# Patient Record
Sex: FEMALE | Race: WHITE | NOT HISPANIC OR LATINO | Employment: OTHER | URBAN - METROPOLITAN AREA
[De-identification: names, ages, dates, MRNs, and addresses within clinical notes are randomized per-mention and may not be internally consistent; named-entity substitution may affect disease eponyms.]

---

## 2017-01-25 ENCOUNTER — OFFICE VISIT (OUTPATIENT)
Dept: INTERNAL MEDICINE | Facility: CLINIC | Age: 52
End: 2017-01-25

## 2017-01-25 VITALS
HEIGHT: 70 IN | DIASTOLIC BLOOD PRESSURE: 84 MMHG | SYSTOLIC BLOOD PRESSURE: 112 MMHG | BODY MASS INDEX: 38.82 KG/M2 | OXYGEN SATURATION: 98 % | HEART RATE: 92 BPM | WEIGHT: 271.2 LBS

## 2017-01-25 DIAGNOSIS — R31.9 HEMATURIA: ICD-10-CM

## 2017-01-25 DIAGNOSIS — E78.2 MIXED HYPERLIPIDEMIA: ICD-10-CM

## 2017-01-25 DIAGNOSIS — E03.8 OTHER SPECIFIED HYPOTHYROIDISM: ICD-10-CM

## 2017-01-25 DIAGNOSIS — R73.03 PREDIABETES: ICD-10-CM

## 2017-01-25 DIAGNOSIS — E78.1 HYPERTRIGLYCERIDEMIA: ICD-10-CM

## 2017-01-25 DIAGNOSIS — R35.0 FREQUENT URINATION: Primary | ICD-10-CM

## 2017-01-25 DIAGNOSIS — I10 ESSENTIAL HYPERTENSION: ICD-10-CM

## 2017-01-25 DIAGNOSIS — F32.A DEPRESSION, UNSPECIFIED DEPRESSION TYPE: ICD-10-CM

## 2017-01-25 DIAGNOSIS — M17.11 ARTHRITIS OF RIGHT KNEE: ICD-10-CM

## 2017-01-25 LAB
BILIRUB BLD-MCNC: NEGATIVE MG/DL
CLARITY, POC: ABNORMAL
COLOR UR: YELLOW
GLUCOSE UR STRIP-MCNC: NEGATIVE MG/DL
KETONES UR QL: ABNORMAL
LEUKOCYTE EST, POC: ABNORMAL
NITRITE UR-MCNC: NEGATIVE MG/ML
PH UR: 5.5 [PH] (ref 5–8)
PROT UR STRIP-MCNC: ABNORMAL MG/DL
RBC # UR STRIP: ABNORMAL /UL
SP GR UR: 1.03 (ref 1–1.03)
UROBILINOGEN UR QL: NORMAL

## 2017-01-25 PROCEDURE — 81003 URINALYSIS AUTO W/O SCOPE: CPT | Performed by: INTERNAL MEDICINE

## 2017-01-25 PROCEDURE — 99214 OFFICE O/P EST MOD 30 MIN: CPT | Performed by: INTERNAL MEDICINE

## 2017-01-25 RX ORDER — ATORVASTATIN CALCIUM 20 MG/1
20 TABLET, FILM COATED ORAL DAILY
Qty: 90 TABLET | Refills: 3 | Status: SHIPPED | OUTPATIENT
Start: 2017-01-25 | End: 2018-01-05 | Stop reason: SDUPTHER

## 2017-01-25 RX ORDER — BUSPIRONE HYDROCHLORIDE 30 MG/1
45 TABLET ORAL DAILY
Qty: 135 TABLET | Refills: 3 | Status: SHIPPED | OUTPATIENT
Start: 2017-01-25 | End: 2018-05-31 | Stop reason: SDUPTHER

## 2017-01-25 RX ORDER — MELOXICAM 15 MG/1
15 TABLET ORAL DAILY
Qty: 90 TABLET | Refills: 1 | Status: SHIPPED | OUTPATIENT
Start: 2017-01-25 | End: 2017-07-06 | Stop reason: SDUPTHER

## 2017-01-25 RX ORDER — TERAZOSIN 1 MG/1
1 CAPSULE ORAL DAILY
Qty: 90 CAPSULE | Refills: 3 | Status: SHIPPED | OUTPATIENT
Start: 2017-01-25 | End: 2018-01-05 | Stop reason: SDUPTHER

## 2017-01-25 RX ORDER — LEVOTHYROXINE SODIUM 0.05 MG/1
50 TABLET ORAL DAILY
Qty: 90 TABLET | Refills: 3 | Status: SHIPPED | OUTPATIENT
Start: 2017-01-25 | End: 2018-01-05 | Stop reason: SDUPTHER

## 2017-01-25 RX ORDER — CIPROFLOXACIN 500 MG/1
500 TABLET, FILM COATED ORAL 2 TIMES DAILY
Qty: 14 TABLET | Refills: 0 | Status: SHIPPED | OUTPATIENT
Start: 2017-01-25 | End: 2017-04-18

## 2017-01-25 RX ORDER — BUPROPION HYDROCHLORIDE 300 MG/1
300 TABLET ORAL DAILY
Qty: 90 TABLET | Refills: 3 | Status: SHIPPED | OUTPATIENT
Start: 2017-01-25 | End: 2017-04-18

## 2017-01-25 NOTE — PROGRESS NOTES
Subjective     Christine Villagomez is a 51 y.o. female, who presents with a chief complaint of   Chief Complaint   Patient presents with   • Med Refill     Needs meds sent to [facility] mail order pharmacy   • Urinary Tract Infection     Has been going a lot more than usual.        HPI     The pt is here bc she needs med refills.    She also needs refills on her psych meds.  Her insurance changed and now going to psych is much more expensive. She is on wellbutrin, buspar, and terazosin. Her sx are well controlled at this time.      hld - no cramps or myalgias on statin.      The pt has also had 3-4 days of dysuria.  No abd pain.  + frequency and urgency.  No fever.  No n/v/d.       The following portions of the patient's history were reviewed and updated as appropriate: allergies, current medications, past family history, past medical history, past social history, past surgical history and problem list.    Allergies: Sulfa antibiotics    Review of Systems   Constitutional: Negative.    HENT: Negative.    Eyes: Negative.    Respiratory: Negative.    Cardiovascular: Negative.    Gastrointestinal: Negative.    Endocrine: Negative.    Genitourinary: Positive for dysuria, frequency and urgency.   Musculoskeletal: Negative.    Skin: Negative.    Allergic/Immunologic: Negative.    Neurological: Negative.    Hematological: Negative.    Psychiatric/Behavioral: Negative.    All other systems reviewed and are negative.      Objective     Wt Readings from Last 3 Encounters:   01/25/17 271 lb 3.2 oz (123 kg)   09/23/16 268 lb 12.8 oz (122 kg)   07/18/16 273 lb 6.4 oz (124 kg)     Temp Readings from Last 3 Encounters:   07/18/16 97.9 °F (36.6 °C)   11/06/15 98.9 °F (37.2 °C) (Oral)   06/30/15 98.6 °F (37 °C) (Oral)     BP Readings from Last 3 Encounters:   01/25/17 112/84   09/23/16 110/80   07/18/16 110/80     Pulse Readings from Last 3 Encounters:   01/25/17 92   09/23/16 90   07/18/16 68     Body mass index is 38.91 kg/(m^2).  SpO2  Readings from Last 3 Encounters:   01/25/17 98%   09/23/16 98%   07/18/16 95%       Physical Exam   Constitutional: She is oriented to person, place, and time. She appears well-developed and well-nourished. No distress.   HENT:   Head: Normocephalic and atraumatic.   Right Ear: External ear normal.   Left Ear: External ear normal.   Nose: Nose normal.   Mouth/Throat: Oropharynx is clear and moist.   Eyes: Conjunctivae and EOM are normal. Pupils are equal, round, and reactive to light.   Neck: Normal range of motion. Neck supple.   Cardiovascular: Normal rate, regular rhythm, normal heart sounds and intact distal pulses.    Pulmonary/Chest: Effort normal and breath sounds normal. No respiratory distress. She has no wheezes.   Musculoskeletal: Normal range of motion.   Normal gait   Neurological: She is alert and oriented to person, place, and time.   Skin: Skin is warm and dry.   Psychiatric: She has a normal mood and affect. Her behavior is normal. Judgment and thought content normal.   Nursing note and vitals reviewed.      Results for orders placed or performed in visit on 01/25/17   POC Urinalysis Dipstick, Automated   Result Value Ref Range    Color Yellow Yellow, Straw, Dark Yellow, Grace    Clarity, UA Cloudy (A) Clear    Glucose, UA Negative Negative, 1000 mg/dL (3+) mg/dL    Bilirubin Negative Negative    Ketones, UA 15 mg/dL (A) Negative    Specific Gravity  1.030 1.005 - 1.030    Blood, UA Large (A) Negative    pH, Urine 5.5 5.0 - 8.0    Protein,  mg/dL (A) Negative mg/dL    Urobilinogen, UA Normal Normal    Leukocytes Large (3+) (A) Negative    Nitrite, UA Negative Negative       Assessment/Plan   Christine was seen today for med refill and urinary tract infection.    Diagnoses and all orders for this visit:    Frequent urination  -     POC Urinalysis Dipstick, Automated  -     Urine Culture (Clean Catch); Future  -     Urine Culture (Clean Catch)  -     ciprofloxacin (CIPRO) 500 MG tablet; Take 1  tablet by mouth 2 (Two) Times a Day.    Hematuria  -     Urine Culture (Clean Catch); Future  -     Urine Culture (Clean Catch)  -     ciprofloxacin (CIPRO) 500 MG tablet; Take 1 tablet by mouth 2 (Two) Times a Day.    Mixed hyperlipidemia  -     atorvastatin (LIPITOR) 20 MG tablet; Take 1 tablet by mouth Daily.  -     Comprehensive Metabolic Panel; Future  -     Lipid Panel With LDL / HDL Ratio; Future    Other specified hypothyroidism  -     levothyroxine (SYNTHROID, LEVOTHROID) 50 MCG tablet; Take 1 tablet by mouth Daily.  -     T4, Free; Future  -     TSH; Future    Arthritis of right knee  -     meloxicam (MOBIC) 15 MG tablet; Take 1 tablet by mouth Daily.    Hypertriglyceridemia    Essential hypertension  -     Comprehensive Metabolic Panel; Future  -     CBC & Differential; Future  -     T4, Free; Future  -     TSH; Future    Depression, unspecified depression type  -     terazosin (HYTRIN) 1 MG capsule; Take 1 capsule by mouth Daily.  -     busPIRone (BUSPAR) 30 MG tablet; Take 1.5 tablets by mouth Daily.  -     buPROPion XL (WELLBUTRIN XL) 300 MG 24 hr tablet; Take 1 tablet by mouth Daily.    Prediabetes  -     Hemoglobin A1c; Future          Outpatient Medications Prior to Visit   Medication Sig Dispense Refill   • Calcium Carb-Cholecalciferol (CALCIUM 600 + D) 600-200 MG-UNIT tablet Take 1 tablet by mouth daily.     • Cholecalciferol (VITAMIN D) 1000 UNITS tablet Take 1 tablet by mouth daily.     • Coenzyme Q10 (COQ10) 100 MG capsule Take 100 mg by mouth daily.     • L-Methylfolate (DEPLIN) 15 MG tablet Take 15 mg by mouth daily.     • Magnesium 250 MG tablet Take 250 mg by mouth daily.     • Misc Natural Products (GLUCOSAMINE CHOND COMPLEX/MSM PO) Take 750 mg by mouth daily.     • Multiple Vitamin (MULTIVITAMINS PO) Take 1 tablet by mouth daily.     • Multiple Vitamins-Minerals (HAIR/SKIN/NAILS PO) Take 1 tablet by mouth daily.     • omega-3 acid ethyl esters (LOVAZA) 1 G capsule Take 1 capsule by mouth  2 (two) times a day.     • Probiotic capsule Take 1 capsule by mouth daily.     • atorvastatin (LIPITOR) 20 MG tablet Take 1 tablet by mouth Daily. 90 tablet 3   • busPIRone (BUSPAR) 30 MG tablet Take 45 mg by mouth daily.     • levothyroxine (SYNTHROID, LEVOTHROID) 50 MCG tablet TAKE 1 TABLET EVERY DAY 90 tablet 1   • meloxicam (MOBIC) 15 MG tablet Take 1 tablet by mouth daily. 90 tablet 1   • terazosin (HYTRIN) 1 MG capsule Take 1 mg by mouth daily.     • tolterodine LA (DETROL LA) 4 MG 24 hr capsule Take 1 capsule by mouth daily. 90 capsule 1     No facility-administered medications prior to visit.      New Medications Ordered This Visit   Medications   • atorvastatin (LIPITOR) 20 MG tablet     Sig: Take 1 tablet by mouth Daily.     Dispense:  90 tablet     Refill:  3   • levothyroxine (SYNTHROID, LEVOTHROID) 50 MCG tablet     Sig: Take 1 tablet by mouth Daily.     Dispense:  90 tablet     Refill:  3   • meloxicam (MOBIC) 15 MG tablet     Sig: Take 1 tablet by mouth Daily.     Dispense:  90 tablet     Refill:  1   • terazosin (HYTRIN) 1 MG capsule     Sig: Take 1 capsule by mouth Daily.     Dispense:  90 capsule     Refill:  3   • busPIRone (BUSPAR) 30 MG tablet     Sig: Take 1.5 tablets by mouth Daily.     Dispense:  135 tablet     Refill:  3   • buPROPion XL (WELLBUTRIN XL) 300 MG 24 hr tablet     Sig: Take 1 tablet by mouth Daily.     Dispense:  90 tablet     Refill:  3   • ciprofloxacin (CIPRO) 500 MG tablet     Sig: Take 1 tablet by mouth 2 (Two) Times a Day.     Dispense:  14 tablet     Refill:  0     Medications Discontinued During This Encounter   Medication Reason   • tolterodine LA (DETROL LA) 4 MG 24 hr capsule    • atorvastatin (LIPITOR) 20 MG tablet Reorder   • levothyroxine (SYNTHROID, LEVOTHROID) 50 MCG tablet Reorder   • meloxicam (MOBIC) 15 MG tablet Reorder   • terazosin (HYTRIN) 1 MG capsule Reorder   • busPIRone (BUSPAR) 30 MG tablet Reorder         Return for Recheck, labs in august.

## 2017-01-25 NOTE — MR AVS SNAPSHOT
Christine Villagomez   1/25/2017 11:00 AM   Office Visit    Dept Phone:  [phone]   Encounter #:  [id number]    Provider:  Graciela Cadena MD   Department:  [facility] INTERNAL MED AND PEDS                Your Full Care Plan              Today's Medication Changes          These changes are accurate as of: 1/25/17 11:51 AM.  If you have any questions, ask your nurse or doctor.               New Medication(s)Ordered:     buPROPion  MG 24 hr tablet   Commonly known as:  WELLBUTRIN XL   Take 1 tablet by mouth Daily.   Started by:  Graciela Cadena MD       ciprofloxacin 500 MG tablet   Commonly known as:  CIPRO   Take 1 tablet by mouth 2 (Two) Times a Day.   Started by:  Graciela Cadena MD         Medication(s)that have changed:     levothyroxine 50 MCG tablet   Commonly known as:  SYNTHROID, LEVOTHROID   Take 1 tablet by mouth Daily.   What changed:  See the new instructions.   Changed by:  Graciela Cadena MD         Stop taking medication(s)listed here:     tolterodine LA 4 MG 24 hr capsule   Commonly known as:  DETROL LA   Stopped by:  Graciela Cadena MD                Where to Get Your Medications      These medications were sent to [email or web address] [place], [place] - [address] [place] [address] - [phone] [facility] [phone] [address] [address]     Phone:  [phone]     atorvastatin 20 MG tablet    buPROPion  MG 24 hr tablet    busPIRone 30 MG tablet    levothyroxine 50 MCG tablet    meloxicam 15 MG tablet    terazosin 1 MG capsule         These medications were sent to [facility] - [place] - [address]  [phone] [facility] [phone] FX  [address] [facility] [place] [address]     Phone:  [phone]     ciprofloxacin 500 MG tablet                  Your Updated Medication List          This list is accurate as of: 1/25/17 11:51 AM.  Always use your most recent med list.                atorvastatin 20 MG tablet   Commonly known as:  LIPITOR   Take 1 tablet by mouth Daily.       buPROPion  MG 24 hr tablet   Commonly known as:  WELLBUTRIN XL   Take 1 tablet by mouth Daily.       busPIRone 30 MG tablet   Commonly known as:  BUSPAR   Take 1.5 tablets by mouth Daily.       CALCIUM 600 + D 600-200 MG-UNIT tablet   Generic drug:  Calcium Carb-Cholecalciferol       ciprofloxacin 500 MG tablet   Commonly known as:  CIPRO   Take 1 tablet by mouth 2 (Two) Times a Day.       CoQ10 100 MG capsule       DEPLIN 15 MG tablet       GLUCOSAMINE CHOND COMPLEX/MSM PO       HAIR/SKIN/NAILS PO       levothyroxine 50 MCG tablet   Commonly known as:  SYNTHROID, LEVOTHROID   Take 1 tablet by mouth Daily.       Magnesium 250 MG tablet       meloxicam 15 MG tablet   Commonly known as:  MOBIC   Take 1 tablet by mouth Daily.       MULTIVITAMINS PO       omega-3 acid ethyl esters 1 G capsule   Commonly known as:  LOVAZA       Probiotic capsule       terazosin 1 MG capsule   Commonly known as:  HYTRIN   Take 1 capsule by mouth Daily.       Vitamin D 1000 UNITS tablet               We Performed the Following     POC Urinalysis Dipstick, Automated     Urine Culture (Clean Catch)       You Were Diagnosed With        Codes Comments    Frequent urination    -  Primary ICD-10-CM: R35.0  ICD-9-CM: 788.41     Hematuria     ICD-10-CM: R31.9  ICD-9-CM: 599.70     Mixed hyperlipidemia     ICD-10-CM: E78.2  ICD-9-CM: 272.2     Other specified hypothyroidism     ICD-10-CM: E03.8  ICD-9-CM: 244.8     Arthritis of right knee     ICD-10-CM: M19.90  ICD-9-CM: 716.96     Hypertriglyceridemia     ICD-10-CM: E78.1  ICD-9-CM: 272.1     Essential hypertension     ICD-10-CM: I10  ICD-9-CM: 401.9     Depression, unspecified depression type     ICD-10-CM: F32.9  ICD-9-CM: 311     Prediabetes     ICD-10-CM: R73.03  ICD-9-CM: 790.29       Instructions     None    Patient Instructions History      Upcoming Appointments     Visit Type Date Time  "Department    FOLLOW UP 1/25/2017 11:00 AM [facility]      [employer] Signup     Our records indicate that you have an active [employer] account.    You can view your After Visit Summary by going to [facility] and logging in with your [employer] username and password.  If you don't have a [employer] username and password but a parent or guardian has access to your record, the parent or guardian should login with their own [employer] username and password and access your record to view the After Visit Summary.    If you have questions, you can email [email or web address] or call [phone] to talk to our [employer] staff.  Remember, [employer] is NOT to be used for urgent needs.  For medical emergencies, dial 911.               Other Info from Your Visit           Allergies     Sulfa Antibiotics  Rash      Reason for Visit     Med Refill Needs meds sent to [facility] mail order pharmacy    Urinary Tract Infection Has been going a lot more than usual.       Vital Signs     Blood Pressure Pulse Height Weight Oxygen Saturation Body Mass Index    112/84 (BP Location: Left arm, Patient Position: Sitting) 92 70\" (177.8 cm) 271 lb 3.2 oz (123 kg) 98% 38.91 kg/m2    Smoking Status                   Never Smoker           Problems and Diagnoses Noted     High blood pressure    High cholesterol or triglycerides    Hypertriglyceridemia    Underactive thyroid    Frequent urination    -  Primary    Blood in urine        Arthritis of right knee        Depression        Prediabetes          Results     POC Urinalysis Dipstick, Automated      Component Value Standard Range & Units    Color Yellow Yellow, Straw, Dark Yellow, Grace    Clarity, UA Cloudy Clear    Glucose, UA Negative Negative, 1000 mg/dL (3+) mg/dL    Bilirubin Negative Negative    Ketones, UA 15 mg/dL Negative    Specific Gravity  1.030 1.005 - 1.030    Blood, UA Large Negative    pH, Urine 5.5 5.0 - 8.0    Protein,  mg/dL Negative mg/dL "    Urobilinogen, UA Normal Normal    Leukocytes Large (3+) Negative    Nitrite, UA Negative Negative

## 2017-01-27 LAB
BACTERIA UR CULT: ABNORMAL
BACTERIA UR CULT: ABNORMAL
OTHER ANTIBIOTIC SUSC ISLT: ABNORMAL

## 2017-01-30 ENCOUNTER — RESULTS ENCOUNTER (OUTPATIENT)
Dept: INTERNAL MEDICINE | Facility: CLINIC | Age: 52
End: 2017-01-30

## 2017-01-30 DIAGNOSIS — E03.8 OTHER SPECIFIED HYPOTHYROIDISM: ICD-10-CM

## 2017-01-30 DIAGNOSIS — R73.03 PREDIABETES: ICD-10-CM

## 2017-01-30 DIAGNOSIS — I10 ESSENTIAL HYPERTENSION: ICD-10-CM

## 2017-01-30 DIAGNOSIS — E78.2 MIXED HYPERLIPIDEMIA: ICD-10-CM

## 2017-02-08 LAB
ALBUMIN SERPL-MCNC: 4.6 G/DL (ref 3.5–5.2)
ALBUMIN/GLOB SERPL: 1.7 G/DL
ALP SERPL-CCNC: 92 U/L (ref 40–129)
ALT SERPL-CCNC: 40 U/L (ref 5–33)
AST SERPL-CCNC: 31 U/L (ref 5–32)
BASOPHILS # BLD AUTO: 0.05 10*3/MM3 (ref 0–0.2)
BASOPHILS NFR BLD AUTO: 0.6 % (ref 0–2)
BILIRUB SERPL-MCNC: 0.6 MG/DL (ref 0.2–1.2)
BUN SERPL-MCNC: 10 MG/DL (ref 6–20)
BUN/CREAT SERPL: 11.8 (ref 7–25)
CALCIUM SERPL-MCNC: 9.6 MG/DL (ref 8.6–10.5)
CHLORIDE SERPL-SCNC: 102 MMOL/L (ref 98–107)
CHOLEST SERPL-MCNC: 152 MG/DL (ref 0–200)
CO2 SERPL-SCNC: 23.8 MMOL/L (ref 22–29)
CREAT SERPL-MCNC: 0.85 MG/DL (ref 0.57–1)
EOSINOPHIL # BLD AUTO: 0.25 10*3/MM3 (ref 0.1–0.3)
EOSINOPHIL NFR BLD AUTO: 3.2 % (ref 0–4)
ERYTHROCYTE [DISTWIDTH] IN BLOOD BY AUTOMATED COUNT: 13.1 % (ref 11.5–14.5)
GLOBULIN SER CALC-MCNC: 2.7 GM/DL
GLUCOSE SERPL-MCNC: 102 MG/DL (ref 65–99)
HBA1C MFR BLD: 5.5 % (ref 4.8–5.6)
HCT VFR BLD AUTO: 44.4 % (ref 37–47)
HDLC SERPL-MCNC: 52 MG/DL (ref 40–60)
HGB BLD-MCNC: 14.1 G/DL (ref 12–16)
IMM GRANULOCYTES # BLD: 0.03 10*3/MM3 (ref 0–0.03)
IMM GRANULOCYTES NFR BLD: 0.4 % (ref 0–0.5)
LDLC SERPL CALC-MCNC: 69 MG/DL (ref 0–100)
LDLC/HDLC SERPL: 1.33 {RATIO}
LYMPHOCYTES # BLD AUTO: 2.34 10*3/MM3 (ref 0.6–4.8)
LYMPHOCYTES NFR BLD AUTO: 29.5 % (ref 20–45)
MCH RBC QN AUTO: 28 PG (ref 27–31)
MCHC RBC AUTO-ENTMCNC: 31.8 G/DL (ref 31–37)
MCV RBC AUTO: 88.1 FL (ref 81–99)
MONOCYTES # BLD AUTO: 0.74 10*3/MM3 (ref 0–1)
MONOCYTES NFR BLD AUTO: 9.3 % (ref 3–8)
NEUTROPHILS # BLD AUTO: 4.52 10*3/MM3 (ref 1.5–8.3)
NEUTROPHILS NFR BLD AUTO: 57 % (ref 45–70)
NRBC BLD AUTO-RTO: 0 /100 WBC (ref 0–0)
PLATELET # BLD AUTO: 257 10*3/MM3 (ref 140–500)
POTASSIUM SERPL-SCNC: 4.2 MMOL/L (ref 3.5–5.2)
PROT SERPL-MCNC: 7.3 G/DL (ref 6–8.5)
RBC # BLD AUTO: 5.04 10*6/MM3 (ref 4.2–5.4)
SODIUM SERPL-SCNC: 141 MMOL/L (ref 136–145)
T4 FREE SERPL-MCNC: 1.28 NG/DL (ref 0.93–1.7)
TRIGL SERPL-MCNC: 154 MG/DL (ref 0–150)
TSH SERPL DL<=0.005 MIU/L-ACNC: 1.81 MIU/ML (ref 0.27–4.2)
VLDLC SERPL CALC-MCNC: 30.8 MG/DL (ref 7–27)
WBC # BLD AUTO: 7.93 10*3/MM3 (ref 4.8–10.8)

## 2017-02-10 ENCOUNTER — TRANSCRIBE ORDERS (OUTPATIENT)
Dept: ADMINISTRATIVE | Facility: HOSPITAL | Age: 52
End: 2017-02-10

## 2017-02-10 DIAGNOSIS — Z13.9 SCREENING: Primary | ICD-10-CM

## 2017-02-15 ENCOUNTER — HOSPITAL ENCOUNTER (OUTPATIENT)
Dept: MAMMOGRAPHY | Facility: HOSPITAL | Age: 52
Discharge: HOME | End: 2017-02-15
Attending: OBSTETRICS & GYNECOLOGY | Admitting: OBSTETRICS & GYNECOLOGY

## 2017-02-15 DIAGNOSIS — Z13.9 SCREENING: ICD-10-CM

## 2017-02-15 PROCEDURE — G0202 SCR MAMMO BI INCL CAD: HCPCS

## 2017-02-15 PROCEDURE — 77063 BREAST TOMOSYNTHESIS BI: CPT

## 2017-02-28 ENCOUNTER — DOCUMENTATION (OUTPATIENT)
Dept: INTERNAL MEDICINE | Facility: HOSPITAL | Age: 52
End: 2017-02-28

## 2017-02-28 ENCOUNTER — HOSPITAL ENCOUNTER (OUTPATIENT)
Dept: SLEEP MEDICINE | Facility: HOSPITAL | Age: 52
Discharge: HOME | End: 2017-02-28
Admitting: INTERNAL MEDICINE

## 2017-02-28 PROCEDURE — G0463 HOSPITAL OUTPT CLINIC VISIT: HCPCS

## 2017-02-28 NOTE — PROGRESS NOTES
PULMONARY SLEEP CONSULT NOTE      PATIENT IDENTIFICATION:  Name: Christine Villagomez  Age: 51 y.o.  Sex: female  :  1965  MRN: [id number]    DATE OF CONSULTATION:  2017   Referring Physician: No admitting provider for patient encounter.                  CHIEF COMPLAINT: Obstructive Sleep Apnea      History of Present Illness:   Christine Villagomez is a 51 y.o. female Pt on CPAP feeling better more energy especially the night he use it more than 4 hours, no sleepiness no fatigue no tiredness, no mask irritation no dryness, compliance report reviewed with pt AHI< 5 with good usage.       Review of Systems:   Constitutional: negative   Eyes: negative   ENT/oropharynx: negative   Cardiovascular: negative   Respiratory: negative   Gastrointestinal: negative   Genitourinary: negative   Neurological: negative   Musculoskeletal: negative   Integument/breast: negative   Endocrine: negative   Allergic/Immunologic: negative     Past Medical History:  Past Medical History   Diagnosis Date   • Hypothyroidism        Past Surgical History:  Past Surgical History   Procedure Laterality Date   • Knee meniscal repair     • Kidney stone surgery          Family History:  No family history on file.     Social History:   Social History   Substance Use Topics   • Smoking status: Never Smoker   • Smokeless tobacco: Not on file   • Alcohol use No        Allergies:  Allergies   Allergen Reactions   • Sulfa Antibiotics Rash       Home Meds:    (Not in a hospital admission)    Objective:    Vitals Ranges:   BP: ()/()   Arterial Line BP: ()/()   WEIGHT: 270   pound       HEIGHT: 70  inches     BMI: 39   /88      Exam:    General Appearance:      Head:  Normocephalic, without obvious abnormality, atraumatic.   Eyes:  Conjunctiva/corneas clear, EOM's intact, both eyes.         Ears:  Normal external ear canals, both ears.    Nose:  Nares normal, no drainage      Throat:  Lips, mucosa, and tongue normal. Mallampati score 3    Neck:   Supple, symmetrical, trachea midline. No JVD.  Lungs:   Bilateral basal rhonchi bilaterally, respirations unlabored symmetrical wall movement.    Chest wall:  No tenderness or deformity.    Heart:  Regular rate and rhythm, S1 and S2 normal.  Abdomen: Soft, non-tender, no masses, no organomegaly.    Extremities: Trace edema no clubbing or Cyanosis        Data Review:  All labs (24hrs): No results found for this or any previous visit (from the past 24 hour(s)).     Imaging:  [unfilled]    ASSESSMENT:  obstructive sleep apnea   .Hypertension essential       PLAN:  This patient with obstructive sleep apnea, compliance is improved. Encourage to use it more frequent, I re-emphasized on pt the long and short term benefit of treating SANA.   Treating SANA will improve BP control           Jone Seth MD. D, ABSM.  2/28/2017  11:12 AM

## 2017-04-18 ENCOUNTER — OFFICE VISIT (OUTPATIENT)
Dept: INTERNAL MEDICINE | Facility: CLINIC | Age: 52
End: 2017-04-18

## 2017-04-18 VITALS
BODY MASS INDEX: 37.65 KG/M2 | HEART RATE: 91 BPM | WEIGHT: 263 LBS | OXYGEN SATURATION: 98 % | HEIGHT: 70 IN | SYSTOLIC BLOOD PRESSURE: 122 MMHG | DIASTOLIC BLOOD PRESSURE: 80 MMHG

## 2017-04-18 DIAGNOSIS — S80.11XA CONTUSION OF LOWER LEG, RIGHT: ICD-10-CM

## 2017-04-18 DIAGNOSIS — M17.0 ARTHRITIS OF BOTH KNEES: Primary | ICD-10-CM

## 2017-04-18 PROCEDURE — 99213 OFFICE O/P EST LOW 20 MIN: CPT | Performed by: INTERNAL MEDICINE

## 2017-04-18 PROCEDURE — 20610 DRAIN/INJ JOINT/BURSA W/O US: CPT | Performed by: INTERNAL MEDICINE

## 2017-04-18 RX ORDER — TRIAMCINOLONE ACETONIDE 40 MG/ML
80 INJECTION, SUSPENSION INTRA-ARTICULAR; INTRAMUSCULAR ONCE
Status: DISCONTINUED | OUTPATIENT
Start: 2017-04-18 | End: 2018-08-15

## 2017-04-18 RX ORDER — TRIAMCINOLONE ACETONIDE 40 MG/ML
80 INJECTION, SUSPENSION INTRA-ARTICULAR; INTRAMUSCULAR ONCE
Status: COMPLETED | OUTPATIENT
Start: 2017-04-18 | End: 2018-04-18

## 2017-04-18 RX ORDER — BUPROPION HYDROCHLORIDE 150 MG/1
150 TABLET, EXTENDED RELEASE ORAL DAILY
COMMUNITY
End: 2018-03-12 | Stop reason: DRUGHIGH

## 2017-04-18 NOTE — PROGRESS NOTES
Subjective     Christine Villagomez is a 51 y.o. female, who presents with a chief complaint of   Chief Complaint   Patient presents with   • Leg Pain     L leg, she tripped over a rake about ten days ago in her garage.    • Knee Pain     bilateral knee pain/.       HPI   The pt is here bc of her legs.  She has gotten cortisone shots in her knees in the past that helped with her arthritis pain.  Her last injection was last July.  She has bilateral knee arthritis.      She also tripped on a rake in the garage about 10 days ago.  Her right lower lateral leg is sore with activity and to the touch.  + bruising.    The following portions of the patient's history were reviewed and updated as appropriate: allergies, current medications, past family history, past medical history, past social history, past surgical history and problem list.    Allergies: Sulfa antibiotics    Review of Systems   Constitutional: Negative.    HENT: Negative.    Eyes: Negative.    Respiratory: Negative.    Cardiovascular: Negative.    Gastrointestinal: Negative.    Endocrine: Negative.    Genitourinary: Negative.    Musculoskeletal: Positive for arthralgias.   Skin: Negative.    Allergic/Immunologic: Negative.    Neurological: Negative.    Hematological: Negative.    Psychiatric/Behavioral: Negative.    All other systems reviewed and are negative.      Objective     Wt Readings from Last 3 Encounters:   04/18/17 263 lb (119 kg)   01/25/17 271 lb 3.2 oz (123 kg)   09/23/16 268 lb 12.8 oz (122 kg)     Temp Readings from Last 3 Encounters:   07/18/16 97.9 °F (36.6 °C)   11/06/15 98.9 °F (37.2 °C) (Oral)   06/30/15 98.6 °F (37 °C) (Oral)     BP Readings from Last 3 Encounters:   04/18/17 122/80   01/25/17 112/84   09/23/16 110/80     Pulse Readings from Last 3 Encounters:   04/18/17 91   01/25/17 92   09/23/16 90     Body mass index is 37.74 kg/(m^2).  SpO2 Readings from Last 3 Encounters:   04/18/17 98%   01/25/17 98%   09/23/16 98%       Physical Exam    Constitutional: She is oriented to person, place, and time. She appears well-developed and well-nourished. No distress.   HENT:   Head: Normocephalic and atraumatic.   Right Ear: External ear normal.   Left Ear: External ear normal.   Nose: Nose normal.   Mouth/Throat: Oropharynx is clear and moist.   Eyes: Conjunctivae and EOM are normal. Pupils are equal, round, and reactive to light.   Neck: Normal range of motion. Neck supple.   Cardiovascular: Normal rate, regular rhythm, normal heart sounds and intact distal pulses.    Pulmonary/Chest: Effort normal and breath sounds normal. No respiratory distress. She has no wheezes.   Musculoskeletal: Normal range of motion.   Normal gait   Neurological: She is alert and oriented to person, place, and time.   Skin: Skin is warm and dry.   Psychiatric: She has a normal mood and affect. Her behavior is normal. Judgment and thought content normal.   Nursing note and vitals reviewed.      Results for orders placed or performed in visit on 01/30/17   Comprehensive Metabolic Panel   Result Value Ref Range    Glucose 102 (H) 65 - 99 mg/dL    BUN 10 6 - 20 mg/dL    Creatinine 0.85 0.57 - 1.00 mg/dL    eGFR Non African Am 70 >60 mL/min/1.73    eGFR African Am 85 >60 mL/min/1.73    BUN/Creatinine Ratio 11.8 7.0 - 25.0    Sodium 141 136 - 145 mmol/L    Potassium 4.2 3.5 - 5.2 mmol/L    Chloride 102 98 - 107 mmol/L    Total CO2 23.8 22.0 - 29.0 mmol/L    Calcium 9.6 8.6 - 10.5 mg/dL    Total Protein 7.3 6.0 - 8.5 g/dL    Albumin 4.60 3.50 - 5.20 g/dL    Globulin 2.7 gm/dL    A/G Ratio 1.7 g/dL    Total Bilirubin 0.6 0.2 - 1.2 mg/dL    Alkaline Phosphatase 92 40 - 129 U/L    AST (SGOT) 31 5 - 32 U/L    ALT (SGPT) 40 (H) 5 - 33 U/L   T4, Free   Result Value Ref Range    Free T4 1.28 0.93 - 1.70 ng/dL   TSH   Result Value Ref Range    TSH 1.810 0.270 - 4.200 mIU/mL   Lipid Panel With LDL / HDL Ratio   Result Value Ref Range    Total Cholesterol 152 0 - 200 mg/dL    Triglycerides 154 (H)  0 - 150 mg/dL    HDL Cholesterol 52 40 - 60 mg/dL    VLDL Cholesterol 30.8 (H) 7 - 27 mg/dL    LDL Cholesterol  69 0 - 100 mg/dL    LDL/HDL Ratio 1.33    Hemoglobin A1c   Result Value Ref Range    Hemoglobin A1C 5.50 4.80 - 5.60 %   CBC & Differential   Result Value Ref Range    WBC 7.93 4.80 - 10.80 10*3/mm3    RBC 5.04 4.20 - 5.40 10*6/mm3    Hemoglobin 14.1 12.0 - 16.0 g/dL    Hematocrit 44.4 37.0 - 47.0 %    MCV 88.1 81.0 - 99.0 fL    MCH 28.0 27.0 - 31.0 pg    MCHC 31.8 31.0 - 37.0 g/dL    RDW 13.1 11.5 - 14.5 %    Platelets 257 140 - 500 10*3/mm3    Neutrophil Rel % 57.0 45.0 - 70.0 %    Lymphocyte Rel % 29.5 20.0 - 45.0 %    Monocyte Rel % 9.3 (H) 3.0 - 8.0 %    Eosinophil Rel % 3.2 0.0 - 4.0 %    Basophil Rel % 0.6 0.0 - 2.0 %    Neutrophils Absolute 4.52 1.50 - 8.30 10*3/mm3    Lymphocytes Absolute 2.34 0.60 - 4.80 10*3/mm3    Monocytes Absolute 0.74 0.00 - 1.00 10*3/mm3    Eosinophils Absolute 0.25 0.10 - 0.30 10*3/mm3    Basophils Absolute 0.05 0.00 - 0.20 10*3/mm3    Immature Granulocyte Rel % 0.4 0.0 - 0.5 %    Immature Grans Absolute 0.03 0.00 - 0.03 10*3/mm3    nRBC 0.0 0.0 - 0.0 /100 WBC       Assessment/Plan   Christine was seen today for leg pain and knee pain.    Diagnoses and all orders for this visit:    Arthritis of both knees - both knees injected today in office.  cont meloxicam.  Cont conservative management of arthritis.  -     Arthrocentesis  -     triamcinolone acetonide (KENALOG-40) injection 80 mg; Inject 2 mL into the joint 1 (One) Time.  -     triamcinolone acetonide (KENALOG-40) injection 80 mg; Inject 2 mL into the joint 1 (One) Time.    Contusion of lower leg, right - supportive care.  otc meds prn.          Outpatient Medications Prior to Visit   Medication Sig Dispense Refill   • atorvastatin (LIPITOR) 20 MG tablet Take 1 tablet by mouth Daily. (Patient taking differently: Take 10 mg by mouth Daily.) 90 tablet 3   • busPIRone (BUSPAR) 30 MG tablet Take 1.5 tablets by mouth Daily.  135 tablet 3   • Calcium Carb-Cholecalciferol (CALCIUM 600 + D) 600-200 MG-UNIT tablet Take 1 tablet by mouth daily.     • Cholecalciferol (VITAMIN D) 1000 UNITS tablet Take 1 tablet by mouth daily.     • Coenzyme Q10 (COQ10) 100 MG capsule Take 100 mg by mouth daily.     • L-Methylfolate (DEPLIN) 15 MG tablet Take 15 mg by mouth daily.     • levothyroxine (SYNTHROID, LEVOTHROID) 50 MCG tablet Take 1 tablet by mouth Daily. 90 tablet 3   • Magnesium 250 MG tablet Take 250 mg by mouth daily.     • meloxicam (MOBIC) 15 MG tablet Take 1 tablet by mouth Daily. 90 tablet 1   • Misc Natural Products (GLUCOSAMINE CHOND COMPLEX/MSM PO) Take 750 mg by mouth daily.     • Multiple Vitamin (MULTIVITAMINS PO) Take 1 tablet by mouth daily.     • Multiple Vitamins-Minerals (HAIR/SKIN/NAILS PO) Take 1 tablet by mouth daily.     • Probiotic capsule Take 1 capsule by mouth daily.     • terazosin (HYTRIN) 1 MG capsule Take 1 capsule by mouth Daily. 90 capsule 3   • buPROPion XL (WELLBUTRIN XL) 300 MG 24 hr tablet Take 1 tablet by mouth Daily. 90 tablet 3   • ciprofloxacin (CIPRO) 500 MG tablet Take 1 tablet by mouth 2 (Two) Times a Day. 14 tablet 0   • omega-3 acid ethyl esters (LOVAZA) 1 G capsule Take 1 capsule by mouth 2 (two) times a day.       No facility-administered medications prior to visit.      New Medications Ordered This Visit   Medications   • triamcinolone acetonide (KENALOG-40) injection 80 mg   • triamcinolone acetonide (KENALOG-40) injection 80 mg       Medications Discontinued During This Encounter   Medication Reason   • ciprofloxacin (CIPRO) 500 MG tablet    • buPROPion XL (WELLBUTRIN XL) 300 MG 24 hr tablet    • omega-3 acid ethyl esters (LOVAZA) 1 G capsule          Return if symptoms worsen or fail to improve.

## 2017-04-18 NOTE — PROGRESS NOTES
Procedure   Arthrocentesis  Date/Time: 4/18/2017 12:38 PM  Performed by: JOSE HATFIELD  Authorized by: JOSE HATFIELD   Consent: Verbal consent obtained.  Risks and benefits: risks, benefits and alternatives were discussed  Consent given by: patient  Patient understanding: patient states understanding of the procedure being performed  Site marked: the operative site was marked  Patient identity confirmed: verbally with patient  Indications: pain   Body area: knee  Preparation: Patient was prepped and draped in the usual sterile fashion.  Approach: lateral  Triamcinolone amount: 80 mg  Lidocaine 1% amount: 1 mL  Patient tolerance: Patient tolerated the procedure well with no immediate complications  Comments: Bilateral knees injected with 25G needle.  80mg triamcinolone and 1ml 1%lidocaine injected into each knee.

## 2017-07-06 DIAGNOSIS — M17.11 ARTHRITIS OF RIGHT KNEE: ICD-10-CM

## 2017-07-06 RX ORDER — MELOXICAM 15 MG/1
TABLET ORAL
Qty: 90 TABLET | Refills: 1 | Status: SHIPPED | OUTPATIENT
Start: 2017-07-06 | End: 2018-01-05 | Stop reason: SDUPTHER

## 2017-09-27 ENCOUNTER — TELEPHONE (OUTPATIENT)
Dept: SLEEP MEDICINE | Facility: HOSPITAL | Age: 52
End: 2017-09-27

## 2017-10-03 ENCOUNTER — DOCUMENTATION (OUTPATIENT)
Dept: INTERNAL MEDICINE | Facility: HOSPITAL | Age: 52
End: 2017-10-03

## 2017-10-03 ENCOUNTER — HOSPITAL ENCOUNTER (OUTPATIENT)
Dept: SLEEP MEDICINE | Facility: HOSPITAL | Age: 52
Discharge: HOME | End: 2017-10-03
Admitting: INTERNAL MEDICINE

## 2017-10-03 PROCEDURE — G0463 HOSPITAL OUTPT CLINIC VISIT: HCPCS

## 2017-10-03 NOTE — PROGRESS NOTES
PULMONARY SLEEP CONSULT NOTE      PATIENT IDENTIFICATION:  Name: Christine Villagomez  Age: 51 y.o.  Sex: female  :  1965  MRN: [id number]    DATE OF CONSULTATION:  10/3/2017   Referring Physician: No admitting provider for patient encounter.                  CHIEF COMPLAINT: Obstructive Sleep Apnea      History of Present Illness:   Christine Villagomez is a 51 y.o. female Pt with still multiple wakening up at night with sleepiness fatigue and snoring, witnessed apnea, Hard  to get up in the morning. Daytime fatigue sleepiness loss of energy, [place] score of ( 16 ) \  Pt was on CPAP till it got broken was feeling good when she was on it       Review of Systems:   Constitutional: As above    Eyes: negative   ENT/oropharynx: negative   Cardiovascular: negative   Respiratory: negative   Gastrointestinal: negative   Genitourinary: negative   Neurological: negative   Musculoskeletal: negative   Integument/breast: negative   Endocrine: negative   Allergic/Immunologic: negative     Past Medical History:  Past Medical History:   Diagnosis Date   • Hypothyroidism        Past Surgical History:  Past Surgical History:   Procedure Laterality Date   • KIDNEY STONE SURGERY     • KNEE MENISCAL REPAIR          Family History:  No family history on file.     Social History:   Social History   Substance Use Topics   • Smoking status: Never Smoker   • Smokeless tobacco: Not on file   • Alcohol use No        Allergies:  Allergies   Allergen Reactions   • Sulfa Antibiotics Rash       Home Meds:    (Not in a hospital admission)    Objective:    Vitals Ranges:      WEIGHT: 237   pound       HEIGHT: 68  inches     BMI: 36   /83      Exam:    General Appearance:      Head:  Normocephalic, without obvious abnormality, atraumatic.   Eyes:  Conjunctiva/corneas clear, EOM's intact, both eyes.         Ears:  Normal external ear canals, both ears.    Nose:  Nares normal, no drainage      Throat:  Lips, mucosa, and tongue normal.  Mallampati score 3    Neck:  Supple, symmetrical, trachea midline. No JVD.  Lungs:   Bilateral basal rhonchi bilaterally, respirations unlabored symmetrical wall movement.    Chest wall:  No tenderness or deformity.    Heart:  Regular rate and rhythm, S1 and S2 normal.  Abdomen: Soft, non-tender, no masses, no organomegaly.    Extremities: Trace edema no clubbing or Cyanosis        Data Review:  All labs (24hrs): No results found for this or any previous visit (from the past 24 hour(s)).     Imaging:  [unfilled]    ASSESSMENT:  obstructive sleep apnea   Hypothyridism    PLAN:   This is patient with symptoms of obstructive sleep apnea, if not able to get new CPAP  NPSG study ASAP / split night study, Avoid supine avoid sedative meds in pm, weight loss, Avoid driving. Long discussion with patient about the physiology of SANA, and long term and short term benefit of treating SANA     It is recommended to keep thyroid function optimized to improve quality of sleep        Jone Seth MD. D, ABSM.  10/3/2017  1:41 PM

## 2017-10-18 ENCOUNTER — OFFICE VISIT (OUTPATIENT)
Dept: INTERNAL MEDICINE | Facility: CLINIC | Age: 52
End: 2017-10-18

## 2017-10-18 VITALS
WEIGHT: 234.8 LBS | HEART RATE: 79 BPM | HEIGHT: 70 IN | SYSTOLIC BLOOD PRESSURE: 130 MMHG | OXYGEN SATURATION: 98 % | BODY MASS INDEX: 33.61 KG/M2 | DIASTOLIC BLOOD PRESSURE: 84 MMHG

## 2017-10-18 DIAGNOSIS — I10 ESSENTIAL HYPERTENSION: ICD-10-CM

## 2017-10-18 DIAGNOSIS — E88.81 INSULIN RESISTANCE: ICD-10-CM

## 2017-10-18 DIAGNOSIS — E78.2 MIXED HYPERLIPIDEMIA: ICD-10-CM

## 2017-10-18 DIAGNOSIS — E78.1 HYPERTRIGLYCERIDEMIA: ICD-10-CM

## 2017-10-18 DIAGNOSIS — E03.8 OTHER SPECIFIED HYPOTHYROIDISM: ICD-10-CM

## 2017-10-18 DIAGNOSIS — Z23 NEEDS FLU SHOT: Primary | ICD-10-CM

## 2017-10-18 DIAGNOSIS — F41.9 ANXIETY: ICD-10-CM

## 2017-10-18 DIAGNOSIS — G47.30 SLEEP APNEA, UNSPECIFIED TYPE: ICD-10-CM

## 2017-10-18 DIAGNOSIS — Z00.00 HEALTHCARE MAINTENANCE: ICD-10-CM

## 2017-10-18 PROBLEM — E88.819 INSULIN RESISTANCE: Status: ACTIVE | Noted: 2017-10-18

## 2017-10-18 LAB
ALBUMIN SERPL-MCNC: 4.8 G/DL (ref 3.5–5.2)
ALBUMIN/GLOB SERPL: 1.8 G/DL
ALP SERPL-CCNC: 96 U/L (ref 40–129)
ALT SERPL-CCNC: 26 U/L (ref 5–33)
AST SERPL-CCNC: 21 U/L (ref 5–32)
BASOPHILS # BLD AUTO: 0.05 10*3/MM3 (ref 0–0.2)
BASOPHILS NFR BLD AUTO: 0.6 % (ref 0–2)
BILIRUB SERPL-MCNC: 0.6 MG/DL (ref 0.2–1.2)
BUN SERPL-MCNC: 16 MG/DL (ref 6–20)
BUN/CREAT SERPL: 16.7 (ref 7–25)
CALCIUM SERPL-MCNC: 9.6 MG/DL (ref 8.6–10.5)
CHLORIDE SERPL-SCNC: 101 MMOL/L (ref 98–107)
CHOLEST SERPL-MCNC: 142 MG/DL (ref 0–200)
CO2 SERPL-SCNC: 27.1 MMOL/L (ref 22–29)
CREAT SERPL-MCNC: 0.96 MG/DL (ref 0.57–1)
EOSINOPHIL # BLD AUTO: 0.28 10*3/MM3 (ref 0.1–0.3)
EOSINOPHIL NFR BLD AUTO: 3.6 % (ref 0–4)
ERYTHROCYTE [DISTWIDTH] IN BLOOD BY AUTOMATED COUNT: 12 % (ref 11.5–14.5)
GLOBULIN SER CALC-MCNC: 2.6 GM/DL
GLUCOSE SERPL-MCNC: 94 MG/DL (ref 65–99)
HBA1C MFR BLD: 5.5 % (ref 4.8–5.6)
HCT VFR BLD AUTO: 43.1 % (ref 37–47)
HDLC SERPL-MCNC: 52 MG/DL (ref 40–60)
HGB BLD-MCNC: 14 G/DL (ref 12–16)
IMM GRANULOCYTES # BLD: 0.02 10*3/MM3 (ref 0–0.03)
IMM GRANULOCYTES NFR BLD: 0.3 % (ref 0–0.5)
LDLC SERPL CALC-MCNC: 61 MG/DL (ref 0–100)
LDLC/HDLC SERPL: 1.17 {RATIO}
LYMPHOCYTES # BLD AUTO: 2.17 10*3/MM3 (ref 0.6–4.8)
LYMPHOCYTES NFR BLD AUTO: 27.8 % (ref 20–45)
MCH RBC QN AUTO: 28.8 PG (ref 27–31)
MCHC RBC AUTO-ENTMCNC: 32.5 G/DL (ref 31–37)
MCV RBC AUTO: 88.7 FL (ref 81–99)
MONOCYTES # BLD AUTO: 0.77 10*3/MM3 (ref 0–1)
MONOCYTES NFR BLD AUTO: 9.9 % (ref 3–8)
NEUTROPHILS # BLD AUTO: 4.51 10*3/MM3 (ref 1.5–8.3)
NEUTROPHILS NFR BLD AUTO: 57.8 % (ref 45–70)
NRBC BLD AUTO-RTO: 0 /100 WBC (ref 0–0)
PLATELET # BLD AUTO: 230 10*3/MM3 (ref 140–500)
POTASSIUM SERPL-SCNC: 4.4 MMOL/L (ref 3.5–5.2)
PROT SERPL-MCNC: 7.4 G/DL (ref 6–8.5)
RBC # BLD AUTO: 4.86 10*6/MM3 (ref 4.2–5.4)
SODIUM SERPL-SCNC: 139 MMOL/L (ref 136–145)
T4 FREE SERPL-MCNC: 1.24 NG/DL (ref 0.93–1.7)
TRIGL SERPL-MCNC: 146 MG/DL (ref 0–150)
TSH SERPL DL<=0.005 MIU/L-ACNC: 1.45 MIU/ML (ref 0.27–4.2)
VLDLC SERPL CALC-MCNC: 29.2 MG/DL (ref 7–27)
WBC # BLD AUTO: 7.8 10*3/MM3 (ref 4.8–10.8)

## 2017-10-18 PROCEDURE — 90686 IIV4 VACC NO PRSV 0.5 ML IM: CPT | Performed by: INTERNAL MEDICINE

## 2017-10-18 PROCEDURE — 90715 TDAP VACCINE 7 YRS/> IM: CPT | Performed by: INTERNAL MEDICINE

## 2017-10-18 PROCEDURE — 90471 IMMUNIZATION ADMIN: CPT | Performed by: INTERNAL MEDICINE

## 2017-10-18 PROCEDURE — 90472 IMMUNIZATION ADMIN EACH ADD: CPT | Performed by: INTERNAL MEDICINE

## 2017-10-18 PROCEDURE — 99214 OFFICE O/P EST MOD 30 MIN: CPT | Performed by: INTERNAL MEDICINE

## 2017-10-18 NOTE — PROGRESS NOTES
Subjective     Christine Villagomez is a 51 y.o. female, who presents with a chief complaint of   Chief Complaint   Patient presents with   • Follow-up     Needs labs, patient is fasting this AM   • Hyperlipidemia       HPI   The pt is here for follow up.  Since the pt's last OV she has lost 47 pounds.  She has been more active and really watching her diet.  She is feeling better now.  Her knees feel better now.       HLD - on atorvastatin.  No cramps or myalgias.    Insulin resistance - weight loss as above.      Hypothyroidism - on synthroid.  No hair, skin changes.      SANA - pt still using her cpap and feels better when she uses it.      HM - pt needs Tdap, mammo and dexa next feb.  She is perimenopausal.    The following portions of the patient's history were reviewed and updated as appropriate: allergies, current medications, past family history, past medical history, past social history, past surgical history and problem list.    Allergies: Sulfa antibiotics    Review of Systems   Constitutional: Negative.    HENT: Negative.    Eyes: Negative.    Respiratory: Negative.    Cardiovascular: Negative.    Gastrointestinal: Negative.    Endocrine: Negative.    Genitourinary: Negative.    Musculoskeletal: Negative.    Skin: Negative.    Allergic/Immunologic: Negative.    Neurological: Negative.    Hematological: Negative.    Psychiatric/Behavioral: Negative.    All other systems reviewed and are negative.      Objective     Wt Readings from Last 3 Encounters:   10/18/17 234 lb 12.8 oz (107 kg)   04/18/17 263 lb (119 kg)   01/25/17 271 lb 3.2 oz (123 kg)     Temp Readings from Last 3 Encounters:   07/18/16 97.9 °F (36.6 °C)   11/06/15 98.9 °F (37.2 °C) (Oral)   06/30/15 98.6 °F (37 °C) (Oral)     BP Readings from Last 3 Encounters:   10/18/17 130/84   04/18/17 122/80   01/25/17 112/84     Pulse Readings from Last 3 Encounters:   10/18/17 79   04/18/17 91   01/25/17 92     Body mass index is 33.69 kg/(m^2).  SpO2 Readings  from Last 3 Encounters:   10/18/17 98%   04/18/17 98%   01/25/17 98%       Physical Exam   Constitutional: She is oriented to person, place, and time. She appears well-developed and well-nourished. No distress.   HENT:   Head: Normocephalic and atraumatic.   Right Ear: External ear normal.   Left Ear: External ear normal.   Nose: Nose normal.   Mouth/Throat: Oropharynx is clear and moist.   Eyes: Conjunctivae and EOM are normal. Pupils are equal, round, and reactive to light.   Neck: Normal range of motion. Neck supple.   Cardiovascular: Normal rate, regular rhythm, normal heart sounds and intact distal pulses.    Pulmonary/Chest: Effort normal and breath sounds normal. No respiratory distress. She has no wheezes.   Musculoskeletal: Normal range of motion.   Normal gait   Neurological: She is alert and oriented to person, place, and time.   Skin: Skin is warm and dry.   Psychiatric: She has a normal mood and affect. Her behavior is normal. Judgment and thought content normal.   Nursing note and vitals reviewed.      Results for orders placed or performed in visit on 01/30/17   Comprehensive Metabolic Panel   Result Value Ref Range    Glucose 102 (H) 65 - 99 mg/dL    BUN 10 6 - 20 mg/dL    Creatinine 0.85 0.57 - 1.00 mg/dL    eGFR Non African Am 70 >60 mL/min/1.73    eGFR African Am 85 >60 mL/min/1.73    BUN/Creatinine Ratio 11.8 7.0 - 25.0    Sodium 141 136 - 145 mmol/L    Potassium 4.2 3.5 - 5.2 mmol/L    Chloride 102 98 - 107 mmol/L    Total CO2 23.8 22.0 - 29.0 mmol/L    Calcium 9.6 8.6 - 10.5 mg/dL    Total Protein 7.3 6.0 - 8.5 g/dL    Albumin 4.60 3.50 - 5.20 g/dL    Globulin 2.7 gm/dL    A/G Ratio 1.7 g/dL    Total Bilirubin 0.6 0.2 - 1.2 mg/dL    Alkaline Phosphatase 92 40 - 129 U/L    AST (SGOT) 31 5 - 32 U/L    ALT (SGPT) 40 (H) 5 - 33 U/L   T4, Free   Result Value Ref Range    Free T4 1.28 0.93 - 1.70 ng/dL   TSH   Result Value Ref Range    TSH 1.810 0.270 - 4.200 mIU/mL   Lipid Panel With LDL / HDL Ratio    Result Value Ref Range    Total Cholesterol 152 0 - 200 mg/dL    Triglycerides 154 (H) 0 - 150 mg/dL    HDL Cholesterol 52 40 - 60 mg/dL    VLDL Cholesterol 30.8 (H) 7 - 27 mg/dL    LDL Cholesterol  69 0 - 100 mg/dL    LDL/HDL Ratio 1.33    Hemoglobin A1c   Result Value Ref Range    Hemoglobin A1C 5.50 4.80 - 5.60 %   CBC & Differential   Result Value Ref Range    WBC 7.93 4.80 - 10.80 10*3/mm3    RBC 5.04 4.20 - 5.40 10*6/mm3    Hemoglobin 14.1 12.0 - 16.0 g/dL    Hematocrit 44.4 37.0 - 47.0 %    MCV 88.1 81.0 - 99.0 fL    MCH 28.0 27.0 - 31.0 pg    MCHC 31.8 31.0 - 37.0 g/dL    RDW 13.1 11.5 - 14.5 %    Platelets 257 140 - 500 10*3/mm3    Neutrophil Rel % 57.0 45.0 - 70.0 %    Lymphocyte Rel % 29.5 20.0 - 45.0 %    Monocyte Rel % 9.3 (H) 3.0 - 8.0 %    Eosinophil Rel % 3.2 0.0 - 4.0 %    Basophil Rel % 0.6 0.0 - 2.0 %    Neutrophils Absolute 4.52 1.50 - 8.30 10*3/mm3    Lymphocytes Absolute 2.34 0.60 - 4.80 10*3/mm3    Monocytes Absolute 0.74 0.00 - 1.00 10*3/mm3    Eosinophils Absolute 0.25 0.10 - 0.30 10*3/mm3    Basophils Absolute 0.05 0.00 - 0.20 10*3/mm3    Immature Granulocyte Rel % 0.4 0.0 - 0.5 %    Immature Grans Absolute 0.03 0.00 - 0.03 10*3/mm3    nRBC 0.0 0.0 - 0.0 /100 WBC       Assessment/Plan   Christine was seen today for follow-up and hyperlipidemia.    Diagnoses and all orders for this visit:    Healthcare maintenance  -     Comprehensive Metabolic Panel; Future  -     CBC & Differential; Future  -     T4, Free; Future  -     TSH; Future  -     Lipid Panel With LDL / HDL Ratio; Future  -     Hemoglobin A1c; Future  -     Tdap Vaccine Greater Than or Equal To 6yo IM    Hypertriglyceridemia  -     Comprehensive Metabolic Panel; Future  -     Lipid Panel With LDL / HDL Ratio; Future    Mixed hyperlipidemia  -     Comprehensive Metabolic Panel; Future  -     Lipid Panel With LDL / HDL Ratio; Future    Essential hypertension  -     Comprehensive Metabolic Panel; Future  -     CBC & Differential;  Future  -     T4, Free; Future  -     TSH; Future  -     Lipid Panel With LDL / HDL Ratio; Future    Other specified hypothyroidism  -     T4, Free; Future  -     TSH; Future    Anxiety  -     Comprehensive Metabolic Panel; Future  -     CBC & Differential; Future  -     T4, Free; Future  -     TSH; Future  -     Lipid Panel With LDL / HDL Ratio; Future    Sleep apnea, unspecified type    Insulin resistance  -     Comprehensive Metabolic Panel; Future  -     CBC & Differential; Future  -     T4, Free; Future  -     TSH; Future  -     Lipid Panel With LDL / HDL Ratio; Future  -     Hemoglobin A1c; Future      Cont current meds at this time. Adjust as needed based on lab results.      Outpatient Medications Prior to Visit   Medication Sig Dispense Refill   • atorvastatin (LIPITOR) 20 MG tablet Take 1 tablet by mouth Daily. (Patient taking differently: Take 10 mg by mouth Daily.) 90 tablet 3   • buPROPion SR (WELLBUTRIN SR) 150 MG 12 hr tablet Take 150 mg by mouth Daily.     • busPIRone (BUSPAR) 30 MG tablet Take 1.5 tablets by mouth Daily. 135 tablet 3   • Calcium Carb-Cholecalciferol (CALCIUM 600 + D) 600-200 MG-UNIT tablet Take 1 tablet by mouth daily.     • Cholecalciferol (VITAMIN D) 1000 UNITS tablet Take 1 tablet by mouth daily.     • Coenzyme Q10 (COQ10) 100 MG capsule Take 100 mg by mouth daily.     • L-Methylfolate (DEPLIN) 15 MG tablet Take 15 mg by mouth daily.     • levothyroxine (SYNTHROID, LEVOTHROID) 50 MCG tablet Take 1 tablet by mouth Daily. 90 tablet 3   • Magnesium 250 MG tablet Take 250 mg by mouth daily.     • meloxicam (MOBIC) 15 MG tablet TAKE 1 TABLET DAILY 90 tablet 1   • Misc Natural Products (GLUCOSAMINE CHOND COMPLEX/MSM PO) Take 750 mg by mouth daily.     • Multiple Vitamin (MULTIVITAMINS PO) Take 1 tablet by mouth daily.     • Multiple Vitamins-Minerals (HAIR/SKIN/NAILS PO) Take 1 tablet by mouth daily.     • Probiotic capsule Take 1 capsule by mouth daily.     • terazosin (HYTRIN) 1 MG  capsule Take 1 capsule by mouth Daily. 90 capsule 3     Facility-Administered Medications Prior to Visit   Medication Dose Route Frequency Provider Last Rate Last Dose   • triamcinolone acetonide (KENALOG-40) injection 80 mg  80 mg Intra-articular Once Graciela Cadena MD       • triamcinolone acetonide (KENALOG-40) injection 80 mg  80 mg Intra-articular Once Graciela Cadena MD         No orders of the defined types were placed in this encounter.    There are no discontinued medications.      Return in about 6 months (around 4/18/2018) for Recheck, labs.

## 2017-12-06 ENCOUNTER — OFFICE VISIT (OUTPATIENT)
Dept: RETAIL CLINIC | Facility: CLINIC | Age: 52
End: 2017-12-06

## 2017-12-06 VITALS
RESPIRATION RATE: 17 BRPM | SYSTOLIC BLOOD PRESSURE: 128 MMHG | OXYGEN SATURATION: 98 % | DIASTOLIC BLOOD PRESSURE: 70 MMHG | HEART RATE: 96 BPM | TEMPERATURE: 99.3 F

## 2017-12-06 DIAGNOSIS — J02.9 ACUTE PHARYNGITIS, UNSPECIFIED ETIOLOGY: Primary | ICD-10-CM

## 2017-12-06 DIAGNOSIS — R30.0 DYSURIA: ICD-10-CM

## 2017-12-06 DIAGNOSIS — J03.90 TONSILLITIS: ICD-10-CM

## 2017-12-06 LAB
BILIRUB BLD-MCNC: NEGATIVE MG/DL
CLARITY, POC: ABNORMAL
COLOR UR: ABNORMAL
EXPIRATION DATE: NORMAL
GLUCOSE UR STRIP-MCNC: NEGATIVE MG/DL
INTERNAL CONTROL: NORMAL
KETONES UR QL: ABNORMAL
LEUKOCYTE EST, POC: ABNORMAL
Lab: NORMAL
NITRITE UR-MCNC: NEGATIVE MG/ML
PH UR: 5.5 [PH] (ref 5–8)
PROT UR STRIP-MCNC: ABNORMAL MG/DL
RBC # UR STRIP: ABNORMAL /UL
S PYO AG THROAT QL: NEGATIVE
SP GR UR: 1.02 (ref 1–1.03)
UROBILINOGEN UR QL: NORMAL

## 2017-12-06 PROCEDURE — 81003 URINALYSIS AUTO W/O SCOPE: CPT | Performed by: NURSE PRACTITIONER

## 2017-12-06 PROCEDURE — 99213 OFFICE O/P EST LOW 20 MIN: CPT | Performed by: NURSE PRACTITIONER

## 2017-12-06 PROCEDURE — 87880 STREP A ASSAY W/OPTIC: CPT | Performed by: NURSE PRACTITIONER

## 2017-12-06 RX ORDER — AMOXICILLIN 875 MG/1
875 TABLET, COATED ORAL 2 TIMES DAILY
Qty: 20 TABLET | Refills: 0 | Status: SHIPPED | OUTPATIENT
Start: 2017-12-06 | End: 2017-12-16

## 2017-12-06 NOTE — PROGRESS NOTES
Subjective     Christine Villagomez is a 51 y.o.. female.     Sore Throat    This is a new problem. The current episode started in the past 7 days. The problem has been gradually worsening. There has been no fever. Associated symptoms include ear pain and headaches. Pertinent negatives include no abdominal pain, congestion, coughing, diarrhea or vomiting. Treatments tried: tylenol, otc cough medicine. The treatment provided no relief.       The following portions of the patient's history were reviewed and updated as appropriate: allergies, current medications, past family history, past medical history, past social history, past surgical history and problem list.    Review of Systems   Constitutional: Positive for chills and fatigue. Negative for fever.   HENT: Positive for ear pain and sore throat. Negative for congestion, postnasal drip, rhinorrhea, sinus pain and sinus pressure.    Eyes: Positive for redness.   Respiratory: Negative.  Negative for cough.    Cardiovascular: Negative.    Gastrointestinal: Negative.  Negative for abdominal pain, diarrhea, nausea and vomiting.   Genitourinary: Positive for dysuria and frequency. Negative for urgency.   Neurological: Positive for headaches.       Objective     Vitals:    12/06/17 1553   BP: 128/70   Pulse: 96   Resp: 17   Temp: 99.3 °F (37.4 °C)   TempSrc: Oral   SpO2: 98%       Physical Exam   Constitutional: She is oriented to person, place, and time. She appears well-developed and well-nourished.   HENT:   Head: Normocephalic and atraumatic.   Right Ear: Tympanic membrane normal. Tympanic membrane is not erythematous.   Left Ear: Tympanic membrane is not erythematous.   Nose: Nose normal. Right sinus exhibits no maxillary sinus tenderness and no frontal sinus tenderness. Left sinus exhibits no maxillary sinus tenderness and no frontal sinus tenderness.   Mouth/Throat: Posterior oropharyngeal erythema present. Tonsils are 2+ on the right. Tonsils are 2+ on the left.    Left TM with clear fluid noted   Eyes: Conjunctivae are normal. Pupils are equal, round, and reactive to light.   Cardiovascular: Normal rate and regular rhythm.    No murmur heard.  Pulmonary/Chest: Effort normal. She has no wheezes. She has no rhonchi. She has no rales.   Musculoskeletal: Normal range of motion.   Lymphadenopathy:     She has cervical adenopathy.        Right cervical: Superficial cervical adenopathy present.        Left cervical: Superficial cervical adenopathy present.   Neurological: She is alert and oriented to person, place, and time.   Skin: Skin is warm and dry.   Vitals reviewed.      Lab Results (last 24 hours)     Procedure Component Value Units Date/Time    POCT urinalysis dipstick, automated [[id number]]  (Abnormal) Collected:  12/06/17 1614    Specimen:  Urine Updated:  12/06/17 1616     Color Straw     Clarity, UA Hazy (A)     Glucose, UA Negative mg/dL      Bilirubin Negative     Ketones, UA 40 mg/dL (A)     Specific Gravity  1.025     Blood, UA Small (A)     pH, Urine 5.5     Protein, POC Trace (A) mg/dL      Urobilinogen, UA Normal     Leukocytes Small (1+) (A)     Nitrite, UA Negative    POC Rapid Strep A [[id number]]  (Normal) Collected:  12/06/17 1616    Specimen:  Swab Updated:  12/06/17 1616     Rapid Strep A Screen Negative     Internal Control Passed     Lot Number [id number]     Expiration Date 2019-04-30          Assessment/Plan   Christine was seen today for sore throat.    Diagnoses and all orders for this visit:    Acute pharyngitis, unspecified etiology  -     POC Rapid Strep A    Tonsillitis    Dysuria  -     POCT urinalysis dipstick, automated    Other orders  -     amoxicillin (AMOXIL) 875 MG tablet; Take 1 tablet by mouth 2 (Two) Times a Day for 10 days.        Patient Instructions   Pharyngitis  Pharyngitis is redness, pain, and swelling (inflammation) of your pharynx.   CAUSES   Pharyngitis is usually caused by infection. Most of the time, these infections are from  viruses (viral) and are part of a cold. However, sometimes pharyngitis is caused by bacteria (bacterial). Pharyngitis can also be caused by allergies. Viral pharyngitis may be spread from person to person by coughing, sneezing, and personal items or utensils (cups, forks, spoons, toothbrushes). Bacterial pharyngitis may be spread from person to person by more intimate contact, such as kissing.   SIGNS AND SYMPTOMS   Symptoms of pharyngitis include:    · Sore throat.    · Tiredness (fatigue).    · Low-grade fever.    · Headache.  · Joint pain and muscle aches.  · Skin rashes.  · Swollen lymph nodes.  · Plaque-like film on throat or tonsils (often seen with bacterial pharyngitis).  DIAGNOSIS   Your health care provider will ask you questions about your illness and your symptoms. Your medical history, along with a physical exam, is often all that is needed to diagnose pharyngitis. Sometimes, a rapid strep test is done. Other lab tests may also be done, depending on the suspected cause.   TREATMENT   Viral pharyngitis will usually get better in 3-4 days without the use of medicine. Bacterial pharyngitis is treated with medicines that kill germs (antibiotics).   HOME CARE INSTRUCTIONS   · Drink enough water and fluids to keep your urine clear or pale yellow.    · Only take over-the-counter or prescription medicines as directed by your health care provider:      If you are prescribed antibiotics, make sure you finish them even if you start to feel better.      Do not take aspirin.    · Get lots of rest.    · Gargle with 8 oz of salt water (½ tsp of salt per 1 qt of water) as often as every 1-2 hours to soothe your throat.    · Throat lozenges (if you are not at risk for choking) or sprays may be used to soothe your throat.  SEEK MEDICAL CARE IF:   · You have large, tender lumps in your neck.  · You have a rash.  · You cough up green, yellow-brown, or bloody spit.  SEEK IMMEDIATE MEDICAL CARE IF:   · Your neck becomes  stiff.  · You drool or are unable to swallow liquids.  · You vomit or are unable to keep medicines or liquids down.  · You have severe pain that does not go away with the use of recommended medicines.  · You have trouble breathing (not caused by a stuffy nose).  MAKE SURE YOU:   · Understand these instructions.  · Will watch your condition.  · Will get help right away if you are not doing well or get worse.     This information is not intended to replace advice given to you by your health care provider. Make sure you discuss any questions you have with your health care provider.     Document Released: 12/18/2006 Document Revised: 10/08/2014 Document Reviewed: 08/25/2014  [employer] Interactive Patient Education ©2017 [employer]        Return if symptoms worsen or fail to improve with urgent care/ER.

## 2017-12-06 NOTE — PATIENT INSTRUCTIONS

## 2018-01-05 DIAGNOSIS — E03.8 OTHER SPECIFIED HYPOTHYROIDISM: ICD-10-CM

## 2018-01-05 DIAGNOSIS — F32.A DEPRESSION, UNSPECIFIED DEPRESSION TYPE: ICD-10-CM

## 2018-01-05 DIAGNOSIS — E78.2 MIXED HYPERLIPIDEMIA: ICD-10-CM

## 2018-01-05 DIAGNOSIS — M17.11 ARTHRITIS OF RIGHT KNEE: ICD-10-CM

## 2018-01-05 RX ORDER — LEVOTHYROXINE SODIUM 0.05 MG/1
50 TABLET ORAL DAILY
Qty: 90 TABLET | Refills: 3 | Status: SHIPPED | OUTPATIENT
Start: 2018-01-05 | End: 2018-08-15

## 2018-01-05 RX ORDER — TERAZOSIN 1 MG/1
1 CAPSULE ORAL DAILY
Qty: 90 CAPSULE | Refills: 3 | Status: SHIPPED | OUTPATIENT
Start: 2018-01-05 | End: 2018-08-15

## 2018-01-05 RX ORDER — ATORVASTATIN CALCIUM 20 MG/1
20 TABLET, FILM COATED ORAL DAILY
Qty: 90 TABLET | Refills: 3 | Status: SHIPPED | OUTPATIENT
Start: 2018-01-05 | End: 2018-08-15

## 2018-01-05 RX ORDER — MELOXICAM 15 MG/1
TABLET ORAL
Qty: 90 TABLET | Refills: 1 | Status: SHIPPED | OUTPATIENT
Start: 2018-01-05 | End: 2018-05-21 | Stop reason: SDUPTHER

## 2018-01-08 ENCOUNTER — TRANSCRIBE ORDERS (OUTPATIENT)
Dept: ADMINISTRATIVE | Facility: HOSPITAL | Age: 53
End: 2018-01-08

## 2018-01-08 DIAGNOSIS — Z12.31 VISIT FOR SCREENING MAMMOGRAM: Primary | ICD-10-CM

## 2018-02-12 ENCOUNTER — OFFICE VISIT (OUTPATIENT)
Dept: OBSTETRICS AND GYNECOLOGY | Facility: CLINIC | Age: 53
End: 2018-02-12

## 2018-02-12 VITALS
BODY MASS INDEX: 35.5 KG/M2 | DIASTOLIC BLOOD PRESSURE: 82 MMHG | SYSTOLIC BLOOD PRESSURE: 124 MMHG | HEIGHT: 70 IN | WEIGHT: 248 LBS

## 2018-02-12 DIAGNOSIS — Z11.51 SPECIAL SCREENING EXAMINATION FOR HUMAN PAPILLOMAVIRUS (HPV): ICD-10-CM

## 2018-02-12 DIAGNOSIS — Z01.419 PAP SMEAR, LOW-RISK: Primary | ICD-10-CM

## 2018-02-12 DIAGNOSIS — Z12.39 SCREENING FOR BREAST CANCER: ICD-10-CM

## 2018-02-12 DIAGNOSIS — Z01.419 GYNECOLOGIC EXAM NORMAL: ICD-10-CM

## 2018-02-12 LAB
B-HCG UR QL: NEGATIVE
BILIRUB BLD-MCNC: NEGATIVE MG/DL
CLARITY, POC: CLEAR
COLOR UR: YELLOW
GLUCOSE UR STRIP-MCNC: NEGATIVE MG/DL
INTERNAL NEGATIVE CONTROL: NEGATIVE
INTERNAL POSITIVE CONTROL: POSITIVE
KETONES UR QL: NEGATIVE
LEUKOCYTE EST, POC: NEGATIVE
Lab: 2154
NITRITE UR-MCNC: NEGATIVE MG/ML
PH UR: 5 [PH] (ref 5–8)
PROT UR STRIP-MCNC: NEGATIVE MG/DL
RBC # UR STRIP: NEGATIVE /UL
SP GR UR: 1 (ref 1–1.03)
UROBILINOGEN UR QL: NORMAL

## 2018-02-12 PROCEDURE — 81025 URINE PREGNANCY TEST: CPT | Performed by: OBSTETRICS & GYNECOLOGY

## 2018-02-12 PROCEDURE — 81002 URINALYSIS NONAUTO W/O SCOPE: CPT | Performed by: OBSTETRICS & GYNECOLOGY

## 2018-02-12 PROCEDURE — 99396 PREV VISIT EST AGE 40-64: CPT | Performed by: OBSTETRICS & GYNECOLOGY

## 2018-02-14 LAB
CYTOLOGIST CVX/VAG CYTO: NORMAL
CYTOLOGY CVX/VAG DOC THIN PREP: NORMAL
DX ICD CODE: NORMAL
HIV 1 & 2 AB SER-IMP: NORMAL
HPV I/H RISK 1 DNA CVX QL PROBE+SIG AMP: NEGATIVE
Lab: NORMAL
OTHER STN SPEC: NORMAL
PATH REPORT.FINAL DX SPEC: NORMAL
STAT OF ADQ CVX/VAG CYTO-IMP: NORMAL

## 2018-02-14 NOTE — PROGRESS NOTES
GYN Annual Exam     CC- Here for annual exam.     Christine Villagomez is a 52 y.o. female established patient who presents for annual well woman exam. She has been working on losing weight. She is still having cycles about every other month.  HF are not bad.      OB History      Para Term  AB Living    1 1 1   1    SAB TAB Ectopic Multiple Live Births                Obstetric Comments    1           Menarche:12  Menopause:not yet  HRT:no  Current contraception: vasectomy  History of abnormal Pap smear: no  History of abnormal mammogram: no  Family history of uterine, colon or ovarian cancer: yes - MGM colon dx 59  Family history of breast cancer: no  STD's: none    Health Maintenance   Topic Date Due   • PNEUMOCOCCAL VACCINE (19-64 MEDIUM RISK) (1 of  - PPSV23) 1983   • HEPATITIS C SCREENING  2016   • LIPID PANEL  10/18/2018   • PAP SMEAR  2019   • MAMMOGRAM  02/15/2019   • COLONOSCOPY  2026   • TDAP/TD VACCINES (3 - Td) 10/18/2027   • INFLUENZA VACCINE  Completed       Past Medical History:   Diagnosis Date   • Bipolar disorder    • Depression    • Hyperlipidemia    • Hypothyroidism    • Kidney stone        Past Surgical History:   Procedure Laterality Date   • BLADDER SUSPENSION      TVT   • KIDNEY STONE SURGERY     • KNEE MENISCAL REPAIR Bilateral          Current Outpatient Prescriptions:   •  atorvastatin (LIPITOR) 20 MG tablet, Take 1 tablet by mouth Daily., Disp: 90 tablet, Rfl: 3  •  buPROPion SR (WELLBUTRIN SR) 150 MG 12 hr tablet, Take 150 mg by mouth Daily., Disp: , Rfl:   •  busPIRone (BUSPAR) 30 MG tablet, Take 1.5 tablets by mouth Daily., Disp: 135 tablet, Rfl: 3  •  Calcium Carb-Cholecalciferol (CALCIUM 600 + D) 600-200 MG-UNIT tablet, Take 1 tablet by mouth daily., Disp: , Rfl:   •  Cholecalciferol (VITAMIN D) 1000 UNITS tablet, Take 1 tablet by mouth daily., Disp: , Rfl:   •  Coenzyme Q10 (COQ10) 100 MG capsule, Take 100 mg by mouth daily., Disp: , Rfl:   •   L-Methylfolate (DEPLIN) 15 MG tablet, Take 15 mg by mouth daily., Disp: , Rfl:   •  levothyroxine (SYNTHROID, LEVOTHROID) 50 MCG tablet, Take 1 tablet by mouth Daily., Disp: 90 tablet, Rfl: 3  •  Magnesium 250 MG tablet, Take 250 mg by mouth daily., Disp: , Rfl:   •  meloxicam (MOBIC) 15 MG tablet, Take one po daily, Disp: 90 tablet, Rfl: 1  •  Misc Natural Products (GLUCOSAMINE CHOND COMPLEX/MSM PO), Take 750 mg by mouth daily., Disp: , Rfl:   •  Multiple Vitamin (MULTIVITAMINS PO), Take 1 tablet by mouth daily., Disp: , Rfl:   •  Multiple Vitamins-Minerals (HAIR/SKIN/NAILS PO), Take 1 tablet by mouth daily., Disp: , Rfl:   •  Probiotic capsule, Take 1 capsule by mouth daily., Disp: , Rfl:   •  terazosin (HYTRIN) 1 MG capsule, Take 1 capsule by mouth Daily., Disp: 90 capsule, Rfl: 3    Current Facility-Administered Medications:   •  triamcinolone acetonide (KENALOG-40) injection 80 mg, 80 mg, Intra-articular, Once, Graciela Cadena MD  •  triamcinolone acetonide (KENALOG-40) injection 80 mg, 80 mg, Intra-articular, Once, Graciela Cadena MD    Allergies   Allergen Reactions   • Sulfa Antibiotics Rash       Social History   Substance Use Topics   • Smoking status: Never Smoker   • Smokeless tobacco: Never Used   • Alcohol use No       Family History   Problem Relation Age of Onset   • Cancer Mother      lung   • Heart attack Father    • Colon cancer Maternal Grandmother    • Breast cancer Neg Hx    • Ovarian cancer Neg Hx    • Pulmonary embolism Neg Hx    • Deep vein thrombosis Neg Hx        Review of Systems   Constitutional: Positive for unexpected weight change. Negative for appetite change, fatigue and fever.   Respiratory: Negative for cough and shortness of breath.    Cardiovascular: Negative for chest pain and palpitations.   Gastrointestinal: Negative for abdominal distention, abdominal pain, constipation, diarrhea and nausea.   Endocrine: Positive for heat intolerance (mild).  "  Genitourinary: Positive for menstrual problem (irregular). Negative for dyspareunia, dysuria, pelvic pain and vaginal discharge.   Skin: Negative for color change and rash.   Neurological: Negative for headaches.   Psychiatric/Behavioral: Negative for dysphoric mood. The patient is not nervous/anxious.        /82  Ht 177.8 cm (70\")  Wt 112 kg (248 lb)  Breastfeeding? No  BMI 35.58 kg/m2    Physical Exam   Constitutional: She is oriented to person, place, and time. She appears well-developed and well-nourished.   HENT:   Head: Normocephalic and atraumatic.   Neck: Neck supple. No thyromegaly present.   Cardiovascular: Normal rate and regular rhythm.    Pulmonary/Chest: Effort normal and breath sounds normal. Right breast exhibits no inverted nipple, no mass, no nipple discharge, no skin change and no tenderness. Left breast exhibits no inverted nipple, no mass, no nipple discharge, no skin change and no tenderness.   Abdominal: Soft. Bowel sounds are normal. She exhibits no distension and no mass. There is no tenderness. There is no rebound and no guarding. No hernia.   Genitourinary: Uterus normal. Pelvic exam was performed with patient supine. There is no rash, tenderness or lesion on the right labia. There is no rash, tenderness or lesion on the left labia. Cervix exhibits no motion tenderness, no discharge and no friability. Right adnexum displays no mass, no tenderness and no fullness. Left adnexum displays no mass, no tenderness and no fullness. No erythema, tenderness or bleeding in the vagina. No foreign body in the vagina. No signs of injury around the vagina. No vaginal discharge found.   Genitourinary Comments: Cystocele noted   Neurological: She is alert and oriented to person, place, and time.   Skin: Skin is warm and dry.   Psychiatric: She has a normal mood and affect. Her behavior is normal. Judgment and thought content normal.   Vitals reviewed.         Assessment/Plan    1) GYN HM: check " pap/HPV   SBE demonstrated and encouraged.  2) STD screening: declines Condoms encouraged.  3) Bone health - Weight bearing exercise, dietary calcium recommendations and vitamin D reviewed.   4) Diet and Exercise discussed  5) Smoking Status: non smoker  6) Perimenopause d/w pt.  7)MMG:  Scheduled next week.   8) DEXA- plan age 54  9)C scope- UTD 3/2016- repeat 10 years   Follow up prn and 1 year       Christine was seen today for gynecologic exam.    Diagnoses and all orders for this visit:    Pap smear, low-risk  -     POC Urinalysis Dipstick  -     POC Pregnancy, Urine  -     Pap IG, HPV-hr - ThinPrep Vial, Cervix    Special screening examination for human papillomavirus (HPV)  -     POC Urinalysis Dipstick  -     POC Pregnancy, Urine  -     Pap IG, HPV-hr - ThinPrep Vial, Cervix    Screening for breast cancer  -     Mammo Screening Bilateral With CAD; Future    Gynecologic exam normal        Trinity Clark MD  2/12/18  8:59 PM

## 2018-02-19 ENCOUNTER — HOSPITAL ENCOUNTER (OUTPATIENT)
Dept: MAMMOGRAPHY | Facility: HOSPITAL | Age: 53
Discharge: HOME | End: 2018-02-19
Attending: OBSTETRICS & GYNECOLOGY | Admitting: OBSTETRICS & GYNECOLOGY

## 2018-02-19 DIAGNOSIS — Z12.31 VISIT FOR SCREENING MAMMOGRAM: ICD-10-CM

## 2018-02-19 PROCEDURE — 77063 BREAST TOMOSYNTHESIS BI: CPT

## 2018-02-19 PROCEDURE — 77067 SCR MAMMO BI INCL CAD: CPT

## 2018-03-12 RX ORDER — BUPROPION HYDROCHLORIDE 300 MG/1
300 TABLET ORAL DAILY
Qty: 90 TABLET | Refills: 1 | Status: SHIPPED | OUTPATIENT
Start: 2018-03-12 | End: 2018-05-31 | Stop reason: SDUPTHER

## 2018-04-10 DIAGNOSIS — E88.81 INSULIN RESISTANCE: ICD-10-CM

## 2018-04-10 DIAGNOSIS — E11.59 OBESITY, DIABETES, AND HYPERTENSION SYNDROME (HCC): ICD-10-CM

## 2018-04-10 DIAGNOSIS — I10 ESSENTIAL HYPERTENSION: ICD-10-CM

## 2018-04-10 DIAGNOSIS — E78.2 MIXED HYPERLIPIDEMIA: ICD-10-CM

## 2018-04-10 DIAGNOSIS — E78.1 HYPERTRIGLYCERIDEMIA: Primary | ICD-10-CM

## 2018-04-10 DIAGNOSIS — E11.69 OBESITY, DIABETES, AND HYPERTENSION SYNDROME (HCC): ICD-10-CM

## 2018-04-10 DIAGNOSIS — E03.8 OTHER SPECIFIED HYPOTHYROIDISM: ICD-10-CM

## 2018-04-10 DIAGNOSIS — E66.9 OBESITY, DIABETES, AND HYPERTENSION SYNDROME (HCC): ICD-10-CM

## 2018-04-10 DIAGNOSIS — I15.2 OBESITY, DIABETES, AND HYPERTENSION SYNDROME (HCC): ICD-10-CM

## 2018-04-10 PROBLEM — M25.569 KNEE PAIN: Status: ACTIVE | Noted: 2018-04-10

## 2018-04-10 PROBLEM — R20.2 PARESTHESIA OF FOOT: Status: ACTIVE | Noted: 2018-04-10

## 2018-04-11 ENCOUNTER — LAB (OUTPATIENT)
Dept: INTERNAL MEDICINE | Facility: CLINIC | Age: 53
End: 2018-04-11

## 2018-04-11 DIAGNOSIS — E88.81 INSULIN RESISTANCE: ICD-10-CM

## 2018-04-11 DIAGNOSIS — E03.8 OTHER SPECIFIED HYPOTHYROIDISM: ICD-10-CM

## 2018-04-11 DIAGNOSIS — E11.59 OBESITY, DIABETES, AND HYPERTENSION SYNDROME (HCC): ICD-10-CM

## 2018-04-11 DIAGNOSIS — E78.2 MIXED HYPERLIPIDEMIA: ICD-10-CM

## 2018-04-11 DIAGNOSIS — I10 ESSENTIAL HYPERTENSION: ICD-10-CM

## 2018-04-11 DIAGNOSIS — E66.9 OBESITY, DIABETES, AND HYPERTENSION SYNDROME (HCC): ICD-10-CM

## 2018-04-11 DIAGNOSIS — E11.69 OBESITY, DIABETES, AND HYPERTENSION SYNDROME (HCC): ICD-10-CM

## 2018-04-11 DIAGNOSIS — I15.2 OBESITY, DIABETES, AND HYPERTENSION SYNDROME (HCC): ICD-10-CM

## 2018-04-11 DIAGNOSIS — E78.1 HYPERTRIGLYCERIDEMIA: ICD-10-CM

## 2018-04-11 LAB
ALBUMIN SERPL-MCNC: 4.5 G/DL (ref 3.5–5.2)
ALBUMIN/GLOB SERPL: 1.6 G/DL
ALP SERPL-CCNC: 87 U/L (ref 40–129)
ALT SERPL-CCNC: 22 U/L (ref 5–33)
AST SERPL-CCNC: 18 U/L (ref 5–32)
BASOPHILS # BLD AUTO: 0.06 10*3/MM3 (ref 0–0.2)
BASOPHILS NFR BLD AUTO: 0.8 % (ref 0–2)
BILIRUB SERPL-MCNC: 0.5 MG/DL (ref 0.2–1.2)
BUN SERPL-MCNC: 15 MG/DL (ref 6–20)
BUN/CREAT SERPL: 17.9 (ref 7–25)
CALCIUM SERPL-MCNC: 9.5 MG/DL (ref 8.6–10.5)
CHLORIDE SERPL-SCNC: 105 MMOL/L (ref 98–107)
CHOLEST SERPL-MCNC: 152 MG/DL (ref 0–200)
CO2 SERPL-SCNC: 27 MMOL/L (ref 22–29)
CREAT SERPL-MCNC: 0.84 MG/DL (ref 0.57–1)
EOSINOPHIL # BLD AUTO: 0.4 10*3/MM3 (ref 0.1–0.3)
EOSINOPHIL NFR BLD AUTO: 5.4 % (ref 0–4)
ERYTHROCYTE [DISTWIDTH] IN BLOOD BY AUTOMATED COUNT: 12.2 % (ref 11.5–14.5)
GFR SERPLBLD CREATININE-BSD FMLA CKD-EPI: 71 ML/MIN/1.73
GFR SERPLBLD CREATININE-BSD FMLA CKD-EPI: 86 ML/MIN/1.73
GLOBULIN SER CALC-MCNC: 2.8 GM/DL
GLUCOSE SERPL-MCNC: 95 MG/DL (ref 65–99)
HBA1C MFR BLD: 5.4 % (ref 4.8–5.6)
HCT VFR BLD AUTO: 43 % (ref 37–47)
HDLC SERPL-MCNC: 51 MG/DL (ref 40–60)
HGB BLD-MCNC: 13.5 G/DL (ref 12–16)
IMM GRANULOCYTES # BLD: 0.01 10*3/MM3 (ref 0–0.03)
IMM GRANULOCYTES NFR BLD: 0.1 % (ref 0–0.5)
LDLC SERPL CALC-MCNC: 70 MG/DL (ref 0–100)
LDLC/HDLC SERPL: 1.38 {RATIO}
LYMPHOCYTES # BLD AUTO: 2.85 10*3/MM3 (ref 0.6–4.8)
LYMPHOCYTES NFR BLD AUTO: 38.7 % (ref 20–45)
MCH RBC QN AUTO: 28.2 PG (ref 27–31)
MCHC RBC AUTO-ENTMCNC: 31.4 G/DL (ref 31–37)
MCV RBC AUTO: 90 FL (ref 81–99)
MONOCYTES # BLD AUTO: 0.67 10*3/MM3 (ref 0–1)
MONOCYTES NFR BLD AUTO: 9.1 % (ref 3–8)
NEUTROPHILS # BLD AUTO: 3.37 10*3/MM3 (ref 1.5–8.3)
NEUTROPHILS NFR BLD AUTO: 45.9 % (ref 45–70)
NRBC BLD AUTO-RTO: 0 /100 WBC (ref 0–0)
PLATELET # BLD AUTO: 230 10*3/MM3 (ref 140–500)
POTASSIUM SERPL-SCNC: 4.4 MMOL/L (ref 3.5–5.2)
PROT SERPL-MCNC: 7.3 G/DL (ref 6–8.5)
RBC # BLD AUTO: 4.78 10*6/MM3 (ref 4.2–5.4)
SODIUM SERPL-SCNC: 142 MMOL/L (ref 136–145)
T4 FREE SERPL-MCNC: 1.16 NG/DL (ref 0.93–1.7)
TRIGL SERPL-MCNC: 154 MG/DL (ref 0–150)
TSH SERPL DL<=0.005 MIU/L-ACNC: 2.3 MIU/ML (ref 0.27–4.2)
VLDLC SERPL CALC-MCNC: 30.8 MG/DL (ref 7–27)
WBC # BLD AUTO: 7.36 10*3/MM3 (ref 4.8–10.8)

## 2018-04-13 ENCOUNTER — TELEPHONE (OUTPATIENT)
Dept: INTERNAL MEDICINE | Facility: CLINIC | Age: 53
End: 2018-04-13

## 2018-04-13 NOTE — TELEPHONE ENCOUNTER
----- Message from Graciela Cadena MD sent at 4/13/2018 10:45 AM EDT -----  Labs ok. Will discuss at upcoming appt.

## 2018-04-18 ENCOUNTER — OFFICE VISIT (OUTPATIENT)
Dept: INTERNAL MEDICINE | Facility: CLINIC | Age: 53
End: 2018-04-18

## 2018-04-18 VITALS
OXYGEN SATURATION: 98 % | BODY MASS INDEX: 36.42 KG/M2 | WEIGHT: 254.4 LBS | HEIGHT: 70 IN | SYSTOLIC BLOOD PRESSURE: 118 MMHG | DIASTOLIC BLOOD PRESSURE: 78 MMHG | HEART RATE: 94 BPM

## 2018-04-18 DIAGNOSIS — E78.2 MIXED HYPERLIPIDEMIA: ICD-10-CM

## 2018-04-18 DIAGNOSIS — I10 ESSENTIAL HYPERTENSION: ICD-10-CM

## 2018-04-18 DIAGNOSIS — M25.561 CHRONIC PAIN OF BOTH KNEES: ICD-10-CM

## 2018-04-18 DIAGNOSIS — M25.562 CHRONIC PAIN OF BOTH KNEES: ICD-10-CM

## 2018-04-18 DIAGNOSIS — E78.1 HYPERTRIGLYCERIDEMIA: Primary | ICD-10-CM

## 2018-04-18 DIAGNOSIS — E03.8 OTHER SPECIFIED HYPOTHYROIDISM: ICD-10-CM

## 2018-04-18 DIAGNOSIS — G89.29 CHRONIC PAIN OF BOTH KNEES: ICD-10-CM

## 2018-04-18 PROCEDURE — 99214 OFFICE O/P EST MOD 30 MIN: CPT | Performed by: INTERNAL MEDICINE

## 2018-04-18 PROCEDURE — 20610 DRAIN/INJ JOINT/BURSA W/O US: CPT | Performed by: INTERNAL MEDICINE

## 2018-04-18 RX ADMIN — TRIAMCINOLONE ACETONIDE 80 MG: 40 INJECTION, SUSPENSION INTRA-ARTICULAR; INTRAMUSCULAR at 08:07

## 2018-04-18 NOTE — PROGRESS NOTES
Christine Villagomez is a 52 y.o. female, who presents with a chief complaint of   Chief Complaint   Patient presents with   • Knee Pain     Had knee injections a year ago. Has been getting worse in the last three months.       HPI   The pt is here for follow up.      Chronic knee pain.  Pt would like knee injections.  Last injections about a year ago and this really helped.      Last year she lost about 50 pounds but she gained about 20 back over the winter.      Pre diabetes - the pt has changed her diet and glucoses down.  a1c now down to 5.4.    htn - well controlled.  No ha/dizziness    HLD - doing well currently on statin.  No cramps or myalgias    Hypothyroidism - on low dose synthroid.  No hair/skin changes. .     The following portions of the patient's history were reviewed and updated as appropriate: allergies, current medications, past family history, past medical history, past social history, past surgical history and problem list.    Allergies: Sulfa antibiotics    Review of Systems   Constitutional: Negative.    HENT: Positive for congestion.    Eyes: Negative.    Respiratory: Positive for cough.    Cardiovascular: Negative.    Gastrointestinal: Negative.    Endocrine: Negative.    Genitourinary: Negative.    Musculoskeletal: Negative.    Skin: Negative.    Allergic/Immunologic: Negative.    Neurological: Negative.    Hematological: Negative.    Psychiatric/Behavioral: Negative.    All other systems reviewed and are negative.            Wt Readings from Last 3 Encounters:   04/18/18 115 kg (254 lb 6.4 oz)   02/12/18 112 kg (248 lb)   10/18/17 107 kg (234 lb 12.8 oz)     Temp Readings from Last 3 Encounters:   12/06/17 99.3 °F (37.4 °C) (Oral)   07/18/16 97.9 °F (36.6 °C)   11/06/15 98.9 °F (37.2 °C) (Oral)     BP Readings from Last 3 Encounters:   04/18/18 118/78   02/12/18 124/82   12/06/17 128/70     Pulse Readings from Last 3 Encounters:   04/18/18 94   12/06/17 96   10/18/17 79     Body mass index  is 36.5 kg/m².    @LASTSAO2(3)@    Physical Exam   Constitutional: She is oriented to person, place, and time. She appears well-developed and well-nourished. No distress.   HENT:   Head: Normocephalic and atraumatic.   Right Ear: External ear normal.   Left Ear: External ear normal.   Nose: Nose normal.   Mouth/Throat: Oropharynx is clear and moist.   Bilateral serous effusion without erythema   Eyes: Conjunctivae, EOM and lids are normal. Pupils are equal, round, and reactive to light.   Neck: Normal range of motion. Neck supple.   Cardiovascular: Normal rate, regular rhythm, normal heart sounds and intact distal pulses.    Pulmonary/Chest: Effort normal and breath sounds normal. No respiratory distress. She has no wheezes.   Musculoskeletal: Normal range of motion. She exhibits no edema.   Normal gait   Neurological: She is alert and oriented to person, place, and time. No cranial nerve deficit.   Skin: Skin is warm and dry. No rash noted.   Psychiatric: She has a normal mood and affect. Her behavior is normal. Judgment and thought content normal.   Nursing note and vitals reviewed.      Results for orders placed or performed in visit on 04/11/18   Comprehensive metabolic panel   Result Value Ref Range    Glucose 95 65 - 99 mg/dL    BUN 15 6 - 20 mg/dL    Creatinine 0.84 0.57 - 1.00 mg/dL    eGFR Non African Am 71 >60 mL/min/1.73    eGFR African Am 86 >60 mL/min/1.73    BUN/Creatinine Ratio 17.9 7.0 - 25.0    Sodium 142 136 - 145 mmol/L    Potassium 4.4 3.5 - 5.2 mmol/L    Chloride 105 98 - 107 mmol/L    Total CO2 27.0 22.0 - 29.0 mmol/L    Calcium 9.5 8.6 - 10.5 mg/dL    Total Protein 7.3 6.0 - 8.5 g/dL    Albumin 4.50 3.50 - 5.20 g/dL    Globulin 2.8 gm/dL    A/G Ratio 1.6 g/dL    Total Bilirubin 0.5 0.2 - 1.2 mg/dL    Alkaline Phosphatase 87 40 - 129 U/L    AST (SGOT) 18 5 - 32 U/L    ALT (SGPT) 22 5 - 33 U/L   Lipid Panel With LDL/HDL Ratio   Result Value Ref Range    Total Cholesterol 152 0 - 200 mg/dL     "Triglycerides 154 (H) 0 - 150 mg/dL    HDL Cholesterol 51 40 - 60 mg/dL    VLDL Cholesterol 30.8 (H) 7 - 27 mg/dL    LDL Cholesterol  70 0 - 100 mg/dL    LDL/HDL Ratio 1.38    TSH   Result Value Ref Range    TSH 2.300 0.270 - 4.200 mIU/mL   T4, free   Result Value Ref Range    Free T4 1.16 0.93 - 1.70 ng/dL   Hemoglobin A1c   Result Value Ref Range    Hemoglobin A1C 5.40 4.80 - 5.60 %   CBC w AUTO Differential   Result Value Ref Range    WBC 7.36 4.80 - 10.80 10*3/mm3    RBC 4.78 4.20 - 5.40 10*6/mm3    Hemoglobin 13.5 12.0 - 16.0 g/dL    Hematocrit 43.0 37.0 - 47.0 %    MCV 90.0 81.0 - 99.0 fL    MCH 28.2 27.0 - 31.0 pg    MCHC 31.4 31.0 - 37.0 g/dL    RDW 12.2 11.5 - 14.5 %    Platelets 230 140 - 500 10*3/mm3    Neutrophil Rel % 45.9 45.0 - 70.0 %    Lymphocyte Rel % 38.7 20.0 - 45.0 %    Monocyte Rel % 9.1 (H) 3.0 - 8.0 %    Eosinophil Rel % 5.4 (H) 0.0 - 4.0 %    Basophil Rel % 0.8 0.0 - 2.0 %    Neutrophils Absolute 3.37 1.50 - 8.30 10*3/mm3    Lymphocytes Absolute 2.85 0.60 - 4.80 10*3/mm3    Monocytes Absolute 0.67 0.00 - 1.00 10*3/mm3    Eosinophils Absolute 0.40 (H) 0.10 - 0.30 10*3/mm3    Basophils Absolute 0.06 0.00 - 0.20 10*3/mm3    Immature Granulocyte Rel % 0.1 0.0 - 0.5 %    Immature Grans Absolute 0.01 0.00 - 0.03 10*3/mm3    nRBC 0.0 0.0 - 0.0 /100 WBC     Bilateral knee injection  Date/Time: 4/18/2018 8:54 AM  Performed by: JOSE HATFIELD  Authorized by: JOSE HATFIELD   Consent: Verbal consent obtained.  Risks and benefits: risks, benefits and alternatives were discussed  Consent given by: patient  Patient understanding: patient states understanding of the procedure being performed  Procedure consent: procedure consent matches procedure scheduled  Patient identity confirmed: verbally with patient  Time out: Immediately prior to procedure a \"time out\" was called to verify the correct patient, procedure, equipment, support staff and site/side marked as required.  Indications: " pain   Body area: knee (bilateral knees)  Local anesthesia used: no    Anesthesia:  Local anesthesia used: no  Preparation: Patient was prepped and draped in the usual sterile fashion.  Needle gauge: 25G.  Approach: lateral  Patient tolerance: Patient tolerated the procedure well with no immediate complications              Christine was seen today for knee pain.    Diagnoses and all orders for this visit:    Hypertriglyceridemia    Mixed hyperlipidemia    Essential hypertension    Other specified hypothyroidism    Chronic pain of both knees  -     Arthrocentesis          Outpatient Medications Prior to Visit   Medication Sig Dispense Refill   • atorvastatin (LIPITOR) 20 MG tablet Take 1 tablet by mouth Daily. 90 tablet 3   • buPROPion XL (WELLBUTRIN XL) 300 MG 24 hr tablet Take 1 tablet by mouth Daily. 90 tablet 1   • busPIRone (BUSPAR) 30 MG tablet Take 1.5 tablets by mouth Daily. 135 tablet 3   • Calcium Carb-Cholecalciferol (CALCIUM 600 + D) 600-200 MG-UNIT tablet Take 1 tablet by mouth daily.     • Cholecalciferol (VITAMIN D) 1000 UNITS tablet Take 1 tablet by mouth daily.     • Coenzyme Q10 (COQ10) 100 MG capsule Take 100 mg by mouth daily.     • L-Methylfolate (DEPLIN) 15 MG tablet Take 15 mg by mouth daily.     • levothyroxine (SYNTHROID, LEVOTHROID) 50 MCG tablet Take 1 tablet by mouth Daily. 90 tablet 3   • Magnesium 250 MG tablet Take 250 mg by mouth daily.     • meloxicam (MOBIC) 15 MG tablet Take one po daily 90 tablet 1   • Misc Natural Products (GLUCOSAMINE CHOND COMPLEX/MSM PO) Take 750 mg by mouth daily.     • Multiple Vitamin (MULTIVITAMINS PO) Take 1 tablet by mouth daily.     • Multiple Vitamins-Minerals (HAIR/SKIN/NAILS PO) Take 1 tablet by mouth daily.     • Probiotic capsule Take 1 capsule by mouth daily.     • terazosin (HYTRIN) 1 MG capsule Take 1 capsule by mouth Daily. 90 capsule 3     Facility-Administered Medications Prior to Visit   Medication Dose Route Frequency Provider Last Rate Last  Dose   • triamcinolone acetonide (KENALOG-40) injection 80 mg  80 mg Intra-articular Once Graciela Cadena MD       • triamcinolone acetonide (KENALOG-40) injection 80 mg  80 mg Intra-articular Once Graciela Cadena MD         No orders of the defined types were placed in this encounter.    [unfilled]  There are no discontinued medications.      Return in about 6 months (around 10/18/2018) for Recheck, labs.

## 2018-04-26 ENCOUNTER — OFFICE VISIT (OUTPATIENT)
Dept: RETAIL CLINIC | Facility: CLINIC | Age: 53
End: 2018-04-26

## 2018-04-26 VITALS
SYSTOLIC BLOOD PRESSURE: 135 MMHG | OXYGEN SATURATION: 98 % | HEART RATE: 96 BPM | TEMPERATURE: 97.7 F | DIASTOLIC BLOOD PRESSURE: 80 MMHG

## 2018-04-26 DIAGNOSIS — H65.111 ACUTE MUCOID OTITIS MEDIA OF RIGHT EAR: Primary | ICD-10-CM

## 2018-04-26 DIAGNOSIS — M54.50 ACUTE LOW BACK PAIN WITHOUT SCIATICA, UNSPECIFIED BACK PAIN LATERALITY: ICD-10-CM

## 2018-04-26 DIAGNOSIS — H65.02 ACUTE SEROUS OTITIS MEDIA OF LEFT EAR, RECURRENCE NOT SPECIFIED: ICD-10-CM

## 2018-04-26 DIAGNOSIS — T14.8XXA MUSCLE STRAIN: ICD-10-CM

## 2018-04-26 PROCEDURE — 99213 OFFICE O/P EST LOW 20 MIN: CPT | Performed by: NURSE PRACTITIONER

## 2018-04-26 RX ORDER — AMOXICILLIN 875 MG/1
875 TABLET, COATED ORAL 2 TIMES DAILY
Qty: 20 TABLET | Refills: 0 | Status: SHIPPED | OUTPATIENT
Start: 2018-04-26 | End: 2018-04-27

## 2018-04-26 NOTE — PROGRESS NOTES
Subjective     Christine Villagomez is a 52 y.o.. female.     Pt c/o low back pain that started after long car ride after going on trip. Pt denies falling or any accidents. Pt denies any numbness and/or tingling. Pt points to mid-line low back where pain occurs. Pt stating discomfort with movement and none when sitting still.       URI    This is a new problem. Episode onset: 9 days. The problem has been unchanged. There has been no fever. Associated symptoms include congestion, coughing, ear pain and rhinorrhea. Pertinent negatives include no abdominal pain, diarrhea, headaches, nausea, sore throat or vomiting. Treatments tried: flonase, ibuprofen, cough/cold otc med. The treatment provided no relief.       The following portions of the patient's history were reviewed and updated as appropriate: allergies, current medications, past family history, past medical history, past social history, past surgical history and problem list.    Review of Systems   Constitutional: Negative for fever.   HENT: Positive for congestion, ear pain and rhinorrhea. Negative for sore throat.    Respiratory: Positive for cough.    Gastrointestinal: Negative for abdominal pain, diarrhea, nausea and vomiting.   Genitourinary: Negative.    Musculoskeletal: Positive for arthralgias.   Neurological: Negative for headaches.       Objective     Vitals:    04/26/18 0822   BP: 135/80   Pulse: 96   Temp: 97.7 °F (36.5 °C)   TempSrc: Oral   SpO2: 98%       Physical Exam   Constitutional: She is oriented to person, place, and time. She appears well-developed and well-nourished.   HENT:   Head: Normocephalic and atraumatic.   Right Ear: Tympanic membrane is erythematous. A middle ear effusion is present.   Left Ear: Tympanic membrane normal. There is drainage (clear). Tympanic membrane is not erythematous.   Nose: Mucosal edema present. Right sinus exhibits no maxillary sinus tenderness and no frontal sinus tenderness. Left sinus exhibits no maxillary  sinus tenderness and no frontal sinus tenderness.   Mouth/Throat: Oropharynx is clear and moist. Oropharyngeal exudate: pnd.   Eyes: Conjunctivae are normal. Pupils are equal, round, and reactive to light.   Cardiovascular: Normal rate and regular rhythm.    No murmur heard.  Pulmonary/Chest: Effort normal. She has no wheezes. She has no rhonchi. She has no rales.   Musculoskeletal: Normal range of motion.   Spine: Full ROM, no swelling or redness noted, no pain with palpitation   Lymphadenopathy:     She has no cervical adenopathy.   Neurological: She is alert and oriented to person, place, and time.   Skin: Skin is warm and dry.   Vitals reviewed.      Assessment/Plan   Christine was seen today for uri.    Diagnoses and all orders for this visit:    Acute mucoid otitis media of right ear  -     amoxicillin (AMOXIL) 875 MG tablet; Take 1 tablet by mouth 2 (Two) Times a Day for 10 days.    Acute serous otitis media of left ear, recurrence not specified    Acute low back pain without sciatica, unspecified back pain laterality    Muscle strain    Pt informed to continue using flonase daily; continue using mobic daily; to do warm and cold compress tid for next several days and if no improvement to follow up with primary. Pt verb. Understanding.    Patient Instructions   Back Pain, Adult  Back pain is very common. The pain often gets better over time. The cause of back pain is usually not dangerous. Most people can learn to manage their back pain on their own.  Follow these instructions at home:  Watch your back pain for any changes. The following actions may help to lessen any pain you are feeling:  · Stay active. Start with short walks on flat ground if you can. Try to walk farther each day.  · Exercise regularly as told by your doctor. Exercise helps your back heal faster. It also helps avoid future injury by keeping your muscles strong and flexible.  · Do not sit, drive, or  one place for more than 30  minutes.  · Do not stay in bed. Resting more than 1-2 days can slow down your recovery.  · Be careful when you bend or lift an object. Use good form when lifting:  ¨ Bend at your knees.  ¨ Keep the object close to your body.  ¨ Do not twist.  · Sleep on a firm mattress. Lie on your side, and bend your knees. If you lie on your back, put a pillow under your knees.  · Take medicines only as told by your doctor.  · Put ice on the injured area.  ¨ Put ice in a plastic bag.  ¨ Place a towel between your skin and the bag.  ¨ Leave the ice on for 20 minutes, 2-3 times a day for the first 2-3 days. After that, you can switch between ice and heat packs.  · Avoid feeling anxious or stressed. Find good ways to deal with stress, such as exercise.  · Maintain a healthy weight. Extra weight puts stress on your back.  Contact a doctor if:  · You have pain that does not go away with rest or medicine.  · You have worsening pain that goes down into your legs or buttocks.  · You have pain that does not get better in one week.  · You have pain at night.  · You lose weight.  · You have a fever or chills.  Get help right away if:  · You cannot control when you poop (bowel movement) or pee (urinate).  · Your arms or legs feel weak.  · Your arms or legs lose feeling (numbness).  · You feel sick to your stomach (nauseous) or throw up (vomit).  · You have belly (abdominal) pain.  · You feel like you may pass out (faint).  This information is not intended to replace advice given to you by your health care provider. Make sure you discuss any questions you have with your health care provider.  Document Released: 06/05/2009 Document Revised: 05/25/2017 Document Reviewed: 04/21/2015  [employer] Interactive Patient Education © 2017 [employer]  Otitis Media, Adult  Otitis media occurs when there is inflammation and fluid in the middle ear. Your middle ear is a part of the ear that contains bones for hearing as well as air that helps send sounds to  your brain.  What are the causes?  This condition is caused by a blockage in the eustachian tube. This tube drains fluid from the ear to the back of the nose (nasopharynx). A blockage in this tube can be caused by an object or by swelling (edema) in the tube. Problems that can cause a blockage include:  · A cold or other upper respiratory infection.  · Allergies.  · An irritant, such as tobacco smoke.  · Enlarged adenoids. The adenoids are areas of soft tissue located high in the back of the throat, behind the nose and the roof of the mouth.  · A mass in the nasopharynx.  · Damage to the ear caused by pressure changes (barotrauma).  What are the signs or symptoms?  Symptoms of this condition include:  · Ear pain.  · A fever.  · Decreased hearing.  · A headache.  · Tiredness (lethargy).  · Fluid leaking from the ear.  · Ringing in the ear.  How is this diagnosed?  This condition is diagnosed with a physical exam. During the exam your health care provider will use an instrument called an otoscope to look into your ear and check for redness, swelling, and fluid. He or she will also ask about your symptoms.  Your health care provider may also order tests, such as:  · A test to check the movement of the eardrum (pneumatic otoscopy). This test is done by squeezing a small amount of air into the ear.  · A test that changes air pressure in the middle ear to check how well the eardrum moves and whether the eustachian tube is working (tympanogram).  How is this treated?  This condition usually goes away on its own within 3-5 days. But if the condition is caused by a bacteria infection and does not go away own its own, or keeps coming back, your health care provider may:  · Prescribe antibiotic medicines to treat the infection.  · Prescribe or recommend medicines to control pain.  Follow these instructions at home:  · Take over-the-counter and prescription medicines only as told by your health care provider.  · If you were  prescribed an antibiotic medicine, take it as told by your health care provider. Do not stop taking the antibiotic even if you start to feel better.  · Keep all follow-up visits as told by your health care provider. This is important.  Contact a health care provider if:  · You have bleeding from your nose.  · There is a lump on your neck.  · You are not getting better in 5 days.  · You feel worse instead of better.  Get help right away if:  · You have severe pain that is not controlled with medicine.  · You have swelling, redness, or pain around your ear.  · You have stiffness in your neck.  · A part of your face is paralyzed.  · The bone behind your ear (mastoid) is tender when you touch it.  · You develop a severe headache.  Summary  · Otitis media is redness, soreness, and swelling of the middle ear.  · This condition usually goes away on its own within 3-5 days.  · If the problem does not go away in 3-5 days, your health care provider may prescribe or recommend medicines to treat your symptoms.  · If you were prescribed an antibiotic medicine, take it as told by your health care provider.  This information is not intended to replace advice given to you by your health care provider. Make sure you discuss any questions you have with your health care provider.  Document Released: 09/22/2005 Document Revised: 12/08/2017 Document Reviewed: 12/08/2017  [employer] Interactive Patient Education © 2017 [employer]        Return if symptoms worsen or fail to improve with urgent care/ER, for follow up with primary provider in 2-3 days if low back pain not improved.

## 2018-04-26 NOTE — PATIENT INSTRUCTIONS
Back Pain, Adult  Back pain is very common. The pain often gets better over time. The cause of back pain is usually not dangerous. Most people can learn to manage their back pain on their own.  Follow these instructions at home:  Watch your back pain for any changes. The following actions may help to lessen any pain you are feeling:  · Stay active. Start with short walks on flat ground if you can. Try to walk farther each day.  · Exercise regularly as told by your doctor. Exercise helps your back heal faster. It also helps avoid future injury by keeping your muscles strong and flexible.  · Do not sit, drive, or  one place for more than 30 minutes.  · Do not stay in bed. Resting more than 1-2 days can slow down your recovery.  · Be careful when you bend or lift an object. Use good form when lifting:  ¨ Bend at your knees.  ¨ Keep the object close to your body.  ¨ Do not twist.  · Sleep on a firm mattress. Lie on your side, and bend your knees. If you lie on your back, put a pillow under your knees.  · Take medicines only as told by your doctor.  · Put ice on the injured area.  ¨ Put ice in a plastic bag.  ¨ Place a towel between your skin and the bag.  ¨ Leave the ice on for 20 minutes, 2-3 times a day for the first 2-3 days. After that, you can switch between ice and heat packs.  · Avoid feeling anxious or stressed. Find good ways to deal with stress, such as exercise.  · Maintain a healthy weight. Extra weight puts stress on your back.  Contact a doctor if:  · You have pain that does not go away with rest or medicine.  · You have worsening pain that goes down into your legs or buttocks.  · You have pain that does not get better in one week.  · You have pain at night.  · You lose weight.  · You have a fever or chills.  Get help right away if:  · You cannot control when you poop (bowel movement) or pee (urinate).  · Your arms or legs feel weak.  · Your arms or legs lose feeling (numbness).  · You feel sick to  your stomach (nauseous) or throw up (vomit).  · You have belly (abdominal) pain.  · You feel like you may pass out (faint).  This information is not intended to replace advice given to you by your health care provider. Make sure you discuss any questions you have with your health care provider.  Document Released: 06/05/2009 Document Revised: 05/25/2017 Document Reviewed: 04/21/2015  [email or web address] Interactive Patient Education © 2017 [email or web address]  Otitis Media, Adult  Otitis media occurs when there is inflammation and fluid in the middle ear. Your middle ear is a part of the ear that contains bones for hearing as well as air that helps send sounds to your brain.  What are the causes?  This condition is caused by a blockage in the eustachian tube. This tube drains fluid from the ear to the back of the nose (nasopharynx). A blockage in this tube can be caused by an object or by swelling (edema) in the tube. Problems that can cause a blockage include:  · A cold or other upper respiratory infection.  · Allergies.  · An irritant, such as tobacco smoke.  · Enlarged adenoids. The adenoids are areas of soft tissue located high in the back of the throat, behind the nose and the roof of the mouth.  · A mass in the nasopharynx.  · Damage to the ear caused by pressure changes (barotrauma).  What are the signs or symptoms?  Symptoms of this condition include:  · Ear pain.  · A fever.  · Decreased hearing.  · A headache.  · Tiredness (lethargy).  · Fluid leaking from the ear.  · Ringing in the ear.  How is this diagnosed?  This condition is diagnosed with a physical exam. During the exam your health care provider will use an instrument called an otoscope to look into your ear and check for redness, swelling, and fluid. He or she will also ask about your symptoms.  Your health care provider may also order tests, such as:  · A test to check the movement of the eardrum (pneumatic otoscopy). This test is done by squeezing a small amount  of air into the ear.  · A test that changes air pressure in the middle ear to check how well the eardrum moves and whether the eustachian tube is working (tympanogram).  How is this treated?  This condition usually goes away on its own within 3-5 days. But if the condition is caused by a bacteria infection and does not go away own its own, or keeps coming back, your health care provider may:  · Prescribe antibiotic medicines to treat the infection.  · Prescribe or recommend medicines to control pain.  Follow these instructions at home:  · Take over-the-counter and prescription medicines only as told by your health care provider.  · If you were prescribed an antibiotic medicine, take it as told by your health care provider. Do not stop taking the antibiotic even if you start to feel better.  · Keep all follow-up visits as told by your health care provider. This is important.  Contact a health care provider if:  · You have bleeding from your nose.  · There is a lump on your neck.  · You are not getting better in 5 days.  · You feel worse instead of better.  Get help right away if:  · You have severe pain that is not controlled with medicine.  · You have swelling, redness, or pain around your ear.  · You have stiffness in your neck.  · A part of your face is paralyzed.  · The bone behind your ear (mastoid) is tender when you touch it.  · You develop a severe headache.  Summary  · Otitis media is redness, soreness, and swelling of the middle ear.  · This condition usually goes away on its own within 3-5 days.  · If the problem does not go away in 3-5 days, your health care provider may prescribe or recommend medicines to treat your symptoms.  · If you were prescribed an antibiotic medicine, take it as told by your health care provider.  This information is not intended to replace advice given to you by your health care provider. Make sure you discuss any questions you have with your health care provider.  Document  Released: 09/22/2005 Document Revised: 12/08/2017 Document Reviewed: 12/08/2017  [employer] Interactive Patient Education © 2017 [employer]

## 2018-04-27 ENCOUNTER — APPOINTMENT (OUTPATIENT)
Dept: GENERAL RADIOLOGY | Facility: HOSPITAL | Age: 53
End: 2018-04-27

## 2018-04-27 PROCEDURE — 72110 X-RAY EXAM L-2 SPINE 4/>VWS: CPT | Performed by: EMERGENCY MEDICINE

## 2018-05-21 DIAGNOSIS — F32.A DEPRESSION, UNSPECIFIED DEPRESSION TYPE: ICD-10-CM

## 2018-05-21 DIAGNOSIS — M17.11 ARTHRITIS OF RIGHT KNEE: ICD-10-CM

## 2018-05-21 RX ORDER — BUSPIRONE HYDROCHLORIDE 30 MG/1
45 TABLET ORAL DAILY
Qty: 135 TABLET | Refills: 3 | Status: CANCELLED | OUTPATIENT
Start: 2018-05-21

## 2018-05-21 RX ORDER — MELOXICAM 15 MG/1
TABLET ORAL
Qty: 90 TABLET | Refills: 1 | Status: SHIPPED | OUTPATIENT
Start: 2018-05-21 | End: 2018-08-15

## 2018-05-21 RX ORDER — BUPROPION HYDROCHLORIDE 300 MG/1
300 TABLET ORAL DAILY
Qty: 90 TABLET | Refills: 1 | Status: CANCELLED | OUTPATIENT
Start: 2018-05-21

## 2018-05-31 DIAGNOSIS — F32.A DEPRESSION, UNSPECIFIED DEPRESSION TYPE: ICD-10-CM

## 2018-05-31 RX ORDER — BUSPIRONE HYDROCHLORIDE 30 MG/1
45 TABLET ORAL DAILY
Qty: 135 TABLET | Refills: 3 | Status: SHIPPED | OUTPATIENT
Start: 2018-05-31 | End: 2018-08-15

## 2018-05-31 RX ORDER — BUPROPION HYDROCHLORIDE 300 MG/1
300 TABLET ORAL DAILY
Qty: 90 TABLET | Refills: 3 | Status: SHIPPED | OUTPATIENT
Start: 2018-05-31 | End: 2018-08-13 | Stop reason: SDUPTHER

## 2018-08-13 RX ORDER — BUPROPION HYDROCHLORIDE 300 MG/1
TABLET ORAL
Qty: 90 TABLET | Refills: 1 | Status: SHIPPED | OUTPATIENT
Start: 2018-08-13 | End: 2018-08-15

## 2018-08-21 ENCOUNTER — OFFICE VISIT (OUTPATIENT)
Dept: RETAIL CLINIC | Facility: CLINIC | Age: 53
End: 2018-08-21

## 2018-08-21 VITALS
HEART RATE: 98 BPM | TEMPERATURE: 98.1 F | OXYGEN SATURATION: 96 % | DIASTOLIC BLOOD PRESSURE: 80 MMHG | SYSTOLIC BLOOD PRESSURE: 120 MMHG

## 2018-08-21 DIAGNOSIS — R31.9 URINARY TRACT INFECTION WITH HEMATURIA, SITE UNSPECIFIED: ICD-10-CM

## 2018-08-21 DIAGNOSIS — N39.0 URINARY TRACT INFECTION WITH HEMATURIA, SITE UNSPECIFIED: ICD-10-CM

## 2018-08-21 DIAGNOSIS — R30.0 DYSURIA: Primary | ICD-10-CM

## 2018-08-21 LAB
BILIRUB BLD-MCNC: ABNORMAL MG/DL
CLARITY, POC: ABNORMAL
COLOR UR: ABNORMAL
GLUCOSE UR STRIP-MCNC: NEGATIVE MG/DL
KETONES UR QL: NEGATIVE
LEUKOCYTE EST, POC: ABNORMAL
NITRITE UR-MCNC: POSITIVE MG/ML
PH UR: 6 [PH] (ref 5–8)
PROT UR STRIP-MCNC: ABNORMAL MG/DL
RBC # UR STRIP: ABNORMAL /UL
SP GR UR: 1.02 (ref 1–1.03)
UROBILINOGEN UR QL: NORMAL

## 2018-08-21 PROCEDURE — 81003 URINALYSIS AUTO W/O SCOPE: CPT | Performed by: NURSE PRACTITIONER

## 2018-08-21 PROCEDURE — 99213 OFFICE O/P EST LOW 20 MIN: CPT | Performed by: NURSE PRACTITIONER

## 2018-08-21 RX ORDER — AMOXICILLIN 875 MG/1
875 TABLET, COATED ORAL 2 TIMES DAILY
Qty: 20 TABLET | Refills: 0 | Status: SHIPPED | OUTPATIENT
Start: 2018-08-21 | End: 2018-08-31

## 2018-08-21 NOTE — PROGRESS NOTES
Subjective     Christine Villagomez is a 52 y.o.. female.     Last Wednesday went to urgent care for back/hip pain, given tizanidine for muscle relaxor, started with hot flashes and constipation; pt stating that she looked up on internet that said those symptoms could be UTI and pt wanting to be checked.      Urinary Tract Infection    This is a new problem. Episode onset: 4 days. The problem has been unchanged. Associated symptoms include frequency, nausea and urgency. Pertinent negatives include no hematuria or vomiting. Treatments tried: azo equivalent otc medication, 2 doses. The treatment provided no relief.       The following portions of the patient's history were reviewed and updated as appropriate: allergies, current medications, past family history, past medical history, past social history, past surgical history and problem list.    Review of Systems   Constitutional: Negative for fever.        Hot flashes   Gastrointestinal: Positive for nausea. Negative for vomiting.   Genitourinary: Positive for dysuria, frequency and urgency. Negative for hematuria.   Musculoskeletal: Positive for arthralgias (low back/hip pain).       Objective     Vitals:    08/21/18 1308   BP: 120/80   Pulse: 98   Temp: 98.1 °F (36.7 °C)   TempSrc: Oral   SpO2: 96%       Physical Exam   Constitutional: She is oriented to person, place, and time. She appears well-developed and well-nourished.   HENT:   Head: Normocephalic and atraumatic.   Eyes: Pupils are equal, round, and reactive to light.   Cardiovascular: Normal rate and regular rhythm.    Pulmonary/Chest: Effort normal and breath sounds normal.   Musculoskeletal: Normal range of motion.   Neurological: She is alert and oriented to person, place, and time.   Vitals reviewed.      Lab Results (last 24 hours)     Procedure Component Value Units Date/Time    POC Urinalysis Dipstick, Automated [[id number]]  (Abnormal) Collected:  08/21/18 1315    Specimen:  Urine Updated:  08/21/18 1316      Color Grace     Clarity, UA Cloudy (A)     Specific Gravity  1.025     pH, Urine 6.0     Leukocytes Large (3+) (A)     Nitrite, UA Positive (A)     Protein,  mg/dL (A) mg/dL      Glucose, UA Negative mg/dL      Ketones, UA Negative     Urobilinogen, UA Normal     Bilirubin Small (1+) (A)     Blood, UA Large (A)          Assessment/Plan   Christine was seen today for urinary tract infection.    Diagnoses and all orders for this visit:    Dysuria  -     POC Urinalysis Dipstick, Automated    Urinary tract infection with hematuria, site unspecified  -     Urine Culture - Urine, Urine, Clean Catch  -     amoxicillin (AMOXIL) 875 MG tablet; Take 1 tablet by mouth 2 (Two) Times a Day for 10 days.        Patient Instructions   Urinary Tract Infection, Adult  A urinary tract infection (UTI) is an infection of any part of the urinary tract, which includes the kidneys, ureters, bladder, and urethra. These organs make, store, and get rid of urine in the body. UTI can be a bladder infection (cystitis) or kidney infection (pyelonephritis).  What are the causes?  This infection may be caused by fungi, viruses, or bacteria. Bacteria are the most common cause of UTIs. This condition can also be caused by repeated incomplete emptying of the bladder during urination.  What increases the risk?  This condition is more likely to develop if:  · You ignore your need to urinate or hold urine for long periods of time.  · You do not empty your bladder completely during urination.  · You wipe back to front after urinating or having a bowel movement, if you are female.  · You are uncircumcised, if you are male.  · You are constipated.  · You have a urinary catheter that stays in place (indwelling).  · You have a weak defense (immune) system.  · You have a medical condition that affects your bowels, kidneys, or bladder.  · You have diabetes.  · You take antibiotic medicines frequently or for long periods of time, and the antibiotics no  longer work well against certain types of infections (antibiotic resistance).  · You take medicines that irritate your urinary tract.  · You are exposed to chemicals that irritate your urinary tract.  · You are female.    What are the signs or symptoms?  Symptoms of this condition include:  · Fever.  · Frequent urination or passing small amounts of urine frequently.  · Needing to urinate urgently.  · Pain or burning with urination.  · Urine that smells bad or unusual.  · Cloudy urine.  · Pain in the lower abdomen or back.  · Trouble urinating.  · Blood in the urine.  · Vomiting or being less hungry than normal.  · Diarrhea or abdominal pain.  · Vaginal discharge, if you are female.    How is this diagnosed?  This condition is diagnosed with a medical history and physical exam. You will also need to provide a urine sample to test your urine. Other tests may be done, including:  · Blood tests.  · Sexually transmitted disease (STD) testing.    If you have had more than one UTI, a cystoscopy or imaging studies may be done to determine the cause of the infections.  How is this treated?  Treatment for this condition often includes a combination of two or more of the following:  · Antibiotic medicine.  · Other medicines to treat less common causes of UTI.  · Over-the-counter medicines to treat pain.  · Drinking enough water to stay hydrated.    Follow these instructions at home:  · Take over-the-counter and prescription medicines only as told by your health care provider.  · If you were prescribed an antibiotic, take it as told by your health care provider. Do not stop taking the antibiotic even if you start to feel better.  · Avoid alcohol, caffeine, tea, and carbonated beverages. They can irritate your bladder.  · Drink enough fluid to keep your urine clear or pale yellow.  · Keep all follow-up visits as told by your health care provider. This is important.  · Make sure to:  ? Empty your bladder often and completely. Do  not hold urine for long periods of time.  ? Empty your bladder before and after sex.  ? Wipe from front to back after a bowel movement if you are female. Use each tissue one time when you wipe.  Contact a health care provider if:  · You have back pain.  · You have a fever.  · You feel nauseous or vomit.  · Your symptoms do not get better after 3 days.  · Your symptoms go away and then return.  Get help right away if:  · You have severe back pain or lower abdominal pain.  · You are vomiting and cannot keep down any medicines or water.  This information is not intended to replace advice given to you by your health care provider. Make sure you discuss any questions you have with your health care provider.  Document Released: 09/27/2006 Document Revised: 05/31/2017 Document Reviewed: 11/07/2016  [employer] Interactive Patient Education © 2018 [employer]        Return if symptoms worsen or fail to improve with urgent care/ER.

## 2018-08-25 LAB
BACTERIA UR CULT: ABNORMAL
BACTERIA UR CULT: ABNORMAL
OTHER ANTIBIOTIC SUSC ISLT: ABNORMAL

## 2018-08-26 ENCOUNTER — TELEPHONE (OUTPATIENT)
Dept: RETAIL CLINIC | Facility: CLINIC | Age: 53
End: 2018-08-26

## 2018-08-26 NOTE — TELEPHONE ENCOUNTER
Called and spoke with pt regarding urine culture results, abnormal--E coli with greater than 100,000 colonies. Pt informed to continue abx as prescribed. Pt verb. Understanding. Pt stating she is feeling better. Pt denies any issues at this time. Pt informed to take full script and if having any issues to rto. Pt verb. Understanding.

## 2018-09-28 DIAGNOSIS — E78.1 HYPERTRIGLYCERIDEMIA: Primary | ICD-10-CM

## 2018-09-28 DIAGNOSIS — E03.8 OTHER SPECIFIED HYPOTHYROIDISM: ICD-10-CM

## 2018-09-28 DIAGNOSIS — I10 ESSENTIAL HYPERTENSION: ICD-10-CM

## 2018-09-28 DIAGNOSIS — E78.00 HYPERCHOLESTEROLEMIA: ICD-10-CM

## 2018-09-28 PROBLEM — Z86.59 HISTORY OF DEPRESSION: Status: ACTIVE | Noted: 2018-09-28

## 2018-09-28 PROBLEM — F31.9 BIPOLAR DISORDER: Status: ACTIVE | Noted: 2018-09-28

## 2018-10-01 DIAGNOSIS — F32.A DEPRESSION, UNSPECIFIED DEPRESSION TYPE: ICD-10-CM

## 2018-10-01 DIAGNOSIS — M17.11 ARTHRITIS OF RIGHT KNEE: ICD-10-CM

## 2018-10-01 DIAGNOSIS — E78.2 MIXED HYPERLIPIDEMIA: ICD-10-CM

## 2018-10-02 ENCOUNTER — OFFICE VISIT (OUTPATIENT)
Dept: SLEEP MEDICINE | Facility: HOSPITAL | Age: 53
End: 2018-10-02
Attending: INTERNAL MEDICINE

## 2018-10-02 VITALS
HEART RATE: 90 BPM | DIASTOLIC BLOOD PRESSURE: 75 MMHG | SYSTOLIC BLOOD PRESSURE: 131 MMHG | BODY MASS INDEX: 33.5 KG/M2 | WEIGHT: 234 LBS | HEIGHT: 70 IN

## 2018-10-02 DIAGNOSIS — I10 ESSENTIAL HYPERTENSION: ICD-10-CM

## 2018-10-02 DIAGNOSIS — G47.33 OBSTRUCTIVE SLEEP APNEA SYNDROME: Primary | ICD-10-CM

## 2018-10-02 PROCEDURE — G0463 HOSPITAL OUTPT CLINIC VISIT: HCPCS

## 2018-10-02 RX ORDER — ATORVASTATIN CALCIUM 20 MG/1
TABLET, FILM COATED ORAL
Qty: 90 TABLET | Refills: 3 | Status: SHIPPED | OUTPATIENT
Start: 2018-10-02 | End: 2019-10-30 | Stop reason: SDUPTHER

## 2018-10-02 RX ORDER — TERAZOSIN 1 MG/1
CAPSULE ORAL
Qty: 90 CAPSULE | Refills: 3 | Status: SHIPPED | OUTPATIENT
Start: 2018-10-02 | End: 2019-11-22 | Stop reason: SDUPTHER

## 2018-10-02 RX ORDER — MELOXICAM 15 MG/1
TABLET ORAL
Qty: 90 TABLET | Refills: 1 | Status: SHIPPED | OUTPATIENT
Start: 2018-10-02 | End: 2019-05-15 | Stop reason: SDUPTHER

## 2018-10-02 NOTE — PROGRESS NOTES
PULMONARY SLEEP CONSULT NOTE      PATIENT IDENTIFICATION:  Name: Christine Villagomez  Age: 52 y.o.  Sex: female  :  1965  MRN: [id number]    DATE OF CONSULTATION:  10/2/2018   Referring Physician: No admitting provider for patient encounter.                  CHIEF COMPLAINT: Obstructive Sleep Apnea    History of Present Illness:   Christine Villagomez is a 52 y.o. female  Pt on CPAP feeling better more energy especially the night he use it more than 4 hours, no sleepiness no fatigue no tiredness, no mask irritation no dryness, compliance report reviewed with pt AHI< 5 with good usage.       Review of Systems:   Constitutional: negative   Eyes: negative   ENT/oropharynx: negative   Cardiovascular: negative   Respiratory: negative   Gastrointestinal: negative   Genitourinary: negative   Neurological: negative   Musculoskeletal: negative   Integument/breast: negative   Endocrine: negative   Allergic/Immunologic: negative     Past Medical History:  Past Medical History:   Diagnosis Date   • Bipolar disorder (CMS/HCC)    • Depression    • Hyperlipidemia    • Hypothyroidism    • Kidney stone        Past Surgical History:  Past Surgical History:   Procedure Laterality Date   • BLADDER SUSPENSION      TVT   • KIDNEY STONE SURGERY     • KNEE MENISCAL REPAIR Bilateral         Family History:  Family History   Problem Relation Age of Onset   • Cancer Mother         lung   • Heart attack Father    • Colon cancer Maternal Grandmother    • Breast cancer Neg Hx    • Ovarian cancer Neg Hx    • Pulmonary embolism Neg Hx    • Deep vein thrombosis Neg Hx         Social History:   Social History   Substance Use Topics   • Smoking status: Never Smoker   • Smokeless tobacco: Never Used   • Alcohol use No        Allergies:  Allergies   Allergen Reactions   • Sulfa Antibiotics Rash       Home Meds:    (Not in a hospital admission)    Objective:    Vitals Ranges:   Heart Rate:  [90] 90  BP: (131)/(75) 131/75  Body mass index is 33.58  kg/m².     Exam:  General Appearance:  WDWN    HEENT:   without obvious abnormality,  Conjunctiva/corneas clear,  Normal external ear canals, no drainage    Clear orsalmucosa,  Mallampati score 3    Neck:  Supple, symmetrical, trachea midline. No JVD.  Lungs:   Bilateral basal rhonchi bilaterally, respirations unlabored symmetrical wall movement.    Chest wall:  No tenderness or deformity.    Heart:  Regular rate and rhythm, S1 and S2 normal.  Extremities: Trace edema no clubbing or Cyanosis        Data Review:  All labs (24hrs): No results found for this or any previous visit (from the past 24 hour(s)).     Imaging:  [unfilled]    ASSESSMENT:  Diagnoses and all orders for this visit:    Obstructive sleep apnea syndrome    Essential hypertension    Hypothyroidism     PLAN:  This patient with obstructive sleep apnea, compliance is improved. Encourage to use it more frequent, I re-emphasized on pt the long and short term benefit of treating SANA.     Treating SANA will improve BP control    It is recommended to keep thyroid function optimized to improve quality of sleep    Jone Seth MD. D, ABSM.  10/2/2018  10:59 AM

## 2018-10-03 ENCOUNTER — RESULTS ENCOUNTER (OUTPATIENT)
Dept: INTERNAL MEDICINE | Facility: CLINIC | Age: 53
End: 2018-10-03

## 2018-10-03 DIAGNOSIS — I10 ESSENTIAL HYPERTENSION: ICD-10-CM

## 2018-10-03 DIAGNOSIS — E78.00 HYPERCHOLESTEROLEMIA: ICD-10-CM

## 2018-10-03 DIAGNOSIS — E03.8 OTHER SPECIFIED HYPOTHYROIDISM: ICD-10-CM

## 2018-10-03 DIAGNOSIS — E78.1 HYPERTRIGLYCERIDEMIA: ICD-10-CM

## 2018-10-03 LAB
ALBUMIN SERPL-MCNC: 4.5 G/DL (ref 3.5–5.2)
ALBUMIN/GLOB SERPL: 1.6 G/DL
ALP SERPL-CCNC: 95 U/L (ref 40–129)
ALT SERPL-CCNC: 19 U/L (ref 5–33)
AST SERPL-CCNC: 19 U/L (ref 5–32)
BASOPHILS # BLD AUTO: 0.04 10*3/MM3 (ref 0–0.2)
BASOPHILS NFR BLD AUTO: 0.6 % (ref 0–2)
BILIRUB SERPL-MCNC: 0.6 MG/DL (ref 0.2–1.2)
BUN SERPL-MCNC: 15 MG/DL (ref 6–20)
BUN/CREAT SERPL: 16.1 (ref 7–25)
CALCIUM SERPL-MCNC: 9.4 MG/DL (ref 8.6–10.5)
CHLORIDE SERPL-SCNC: 102 MMOL/L (ref 98–107)
CHOLEST SERPL-MCNC: 168 MG/DL (ref 0–200)
CO2 SERPL-SCNC: 28.6 MMOL/L (ref 22–29)
CREAT SERPL-MCNC: 0.93 MG/DL (ref 0.57–1)
EOSINOPHIL # BLD AUTO: 0.17 10*3/MM3 (ref 0.1–0.3)
EOSINOPHIL NFR BLD AUTO: 2.5 % (ref 0–4)
ERYTHROCYTE [DISTWIDTH] IN BLOOD BY AUTOMATED COUNT: 12 % (ref 11.5–14.5)
GLOBULIN SER CALC-MCNC: 2.9 GM/DL
GLUCOSE SERPL-MCNC: 87 MG/DL (ref 65–99)
HCT VFR BLD AUTO: 42.7 % (ref 37–47)
HDLC SERPL-MCNC: 63 MG/DL (ref 40–60)
HGB BLD-MCNC: 13.5 G/DL (ref 12–16)
IMM GRANULOCYTES # BLD: 0.01 10*3/MM3 (ref 0–0.03)
IMM GRANULOCYTES NFR BLD: 0.1 % (ref 0–0.5)
LDLC SERPL CALC-MCNC: 79 MG/DL (ref 0–100)
LDLC/HDLC SERPL: 1.25 {RATIO}
LYMPHOCYTES # BLD AUTO: 2.39 10*3/MM3 (ref 0.6–4.8)
LYMPHOCYTES NFR BLD AUTO: 34.6 % (ref 20–45)
MCH RBC QN AUTO: 28.4 PG (ref 27–31)
MCHC RBC AUTO-ENTMCNC: 31.6 G/DL (ref 31–37)
MCV RBC AUTO: 89.9 FL (ref 81–99)
MONOCYTES # BLD AUTO: 0.63 10*3/MM3 (ref 0–1)
MONOCYTES NFR BLD AUTO: 9.1 % (ref 3–8)
NEUTROPHILS # BLD AUTO: 3.67 10*3/MM3 (ref 1.5–8.3)
NEUTROPHILS NFR BLD AUTO: 53.1 % (ref 45–70)
NRBC BLD AUTO-RTO: 0 /100 WBC (ref 0–0)
PLATELET # BLD AUTO: 229 10*3/MM3 (ref 140–500)
POTASSIUM SERPL-SCNC: 4.4 MMOL/L (ref 3.5–5.2)
PROT SERPL-MCNC: 7.4 G/DL (ref 6–8.5)
RBC # BLD AUTO: 4.75 10*6/MM3 (ref 4.2–5.4)
SODIUM SERPL-SCNC: 141 MMOL/L (ref 136–145)
T4 FREE SERPL-MCNC: 1.27 NG/DL (ref 0.93–1.7)
TRIGL SERPL-MCNC: 132 MG/DL (ref 0–150)
TSH SERPL DL<=0.005 MIU/L-ACNC: 3.51 MIU/ML (ref 0.27–4.2)
VLDLC SERPL CALC-MCNC: 26.4 MG/DL (ref 7–27)
WBC # BLD AUTO: 6.91 10*3/MM3 (ref 4.8–10.8)

## 2018-10-10 ENCOUNTER — OFFICE VISIT (OUTPATIENT)
Dept: INTERNAL MEDICINE | Facility: CLINIC | Age: 53
End: 2018-10-10

## 2018-10-10 VITALS
BODY MASS INDEX: 33.01 KG/M2 | DIASTOLIC BLOOD PRESSURE: 80 MMHG | WEIGHT: 230.6 LBS | HEART RATE: 93 BPM | SYSTOLIC BLOOD PRESSURE: 122 MMHG | HEIGHT: 70 IN | OXYGEN SATURATION: 98 %

## 2018-10-10 DIAGNOSIS — I10 ESSENTIAL HYPERTENSION: ICD-10-CM

## 2018-10-10 DIAGNOSIS — Z86.59 HISTORY OF DEPRESSION: ICD-10-CM

## 2018-10-10 DIAGNOSIS — F31.9 BIPOLAR AFFECTIVE DISORDER, REMISSION STATUS UNSPECIFIED (HCC): ICD-10-CM

## 2018-10-10 DIAGNOSIS — E78.1 HYPERTRIGLYCERIDEMIA: ICD-10-CM

## 2018-10-10 DIAGNOSIS — F30.9 BIPOLAR I DISORDER, SINGLE MANIC EPISODE (HCC): ICD-10-CM

## 2018-10-10 DIAGNOSIS — Z23 NEEDS FLU SHOT: Primary | ICD-10-CM

## 2018-10-10 DIAGNOSIS — E03.8 OTHER SPECIFIED HYPOTHYROIDISM: ICD-10-CM

## 2018-10-10 DIAGNOSIS — E78.00 HYPERCHOLESTEROLEMIA: ICD-10-CM

## 2018-10-10 PROCEDURE — 90471 IMMUNIZATION ADMIN: CPT | Performed by: INTERNAL MEDICINE

## 2018-10-10 PROCEDURE — 99214 OFFICE O/P EST MOD 30 MIN: CPT | Performed by: INTERNAL MEDICINE

## 2018-10-10 PROCEDURE — 90686 IIV4 VACC NO PRSV 0.5 ML IM: CPT | Performed by: INTERNAL MEDICINE

## 2018-10-10 RX ORDER — LEVOTHYROXINE SODIUM 0.05 MG/1
50 TABLET ORAL DAILY
Qty: 90 TABLET | Refills: 3 | Status: SHIPPED | OUTPATIENT
Start: 2018-10-10 | End: 2020-01-08 | Stop reason: SDUPTHER

## 2018-10-10 RX ORDER — DEXTROAMPHETAMINE SACCHARATE, AMPHETAMINE ASPARTATE MONOHYDRATE, DEXTROAMPHETAMINE SULFATE AND AMPHETAMINE SULFATE 7.5; 7.5; 7.5; 7.5 MG/1; MG/1; MG/1; MG/1
30 CAPSULE, EXTENDED RELEASE ORAL EVERY MORNING
COMMUNITY
Start: 2018-10-04 | End: 2018-12-28 | Stop reason: SDUPTHER

## 2018-10-10 NOTE — PROGRESS NOTES
Christine Villagomez is a 52 y.o. female, who presents with a chief complaint of   Chief Complaint   Patient presents with   • Follow-up     Had labs done recently   • Thyroid Problem     Has been on Synthroid for a while, it is very expensive, she would like to know if she can switch to Levothyroxine.        HPI   Pt here for follow up    Pt was started on Adderall by psych (dr. Arevalo).  Pt brought HR log and keeps track of bp's.  So far bp and hr have been normal.  Pt thinks the med helps some with anxiety and compulsions.    Hypothyroidism - pt has been on thyroid meds for years.  She has been on synthroid but wondering. No hair/skin changes.    History of insulin resistance - last 3 a1c's have been in normal range.     hld - doing well on statin.  No cramps or myalgias    HTN - well controlled. No ha/dizziness.      The following portions of the patient's history were reviewed and updated as appropriate: allergies, current medications, past family history, past medical history, past social history, past surgical history and problem list.    Allergies: Sulfa antibiotics    Review of Systems   Constitutional: Negative.    HENT: Negative.    Eyes: Negative.    Respiratory: Negative.    Cardiovascular: Negative.    Gastrointestinal: Negative.    Endocrine: Negative.    Genitourinary: Negative.    Musculoskeletal: Negative.    Skin: Negative.    Allergic/Immunologic: Negative.    Neurological: Negative.    Hematological: Negative.    Psychiatric/Behavioral: Negative.    All other systems reviewed and are negative.            Wt Readings from Last 3 Encounters:   10/10/18 105 kg (230 lb 9.6 oz)   10/02/18 106 kg (234 lb)   08/15/18 102 kg (225 lb)     Temp Readings from Last 3 Encounters:   08/21/18 98.1 °F (36.7 °C) (Oral)   08/15/18 98.4 °F (36.9 °C) (Oral)   04/27/18 97.5 °F (36.4 °C) (Oral)     BP Readings from Last 3 Encounters:   10/10/18 122/80   10/02/18 131/75   08/21/18 120/80     Pulse Readings from Last  3 Encounters:   10/10/18 93   10/02/18 90   08/21/18 98     Body mass index is 33.09 kg/m².  @LASTSAO2(3)@    Physical Exam   Constitutional: She is oriented to person, place, and time. She appears well-developed and well-nourished. No distress.   HENT:   Head: Normocephalic and atraumatic.   Right Ear: External ear normal.   Left Ear: External ear normal.   Nose: Nose normal.   Mouth/Throat: Oropharynx is clear and moist.   Eyes: Pupils are equal, round, and reactive to light. Conjunctivae and EOM are normal.   Neck: Normal range of motion. Neck supple.   Cardiovascular: Normal rate, regular rhythm, normal heart sounds and intact distal pulses.    Pulmonary/Chest: Effort normal and breath sounds normal. No respiratory distress. She has no wheezes.   Musculoskeletal: Normal range of motion.   Normal gait   Neurological: She is alert and oriented to person, place, and time.   Skin: Skin is warm and dry.   Psychiatric: She has a normal mood and affect. Her behavior is normal. Judgment and thought content normal.   Nursing note and vitals reviewed.      Results for orders placed or performed in visit on 10/03/18   Comprehensive metabolic panel   Result Value Ref Range    Glucose 87 65 - 99 mg/dL    BUN 15 6 - 20 mg/dL    Creatinine 0.93 0.57 - 1.00 mg/dL    eGFR Non African Am 63 >60 mL/min/1.73    eGFR African Am 76 >60 mL/min/1.73    BUN/Creatinine Ratio 16.1 7.0 - 25.0    Sodium 141 136 - 145 mmol/L    Potassium 4.4 3.5 - 5.2 mmol/L    Chloride 102 98 - 107 mmol/L    Total CO2 28.6 22.0 - 29.0 mmol/L    Calcium 9.4 8.6 - 10.5 mg/dL    Total Protein 7.4 6.0 - 8.5 g/dL    Albumin 4.50 3.50 - 5.20 g/dL    Globulin 2.9 gm/dL    A/G Ratio 1.6 g/dL    Total Bilirubin 0.6 0.2 - 1.2 mg/dL    Alkaline Phosphatase 95 40 - 129 U/L    AST (SGOT) 19 5 - 32 U/L    ALT (SGPT) 19 5 - 33 U/L   Lipid Panel With LDL/HDL Ratio   Result Value Ref Range    Total Cholesterol 168 0 - 200 mg/dL    Triglycerides 132 0 - 150 mg/dL    HDL  Cholesterol 63 (H) 40 - 60 mg/dL    VLDL Cholesterol 26.4 7 - 27 mg/dL    LDL Cholesterol  79 0 - 100 mg/dL    LDL/HDL Ratio 1.25    TSH   Result Value Ref Range    TSH 3.510 0.270 - 4.200 mIU/mL   T4, free   Result Value Ref Range    Free T4 1.27 0.93 - 1.70 ng/dL   CBC w AUTO Differential   Result Value Ref Range    WBC 6.91 4.80 - 10.80 10*3/mm3    RBC 4.75 4.20 - 5.40 10*6/mm3    Hemoglobin 13.5 12.0 - 16.0 g/dL    Hematocrit 42.7 37.0 - 47.0 %    MCV 89.9 81.0 - 99.0 fL    MCH 28.4 27.0 - 31.0 pg    MCHC 31.6 31.0 - 37.0 g/dL    RDW 12.0 11.5 - 14.5 %    Platelets 229 140 - 500 10*3/mm3    Neutrophil Rel % 53.1 45.0 - 70.0 %    Lymphocyte Rel % 34.6 20.0 - 45.0 %    Monocyte Rel % 9.1 (H) 3.0 - 8.0 %    Eosinophil Rel % 2.5 0.0 - 4.0 %    Basophil Rel % 0.6 0.0 - 2.0 %    Neutrophils Absolute 3.67 1.50 - 8.30 10*3/mm3    Lymphocytes Absolute 2.39 0.60 - 4.80 10*3/mm3    Monocytes Absolute 0.63 0.00 - 1.00 10*3/mm3    Eosinophils Absolute 0.17 0.10 - 0.30 10*3/mm3    Basophils Absolute 0.04 0.00 - 0.20 10*3/mm3    Immature Granulocyte Rel % 0.1 0.0 - 0.5 %    Immature Grans Absolute 0.01 0.00 - 0.03 10*3/mm3    nRBC 0.0 0.0 - 0.0 /100 WBC           Christine was seen today for follow-up and thyroid problem.    Diagnoses and all orders for this visit:    Needs flu shot  -     Fluarix/Fluzone/Afluria/FluLaval (6730-5900)    Hypertriglyceridemia    Hypercholesterolemia    Essential hypertension    Other specified hypothyroidism    Bipolar I disorder, single manic episode (CMS/[facility])    History of depression    Bipolar affective disorder, remission status unspecified (CMS/[facility])    Other orders  -     levothyroxine (SYNTHROID, LEVOTHROID) 50 MCG tablet; Take 1 tablet by mouth Daily.      Cont current meds with changes as above.  Ov/las in 6 mo.     Outpatient Medications Prior to Visit   Medication Sig Dispense Refill   • amphetamine-dextroamphetamine (ADDERALL) 20 MG tablet Take 20 mg by mouth Every Evening.     •  atorvastatin (LIPITOR) 20 MG tablet TAKE 1 TABLET DAILY 90 tablet 3   • bisoprolol-hydrochlorothiazide (ZIAC) 5-6.25 MG per tablet bisoprolol 5 mg-hydrochlorothiazide 6.25 mg tablet     • buPROPion XL (WELLBUTRIN XL) 300 MG 24 hr tablet bupropion HCl  mg 24 hr tablet, extended release     • busPIRone (BUSPAR) 30 MG tablet buspirone 30 mg tablet     • Calcium Carbonate-Vitamin D (CALCIUM 500 + D) 500-125 MG-UNIT tablet Calcium 500 + D     • cetirizine (zyrTEC) 10 MG tablet Take 10 mg by mouth Daily.     • Cholecalciferol (VITAMIN D) 1000 UNITS tablet Take 1 tablet by mouth daily.     • Coenzyme Q10 (CO Q-10) 100 MG capsule Co Q-10     • diphenhydrAMINE (BENADRYL) 25 mg capsule Benadryl     • docusate sodium (COLACE) 50 MG capsule Take  by mouth 2 (Two) Times a Day.     • Ergocalciferol (VITAMIN D2 PO) Vitamin D2     • FIBER PO Take  by mouth.     • fluticasone (FLONASE) 50 MCG/ACT nasal spray fluticasone 50 mcg/actuation nasal spray,suspension     • Glucosamine 750 MG tablet glucosamin 375 mg-chond 300 mg-collagen 50 mg-hyaluronic acid 2 mg cap   Take by oral route.     • Green Tea, Camillia sinensis, (GREEN TEA EXTRACT PO) Take  by mouth.     • l-methylfolate-algae (DEPLIN) 7.5-90.314 MG capsule capsule Deplin (algal oil) 15 mg-90.314 mg capsule     • Magnesium 250 MG tablet Take 250 mg by mouth daily.     • magnesium citrate 1.745 GM/30ML solution solution Take  by mouth 1 (One) Time.     • meloxicam (MOBIC) 15 MG tablet TAKE 1 TABLET DAILY 90 tablet 1   • Multiple Vitamin (MULTI-VITAMIN DAILY PO) Multi Vitamin     • Multiple Vitamins-Minerals (HAIR/SKIN/NAILS PO) Take 1 tablet by mouth daily.     • Omega-3-acid Ethyl Esters (LOVAZA PO) Lovaza     • Probiotic Product (PROBIOTIC DAILY PO) Probiotic     • terazosin (HYTRIN) 1 MG capsule TAKE 1 CAPSULE DAILY 90 capsule 3   • tiZANidine (ZANAFLEX) 4 MG tablet Take 1 tablet by mouth At Night As Needed for Muscle Spasms. 21 tablet 0   • levothyroxine (SYNTHROID,  LEVOTHROID) 50 MCG tablet levothyroxine 50 mcg tablet       No facility-administered medications prior to visit.      New Medications Ordered This Visit   Medications   • levothyroxine (SYNTHROID, LEVOTHROID) 50 MCG tablet     Sig: Take 1 tablet by mouth Daily.     Dispense:  90 tablet     Refill:  3     [unfilled]  Medications Discontinued During This Encounter   Medication Reason   • levothyroxine (SYNTHROID, LEVOTHROID) 50 MCG tablet Reorder         No Follow-up on file.

## 2018-12-09 ENCOUNTER — OFFICE VISIT (OUTPATIENT)
Dept: RETAIL CLINIC | Facility: CLINIC | Age: 53
End: 2018-12-09

## 2018-12-09 VITALS
RESPIRATION RATE: 20 BRPM | DIASTOLIC BLOOD PRESSURE: 82 MMHG | HEART RATE: 119 BPM | SYSTOLIC BLOOD PRESSURE: 150 MMHG | OXYGEN SATURATION: 99 % | TEMPERATURE: 98 F

## 2018-12-09 DIAGNOSIS — A49.9 UTI (URINARY TRACT INFECTION), BACTERIAL: Primary | ICD-10-CM

## 2018-12-09 DIAGNOSIS — N39.0 UTI (URINARY TRACT INFECTION), BACTERIAL: Primary | ICD-10-CM

## 2018-12-09 LAB
BILIRUB BLD-MCNC: ABNORMAL MG/DL
CLARITY, POC: ABNORMAL
COLOR UR: ABNORMAL
GLUCOSE UR STRIP-MCNC: NEGATIVE MG/DL
KETONES UR QL: ABNORMAL
LEUKOCYTE EST, POC: ABNORMAL
NITRITE UR-MCNC: NEGATIVE MG/ML
PH UR: 6 [PH] (ref 5–8)
PROT UR STRIP-MCNC: ABNORMAL MG/DL
RBC # UR STRIP: ABNORMAL /UL
SP GR UR: 1.03 (ref 1–1.03)
UROBILINOGEN UR QL: NORMAL

## 2018-12-09 PROCEDURE — 81003 URINALYSIS AUTO W/O SCOPE: CPT | Performed by: NURSE PRACTITIONER

## 2018-12-09 PROCEDURE — 99213 OFFICE O/P EST LOW 20 MIN: CPT | Performed by: NURSE PRACTITIONER

## 2018-12-09 RX ORDER — PHENAZOPYRIDINE HYDROCHLORIDE 200 MG/1
200 TABLET, FILM COATED ORAL 3 TIMES DAILY PRN
Qty: 6 TABLET | Refills: 0 | Status: SHIPPED | OUTPATIENT
Start: 2018-12-09 | End: 2018-12-11

## 2018-12-09 RX ORDER — NITROFURANTOIN 25; 75 MG/1; MG/1
100 CAPSULE ORAL 2 TIMES DAILY
Qty: 14 CAPSULE | Refills: 0 | Status: SHIPPED | OUTPATIENT
Start: 2018-12-09 | End: 2018-12-16

## 2018-12-09 NOTE — PROGRESS NOTES
Subjective   Christine Villagomez is a 53 y.o. female.     Patient has had kidney stones twice in the past and also has roughly 2 UTIs/year. She denies flank/back pain. She did take 1 tablet of amoxicillin about 2 hours ago.      Urinary Tract Infection    This is a new problem. The current episode started today. The problem occurs every urination. The problem has been unchanged. The quality of the pain is described as aching. The pain is mild. There has been no fever. She is sexually active. There is no history of pyelonephritis. Associated symptoms include frequency and hematuria. Pertinent negatives include no chills, flank pain, nausea, urgency or vomiting. She has tried antibiotics for the symptoms. The treatment provided moderate relief. Her past medical history is significant for kidney stones. There is no history of catheterization, recurrent UTIs or a urological procedure.       The following portions of the patient's history were reviewed and updated as appropriate: allergies, current medications, past medical history, past social history, past surgical history and problem list.    Review of Systems   Constitutional: Negative for appetite change, chills, diaphoresis, fatigue and fever.   Gastrointestinal: Negative for diarrhea, nausea and vomiting.   Genitourinary: Positive for dysuria, frequency and hematuria. Negative for decreased urine volume, difficulty urinating, flank pain, genital sores, pelvic pain, urgency, vaginal bleeding, vaginal discharge and vaginal pain.   Musculoskeletal: Negative for myalgias.   Skin: Negative for color change.   Allergic/Immunologic: Negative for immunocompromised state.   Neurological: Negative for dizziness, syncope, weakness and headaches.       Objective   Physical Exam   Constitutional: She is oriented to person, place, and time. She appears well-developed and well-nourished. She is cooperative.  Non-toxic appearance. She does not appear ill. No distress.    Cardiovascular: Regular rhythm, S1 normal and S2 normal. Tachycardia present.   Pulmonary/Chest: Effort normal and breath sounds normal.   Abdominal: Soft. Bowel sounds are normal. There is tenderness in the suprapubic area. There is no rigidity, no rebound, no guarding and no CVA tenderness.   Neurological: She is alert and oriented to person, place, and time.   Skin: Skin is warm and dry. She is not diaphoretic. No pallor.   Vitals reviewed.      Assessment/Plan   Christine was seen today for urinary tract infection.    Diagnoses and all orders for this visit:    UTI (urinary tract infection), bacterial  -     nitrofurantoin, macrocrystal-monohydrate, (MACROBID) 100 MG capsule; Take 1 capsule by mouth 2 (Two) Times a Day for 7 days.  -     phenazopyridine (PYRIDIUM) 200 MG tablet; Take 1 tablet by mouth 3 (Three) Times a Day As Needed for bladder spasms for up to 2 days.  -     Urine Culture - Urine, Urine, Catheter  -     POC Urinalysis Dipstick, Automated          -     Increase H2O intake        -     Will call with urine culture results once available        -     Follow up with PCP for persistent symptoms or UC/ER for worsening symptoms    Lab Results (last 24 hours)     Procedure Component Value Units Date/Time    POC Urinalysis Dipstick, Automated [[id number]]  (Abnormal) Collected:  12/09/18 1634    Specimen:  Urine Updated:  12/09/18 1635     Color Dark Yellow     Clarity, UA Cloudy     Specific Gravity  1.030     pH, Urine 6.0     Leukocytes Small (1+)     Nitrite, UA Negative     Protein,  mg/dL mg/dL      Glucose, UA Negative mg/dL      Ketones, UA Trace     Urobilinogen, UA Normal     Bilirubin Small (1+)     Blood, UA Large

## 2018-12-12 ENCOUNTER — TELEPHONE (OUTPATIENT)
Dept: RETAIL CLINIC | Facility: CLINIC | Age: 53
End: 2018-12-12

## 2018-12-12 LAB
BACTERIA UR CULT: NORMAL
BACTERIA UR CULT: NORMAL

## 2018-12-12 NOTE — TELEPHONE ENCOUNTER
Informed Ms. Villagomez today that her urine culture results came back as negative. Ms. Villagomez stated that she was feeling better. Pt was instructed to stop taking the antibiotic due to no bacteria being found in her urine.

## 2018-12-12 NOTE — TELEPHONE ENCOUNTER
Ms. Villagomez was called today regarding her negative urine culture results. Pt was unable to be reached. A voicemail was left instead.     Mailing a letter to the residence listed on file. Letter will notify pt of her negative urine culture results.

## 2018-12-28 ENCOUNTER — OFFICE VISIT (OUTPATIENT)
Dept: INTERNAL MEDICINE | Facility: CLINIC | Age: 53
End: 2018-12-28

## 2018-12-28 VITALS
HEIGHT: 70 IN | TEMPERATURE: 98.2 F | SYSTOLIC BLOOD PRESSURE: 126 MMHG | RESPIRATION RATE: 16 BRPM | HEART RATE: 98 BPM | BODY MASS INDEX: 32.78 KG/M2 | WEIGHT: 229 LBS | OXYGEN SATURATION: 98 % | DIASTOLIC BLOOD PRESSURE: 80 MMHG

## 2018-12-28 DIAGNOSIS — F98.8 ATTENTION DEFICIT DISORDER (ADD) IN ADULT: ICD-10-CM

## 2018-12-28 DIAGNOSIS — M25.562 CHRONIC PAIN OF BOTH KNEES: Primary | ICD-10-CM

## 2018-12-28 DIAGNOSIS — M25.561 CHRONIC PAIN OF BOTH KNEES: Primary | ICD-10-CM

## 2018-12-28 DIAGNOSIS — G89.29 CHRONIC PAIN OF BOTH KNEES: Primary | ICD-10-CM

## 2018-12-28 DIAGNOSIS — F41.9 ANXIETY: ICD-10-CM

## 2018-12-28 DIAGNOSIS — Z86.59 HISTORY OF DEPRESSION: ICD-10-CM

## 2018-12-28 PROCEDURE — 99214 OFFICE O/P EST MOD 30 MIN: CPT | Performed by: INTERNAL MEDICINE

## 2018-12-28 PROCEDURE — 20610 DRAIN/INJ JOINT/BURSA W/O US: CPT | Performed by: INTERNAL MEDICINE

## 2018-12-28 RX ORDER — DEXTROAMPHETAMINE SACCHARATE, AMPHETAMINE ASPARTATE, DEXTROAMPHETAMINE SULFATE AND AMPHETAMINE SULFATE 5; 5; 5; 5 MG/1; MG/1; MG/1; MG/1
20 TABLET ORAL EVERY EVENING
Qty: 30 TABLET | Refills: 0 | Status: SHIPPED | OUTPATIENT
Start: 2018-12-28 | End: 2019-03-07 | Stop reason: SDUPTHER

## 2018-12-28 RX ORDER — TRIAMCINOLONE ACETONIDE 40 MG/ML
80 INJECTION, SUSPENSION INTRA-ARTICULAR; INTRAMUSCULAR ONCE
Status: COMPLETED | OUTPATIENT
Start: 2018-12-28 | End: 2018-12-28

## 2018-12-28 RX ORDER — DEXTROAMPHETAMINE SACCHARATE, AMPHETAMINE ASPARTATE MONOHYDRATE, DEXTROAMPHETAMINE SULFATE AND AMPHETAMINE SULFATE 7.5; 7.5; 7.5; 7.5 MG/1; MG/1; MG/1; MG/1
30 CAPSULE, EXTENDED RELEASE ORAL EVERY MORNING
Qty: 30 CAPSULE | Refills: 0 | Status: SHIPPED | OUTPATIENT
Start: 2018-12-28 | End: 2019-03-07 | Stop reason: SDUPTHER

## 2018-12-28 RX ADMIN — TRIAMCINOLONE ACETONIDE 80 MG: 40 INJECTION, SUSPENSION INTRA-ARTICULAR; INTRAMUSCULAR at 10:20

## 2018-12-28 RX ADMIN — TRIAMCINOLONE ACETONIDE 80 MG: 40 INJECTION, SUSPENSION INTRA-ARTICULAR; INTRAMUSCULAR at 10:19

## 2019-01-21 ENCOUNTER — TRANSCRIBE ORDERS (OUTPATIENT)
Dept: ADMINISTRATIVE | Facility: HOSPITAL | Age: 54
End: 2019-01-21

## 2019-01-21 DIAGNOSIS — Z12.39 SCREENING BREAST EXAMINATION: Primary | ICD-10-CM

## 2019-02-21 ENCOUNTER — HOSPITAL ENCOUNTER (OUTPATIENT)
Dept: MAMMOGRAPHY | Facility: HOSPITAL | Age: 54
Discharge: HOME | End: 2019-02-21
Attending: OBSTETRICS & GYNECOLOGY | Admitting: OBSTETRICS & GYNECOLOGY

## 2019-02-21 DIAGNOSIS — Z12.39 SCREENING BREAST EXAMINATION: ICD-10-CM

## 2019-02-21 PROCEDURE — 77067 SCR MAMMO BI INCL CAD: CPT

## 2019-02-21 PROCEDURE — 77063 BREAST TOMOSYNTHESIS BI: CPT

## 2019-03-07 ENCOUNTER — OFFICE VISIT (OUTPATIENT)
Dept: OBSTETRICS AND GYNECOLOGY | Facility: CLINIC | Age: 54
End: 2019-03-07

## 2019-03-07 VITALS
HEIGHT: 70 IN | SYSTOLIC BLOOD PRESSURE: 142 MMHG | DIASTOLIC BLOOD PRESSURE: 90 MMHG | WEIGHT: 232 LBS | BODY MASS INDEX: 33.21 KG/M2

## 2019-03-07 DIAGNOSIS — Z01.419 ENCOUNTER FOR GYNECOLOGICAL EXAMINATION WITHOUT ABNORMAL FINDING: ICD-10-CM

## 2019-03-07 DIAGNOSIS — Z13.9 SCREENING FOR CONDITION: Primary | ICD-10-CM

## 2019-03-07 LAB
BILIRUB BLD-MCNC: NEGATIVE MG/DL
CLARITY, POC: CLEAR
COLOR UR: YELLOW
GLUCOSE UR STRIP-MCNC: NEGATIVE MG/DL
KETONES UR QL: NEGATIVE
LEUKOCYTE EST, POC: NEGATIVE
NITRITE UR-MCNC: NEGATIVE MG/ML
PH UR: 5 [PH] (ref 5–8)
PROT UR STRIP-MCNC: NEGATIVE MG/DL
RBC # UR STRIP: NEGATIVE /UL
SP GR UR: 1.03 (ref 1–1.03)
UROBILINOGEN UR QL: NORMAL

## 2019-03-07 PROCEDURE — 81002 URINALYSIS NONAUTO W/O SCOPE: CPT | Performed by: OBSTETRICS & GYNECOLOGY

## 2019-03-07 PROCEDURE — 99396 PREV VISIT EST AGE 40-64: CPT | Performed by: OBSTETRICS & GYNECOLOGY

## 2019-03-07 RX ORDER — DEXTROAMPHETAMINE SACCHARATE, AMPHETAMINE ASPARTATE MONOHYDRATE, DEXTROAMPHETAMINE SULFATE AND AMPHETAMINE SULFATE 6.25; 6.25; 6.25; 6.25 MG/1; MG/1; MG/1; MG/1
CAPSULE, EXTENDED RELEASE ORAL
COMMUNITY
Start: 2019-03-04 | End: 2019-05-01

## 2019-03-07 RX ORDER — ZOSTER VACCINE RECOMBINANT, ADJUVANTED 50 MCG/0.5
KIT INTRAMUSCULAR
COMMUNITY
Start: 2019-01-23 | End: 2020-01-16 | Stop reason: ALTCHOICE

## 2019-03-07 NOTE — PROGRESS NOTES
GYN Annual Exam     CC- Here for annual exam.     Christine Villagomez is a 53 y.o. female established patient who presents for annual well woman exam. She is skipping cycles and has then every 4-8  months. Her last cycle was 2018 and we discussed any VB after one year of amenorrhea is abnormal and needs evaluation. Her dog  2 days ago and she is sad.       OB History      Para Term  AB Living    1 1 1     1    SAB TAB Ectopic Molar Multiple Live Births                       Obstetric Comments    1           Menarche:12  Menopause:not yet  HRT:none  Current contraception: vasectomy  History of abnormal Pap smear: no  History of abnormal mammogram: no  Family history of uterine, colon or ovarian cancer: yes - MGM colon age 60  Family history of breast cancer: no  STD's: none  Last pap smear:18- normal pap/HPV      Health Maintenance   Topic Date Due   • URINE MICROALBUMIN  1965   • ANNUAL PHYSICAL  1967   • PNEUMOCOCCAL VACCINE (19-64 MEDIUM RISK) (1 of 1 - PPSV23) 1983   • ZOSTER VACCINE (1 of 2) 2014   • HEPATITIS C SCREENING  2016   • DIABETIC FOOT EXAM  2016   • DIABETIC EYE EXAM  2016   • HEMOGLOBIN A1C  10/11/2018   • LIPID PANEL  10/03/2019   • PAP SMEAR  2021   • MAMMOGRAM  2021   • COLONOSCOPY  2026   • TDAP/TD VACCINES (3 - Td) 10/18/2027   • INFLUENZA VACCINE  Completed       Past Medical History:   Diagnosis Date   • Bipolar disorder (CMS/HCC)    • Depression    • Hyperlipidemia    • Hypothyroidism    • Kidney stone        Past Surgical History:   Procedure Laterality Date   • BLADDER SUSPENSION      TVT   • KIDNEY STONE SURGERY     • KNEE MENISCAL REPAIR Bilateral          Current Outpatient Medications:   •  amphetamine-dextroamphetamine XR (ADDERALL XR) 25 MG 24 hr capsule, , Disp: , Rfl:   •  atorvastatin (LIPITOR) 20 MG tablet, TAKE 1 TABLET DAILY, Disp: 90 tablet, Rfl: 3  •  bisoprolol-hydrochlorothiazide (ZIAC)  5-6.25 MG per tablet, bisoprolol 5 mg-hydrochlorothiazide 6.25 mg tablet, Disp: , Rfl:   •  buPROPion XL (WELLBUTRIN XL) 300 MG 24 hr tablet, bupropion HCl  mg 24 hr tablet, extended release, Disp: , Rfl:   •  busPIRone (BUSPAR) 30 MG tablet, buspirone 30 mg tablet, Disp: , Rfl:   •  Calcium Carbonate-Vitamin D (CALCIUM 500 + D) 500-125 MG-UNIT tablet, Calcium 500 + D, Disp: , Rfl:   •  cetirizine (zyrTEC) 10 MG tablet, Take 10 mg by mouth Daily., Disp: , Rfl:   •  Cholecalciferol (VITAMIN D) 1000 UNITS tablet, Take 1 tablet by mouth daily., Disp: , Rfl:   •  Coenzyme Q10 (CO Q-10) 100 MG capsule, Co Q-10, Disp: , Rfl:   •  diphenhydrAMINE (BENADRYL) 25 mg capsule, Benadryl, Disp: , Rfl:   •  docusate sodium (COLACE) 50 MG capsule, Take  by mouth 2 (Two) Times a Day., Disp: , Rfl:   •  Ergocalciferol (VITAMIN D2 PO), Vitamin D2, Disp: , Rfl:   •  FIBER PO, Take  by mouth., Disp: , Rfl:   •  fluticasone (FLONASE) 50 MCG/ACT nasal spray, fluticasone 50 mcg/actuation nasal spray,suspension, Disp: , Rfl:   •  Glucosamine 750 MG tablet, glucosamin 375 mg-chond 300 mg-collagen 50 mg-hyaluronic acid 2 mg cap  Take by oral route., Disp: , Rfl:   •  Green Tea, Camillia sinensis, (GREEN TEA EXTRACT PO), Take  by mouth., Disp: , Rfl:   •  l-methylfolate-algae (DEPLIN) 7.5-90.314 MG capsule capsule, Deplin (algal oil) 15 mg-90.314 mg capsule, Disp: , Rfl:   •  levothyroxine (SYNTHROID, LEVOTHROID) 50 MCG tablet, Take 1 tablet by mouth Daily., Disp: 90 tablet, Rfl: 3  •  Magnesium 250 MG tablet, Take 250 mg by mouth daily., Disp: , Rfl:   •  magnesium citrate 1.745 GM/30ML solution solution, Take  by mouth 1 (One) Time., Disp: , Rfl:   •  meloxicam (MOBIC) 15 MG tablet, TAKE 1 TABLET DAILY, Disp: 90 tablet, Rfl: 1  •  Multiple Vitamin (MULTI-VITAMIN DAILY PO), Multi Vitamin, Disp: , Rfl:   •  Multiple Vitamins-Minerals (HAIR/SKIN/NAILS PO), Take 1 tablet by mouth daily., Disp: , Rfl:   •  Omega-3-acid Ethyl Esters (LOVAZA  "PO), Lovaza, Disp: , Rfl:   •  Probiotic Product (PROBIOTIC DAILY PO), Probiotic, Disp: , Rfl:   •  SHINGRIX 50 MCG/0.5ML reconstituted suspension, , Disp: , Rfl:   •  terazosin (HYTRIN) 1 MG capsule, TAKE 1 CAPSULE DAILY, Disp: 90 capsule, Rfl: 3  •  tiZANidine (ZANAFLEX) 4 MG tablet, Take 1 tablet by mouth At Night As Needed for Muscle Spasms., Disp: 21 tablet, Rfl: 0    Allergies   Allergen Reactions   • Sulfa Antibiotics Rash       Social History     Tobacco Use   • Smoking status: Never Smoker   • Smokeless tobacco: Never Used   Substance Use Topics   • Alcohol use: No   • Drug use: No       Family History   Problem Relation Age of Onset   • Cancer Mother         lung   • Heart attack Father    • Colon cancer Maternal Grandmother    • Breast cancer Neg Hx    • Ovarian cancer Neg Hx    • Pulmonary embolism Neg Hx    • Deep vein thrombosis Neg Hx        Review of Systems   Constitutional: Negative for appetite change, fatigue, fever and unexpected weight change.   Respiratory: Negative for cough and shortness of breath.    Cardiovascular: Negative for chest pain and palpitations.   Gastrointestinal: Negative for abdominal distention, abdominal pain, constipation, diarrhea and nausea.   Endocrine: Positive for heat intolerance (mild).   Genitourinary: Positive for menstrual problem (irregular). Negative for dyspareunia, dysuria, pelvic pain and vaginal discharge.   Skin: Negative for color change and rash.   Neurological: Negative for headaches.   Psychiatric/Behavioral: Positive for dysphoric mood (grief from dog passing). The patient is not nervous/anxious.        /90   Ht 177.8 cm (70\")   Wt 105 kg (232 lb)   BMI 33.29 kg/m²     Physical Exam   Constitutional: She is oriented to person, place, and time. She appears well-developed and well-nourished.   HENT:   Head: Normocephalic and atraumatic.   Eyes: Conjunctivae are normal. No scleral icterus.   Neck: Neck supple. No thyromegaly present. "   Cardiovascular: Normal rate and regular rhythm.   Pulmonary/Chest: Effort normal and breath sounds normal. Right breast exhibits no inverted nipple, no mass, no nipple discharge, no skin change and no tenderness. Left breast exhibits no inverted nipple, no mass, no nipple discharge, no skin change and no tenderness.   Abdominal: Soft. Bowel sounds are normal. She exhibits no distension and no mass. There is no tenderness. There is no rebound and no guarding. No hernia.   Genitourinary: Uterus normal. Pelvic exam was performed with patient supine. There is no rash, tenderness or lesion on the right labia. There is no rash, tenderness or lesion on the left labia. Cervix exhibits no motion tenderness, no discharge and no friability. Right adnexum displays no mass, no tenderness and no fullness. Left adnexum displays no mass, no tenderness and no fullness. No erythema, tenderness or bleeding in the vagina. No foreign body in the vagina. No signs of injury around the vagina. No vaginal discharge found.   Genitourinary Comments: Cystocele noted   Neurological: She is alert and oriented to person, place, and time.   Skin: Skin is warm and dry.   Psychiatric: She has a normal mood and affect. Her behavior is normal. Judgment and thought content normal.   Nursing note and vitals reviewed.         Assessment/Plan    1) GYN HM: normal pap/HPV 2/2018    SBE demonstrated and encouraged.  2) STD screening: declines Condoms encouraged.  3) Bone health - Weight bearing exercise, dietary calcium recommendations and vitamin D reviewed.   4) Diet and Exercise discussed  5) Smoking Status: No   6) Social: no issue  7)MMG:  UTD 2/21/19- B1  8) DEXA- plan age 54  9)C scope- UTD 3/2016, repeat 10 years.    Follow up prn and 1 year       Christine was seen today for gynecologic exam.    Diagnoses and all orders for this visit:    Screening for condition  -     POC Urinalysis Dipstick        Trinity Clark MD  3/7/19  9:58 PM

## 2019-04-09 ENCOUNTER — OFFICE VISIT (OUTPATIENT)
Dept: INTERNAL MEDICINE | Facility: CLINIC | Age: 54
End: 2019-04-09

## 2019-04-09 VITALS
RESPIRATION RATE: 16 BRPM | TEMPERATURE: 97.7 F | HEART RATE: 126 BPM | SYSTOLIC BLOOD PRESSURE: 140 MMHG | OXYGEN SATURATION: 98 % | HEIGHT: 70 IN | DIASTOLIC BLOOD PRESSURE: 86 MMHG | BODY MASS INDEX: 32.78 KG/M2 | WEIGHT: 229 LBS

## 2019-04-09 DIAGNOSIS — I10 ESSENTIAL HYPERTENSION: ICD-10-CM

## 2019-04-09 DIAGNOSIS — F98.8 ATTENTION DEFICIT DISORDER (ADD) IN ADULT: ICD-10-CM

## 2019-04-09 DIAGNOSIS — N32.81 OVERACTIVE BLADDER: ICD-10-CM

## 2019-04-09 DIAGNOSIS — M25.562 CHRONIC PAIN OF BOTH KNEES: ICD-10-CM

## 2019-04-09 DIAGNOSIS — Z86.59 HISTORY OF DEPRESSION: ICD-10-CM

## 2019-04-09 DIAGNOSIS — F41.9 ANXIETY: Primary | ICD-10-CM

## 2019-04-09 DIAGNOSIS — G89.29 CHRONIC PAIN OF BOTH KNEES: ICD-10-CM

## 2019-04-09 DIAGNOSIS — M25.561 CHRONIC PAIN OF BOTH KNEES: ICD-10-CM

## 2019-04-09 PROCEDURE — 99214 OFFICE O/P EST MOD 30 MIN: CPT | Performed by: INTERNAL MEDICINE

## 2019-04-09 RX ORDER — DEXTROAMPHETAMINE SACCHARATE, AMPHETAMINE ASPARTATE, DEXTROAMPHETAMINE SULFATE AND AMPHETAMINE SULFATE 2.5; 2.5; 2.5; 2.5 MG/1; MG/1; MG/1; MG/1
10 TABLET ORAL 2 TIMES DAILY
COMMUNITY
End: 2019-10-14 | Stop reason: SDUPTHER

## 2019-04-09 RX ORDER — OXYBUTYNIN CHLORIDE 10 MG/1
10 TABLET, EXTENDED RELEASE ORAL DAILY
Qty: 30 TABLET | Refills: 0 | Status: SHIPPED | OUTPATIENT
Start: 2019-04-09 | End: 2019-10-23 | Stop reason: ALTCHOICE

## 2019-04-09 NOTE — PROGRESS NOTES
Christine Villagomez is a 53 y.o. female, who presents with a chief complaint of   Chief Complaint   Patient presents with   • Hypertension   • Hyperlipidemia   • Hypothyroidism       HPI   Pt here for follow up.      bilat knee pain - pt having more pain alexa with hills/stairs.  She does ok walking on flat surfaces.    She was on terazosin for hot flashes that was written by her psychiatrist.  Dr. Clark stopped this med.  Her bp is now back up though.  She has felt like her bp and HR have been up with increased doses of her adderall.   She has an appt with psych soon and is willing to wean off the med.      She has had some urinary frequency.  On her gyn exam she had a cystocele.  She has had some frequency.      The following portions of the patient's history were reviewed and updated as appropriate: allergies, current medications, past family history, past medical history, past social history, past surgical history and problem list.    Allergies: Sulfa antibiotics    Review of Systems   Constitutional: Negative.    HENT: Negative.    Eyes: Negative.    Respiratory: Negative.    Cardiovascular: Negative.    Gastrointestinal: Negative.    Endocrine: Negative.    Genitourinary: Negative.    Musculoskeletal: Negative.    Skin: Negative.    Allergic/Immunologic: Negative.    Neurological: Negative.    Hematological: Negative.    Psychiatric/Behavioral: Negative.    All other systems reviewed and are negative.            Wt Readings from Last 3 Encounters:   04/09/19 104 kg (229 lb)   03/07/19 105 kg (232 lb)   12/28/18 104 kg (229 lb)     Temp Readings from Last 3 Encounters:   04/09/19 97.7 °F (36.5 °C) (Oral)   12/28/18 98.2 °F (36.8 °C) (Oral)   12/09/18 98 °F (36.7 °C)     BP Readings from Last 3 Encounters:   04/09/19 140/86   03/07/19 142/90   12/28/18 126/80     Pulse Readings from Last 3 Encounters:   04/09/19 (!) 126   12/28/18 98   12/09/18 119     Body mass index is 32.86  kg/m².  @LASTSAO2(3)@    Physical Exam   Constitutional: She is oriented to person, place, and time. She appears well-developed and well-nourished. No distress.   HENT:   Head: Normocephalic and atraumatic.   Right Ear: External ear normal.   Left Ear: External ear normal.   Nose: Nose normal.   Mouth/Throat: Oropharynx is clear and moist.   Eyes: Conjunctivae and EOM are normal. Pupils are equal, round, and reactive to light.   Neck: Normal range of motion. Neck supple.   Cardiovascular: Normal rate and regular rhythm.   Pulmonary/Chest: Effort normal. No respiratory distress.   Musculoskeletal: Normal range of motion.   Normal gait   Neurological: She is alert and oriented to person, place, and time.   Skin: Skin is warm and dry.   Psychiatric: She has a normal mood and affect. Her behavior is normal. Judgment and thought content normal.   Nursing note and vitals reviewed.      Results for orders placed or performed in visit on 03/07/19   POC Urinalysis Dipstick   Result Value Ref Range    Color Yellow Yellow, Straw, Dark Yellow, Grace    Clarity, UA Clear Clear    Glucose, UA Negative Negative, 1000 mg/dL (3+) mg/dL    Bilirubin Negative Negative    Ketones, UA Negative Negative    Specific Gravity  1.030 1.005 - 1.030    Blood, UA Negative Negative    pH, Urine 5.0 5.0 - 8.0    Protein, POC Negative Negative mg/dL    Urobilinogen, UA Normal Normal    Leukocytes Negative Negative    Nitrite, UA Negative Negative           Christine was seen today for hypertension, hyperlipidemia and hypothyroidism.    Diagnoses and all orders for this visit:    Anxiety/depression - follows with dr Arevalo (psych)    Attention deficit disorder (ADD) in adult - discussed adderall side effects.  rec weaning off med bc of hypertension and tachycardia.     Chronic pain of both knees  -     diclofenac (VOLTAREN) 1 % gel gel; Apply 4 g topically to the appropriate area as directed 4 (Four) Times a Day.    Essential hypertension - bp up.   stoping adderall should help as well as restarting terazosin.     Overactive bladder - start ditropan once weaned off adderall. Discussed side effects/rba of this med with pt.   -     oxybutynin XL (DITROPAN XL) 10 MG 24 hr tablet; Take 1 tablet by mouth Daily.        Ok to restart terazosin    Outpatient Medications Prior to Visit   Medication Sig Dispense Refill   • amphetamine-dextroamphetamine (ADDERALL) 10 MG tablet Take 10 mg by mouth Daily.     • amphetamine-dextroamphetamine XR (ADDERALL XR) 25 MG 24 hr capsule      • atorvastatin (LIPITOR) 20 MG tablet TAKE 1 TABLET DAILY 90 tablet 3   • buPROPion XL (WELLBUTRIN XL) 300 MG 24 hr tablet bupropion HCl  mg 24 hr tablet, extended release     • busPIRone (BUSPAR) 30 MG tablet buspirone 30 mg tablet     • Calcium Carbonate-Vitamin D (CALCIUM 500 + D) 500-125 MG-UNIT tablet Calcium 500 + D     • cetirizine (zyrTEC) 10 MG tablet Take 10 mg by mouth Daily.     • Cholecalciferol (VITAMIN D) 1000 UNITS tablet Take 1 tablet by mouth daily.     • Coenzyme Q10 (CO Q-10) 100 MG capsule Co Q-10     • FIBER PO Take  by mouth.     • fluticasone (FLONASE) 50 MCG/ACT nasal spray fluticasone 50 mcg/actuation nasal spray,suspension     • Glucosamine 750 MG tablet glucosamin 375 mg-chond 300 mg-collagen 50 mg-hyaluronic acid 2 mg cap   Take by oral route.     • Green Tea, Camillia sinensis, (GREEN TEA EXTRACT PO) Take  by mouth.     • l-methylfolate-algae (DEPLIN) 7.5-90.314 MG capsule capsule Deplin (algal oil) 15 mg-90.314 mg capsule     • levothyroxine (SYNTHROID, LEVOTHROID) 50 MCG tablet Take 1 tablet by mouth Daily. 90 tablet 3   • Magnesium 250 MG tablet Take 250 mg by mouth daily.     • magnesium citrate 1.745 GM/30ML solution solution Take  by mouth 1 (One) Time.     • meloxicam (MOBIC) 15 MG tablet TAKE 1 TABLET DAILY 90 tablet 1   • Multiple Vitamin (MULTI-VITAMIN DAILY PO) Multi Vitamin     • Probiotic Product (PROBIOTIC DAILY PO) Probiotic     • SHINGRIX 50  MCG/0.5ML reconstituted suspension      • bisoprolol-hydrochlorothiazide (ZIAC) 5-6.25 MG per tablet bisoprolol 5 mg-hydrochlorothiazide 6.25 mg tablet     • diphenhydrAMINE (BENADRYL) 25 mg capsule Benadryl     • docusate sodium (COLACE) 50 MG capsule Take  by mouth 2 (Two) Times a Day.     • Ergocalciferol (VITAMIN D2 PO) Vitamin D2     • Multiple Vitamins-Minerals (HAIR/SKIN/NAILS PO) Take 1 tablet by mouth daily.     • Omega-3-acid Ethyl Esters (LOVAZA PO) Lovaza     • terazosin (HYTRIN) 1 MG capsule TAKE 1 CAPSULE DAILY 90 capsule 3   • tiZANidine (ZANAFLEX) 4 MG tablet Take 1 tablet by mouth At Night As Needed for Muscle Spasms. 21 tablet 0     No facility-administered medications prior to visit.      New Medications Ordered This Visit   Medications   • oxybutynin XL (DITROPAN XL) 10 MG 24 hr tablet     Sig: Take 1 tablet by mouth Daily.     Dispense:  30 tablet     Refill:  0   • diclofenac (VOLTAREN) 1 % gel gel     Sig: Apply 4 g topically to the appropriate area as directed 4 (Four) Times a Day.     Dispense:  400 g     Refill:  5     [unfilled]  There are no discontinued medications.      Return in about 6 months (around 10/9/2019) for Recheck, labs.

## 2019-04-12 LAB
APPEARANCE UR: ABNORMAL
BACTERIA #/AREA URNS HPF: ABNORMAL /HPF
BACTERIA UR CULT: ABNORMAL
BACTERIA UR CULT: ABNORMAL
BILIRUB UR QL STRIP: NEGATIVE
COLOR UR: YELLOW
CRYSTALS URNS MICRO: ABNORMAL
EPI CELLS #/AREA URNS HPF: ABNORMAL /HPF
GLUCOSE UR QL: NEGATIVE
HGB UR QL STRIP: ABNORMAL
KETONES UR QL STRIP: ABNORMAL
LEUKOCYTE ESTERASE UR QL STRIP: ABNORMAL
MICRO URNS: ABNORMAL
MUCOUS THREADS URNS QL MICRO: PRESENT /HPF
NITRITE UR QL STRIP: NEGATIVE
OTHER ANTIBIOTIC SUSC ISLT: ABNORMAL
PH UR STRIP: 5.5 [PH] (ref 5–7.5)
PROT UR QL STRIP: ABNORMAL
RBC #/AREA URNS HPF: ABNORMAL /HPF
SP GR UR: 1.02 (ref 1–1.03)
UNIDENT CRYS URNS QL MICRO: PRESENT
URINALYSIS REFLEX: ABNORMAL
UROBILINOGEN UR STRIP-MCNC: 1 MG/DL (ref 0.2–1)
WBC #/AREA URNS HPF: >30 /HPF

## 2019-04-15 DIAGNOSIS — N39.0 URINARY TRACT INFECTION WITHOUT HEMATURIA, SITE UNSPECIFIED: Primary | ICD-10-CM

## 2019-04-15 RX ORDER — CEPHALEXIN 500 MG/1
500 CAPSULE ORAL 2 TIMES DAILY
Qty: 14 CAPSULE | Refills: 0 | Status: SHIPPED | OUTPATIENT
Start: 2019-04-15 | End: 2019-04-22

## 2019-05-01 ENCOUNTER — OFFICE VISIT (OUTPATIENT)
Dept: INTERNAL MEDICINE | Facility: CLINIC | Age: 54
End: 2019-05-01

## 2019-05-01 VITALS
DIASTOLIC BLOOD PRESSURE: 94 MMHG | RESPIRATION RATE: 18 BRPM | SYSTOLIC BLOOD PRESSURE: 138 MMHG | WEIGHT: 228 LBS | HEART RATE: 107 BPM | BODY MASS INDEX: 32.64 KG/M2 | OXYGEN SATURATION: 97 % | HEIGHT: 70 IN

## 2019-05-01 DIAGNOSIS — R00.0 TACHYCARDIA: ICD-10-CM

## 2019-05-01 DIAGNOSIS — R22.42 MASS OF THIGH, LEFT: Primary | ICD-10-CM

## 2019-05-01 PROCEDURE — 99214 OFFICE O/P EST MOD 30 MIN: CPT | Performed by: INTERNAL MEDICINE

## 2019-05-01 NOTE — PROGRESS NOTES
"      Christine Villagomez is a 53 y.o. female, who presents with a chief complaint of   Chief Complaint   Patient presents with   • Leg Swelling       HPI   Pt has had bump on the back of her right thigh for at least 3 weeks.  She initially thoguth she had pulled something until she felt a lump.  No other leg swelling.  No pain with walking.  Sitting for long times makes the area \"tingly.\"  Getting up and walking makes it better.  She has not taken any meds for the sx.  Since the weather has been nice they are now out walking 10-15k steps a day.      tacycardia - improved with pt off adderall.      The following portions of the patient's history were reviewed and updated as appropriate: allergies, current medications, past family history, past medical history, past social history, past surgical history and problem list.    Allergies: Sulfa antibiotics    Review of Systems   Constitutional: Negative.    HENT: Negative.    Eyes: Negative.    Respiratory: Negative.    Cardiovascular: Negative.    Gastrointestinal: Negative.    Endocrine: Negative.    Genitourinary: Negative.    Musculoskeletal:        Leg lesion   Skin: Negative.    Allergic/Immunologic: Negative.    Neurological: Negative.    Hematological: Negative.    Psychiatric/Behavioral: Negative.    All other systems reviewed and are negative.            Wt Readings from Last 3 Encounters:   05/01/19 103 kg (228 lb)   04/09/19 104 kg (229 lb)   03/07/19 105 kg (232 lb)     Temp Readings from Last 3 Encounters:   04/09/19 97.7 °F (36.5 °C) (Oral)   12/28/18 98.2 °F (36.8 °C) (Oral)   12/09/18 98 °F (36.7 °C)     BP Readings from Last 3 Encounters:   05/01/19 138/94   04/09/19 140/86   03/07/19 142/90     Pulse Readings from Last 3 Encounters:   05/01/19 107   04/09/19 (!) 126   12/28/18 98     Body mass index is 32.71 kg/m².  @LASTSAO2(3)@    Physical Exam   Constitutional: She is oriented to person, place, and time. She appears well-developed and well-nourished. No " distress.   HENT:   Head: Normocephalic and atraumatic.   Right Ear: External ear normal.   Left Ear: External ear normal.   Nose: Nose normal.   Mouth/Throat: Oropharynx is clear and moist.   Eyes: Conjunctivae and EOM are normal. Pupils are equal, round, and reactive to light.   Neck: Normal range of motion. Neck supple.   Cardiovascular: Normal rate, regular rhythm, normal heart sounds and intact distal pulses.   Pulmonary/Chest: Effort normal and breath sounds normal. No respiratory distress. She has no wheezes.   Musculoskeletal: Normal range of motion.   Normal gait  left posterior thigh with lumps fatty tissue.  No tenderness of palpation of left calf, thigh, hip, or fatty tissue. Normal ROM of knee and hip.  No ttp of greater trochanter bursa.    Neurological: She is alert and oriented to person, place, and time.   Skin: Skin is warm and dry.   Psychiatric: She has a normal mood and affect. Her behavior is normal. Judgment and thought content normal.   Nursing note and vitals reviewed.      Results for orders placed or performed in visit on 04/09/19   Urine culture, Comprehensive - ,   Result Value Ref Range    Urine Culture Final report (A)     Result 1 Escherichia coli (A)     Susceptibility Testing Comment    Urinalysis With Culture If Indicated - Urine, Clean Catch   Result Value Ref Range    Specific Gravity, UA 1.023 1.005 - 1.030    pH, UA 5.5 5.0 - 7.5    Color, UA Yellow Yellow    Appearance, UA Turbid (A) Clear    Leukocytes, UA 3+ (A) Negative    Protein 2+ (A) Negative/Trace    Glucose, UA Negative Negative    Ketones Trace (A) Negative    Blood, UA 2+ (A) Negative    Bilirubin, UA Negative Negative    Urobilinogen, UA 1.0 0.2 - 1.0 mg/dL    Nitrite, UA Negative Negative    Microscopic Examination See below:     Urinalysis Reflex Comment    Microscopic Examination -   Result Value Ref Range    WBC, UA >30 (A) 0 - 5 /hpf    RBC, UA 0-2 0 - 2 /hpf    Epithelial Cells (non renal) None seen 0 - 10  /hpf    Crystals, UA Present (A) N/A    Crystal Type Calcium Oxalate N/A    Mucus, UA Present Not Estab. /hpf    Bacteria, UA Few None seen/Few /hpf           Christine was seen today for leg swelling.    Diagnoses and all orders for this visit:    Mass of thigh, left - Observation of thigh tissue/mass.  Exam benign.  If any change to tissue will get u/s of lesion.  No signs/sx of blood clot.    Tachycardia - improved off stimulant therapy.  Would avoid stimulants unless significant change in mood/mental status.             Outpatient Medications Prior to Visit   Medication Sig Dispense Refill   • amphetamine-dextroamphetamine (ADDERALL) 10 MG tablet Take 10 mg by mouth 2 (Two) Times a Day.     • atorvastatin (LIPITOR) 20 MG tablet TAKE 1 TABLET DAILY 90 tablet 3   • bisoprolol-hydrochlorothiazide (ZIAC) 5-6.25 MG per tablet bisoprolol 5 mg-hydrochlorothiazide 6.25 mg tablet     • buPROPion XL (WELLBUTRIN XL) 300 MG 24 hr tablet bupropion HCl  mg 24 hr tablet, extended release     • busPIRone (BUSPAR) 30 MG tablet buspirone 30 mg tablet     • Calcium Carbonate-Vitamin D (CALCIUM 500 + D) 500-125 MG-UNIT tablet Calcium 500 + D     • cetirizine (zyrTEC) 10 MG tablet Take 10 mg by mouth Daily.     • Cholecalciferol (VITAMIN D) 1000 UNITS tablet Take 1 tablet by mouth daily.     • Coenzyme Q10 (CO Q-10) 100 MG capsule Co Q-10     • diclofenac (VOLTAREN) 1 % gel gel Apply 4 g topically to the appropriate area as directed 4 (Four) Times a Day. 400 g 5   • diphenhydrAMINE (BENADRYL) 25 mg capsule Benadryl     • docusate sodium (COLACE) 50 MG capsule Take  by mouth 2 (Two) Times a Day.     • Ergocalciferol (VITAMIN D2 PO) Vitamin D2     • FIBER PO Take  by mouth.     • fluticasone (FLONASE) 50 MCG/ACT nasal spray fluticasone 50 mcg/actuation nasal spray,suspension     • Glucosamine 750 MG tablet glucosamin 375 mg-chond 300 mg-collagen 50 mg-hyaluronic acid 2 mg cap   Take by oral route.     • Green Tea, Camillia  sinensis, (GREEN TEA EXTRACT PO) Take  by mouth.     • l-methylfolate-algae (DEPLIN) 7.5-90.314 MG capsule capsule Deplin (algal oil) 15 mg-90.314 mg capsule     • levothyroxine (SYNTHROID, LEVOTHROID) 50 MCG tablet Take 1 tablet by mouth Daily. 90 tablet 3   • Magnesium 250 MG tablet Take 250 mg by mouth daily.     • magnesium citrate 1.745 GM/30ML solution solution Take  by mouth 1 (One) Time.     • meloxicam (MOBIC) 15 MG tablet TAKE 1 TABLET DAILY 90 tablet 1   • Multiple Vitamin (MULTI-VITAMIN DAILY PO) Multi Vitamin     • Multiple Vitamins-Minerals (HAIR/SKIN/NAILS PO) Take 1 tablet by mouth daily.     • Omega-3-acid Ethyl Esters (LOVAZA PO) Lovaza     • oxybutynin XL (DITROPAN XL) 10 MG 24 hr tablet Take 1 tablet by mouth Daily. 30 tablet 0   • Probiotic Product (PROBIOTIC DAILY PO) Probiotic     • SHINGRIX 50 MCG/0.5ML reconstituted suspension      • terazosin (HYTRIN) 1 MG capsule TAKE 1 CAPSULE DAILY 90 capsule 3   • tiZANidine (ZANAFLEX) 4 MG tablet Take 1 tablet by mouth At Night As Needed for Muscle Spasms. 21 tablet 0   • amphetamine-dextroamphetamine XR (ADDERALL XR) 25 MG 24 hr capsule        No facility-administered medications prior to visit.      No orders of the defined types were placed in this encounter.    [unfilled]  Medications Discontinued During This Encounter   Medication Reason   • amphetamine-dextroamphetamine XR (ADDERALL XR) 25 MG 24 hr capsule *Therapy completed         Return if symptoms worsen or fail to improve.

## 2019-05-15 DIAGNOSIS — F32.A DEPRESSION, UNSPECIFIED DEPRESSION TYPE: ICD-10-CM

## 2019-05-15 DIAGNOSIS — M17.11 ARTHRITIS OF RIGHT KNEE: ICD-10-CM

## 2019-05-15 RX ORDER — MELOXICAM 15 MG/1
TABLET ORAL
Qty: 90 TABLET | Refills: 1 | Status: SHIPPED | OUTPATIENT
Start: 2019-05-15 | End: 2020-01-08 | Stop reason: SDUPTHER

## 2019-05-15 RX ORDER — BUSPIRONE HYDROCHLORIDE 30 MG/1
TABLET ORAL
Qty: 135 TABLET | Refills: 3 | Status: SHIPPED | OUTPATIENT
Start: 2019-05-15 | End: 2020-01-08 | Stop reason: SDUPTHER

## 2019-07-01 ENCOUNTER — OFFICE VISIT (OUTPATIENT)
Dept: INTERNAL MEDICINE | Facility: CLINIC | Age: 54
End: 2019-07-01

## 2019-07-01 VITALS
HEIGHT: 70 IN | SYSTOLIC BLOOD PRESSURE: 128 MMHG | TEMPERATURE: 97.9 F | HEART RATE: 105 BPM | RESPIRATION RATE: 16 BRPM | WEIGHT: 227.2 LBS | DIASTOLIC BLOOD PRESSURE: 88 MMHG | BODY MASS INDEX: 32.53 KG/M2 | OXYGEN SATURATION: 98 %

## 2019-07-01 DIAGNOSIS — F90.9 ADULT ADHD: ICD-10-CM

## 2019-07-01 DIAGNOSIS — M17.11 ARTHRITIS OF RIGHT KNEE: Primary | ICD-10-CM

## 2019-07-01 DIAGNOSIS — F32.A DEPRESSION, UNSPECIFIED DEPRESSION TYPE: ICD-10-CM

## 2019-07-01 DIAGNOSIS — M17.12 ARTHRITIS OF LEFT KNEE: ICD-10-CM

## 2019-07-01 PROCEDURE — 20610 DRAIN/INJ JOINT/BURSA W/O US: CPT | Performed by: INTERNAL MEDICINE

## 2019-07-01 PROCEDURE — 99214 OFFICE O/P EST MOD 30 MIN: CPT | Performed by: INTERNAL MEDICINE

## 2019-07-01 RX ORDER — LIDOCAINE HYDROCHLORIDE 10 MG/ML
1 INJECTION, SOLUTION EPIDURAL; INFILTRATION; INTRACAUDAL; PERINEURAL
Status: DISCONTINUED | OUTPATIENT
Start: 2019-07-01 | End: 2019-07-01 | Stop reason: HOSPADM

## 2019-07-01 RX ORDER — TRIAMCINOLONE ACETONIDE 40 MG/ML
80 INJECTION, SUSPENSION INTRA-ARTICULAR; INTRAMUSCULAR
Status: DISCONTINUED | OUTPATIENT
Start: 2019-07-01 | End: 2019-07-01 | Stop reason: HOSPADM

## 2019-07-01 RX ADMIN — LIDOCAINE HYDROCHLORIDE 1 ML: 10 INJECTION, SOLUTION EPIDURAL; INFILTRATION; INTRACAUDAL; PERINEURAL at 15:53

## 2019-07-01 RX ADMIN — TRIAMCINOLONE ACETONIDE 80 MG: 40 INJECTION, SUSPENSION INTRA-ARTICULAR; INTRAMUSCULAR at 15:53

## 2019-07-01 NOTE — PROGRESS NOTES
Christine Villagomez is a 53 y.o. female, who presents with a chief complaint of   Chief Complaint   Patient presents with   • Knee Pain     bilateral       HPI   Pt here bc of knee pain.  Last knee injections were done in January.  She isn't sure if she needs to see ortho for gel injections or not.      Pt sees dr. Arevalo and tapered down some on the adderall dose.  She was on a higher dose but bp high.  She cant tell much of a difference on the lower dose dose but can tell a bid difference if she is off the med.  It is very expensive for her to go see psych.        The following portions of the patient's history were reviewed and updated as appropriate: allergies, current medications, past family history, past medical history, past social history, past surgical history and problem list.    Allergies: Sulfa antibiotics    Review of Systems   Constitutional: Negative.    HENT: Negative.    Eyes: Negative.    Respiratory: Negative.    Cardiovascular: Negative.    Gastrointestinal: Negative.    Endocrine: Negative.    Genitourinary: Negative.    Musculoskeletal:        Knee pain   Skin: Negative.    Allergic/Immunologic: Negative.    Neurological: Negative.    Hematological: Negative.    Psychiatric/Behavioral: Negative.    All other systems reviewed and are negative.            Wt Readings from Last 3 Encounters:   07/01/19 103 kg (227 lb 3.2 oz)   05/01/19 103 kg (228 lb)   04/09/19 104 kg (229 lb)     Temp Readings from Last 3 Encounters:   07/01/19 97.9 °F (36.6 °C)   04/09/19 97.7 °F (36.5 °C) (Oral)   12/28/18 98.2 °F (36.8 °C) (Oral)     BP Readings from Last 3 Encounters:   07/01/19 128/88   05/01/19 138/94   04/09/19 140/86     Pulse Readings from Last 3 Encounters:   07/01/19 105   05/01/19 107   04/09/19 (!) 126     Body mass index is 32.6 kg/m².    @LASTSAO2(3)@    Physical Exam   Constitutional: She is oriented to person, place, and time. She appears well-developed and well-nourished. No distress.    HENT:   Head: Normocephalic and atraumatic.   Right Ear: External ear normal.   Left Ear: External ear normal.   Nose: Nose normal.   Mouth/Throat: Oropharynx is clear and moist.   Eyes: Conjunctivae and EOM are normal. Pupils are equal, round, and reactive to light.   Neck: Normal range of motion. Neck supple.   Cardiovascular: Normal rate, regular rhythm, normal heart sounds and intact distal pulses.   Pulmonary/Chest: Effort normal and breath sounds normal. No respiratory distress. She has no wheezes.   Musculoskeletal: Normal range of motion.   Normal gait  Crepitus of bilat knees.  No effusion of knees bilat.  No ttp of knees.  No increased warmth or swelling.    Neurological: She is alert and oriented to person, place, and time.   Skin: Skin is warm and dry.   Psychiatric: She has a normal mood and affect. Her behavior is normal. Judgment and thought content normal.   Nursing note and vitals reviewed.      Results for orders placed or performed in visit on 04/09/19   Urine culture, Comprehensive - ,   Result Value Ref Range    Urine Culture Final report (A)     Result 1 Escherichia coli (A)     Susceptibility Testing Comment    Urinalysis With Culture If Indicated - Urine, Clean Catch   Result Value Ref Range    Specific Gravity, UA 1.023 1.005 - 1.030    pH, UA 5.5 5.0 - 7.5    Color, UA Yellow Yellow    Appearance, UA Turbid (A) Clear    Leukocytes, UA 3+ (A) Negative    Protein 2+ (A) Negative/Trace    Glucose, UA Negative Negative    Ketones Trace (A) Negative    Blood, UA 2+ (A) Negative    Bilirubin, UA Negative Negative    Urobilinogen, UA 1.0 0.2 - 1.0 mg/dL    Nitrite, UA Negative Negative    Microscopic Examination See below:     Urinalysis Reflex Comment    Microscopic Examination -   Result Value Ref Range    WBC, UA >30 (A) 0 - 5 /hpf    RBC, UA 0-2 0 - 2 /hpf    Epithelial Cells (non renal) None seen 0 - 10 /hpf    Crystals, UA Present (A) N/A    Crystal Type Calcium Oxalate N/A    Mucus, UA  Present Not Estab. /hpf    Bacteria, UA Few None seen/Few /hpf     Injection of bilateral knees  Date/Time: 7/1/2019 3:53 PM  Performed by: Graciela Cadena MD  Authorized by: Graciela Cadena MD   Risks and benefits: risks, benefits and alternatives were discussed  Consent given by: patient  Patient understanding: patient states understanding of the procedure being performed  Site marked: the operative site was marked  Patient identity confirmed: verbally with patient  Indications: pain   Location: bilateral knees.  Needle gauge: 25G.  Approach: lateral  Patient tolerance: Patient tolerated the procedure well with no immediate complications            Christine was seen today for knee pain.    Diagnoses and all orders for this visit:    Arthritis of right knee - pt has had multiple injections of knees.  Discussed need to follow up with ortho for other conservative interventions.    -     injection of bilateral knees    Depression, unspecified depression type    Adult ADHD - will prescribe low dose adderall for patient since going to see her psychiatrist is cost prohibitive  Discussed controlled substance agreement and need for q 3 mo appts.  She will cont to follow with psych q 6-12 months.     Arthritis of left knee  -     injection of bilateral knees          Outpatient Medications Prior to Visit   Medication Sig Dispense Refill   • amphetamine-dextroamphetamine (ADDERALL) 10 MG tablet Take 10 mg by mouth 2 (Two) Times a Day.     • atorvastatin (LIPITOR) 20 MG tablet TAKE 1 TABLET DAILY 90 tablet 3   • bisoprolol-hydrochlorothiazide (ZIAC) 5-6.25 MG per tablet bisoprolol 5 mg-hydrochlorothiazide 6.25 mg tablet     • buPROPion XL (WELLBUTRIN XL) 300 MG 24 hr tablet bupropion HCl  mg 24 hr tablet, extended release     • busPIRone (BUSPAR) 30 MG tablet TAKE 1 AND 1/2 TABLETS     DAILY 135 tablet 3   • Calcium Carbonate-Vitamin D (CALCIUM 500 + D) 500-125 MG-UNIT tablet Calcium 500 + D     •  cetirizine (zyrTEC) 10 MG tablet Take 10 mg by mouth Daily.     • Cholecalciferol (VITAMIN D) 1000 UNITS tablet Take 1 tablet by mouth daily.     • Coenzyme Q10 (CO Q-10) 100 MG capsule Co Q-10     • diclofenac (VOLTAREN) 1 % gel gel Apply 4 g topically to the appropriate area as directed 4 (Four) Times a Day. 400 g 5   • diphenhydrAMINE (BENADRYL) 25 mg capsule Benadryl     • docusate sodium (COLACE) 50 MG capsule Take  by mouth 2 (Two) Times a Day.     • Ergocalciferol (VITAMIN D2 PO) Vitamin D2     • FIBER PO Take  by mouth.     • fluticasone (FLONASE) 50 MCG/ACT nasal spray fluticasone 50 mcg/actuation nasal spray,suspension     • Glucosamine 750 MG tablet glucosamin 375 mg-chond 300 mg-collagen 50 mg-hyaluronic acid 2 mg cap   Take by oral route.     • Green Tea, Camillia sinensis, (GREEN TEA EXTRACT PO) Take  by mouth.     • l-methylfolate-algae (DEPLIN) 7.5-90.314 MG capsule capsule Deplin (algal oil) 15 mg-90.314 mg capsule     • levothyroxine (SYNTHROID, LEVOTHROID) 50 MCG tablet Take 1 tablet by mouth Daily. 90 tablet 3   • Magnesium 250 MG tablet Take 250 mg by mouth daily.     • magnesium citrate 1.745 GM/30ML solution solution Take  by mouth 1 (One) Time.     • meloxicam (MOBIC) 15 MG tablet TAKE 1 TABLET DAILY 90 tablet 1   • Multiple Vitamin (MULTI-VITAMIN DAILY PO) Multi Vitamin     • Multiple Vitamins-Minerals (HAIR/SKIN/NAILS PO) Take 1 tablet by mouth daily.     • Omega-3-acid Ethyl Esters (LOVAZA PO) Lovaza     • oxybutynin XL (DITROPAN XL) 10 MG 24 hr tablet Take 1 tablet by mouth Daily. 30 tablet 0   • Probiotic Product (PROBIOTIC DAILY PO) Probiotic     • SHINGRIX 50 MCG/0.5ML reconstituted suspension      • terazosin (HYTRIN) 1 MG capsule TAKE 1 CAPSULE DAILY 90 capsule 3   • tiZANidine (ZANAFLEX) 4 MG tablet Take 1 tablet by mouth At Night As Needed for Muscle Spasms. 21 tablet 0     No facility-administered medications prior to visit.      No orders of the defined types were placed in this  encounter.    [unfilled]  There are no discontinued medications.      Return in about 3 months (around 10/1/2019) for Recheck.

## 2019-10-01 ENCOUNTER — OFFICE VISIT (OUTPATIENT)
Dept: SLEEP MEDICINE | Facility: HOSPITAL | Age: 54
End: 2019-10-01
Attending: INTERNAL MEDICINE

## 2019-10-01 VITALS
HEART RATE: 102 BPM | WEIGHT: 218 LBS | DIASTOLIC BLOOD PRESSURE: 90 MMHG | SYSTOLIC BLOOD PRESSURE: 151 MMHG | HEIGHT: 70 IN | BODY MASS INDEX: 31.21 KG/M2

## 2019-10-01 DIAGNOSIS — G47.33 OBSTRUCTIVE SLEEP APNEA SYNDROME: Primary | ICD-10-CM

## 2019-10-01 DIAGNOSIS — E11.59 OBESITY, DIABETES, AND HYPERTENSION SYNDROME (HCC): ICD-10-CM

## 2019-10-01 DIAGNOSIS — E78.1 HYPERTRIGLYCERIDEMIA: Primary | ICD-10-CM

## 2019-10-01 DIAGNOSIS — E03.8 OTHER SPECIFIED HYPOTHYROIDISM: ICD-10-CM

## 2019-10-01 DIAGNOSIS — E66.9 OBESITY, DIABETES, AND HYPERTENSION SYNDROME (HCC): ICD-10-CM

## 2019-10-01 DIAGNOSIS — I15.2 OBESITY, DIABETES, AND HYPERTENSION SYNDROME (HCC): ICD-10-CM

## 2019-10-01 DIAGNOSIS — E88.81 INSULIN RESISTANCE: ICD-10-CM

## 2019-10-01 DIAGNOSIS — E11.69 OBESITY, DIABETES, AND HYPERTENSION SYNDROME (HCC): ICD-10-CM

## 2019-10-01 PROCEDURE — G0463 HOSPITAL OUTPT CLINIC VISIT: HCPCS

## 2019-10-01 NOTE — PROGRESS NOTES
PULMONARY SLEEP CONSULT NOTE      PATIENT IDENTIFICATION:  Name: Christine Villagomez  Age: 53 y.o.  Sex: female  :  1965  MRN: [id number]    DATE OF CONSULTATION:  10/1/2019   Referring Physician: No admitting provider for patient encounter.                  CHIEF COMPLAINT: Obstructive Sleep Apnea    History of Present Illness:   Christine Villagomez is a 53 y.o. female Pt on CPAP feeling better more energy especially the night he use it more than 4 hours, no sleepiness no fatigue no tiredness, no mask irritation no dryness, compliance report reviewed with pt AHI< 5 with good usage.       Review of Systems:   Constitutional: negative   Eyes: negative   ENT/oropharynx: negative   Cardiovascular: negative   Respiratory: negative   Gastrointestinal: negative   Genitourinary: negative   Neurological: negative   Musculoskeletal: negative   Integument/breast: negative   Endocrine: negative   Allergic/Immunologic: negative     Past Medical History:  Past Medical History:   Diagnosis Date   • Bipolar disorder (CMS/HCC)    • Depression    • Hyperlipidemia    • Hypothyroidism    • Kidney stone        Past Surgical History:  Past Surgical History:   Procedure Laterality Date   • BLADDER SUSPENSION      TVT   • KIDNEY STONE SURGERY     • KNEE MENISCAL REPAIR Bilateral         Family History:  Family History   Problem Relation Age of Onset   • Cancer Mother         lung   • Heart attack Father    • Colon cancer Maternal Grandmother    • Breast cancer Neg Hx    • Ovarian cancer Neg Hx    • Pulmonary embolism Neg Hx    • Deep vein thrombosis Neg Hx         Social History:   Social History     Tobacco Use   • Smoking status: Never Smoker   • Smokeless tobacco: Never Used   Substance Use Topics   • Alcohol use: No        Allergies:  Allergies   Allergen Reactions   • Sulfa Antibiotics Rash       Home Meds:    (Not in a hospital admission)    Objective:    Vitals Ranges:   Heart Rate:  [102] 102  BP: (151)/(90) 151/90  Body mass  index is 31.28 kg/m².     Exam:  General Appearance:  WDWN    HEENT:   without obvious abnormality,  Conjunctiva/corneas clear,  Normal external ear canals, no drainage    Clear orsalmucosa,  Mallampati score 3    Neck:  Supple, symmetrical, trachea midline. No JVD.  Lungs:   Bilateral basal rhonchi bilaterally, respirations unlabored symmetrical wall movement.    Chest wall:  No tenderness or deformity.    Heart:  Regular rate and rhythm, S1 and S2 normal.  Extremities: Trace edema no clubbing or Cyanosis        Data Review:  All labs (24hrs): No results found for this or any previous visit (from the past 24 hour(s)).     Imaging:  [unfilled]    ASSESSMENT:  Diagnoses and all orders for this visit:    Obstructive sleep apnea syndrome    Other specified hypothyroidism    Obesity, diabetes, and hypertension syndrome (CMS/HCC)        PLAN:  This patient with obstructive sleep apnea, compliance is improved. Encourage to use it more frequent, I re-emphasized on pt the long and short term benefit of treating SANA.     Treating SANA will improve BP control    It is recommended to keep thyroid function optimized to improve quality of sleep    Jone Seth MD. D, ABSM.  10/1/2019  10:57 AM

## 2019-10-02 ENCOUNTER — LAB (OUTPATIENT)
Dept: INTERNAL MEDICINE | Facility: CLINIC | Age: 54
End: 2019-10-02

## 2019-10-02 DIAGNOSIS — E88.81 INSULIN RESISTANCE: ICD-10-CM

## 2019-10-02 DIAGNOSIS — E03.8 OTHER SPECIFIED HYPOTHYROIDISM: ICD-10-CM

## 2019-10-02 DIAGNOSIS — E78.1 HYPERTRIGLYCERIDEMIA: ICD-10-CM

## 2019-10-02 LAB
ALBUMIN SERPL-MCNC: 4.5 G/DL (ref 3.5–5.2)
ALBUMIN/GLOB SERPL: 1.6 G/DL
ALP SERPL-CCNC: 87 U/L (ref 39–117)
ALT SERPL-CCNC: 16 U/L (ref 1–33)
AST SERPL-CCNC: 16 U/L (ref 1–32)
BASOPHILS # BLD AUTO: 0.04 10*3/MM3 (ref 0–0.2)
BASOPHILS NFR BLD AUTO: 0.7 % (ref 0–1.5)
BILIRUB SERPL-MCNC: 0.6 MG/DL (ref 0.2–1.2)
BUN SERPL-MCNC: 17 MG/DL (ref 6–20)
BUN/CREAT SERPL: 17.3 (ref 7–25)
CALCIUM SERPL-MCNC: 9.5 MG/DL (ref 8.6–10.5)
CHLORIDE SERPL-SCNC: 103 MMOL/L (ref 98–107)
CHOLEST SERPL-MCNC: 161 MG/DL (ref 0–200)
CO2 SERPL-SCNC: 26 MMOL/L (ref 22–29)
CREAT SERPL-MCNC: 0.98 MG/DL (ref 0.57–1)
EOSINOPHIL # BLD AUTO: 0.12 10*3/MM3 (ref 0–0.4)
EOSINOPHIL NFR BLD AUTO: 2 % (ref 0.3–6.2)
ERYTHROCYTE [DISTWIDTH] IN BLOOD BY AUTOMATED COUNT: 12.5 % (ref 12.3–15.4)
GLOBULIN SER CALC-MCNC: 2.8 GM/DL
GLUCOSE SERPL-MCNC: 95 MG/DL (ref 65–99)
HBA1C MFR BLD: 5.4 % (ref 4.8–5.6)
HCT VFR BLD AUTO: 39.2 % (ref 34–46.6)
HDLC SERPL-MCNC: 59 MG/DL (ref 40–60)
HGB BLD-MCNC: 12.8 G/DL (ref 12–15.9)
IMM GRANULOCYTES # BLD AUTO: 0.02 10*3/MM3 (ref 0–0.05)
IMM GRANULOCYTES NFR BLD AUTO: 0.3 % (ref 0–0.5)
LDLC SERPL CALC-MCNC: 82 MG/DL (ref 0–100)
LDLC/HDLC SERPL: 1.39 {RATIO}
LYMPHOCYTES # BLD AUTO: 1.53 10*3/MM3 (ref 0.7–3.1)
LYMPHOCYTES NFR BLD AUTO: 25.1 % (ref 19.6–45.3)
MCH RBC QN AUTO: 28.2 PG (ref 26.6–33)
MCHC RBC AUTO-ENTMCNC: 32.7 G/DL (ref 31.5–35.7)
MCV RBC AUTO: 86.3 FL (ref 79–97)
MONOCYTES # BLD AUTO: 0.57 10*3/MM3 (ref 0.1–0.9)
MONOCYTES NFR BLD AUTO: 9.3 % (ref 5–12)
NEUTROPHILS # BLD AUTO: 3.82 10*3/MM3 (ref 1.7–7)
NEUTROPHILS NFR BLD AUTO: 62.6 % (ref 42.7–76)
NRBC BLD AUTO-RTO: 0 /100 WBC (ref 0–0.2)
PLATELET # BLD AUTO: 264 10*3/MM3 (ref 140–450)
POTASSIUM SERPL-SCNC: 4.5 MMOL/L (ref 3.5–5.2)
PROT SERPL-MCNC: 7.3 G/DL (ref 6–8.5)
RBC # BLD AUTO: 4.54 10*6/MM3 (ref 3.77–5.28)
SODIUM SERPL-SCNC: 142 MMOL/L (ref 136–145)
T4 FREE SERPL-MCNC: 1.47 NG/DL (ref 0.93–1.7)
TRIGL SERPL-MCNC: 101 MG/DL (ref 0–150)
TSH SERPL DL<=0.005 MIU/L-ACNC: 1.87 UIU/ML (ref 0.27–4.2)
VLDLC SERPL CALC-MCNC: 20.2 MG/DL
WBC # BLD AUTO: 6.1 10*3/MM3 (ref 3.4–10.8)

## 2019-10-09 ENCOUNTER — OFFICE VISIT (OUTPATIENT)
Dept: INTERNAL MEDICINE | Facility: CLINIC | Age: 54
End: 2019-10-09

## 2019-10-09 VITALS
RESPIRATION RATE: 18 BRPM | BODY MASS INDEX: 30.92 KG/M2 | HEIGHT: 70 IN | SYSTOLIC BLOOD PRESSURE: 136 MMHG | DIASTOLIC BLOOD PRESSURE: 84 MMHG | WEIGHT: 216 LBS | HEART RATE: 102 BPM | OXYGEN SATURATION: 98 %

## 2019-10-09 DIAGNOSIS — I10 ESSENTIAL HYPERTENSION: ICD-10-CM

## 2019-10-09 DIAGNOSIS — E03.8 OTHER SPECIFIED HYPOTHYROIDISM: ICD-10-CM

## 2019-10-09 DIAGNOSIS — Z00.00 HEALTHCARE MAINTENANCE: Primary | ICD-10-CM

## 2019-10-09 DIAGNOSIS — F98.8 ATTENTION DEFICIT DISORDER (ADD) IN ADULT: ICD-10-CM

## 2019-10-09 DIAGNOSIS — Z23 FLU VACCINE NEED: ICD-10-CM

## 2019-10-09 DIAGNOSIS — E78.00 HYPERCHOLESTEROLEMIA: ICD-10-CM

## 2019-10-09 PROBLEM — E88.81 INSULIN RESISTANCE: Status: RESOLVED | Noted: 2017-10-18 | Resolved: 2019-10-09

## 2019-10-09 PROBLEM — E88.819 INSULIN RESISTANCE: Status: RESOLVED | Noted: 2017-10-18 | Resolved: 2019-10-09

## 2019-10-09 PROCEDURE — 99214 OFFICE O/P EST MOD 30 MIN: CPT | Performed by: INTERNAL MEDICINE

## 2019-10-09 NOTE — PROGRESS NOTES
Christine Villagomez is a 53 y.o. female, who presents with a chief complaint of   Chief Complaint   Patient presents with   • ADHD   • Hypertension       HPI   Pt here for follow up    HTN - bp up some today.  bp's at home have been much better controlled in the 120/80 range.       ADHD - she is on adderall and doing ok on the lower dose.  Sleeping ok.HR down to 80's on lower dose of adderall.      obestity - pt has continued to try to eat healthy and stay active.  She has lost about 60 pounds so far.  She feels much better.      HLD - on statin.  No cramps or myalgias.     The following portions of the patient's history were reviewed and updated as appropriate: allergies, current medications, past family history, past medical history, past social history, past surgical history and problem list.    Allergies: Sulfa antibiotics    Review of Systems   Constitutional: Negative.    HENT: Negative.    Eyes: Negative.    Respiratory: Negative.    Cardiovascular: Negative.    Gastrointestinal: Negative.    Endocrine: Negative.    Genitourinary: Negative.    Musculoskeletal: Negative.    Skin: Negative.    Allergic/Immunologic: Negative.    Neurological: Negative.    Hematological: Negative.    Psychiatric/Behavioral: Negative.    All other systems reviewed and are negative.            Wt Readings from Last 3 Encounters:   10/09/19 98 kg (216 lb)   10/01/19 98.9 kg (218 lb)   07/01/19 103 kg (227 lb 3.2 oz)     Temp Readings from Last 3 Encounters:   07/01/19 97.9 °F (36.6 °C)   04/09/19 97.7 °F (36.5 °C) (Oral)   12/28/18 98.2 °F (36.8 °C) (Oral)     BP Readings from Last 3 Encounters:   10/09/19 136/84   10/01/19 151/90   07/01/19 128/88     Pulse Readings from Last 3 Encounters:   10/09/19 102   10/01/19 102   07/01/19 105     Body mass index is 30.99 kg/m².  @LASTSAO2(3)@    Physical Exam   Constitutional: She is oriented to person, place, and time. She appears well-developed and well-nourished. No distress.   HENT:    Head: Normocephalic and atraumatic.   Right Ear: External ear normal.   Left Ear: External ear normal.   Nose: Nose normal.   Mouth/Throat: Oropharynx is clear and moist.   Eyes: Conjunctivae and EOM are normal. Pupils are equal, round, and reactive to light.   Neck: Normal range of motion. Neck supple.   Cardiovascular: Normal rate, regular rhythm, normal heart sounds and intact distal pulses.   Pulmonary/Chest: Effort normal and breath sounds normal. No respiratory distress. She has no wheezes.   Musculoskeletal: Normal range of motion.   Normal gait   Neurological: She is alert and oriented to person, place, and time.   Skin: Skin is warm and dry.   Psychiatric: She has a normal mood and affect. Her behavior is normal. Judgment and thought content normal.   Nursing note and vitals reviewed.      Results for orders placed or performed in visit on 10/02/19   TSH   Result Value Ref Range    TSH 1.870 0.270 - 4.200 uIU/mL   T4, Free   Result Value Ref Range    Free T4 1.47 0.93 - 1.70 ng/dL   Comprehensive Metabolic Panel   Result Value Ref Range    Glucose 95 65 - 99 mg/dL    BUN 17 6 - 20 mg/dL    Creatinine 0.98 0.57 - 1.00 mg/dL    eGFR Non African Am 59 (L) >60 mL/min/1.73    eGFR African Am 72 >60 mL/min/1.73    BUN/Creatinine Ratio 17.3 7.0 - 25.0    Sodium 142 136 - 145 mmol/L    Potassium 4.5 3.5 - 5.2 mmol/L    Chloride 103 98 - 107 mmol/L    Total CO2 26.0 22.0 - 29.0 mmol/L    Calcium 9.5 8.6 - 10.5 mg/dL    Total Protein 7.3 6.0 - 8.5 g/dL    Albumin 4.50 3.50 - 5.20 g/dL    Globulin 2.8 gm/dL    A/G Ratio 1.6 g/dL    Total Bilirubin 0.6 0.2 - 1.2 mg/dL    Alkaline Phosphatase 87 39 - 117 U/L    AST (SGOT) 16 1 - 32 U/L    ALT (SGPT) 16 1 - 33 U/L   Lipid Panel With LDL / HDL Ratio   Result Value Ref Range    Total Cholesterol 161 0 - 200 mg/dL    Triglycerides 101 0 - 150 mg/dL    HDL Cholesterol 59 40 - 60 mg/dL    VLDL Cholesterol 20.2 mg/dL    LDL Cholesterol  82 0 - 100 mg/dL    LDL/HDL Ratio 1.39     Hemoglobin A1c   Result Value Ref Range    Hemoglobin A1C 5.40 4.80 - 5.60 %   CBC & Differential   Result Value Ref Range    WBC 6.10 3.40 - 10.80 10*3/mm3    RBC 4.54 3.77 - 5.28 10*6/mm3    Hemoglobin 12.8 12.0 - 15.9 g/dL    Hematocrit 39.2 34.0 - 46.6 %    MCV 86.3 79.0 - 97.0 fL    MCH 28.2 26.6 - 33.0 pg    MCHC 32.7 31.5 - 35.7 g/dL    RDW 12.5 12.3 - 15.4 %    Platelets 264 140 - 450 10*3/mm3    Neutrophil Rel % 62.6 42.7 - 76.0 %    Lymphocyte Rel % 25.1 19.6 - 45.3 %    Monocyte Rel % 9.3 5.0 - 12.0 %    Eosinophil Rel % 2.0 0.3 - 6.2 %    Basophil Rel % 0.7 0.0 - 1.5 %    Neutrophils Absolute 3.82 1.70 - 7.00 10*3/mm3    Lymphocytes Absolute 1.53 0.70 - 3.10 10*3/mm3    Monocytes Absolute 0.57 0.10 - 0.90 10*3/mm3    Eosinophils Absolute 0.12 0.00 - 0.40 10*3/mm3    Basophils Absolute 0.04 0.00 - 0.20 10*3/mm3    Immature Granulocyte Rel % 0.3 0.0 - 0.5 %    Immature Grans Absolute 0.02 0.00 - 0.05 10*3/mm3    nRBC 0.0 0.0 - 0.2 /100 WBC           Christine was seen today for adhd and hypertension.    Diagnoses and all orders for this visit:    Healthcare maintenance  -     Hepatitis C Antibody    Attention deficit disorder (ADD) in adult    Other specified hypothyroidism    Essential hypertension    Hypercholesterolemia      Cont current meds.  Ov for recheck in 3 mo.     Patient has been erroneously marked as diabetic. Based on the available clinical information, she does not have diabetes and should therefore be excluded from diabetic health maintenance and quality measures for the remainder of the reporting period.    Outpatient Medications Prior to Visit   Medication Sig Dispense Refill   • amphetamine-dextroamphetamine (ADDERALL) 10 MG tablet Take 10 mg by mouth 2 (Two) Times a Day.     • atorvastatin (LIPITOR) 20 MG tablet TAKE 1 TABLET DAILY 90 tablet 3   • bisoprolol-hydrochlorothiazide (ZIAC) 5-6.25 MG per tablet bisoprolol 5 mg-hydrochlorothiazide 6.25 mg tablet     • buPROPion XL (WELLBUTRIN  XL) 300 MG 24 hr tablet bupropion HCl  mg 24 hr tablet, extended release     • busPIRone (BUSPAR) 30 MG tablet TAKE 1 AND 1/2 TABLETS     DAILY 135 tablet 3   • Calcium Carbonate-Vitamin D (CALCIUM 500 + D) 500-125 MG-UNIT tablet Calcium 500 + D     • cetirizine (zyrTEC) 10 MG tablet Take 10 mg by mouth Daily.     • Cholecalciferol (VITAMIN D) 1000 UNITS tablet Take 1 tablet by mouth daily.     • Coenzyme Q10 (CO Q-10) 100 MG capsule Co Q-10     • diclofenac (VOLTAREN) 1 % gel gel Apply 4 g topically to the appropriate area as directed 4 (Four) Times a Day. 400 g 5   • diphenhydrAMINE (BENADRYL) 25 mg capsule Benadryl     • docusate sodium (COLACE) 50 MG capsule Take  by mouth 2 (Two) Times a Day.     • Ergocalciferol (VITAMIN D2 PO) Vitamin D2     • FIBER PO Take  by mouth.     • fluticasone (FLONASE) 50 MCG/ACT nasal spray fluticasone 50 mcg/actuation nasal spray,suspension     • Glucosamine 750 MG tablet glucosamin 375 mg-chond 300 mg-collagen 50 mg-hyaluronic acid 2 mg cap   Take by oral route.     • Green Tea, Camillia sinensis, (GREEN TEA EXTRACT PO) Take  by mouth.     • l-methylfolate-algae (DEPLIN) 7.5-90.314 MG capsule capsule Deplin (algal oil) 15 mg-90.314 mg capsule     • levothyroxine (SYNTHROID, LEVOTHROID) 50 MCG tablet Take 1 tablet by mouth Daily. 90 tablet 3   • Magnesium 250 MG tablet Take 250 mg by mouth daily.     • magnesium citrate 1.745 GM/30ML solution solution Take  by mouth 1 (One) Time.     • meloxicam (MOBIC) 15 MG tablet TAKE 1 TABLET DAILY 90 tablet 1   • Multiple Vitamin (MULTI-VITAMIN DAILY PO) Multi Vitamin     • Multiple Vitamins-Minerals (HAIR/SKIN/NAILS PO) Take 1 tablet by mouth daily.     • Omega-3-acid Ethyl Esters (LOVAZA PO) Lovaza     • oxybutynin XL (DITROPAN XL) 10 MG 24 hr tablet Take 1 tablet by mouth Daily. 30 tablet 0   • Probiotic Product (PROBIOTIC DAILY PO) Probiotic     • SHINGRIX 50 MCG/0.5ML reconstituted suspension      • terazosin (HYTRIN) 1 MG capsule  TAKE 1 CAPSULE DAILY 90 capsule 3   • tiZANidine (ZANAFLEX) 4 MG tablet Take 1 tablet by mouth At Night As Needed for Muscle Spasms. 21 tablet 0     No facility-administered medications prior to visit.      No orders of the defined types were placed in this encounter.    [unfilled]  There are no discontinued medications.      Return in about 3 months (around 1/9/2020) for Recheck.

## 2019-10-10 ENCOUNTER — PATIENT MESSAGE (OUTPATIENT)
Dept: INTERNAL MEDICINE | Facility: CLINIC | Age: 54
End: 2019-10-10

## 2019-10-10 PROCEDURE — 90471 IMMUNIZATION ADMIN: CPT | Performed by: INTERNAL MEDICINE

## 2019-10-10 PROCEDURE — 90674 CCIIV4 VAC NO PRSV 0.5 ML IM: CPT | Performed by: INTERNAL MEDICINE

## 2019-10-11 NOTE — TELEPHONE ENCOUNTER
From: Christine Villagomez  To: Graciela Cadena MD  Sent: 10/10/2019 11:50 AM EDT  Subject: Prescription Question    As we talked about, I would like a prescription for Adderall (generic), 10mg, taken three times per day (originally prescribed by Dr. Bradly Arevalo).    I contacted [facility] in [place], and they said that they would fill it if you would send them an electronic prescription.    Please contact me if there are any questions, here or [phone].    --Ashly

## 2019-10-14 RX ORDER — DEXTROAMPHETAMINE SACCHARATE, AMPHETAMINE ASPARTATE, DEXTROAMPHETAMINE SULFATE AND AMPHETAMINE SULFATE 2.5; 2.5; 2.5; 2.5 MG/1; MG/1; MG/1; MG/1
10 TABLET ORAL 3 TIMES DAILY
Qty: 90 TABLET | Refills: 0 | Status: SHIPPED | OUTPATIENT
Start: 2019-10-14 | End: 2019-11-13 | Stop reason: SDUPTHER

## 2019-10-23 ENCOUNTER — HOSPITAL ENCOUNTER (EMERGENCY)
Facility: HOSPITAL | Age: 54
Discharge: HOME | End: 2019-10-23
Attending: EMERGENCY MEDICINE | Admitting: EMERGENCY MEDICINE

## 2019-10-23 ENCOUNTER — APPOINTMENT (OUTPATIENT)
Dept: CT IMAGING | Facility: HOSPITAL | Age: 54
End: 2019-10-23

## 2019-10-23 VITALS
WEIGHT: 213 LBS | HEIGHT: 70 IN | BODY MASS INDEX: 30.49 KG/M2 | HEART RATE: 106 BPM | OXYGEN SATURATION: 97 % | TEMPERATURE: 99.5 F | SYSTOLIC BLOOD PRESSURE: 124 MMHG | DIASTOLIC BLOOD PRESSURE: 60 MMHG | RESPIRATION RATE: 18 BRPM

## 2019-10-23 DIAGNOSIS — N30.01 ACUTE CYSTITIS WITH HEMATURIA: ICD-10-CM

## 2019-10-23 DIAGNOSIS — N20.0 KIDNEY STONE: Primary | ICD-10-CM

## 2019-10-23 DIAGNOSIS — N13.2 HYDRONEPHROSIS WITH URINARY OBSTRUCTION DUE TO URETERAL CALCULUS: ICD-10-CM

## 2019-10-23 LAB
ALBUMIN SERPL-MCNC: 4.1 G/DL (ref 3.5–5.2)
ALBUMIN/GLOB SERPL: 1.2 G/DL
ALP SERPL-CCNC: 92 U/L (ref 39–117)
ALT SERPL W P-5'-P-CCNC: 11 U/L (ref 1–33)
ANION GAP SERPL CALCULATED.3IONS-SCNC: 14.5 MMOL/L (ref 5–15)
AST SERPL-CCNC: 14 U/L (ref 1–32)
BACTERIA UR QL AUTO: ABNORMAL /HPF
BASOPHILS # BLD AUTO: 0.04 10*3/MM3 (ref 0–0.2)
BASOPHILS NFR BLD AUTO: 0.3 % (ref 0–1.5)
BILIRUB SERPL-MCNC: 0.9 MG/DL (ref 0.2–1.2)
BILIRUB UR QL STRIP: NEGATIVE
BUN BLD-MCNC: 17 MG/DL (ref 6–20)
BUN/CREAT SERPL: 16.8 (ref 7–25)
CALCIUM SPEC-SCNC: 9.4 MG/DL (ref 8.6–10.5)
CHLORIDE SERPL-SCNC: 105 MMOL/L (ref 98–107)
CLARITY UR: ABNORMAL
CO2 SERPL-SCNC: 20.5 MMOL/L (ref 22–29)
COLOR UR: YELLOW
CREAT BLD-MCNC: 1.01 MG/DL (ref 0.57–1)
D-LACTATE SERPL-SCNC: 1.2 MMOL/L (ref 0.5–2)
DEPRECATED RDW RBC AUTO: 38.2 FL (ref 37–54)
EOSINOPHIL # BLD AUTO: 0.03 10*3/MM3 (ref 0–0.4)
EOSINOPHIL NFR BLD AUTO: 0.2 % (ref 0.3–6.2)
ERYTHROCYTE [DISTWIDTH] IN BLOOD BY AUTOMATED COUNT: 11.9 % (ref 12.3–15.4)
GFR SERPL CREATININE-BSD FRML MDRD: 57 ML/MIN/1.73
GLOBULIN UR ELPH-MCNC: 3.5 GM/DL
GLUCOSE BLD-MCNC: 128 MG/DL (ref 65–99)
GLUCOSE UR STRIP-MCNC: NEGATIVE MG/DL
HCT VFR BLD AUTO: 39.9 % (ref 34–46.6)
HGB BLD-MCNC: 13.1 G/DL (ref 12–15.9)
HGB UR QL STRIP.AUTO: ABNORMAL
HYALINE CASTS UR QL AUTO: ABNORMAL /LPF
IMM GRANULOCYTES # BLD AUTO: 0.06 10*3/MM3 (ref 0–0.05)
IMM GRANULOCYTES NFR BLD AUTO: 0.4 % (ref 0–0.5)
KETONES UR QL STRIP: ABNORMAL
LEUKOCYTE ESTERASE UR QL STRIP.AUTO: ABNORMAL
LYMPHOCYTES # BLD AUTO: 0.59 10*3/MM3 (ref 0.7–3.1)
LYMPHOCYTES NFR BLD AUTO: 4 % (ref 19.6–45.3)
MCH RBC QN AUTO: 28.7 PG (ref 26.6–33)
MCHC RBC AUTO-ENTMCNC: 32.8 G/DL (ref 31.5–35.7)
MCV RBC AUTO: 87.3 FL (ref 79–97)
MONOCYTES # BLD AUTO: 0.8 10*3/MM3 (ref 0.1–0.9)
MONOCYTES NFR BLD AUTO: 5.4 % (ref 5–12)
NEUTROPHILS # BLD AUTO: 13.31 10*3/MM3 (ref 1.7–7)
NEUTROPHILS NFR BLD AUTO: 89.7 % (ref 42.7–76)
NITRITE UR QL STRIP: POSITIVE
NRBC BLD AUTO-RTO: 0 /100 WBC (ref 0–0.2)
PH UR STRIP.AUTO: 7.5 [PH] (ref 4.5–8)
PLATELET # BLD AUTO: 243 10*3/MM3 (ref 140–450)
PMV BLD AUTO: 9 FL (ref 6–12)
POTASSIUM BLD-SCNC: 4 MMOL/L (ref 3.5–5.2)
PROT SERPL-MCNC: 7.6 G/DL (ref 6–8.5)
PROT UR QL STRIP: ABNORMAL
RBC # BLD AUTO: 4.57 10*6/MM3 (ref 3.77–5.28)
RBC # UR: ABNORMAL /HPF
REF LAB TEST METHOD: ABNORMAL
SODIUM BLD-SCNC: 140 MMOL/L (ref 136–145)
SP GR UR STRIP: 1.02 (ref 1–1.03)
SQUAMOUS #/AREA URNS HPF: ABNORMAL /HPF
UROBILINOGEN UR QL STRIP: ABNORMAL
WBC NRBC COR # BLD: 14.83 10*3/MM3 (ref 3.4–10.8)
WBC UR QL AUTO: ABNORMAL /HPF

## 2019-10-23 PROCEDURE — 99284 EMERGENCY DEPT VISIT MOD MDM: CPT

## 2019-10-23 PROCEDURE — 87086 URINE CULTURE/COLONY COUNT: CPT | Performed by: PHYSICIAN ASSISTANT

## 2019-10-23 PROCEDURE — 74176 CT ABD & PELVIS W/O CONTRAST: CPT

## 2019-10-23 PROCEDURE — 25010000002 HYDROMORPHONE PER 4 MG: Performed by: EMERGENCY MEDICINE

## 2019-10-23 PROCEDURE — 99284 EMERGENCY DEPT VISIT MOD MDM: CPT | Performed by: PHYSICIAN ASSISTANT

## 2019-10-23 PROCEDURE — 96375 TX/PRO/DX INJ NEW DRUG ADDON: CPT

## 2019-10-23 PROCEDURE — 25010000002 CEFTRIAXONE SODIUM-DEXTROSE 1-3.74 GM-%(50ML) RECONSTITUTED SOLUTION: Performed by: PHYSICIAN ASSISTANT

## 2019-10-23 PROCEDURE — 87040 BLOOD CULTURE FOR BACTERIA: CPT | Performed by: PHYSICIAN ASSISTANT

## 2019-10-23 PROCEDURE — 85025 COMPLETE CBC W/AUTO DIFF WBC: CPT | Performed by: PHYSICIAN ASSISTANT

## 2019-10-23 PROCEDURE — 81001 URINALYSIS AUTO W/SCOPE: CPT | Performed by: PHYSICIAN ASSISTANT

## 2019-10-23 PROCEDURE — 25010000002 KETOROLAC TROMETHAMINE PER 15 MG: Performed by: PHYSICIAN ASSISTANT

## 2019-10-23 PROCEDURE — 25010000002 ONDANSETRON PER 1 MG: Performed by: PHYSICIAN ASSISTANT

## 2019-10-23 PROCEDURE — 83605 ASSAY OF LACTIC ACID: CPT | Performed by: PHYSICIAN ASSISTANT

## 2019-10-23 PROCEDURE — 80053 COMPREHEN METABOLIC PANEL: CPT | Performed by: PHYSICIAN ASSISTANT

## 2019-10-23 PROCEDURE — 96365 THER/PROPH/DIAG IV INF INIT: CPT

## 2019-10-23 RX ORDER — KETOROLAC TROMETHAMINE 30 MG/ML
30 INJECTION, SOLUTION INTRAMUSCULAR; INTRAVENOUS ONCE
Status: COMPLETED | OUTPATIENT
Start: 2019-10-23 | End: 2019-10-23

## 2019-10-23 RX ORDER — ONDANSETRON 2 MG/ML
INJECTION INTRAMUSCULAR; INTRAVENOUS
Status: DISCONTINUED
Start: 2019-10-23 | End: 2019-10-23 | Stop reason: HOSPADM

## 2019-10-23 RX ORDER — ONDANSETRON 2 MG/ML
4 INJECTION INTRAMUSCULAR; INTRAVENOUS ONCE
Status: COMPLETED | OUTPATIENT
Start: 2019-10-23 | End: 2019-10-23

## 2019-10-23 RX ORDER — HYDROMORPHONE HCL 110MG/55ML
0.5 PATIENT CONTROLLED ANALGESIA SYRINGE INTRAVENOUS ONCE
Status: COMPLETED | OUTPATIENT
Start: 2019-10-23 | End: 2019-10-23

## 2019-10-23 RX ORDER — HYDROCODONE BITARTRATE AND ACETAMINOPHEN 5; 325 MG/1; MG/1
1 TABLET ORAL EVERY 4 HOURS PRN
Qty: 18 TABLET | Refills: 0 | Status: SHIPPED | OUTPATIENT
Start: 2019-10-23 | End: 2019-12-23

## 2019-10-23 RX ORDER — SODIUM CHLORIDE 0.9 % (FLUSH) 0.9 %
10 SYRINGE (ML) INJECTION AS NEEDED
Status: DISCONTINUED | OUTPATIENT
Start: 2019-10-23 | End: 2019-10-23 | Stop reason: HOSPADM

## 2019-10-23 RX ORDER — ONDANSETRON 4 MG/1
4 TABLET, ORALLY DISINTEGRATING ORAL 4 TIMES DAILY PRN
Qty: 12 TABLET | Refills: 0 | Status: SHIPPED | OUTPATIENT
Start: 2019-10-23 | End: 2020-01-16 | Stop reason: ALTCHOICE

## 2019-10-23 RX ORDER — CEFTRIAXONE 1 G/50ML
1 INJECTION, SOLUTION INTRAVENOUS ONCE
Status: COMPLETED | OUTPATIENT
Start: 2019-10-23 | End: 2019-10-23

## 2019-10-23 RX ORDER — CEFUROXIME AXETIL 250 MG/1
250 TABLET ORAL 2 TIMES DAILY
Qty: 14 TABLET | Refills: 0 | Status: SHIPPED | OUTPATIENT
Start: 2019-10-23 | End: 2019-10-30

## 2019-10-23 RX ADMIN — ONDANSETRON 4 MG: 2 INJECTION, SOLUTION INTRAMUSCULAR; INTRAVENOUS at 15:12

## 2019-10-23 RX ADMIN — KETOROLAC TROMETHAMINE 30 MG: 30 INJECTION INTRAMUSCULAR; INTRAVENOUS at 17:18

## 2019-10-23 RX ADMIN — SODIUM CHLORIDE 1000 ML: 9 INJECTION, SOLUTION INTRAVENOUS at 15:20

## 2019-10-23 RX ADMIN — HYDROMORPHONE HYDROCHLORIDE 0.5 MG: 2 INJECTION, SOLUTION INTRAMUSCULAR; INTRAVENOUS; SUBCUTANEOUS at 15:19

## 2019-10-23 RX ADMIN — CEFTRIAXONE 1 G: 1 INJECTION, SOLUTION INTRAVENOUS at 15:20

## 2019-10-23 NOTE — ED NOTES
Urology called back and spoke to LUBA Phillip regarding the patient.      Ashlie Godinez  10/23/19 [id number]

## 2019-10-23 NOTE — DISCHARGE INSTRUCTIONS
Return to the emergency department with worsening symptoms, uncontrolled pain, inability to tolerate oral liquids, fever greater than 101° F not controlled by Tylenol or as needed with emergent concerns.    Strain all urine.

## 2019-10-23 NOTE — ED PROVIDER NOTES
"Subjective   History of Present Illness  History of Present Illness    Chief complaint: Flank pain    Location: Right flank without radiation    Quality/Severity:  \"pain\", severe.    Timing/Duration: 3 days intermittent.  Worsening    Modifying Factors: Worse with deep breath. Nothing makes better.  Patient has not taken anything for the pain.    Associated Symptoms:  Positive hematuria.  Positive frequency and urgency. +n/v x 1 yesterday.     Narrative: 53-year-old female presents flank pain as above.  She does have a history of urolithiasis (2011).  She states this feels the same.  Sees Dr. Bowers.   She went to urgent care today and was sent here for further evaluation and treatment. U/a below (no micro).  They d/c with rx for cipro.     Results for orders placed or performed during the hospital encounter of 10/23/19   POC Urinalysis Dipstick, Multipro (Automated dipstick)   Result Value Ref Range    Color Grace Yellow, Straw, Dark Yellow, Grace    Clarity, UA Cloudy (A) Clear    Glucose, UA Negative Negative, 1000 mg/dL (3+) mg/dL    Bilirubin 1 mg/dL (A) Negative    Ketones, UA Trace (A) Negative    Specific Gravity  1.030 1.005 - 1.030    Blood, UA 3+ (A) Negative    pH, Urine 5.5 5.0 - 8.0    Protein, POC 3+ (A) Negative mg/dL    Urobilinogen, UA Normal Normal    Nitrite, UA Positive (A) Negative    Leukocytes Large (3+) (A) Negative       Review of Systems   Constitutional: Negative.  Negative for chills and fever.   Respiratory: Negative.  Negative for shortness of breath.    Cardiovascular: Negative.  Negative for chest pain.   Gastrointestinal: Negative.  Negative for abdominal pain and nausea.   Genitourinary: Positive for dysuria, flank pain, frequency, hematuria and urgency.   Skin: Negative.    Neurological: Negative.    Psychiatric/Behavioral: Negative.    All other systems reviewed and are negative.      Past Medical History:   Diagnosis Date   • Bipolar disorder (CMS/[facility])    • Depression    • " Hyperlipidemia    • Hypothyroidism    • Kidney stone        Allergies   Allergen Reactions   • Sulfa Antibiotics Rash       Past Surgical History:   Procedure Laterality Date   • BLADDER SUSPENSION      TVT   • KIDNEY STONE SURGERY     • KNEE MENISCAL REPAIR Bilateral        Family History   Problem Relation Age of Onset   • Cancer Mother         lung   • Heart attack Father    • Colon cancer Maternal Grandmother    • Breast cancer Neg Hx    • Ovarian cancer Neg Hx    • Pulmonary embolism Neg Hx    • Deep vein thrombosis Neg Hx        Social History     Socioeconomic History   • Marital status:      Spouse name: Not on file   • Number of children: Not on file   • Years of education: Not on file   • Highest education level: Not on file   Tobacco Use   • Smoking status: Never Smoker   • Smokeless tobacco: Never Used   Substance and Sexual Activity   • Alcohol use: No   • Drug use: No   • Sexual activity: Yes     Partners: Male     Birth control/protection: Surgical     Comment: vas     No current facility-administered medications for this encounter.     Current Outpatient Medications:   •  amphetamine-dextroamphetamine (ADDERALL) 10 MG tablet, Take 1 tablet by mouth 3 (Three) Times a Day., Disp: 90 tablet, Rfl: 0  •  atorvastatin (LIPITOR) 20 MG tablet, TAKE 1 TABLET DAILY, Disp: 90 tablet, Rfl: 3  •  buPROPion XL (WELLBUTRIN XL) 300 MG 24 hr tablet, bupropion HCl  mg 24 hr tablet, extended release, Disp: , Rfl:   •  busPIRone (BUSPAR) 30 MG tablet, TAKE 1 AND 1/2 TABLETS     DAILY, Disp: 135 tablet, Rfl: 3  •  Calcium Carbonate-Vitamin D (CALCIUM 500 + D) 500-125 MG-UNIT tablet, Calcium 500 + D, Disp: , Rfl:   •  cetirizine (zyrTEC) 10 MG tablet, Take 10 mg by mouth Daily., Disp: , Rfl:   •  Cholecalciferol (VITAMIN D) 1000 UNITS tablet, Take 1 tablet by mouth daily., Disp: , Rfl:   •  ciprofloxacin (CIPRO) 500 MG tablet, Take 1 tablet by mouth 2 (Two) Times a Day for 10 days., Disp: 20 tablet, Rfl: 0  •   Coenzyme Q10 (CO Q-10) 100 MG capsule, Co Q-10, Disp: , Rfl:   •  docusate sodium (COLACE) 50 MG capsule, Take  by mouth 2 (Two) Times a Day., Disp: , Rfl:   •  FIBER PO, Take  by mouth., Disp: , Rfl:   •  fluticasone (FLONASE) 50 MCG/ACT nasal spray, fluticasone 50 mcg/actuation nasal spray,suspension, Disp: , Rfl:   •  Glucosamine 750 MG tablet, glucosamin 375 mg-chond 300 mg-collagen 50 mg-hyaluronic acid 2 mg cap  Take by oral route., Disp: , Rfl:   •  Green Tea, Camillia sinensis, (GREEN TEA EXTRACT PO), Take  by mouth., Disp: , Rfl:   •  l-methylfolate-algae (DEPLIN) 7.5-90.314 MG capsule capsule, Deplin (algal oil) 15 mg-90.314 mg capsule, Disp: , Rfl:   •  levothyroxine (SYNTHROID, LEVOTHROID) 50 MCG tablet, Take 1 tablet by mouth Daily., Disp: 90 tablet, Rfl: 3  •  meloxicam (MOBIC) 15 MG tablet, TAKE 1 TABLET DAILY, Disp: 90 tablet, Rfl: 1  •  Multiple Vitamin (MULTI-VITAMIN DAILY PO), Multi Vitamin, Disp: , Rfl:   •  Multiple Vitamins-Minerals (HAIR/SKIN/NAILS PO), Take 1 tablet by mouth daily., Disp: , Rfl:   •  Probiotic Product (PROBIOTIC DAILY PO), Probiotic, Disp: , Rfl:   •  SHINGRIX 50 MCG/0.5ML reconstituted suspension, , Disp: , Rfl:   •  terazosin (HYTRIN) 1 MG capsule, TAKE 1 CAPSULE DAILY, Disp: 90 capsule, Rfl: 3        Objective   Physical Exam  Vitals:    10/23/19 1503   BP: 163/84   Pulse: 110   Resp: 20   Temp: 98.3 °F (36.8 °C)   SpO2: 99%     GENERAL: Alert and oriented ×4.  No apparent distress.  SKIN: Warm, pink and dry  HEENT: Atraumatic normocephalic  LUNGS: Clear to auscultation bilaterally without wheezes, rales or rhonchi  CARDIAC: Regular rate and rhythm.  S1 and S2.  No murmurs, rubs or gallops.  ABD: Soft, severely tender R flank. + R cva tenderness.  No guarding or rebound tenderness.   M/S: MAEW, no deformity  PSYCH: Normal mood and affect    Procedures           ED Course    zofran, dilautid given.  Rocephin given.     Results for orders placed or performed during the  hospital encounter of 10/23/19   Comprehensive Metabolic Panel   Result Value Ref Range    Glucose 128 (H) 65 - 99 mg/dL    BUN 17 6 - 20 mg/dL    Creatinine 1.01 (H) 0.57 - 1.00 mg/dL    Sodium 140 136 - 145 mmol/L    Potassium 4.0 3.5 - 5.2 mmol/L    Chloride 105 98 - 107 mmol/L    CO2 20.5 (L) 22.0 - 29.0 mmol/L    Calcium 9.4 8.6 - 10.5 mg/dL    Total Protein 7.6 6.0 - 8.5 g/dL    Albumin 4.10 3.50 - 5.20 g/dL    ALT (SGPT) 11 1 - 33 U/L    AST (SGOT) 14 1 - 32 U/L    Alkaline Phosphatase 92 39 - 117 U/L    Total Bilirubin 0.9 0.2 - 1.2 mg/dL    eGFR Non African Amer 57 (L) >60 mL/min/1.73    Globulin 3.5 gm/dL    A/G Ratio 1.2 g/dL    BUN/Creatinine Ratio 16.8 7.0 - 25.0    Anion Gap 14.5 5.0 - 15.0 mmol/L   Urinalysis With Culture If Indicated - Urine, Clean Catch   Result Value Ref Range    Color, UA Yellow Yellow, Straw    Appearance, UA Cloudy (A) Clear    pH, UA 7.5 4.5 - 8.0    Specific Gravity, UA 1.025 1.003 - 1.030    Glucose, UA Negative Negative    Ketones, UA 40 mg/dL (2+) (A) Negative    Bilirubin, UA Negative Negative    Blood, UA Large (3+) (A) Negative    Protein, UA >=300 mg/dL (3+) (A) Negative    Leuk Esterase, UA Moderate (2+) (A) Negative    Nitrite, UA Positive (A) Negative    Urobilinogen, UA 0.2 E.U./dL 0.2 - 1.0 E.U./dL   CBC Auto Differential   Result Value Ref Range    WBC 14.83 (H) 3.40 - 10.80 10*3/mm3    RBC 4.57 3.77 - 5.28 10*6/mm3    Hemoglobin 13.1 12.0 - 15.9 g/dL    Hematocrit 39.9 34.0 - 46.6 %    MCV 87.3 79.0 - 97.0 fL    MCH 28.7 26.6 - 33.0 pg    MCHC 32.8 31.5 - 35.7 g/dL    RDW 11.9 (L) 12.3 - 15.4 %    RDW-SD 38.2 37.0 - 54.0 fl    MPV 9.0 6.0 - 12.0 fL    Platelets 243 140 - 450 10*3/mm3    Neutrophil % 89.7 (H) 42.7 - 76.0 %    Lymphocyte % 4.0 (L) 19.6 - 45.3 %    Monocyte % 5.4 5.0 - 12.0 %    Eosinophil % 0.2 (L) 0.3 - 6.2 %    Basophil % 0.3 0.0 - 1.5 %    Immature Grans % 0.4 0.0 - 0.5 %    Neutrophils, Absolute 13.31 (H) 1.70 - 7.00 10*3/mm3    Lymphocytes,  Absolute 0.59 (L) 0.70 - 3.10 10*3/mm3    Monocytes, Absolute 0.80 0.10 - 0.90 10*3/mm3    Eosinophils, Absolute 0.03 0.00 - 0.40 10*3/mm3    Basophils, Absolute 0.04 0.00 - 0.20 10*3/mm3    Immature Grans, Absolute 0.06 (H) 0.00 - 0.05 10*3/mm3    nRBC 0.0 0.0 - 0.2 /100 WBC   Lactic Acid, Plasma   Result Value Ref Range    Lactate 1.2 0.5 - 2.0 mmol/L   Urinalysis, Microscopic Only - Urine, Clean Catch   Result Value Ref Range    RBC, UA 31-50 (A) None Seen /HPF    WBC, UA Too Numerous to Count (A) None Seen /HPF    Bacteria, UA 1+ (A) None Seen /HPF    Squamous Epithelial Cells, UA 3-6 (A) None Seen, 0-2 /HPF    Hyaline Casts, UA None Seen None Seen /LPF    Methodology Manual Light Microscopy      Reviewed CT a/p. Independently viewed by me. Interpreted by radiologist. Discussed with pt.  Ct Abdomen Pelvis Without Contrast    Result Date: 10/23/2019  Narrative: CT Abdomen Pelvis WO INDICATION: Right-sided flank pain that began yesterday. History of stone disease. TECHNIQUE: CT of the abdomen and pelvis without IV contrast. Coronal and sagittal reconstructions were obtained.  Radiation dose reduction techniques included automated exposure control or exposure modulation based on body size. Count of known CT and cardiac nuc med studies performed in previous 12 months: 0. COMPARISON: None available. FINDINGS: Abdomen: Included lung bases are clear. Normal caliber aorta and atherosclerotic change. Spleen adrenal glands pancreas unremarkable. Negative gallbladder. Liver negative. Negative left kidney. Nonobstructing 3 mm stone in the mid to upper pole right kidney and additional nonobstructing stones in the mid to lower pole right kidney. At least moderate hydronephrosis and hydroureter secondary to an obstructing 2-3 mm stone in the distal right ureter just proximal to the right UVJ level. Pelvis: Mild stranding in the perivesical space may reflect superimposed cystitis. Correlate with urinalysis. No drainable fluid  collection in the pelvis. Normal appendix and negative inguinal canals. No suspicious bone lesion.     Impression: 1. 3 mm obstructing distal ureteral stone on the right with at least moderate right-sided hydronephrosis. Additional nonobstructing right renal stones. 2. Nonspecific perivesical fat stranding which may reflect associated cystitis. Signer Name: Jaskaran Holcomb MD  Signed: 10/23/2019 4:46 PM  Workstation Name: [facility]  [facility]    CONSULT  Time   Discussed case with Dr Ireland  Reviewed history, exam, results and treatments.  Discussed concerns and plan of care. Agrees with David.  see dr. ireland in am or dr. Bowers.  If she starts feeling worse, [place].     D/c with ceftin, zofran, norco.     Discussed pertinent labs and imaging findings with the patient/family.  Patient/Family voiced understanding of need to follow-up for recheck, further testing as needed.  Return to the emergency Department warnings were given.              MDM  Number of Diagnoses or Management Options  Acute cystitis with hematuria: new and requires workup  Hydronephrosis with urinary obstruction due to ureteral calculus: new and requires workup  Kidney stone: new and requires workup     Amount and/or Complexity of Data Reviewed  Clinical lab tests: reviewed and ordered  Tests in the radiology section of CPT®: reviewed and ordered  Tests in the medicine section of CPT®: ordered and reviewed  Discuss the patient with other providers: yes  Independent visualization of images, tracings, or specimens: yes    Risk of Complications, Morbidity, and/or Mortality  Presenting problems: moderate  Diagnostic procedures: moderate  Management options: high    Patient Progress  Patient progress: improved      Final diagnoses:   Kidney stone   Hydronephrosis with urinary obstruction due to ureteral calculus   Acute cystitis with hematuria     Dictated utilizing Dragon dictation           Reginaldo  Marjan LOONEY PA-C  10/23/19 5032

## 2019-10-24 LAB — BACTERIA SPEC AEROBE CULT: NO GROWTH

## 2019-10-28 LAB
BACTERIA SPEC AEROBE CULT: NORMAL
BACTERIA SPEC AEROBE CULT: NORMAL

## 2019-10-30 DIAGNOSIS — E78.2 MIXED HYPERLIPIDEMIA: ICD-10-CM

## 2019-10-30 RX ORDER — BUPROPION HYDROCHLORIDE 300 MG/1
300 TABLET ORAL EVERY MORNING
Qty: 90 TABLET | Refills: 1 | Status: SHIPPED | OUTPATIENT
Start: 2019-10-30 | End: 2020-01-08 | Stop reason: SDUPTHER

## 2019-10-30 RX ORDER — ATORVASTATIN CALCIUM 20 MG/1
TABLET, FILM COATED ORAL
Qty: 90 TABLET | Refills: 3 | Status: SHIPPED | OUTPATIENT
Start: 2019-10-30 | End: 2020-01-08 | Stop reason: SDUPTHER

## 2019-11-12 ENCOUNTER — HOSPITAL ENCOUNTER (OUTPATIENT)
Dept: GENERAL RADIOLOGY | Facility: HOSPITAL | Age: 54
Discharge: HOME | End: 2019-11-12
Admitting: UROLOGY

## 2019-11-12 ENCOUNTER — TRANSCRIBE ORDERS (OUTPATIENT)
Dept: ADMINISTRATIVE | Facility: HOSPITAL | Age: 54
End: 2019-11-12

## 2019-11-12 DIAGNOSIS — N20.0 CALCULUS, RENAL: Primary | ICD-10-CM

## 2019-11-12 DIAGNOSIS — N20.0 CALCULUS, RENAL: ICD-10-CM

## 2019-11-12 PROCEDURE — 74018 RADEX ABDOMEN 1 VIEW: CPT

## 2019-11-13 RX ORDER — DEXTROAMPHETAMINE SACCHARATE, AMPHETAMINE ASPARTATE, DEXTROAMPHETAMINE SULFATE AND AMPHETAMINE SULFATE 2.5; 2.5; 2.5; 2.5 MG/1; MG/1; MG/1; MG/1
10 TABLET ORAL 3 TIMES DAILY
Qty: 90 TABLET | Refills: 0 | Status: SHIPPED | OUTPATIENT
Start: 2019-11-13 | End: 2019-11-24 | Stop reason: SDUPTHER

## 2019-11-22 DIAGNOSIS — F32.A DEPRESSION, UNSPECIFIED DEPRESSION TYPE: ICD-10-CM

## 2019-11-22 RX ORDER — TERAZOSIN 1 MG/1
1 CAPSULE ORAL DAILY
Qty: 90 CAPSULE | Refills: 3 | Status: SHIPPED | OUTPATIENT
Start: 2019-11-22 | End: 2020-01-08 | Stop reason: SDUPTHER

## 2019-11-25 RX ORDER — DEXTROAMPHETAMINE SACCHARATE, AMPHETAMINE ASPARTATE, DEXTROAMPHETAMINE SULFATE AND AMPHETAMINE SULFATE 2.5; 2.5; 2.5; 2.5 MG/1; MG/1; MG/1; MG/1
10 TABLET ORAL 3 TIMES DAILY
Qty: 90 TABLET | Refills: 0 | Status: SHIPPED | OUTPATIENT
Start: 2019-11-25 | End: 2020-02-03 | Stop reason: SDUPTHER

## 2019-12-09 ENCOUNTER — TRANSCRIBE ORDERS (OUTPATIENT)
Dept: ADMINISTRATIVE | Facility: HOSPITAL | Age: 54
End: 2019-12-09

## 2019-12-09 DIAGNOSIS — N20.0 RENAL CALCULUS: Primary | ICD-10-CM

## 2019-12-12 ENCOUNTER — HOSPITAL ENCOUNTER (OUTPATIENT)
Dept: ULTRASOUND IMAGING | Facility: HOSPITAL | Age: 54
Discharge: HOME | End: 2019-12-12
Admitting: UROLOGY

## 2019-12-12 DIAGNOSIS — N20.0 RENAL CALCULUS: ICD-10-CM

## 2019-12-12 PROCEDURE — 76775 US EXAM ABDO BACK WALL LIM: CPT

## 2019-12-23 ENCOUNTER — OFFICE VISIT (OUTPATIENT)
Dept: INTERNAL MEDICINE | Facility: CLINIC | Age: 54
End: 2019-12-23

## 2019-12-23 VITALS
TEMPERATURE: 98.2 F | RESPIRATION RATE: 16 BRPM | DIASTOLIC BLOOD PRESSURE: 100 MMHG | BODY MASS INDEX: 29.06 KG/M2 | SYSTOLIC BLOOD PRESSURE: 166 MMHG | OXYGEN SATURATION: 96 % | HEIGHT: 70 IN | HEART RATE: 127 BPM | WEIGHT: 203 LBS

## 2019-12-23 DIAGNOSIS — N30.01 ACUTE CYSTITIS WITH HEMATURIA: Primary | ICD-10-CM

## 2019-12-23 DIAGNOSIS — Z87.442 HISTORY OF RENAL CALCULI: ICD-10-CM

## 2019-12-23 DIAGNOSIS — R10.9 FLANK PAIN: ICD-10-CM

## 2019-12-23 DIAGNOSIS — L30.9 DERMATITIS: ICD-10-CM

## 2019-12-23 LAB
BILIRUB BLD-MCNC: NEGATIVE MG/DL
CLARITY, POC: ABNORMAL
COLOR UR: YELLOW
GLUCOSE UR STRIP-MCNC: ABNORMAL MG/DL
KETONES UR QL: NEGATIVE
LEUKOCYTE EST, POC: ABNORMAL
NITRITE UR-MCNC: POSITIVE MG/ML
PH UR: 7 [PH] (ref 5–8)
PROT UR STRIP-MCNC: ABNORMAL MG/DL
RBC # UR STRIP: ABNORMAL /UL
SP GR UR: 1.02 (ref 1–1.03)
UROBILINOGEN UR QL: NORMAL

## 2019-12-23 PROCEDURE — 99214 OFFICE O/P EST MOD 30 MIN: CPT | Performed by: NURSE PRACTITIONER

## 2019-12-23 PROCEDURE — 81003 URINALYSIS AUTO W/O SCOPE: CPT | Performed by: NURSE PRACTITIONER

## 2019-12-23 RX ORDER — ONDANSETRON HYDROCHLORIDE 8 MG/1
8 TABLET, FILM COATED ORAL EVERY 8 HOURS PRN
Qty: 20 TABLET | Refills: 0 | Status: SHIPPED | OUTPATIENT
Start: 2019-12-23 | End: 2020-01-16 | Stop reason: ALTCHOICE

## 2019-12-23 RX ORDER — CIPROFLOXACIN 500 MG/1
500 TABLET, FILM COATED ORAL 2 TIMES DAILY
Qty: 14 TABLET | Refills: 0 | Status: SHIPPED | OUTPATIENT
Start: 2019-12-23 | End: 2019-12-30

## 2019-12-23 RX ORDER — HYDROCODONE BITARTRATE AND ACETAMINOPHEN 5; 325 MG/1; MG/1
1 TABLET ORAL EVERY 6 HOURS PRN
Qty: 15 TABLET | Refills: 0 | Status: SHIPPED | OUTPATIENT
Start: 2019-12-23 | End: 2020-01-16 | Stop reason: ALTCHOICE

## 2019-12-23 NOTE — PROGRESS NOTES
"Subjective   Christine Villagomez is a 54 y.o. female presenting today for   Chief Complaint   Patient presents with   • Flank Pain   • Rash       Flank Pain   This is a new problem. The current episode started today ([id number]). The problem occurs intermittently. The problem has been waxing and waning since onset. The quality of the pain is described as burning. The pain is at a severity of 5/10. Associated symptoms include dysuria. Pertinent negatives include no fever. She has tried nothing for the symptoms.   Rash   This is a recurrent problem. The current episode started today. The problem is unchanged. The affected locations include the left hand and right hand. The rash is characterized by itchiness and peeling. She was exposed to nothing. Pertinent negatives include no diarrhea, fever or vomiting. Treatments tried: amalactin  The treatment provided no relief.     Prior h/o kidney stones, most recent in October, had neg US 12/12.    The following portions of the patient's history were reviewed and updated as appropriate: allergies, current medications, past family history, past medical history, past social history, past surgical history and problem list.    Review of Systems   Constitutional: Positive for chills. Negative for fever.   Gastrointestinal: Negative for diarrhea, nausea and vomiting.   Genitourinary: Positive for dysuria, flank pain, frequency and urgency. Negative for hematuria.   Skin: Positive for rash.       Objective   Vitals:    12/23/19 1356   BP: 166/100   BP Location: Left arm   Patient Position: Sitting   Cuff Size: Adult   Pulse: (!) 127   Resp: 16   Temp: 98.2 °F (36.8 °C)   TempSrc: Oral   SpO2: 96%   Weight: 92.1 kg (203 lb)   Height: 177.8 cm (70\")     Body mass index is 29.13 kg/m².  Nursing notes and vitals reviewed.    Physical Exam   Constitutional: She is oriented to person, place, and time. She appears well-developed and well-nourished. No distress.   Cardiovascular: Regular rhythm, " S1 normal and S2 normal.   Pulmonary/Chest: Effort normal and breath sounds normal.   Abdominal: Soft. There is no hepatosplenomegaly. There is tenderness in the suprapubic area. There is no rigidity, no guarding and no CVA tenderness.   Neurological: She is alert and oriented to person, place, and time.   Skin:   Bilat hands dry, flaking, cracked, with erythematous patches.   Psychiatric: She has a normal mood and affect. Her behavior is normal. Thought content normal. She is attentive.         Assessment/Plan   Christine was seen today for flank pain and rash.    Diagnoses and all orders for this visit:    Acute cystitis with hematuria  -     Urine Culture - Urine, Urine, Clean Catch  -     POC Urinalysis Dipstick, Automated  -     ciprofloxacin (CIPRO) 500 MG tablet; Take 1 tablet by mouth 2 (Two) Times a Day for 7 days.  -     ondansetron (ZOFRAN) 8 MG tablet; Take 1 tablet by mouth Every 8 (Eight) Hours As Needed for Nausea or Vomiting.  -     HYDROcodone-acetaminophen (NORCO) 5-325 MG per tablet; Take 1 tablet by mouth Every 6 (Six) Hours As Needed for Severe Pain .    History of renal calculi  -     HYDROcodone-acetaminophen (NORCO) 5-325 MG per tablet; Take 1 tablet by mouth Every 6 (Six) Hours As Needed for Severe Pain .    Flank pain  -     ciprofloxacin (CIPRO) 500 MG tablet; Take 1 tablet by mouth 2 (Two) Times a Day for 7 days.  -     HYDROcodone-acetaminophen (NORCO) 5-325 MG per tablet; Take 1 tablet by mouth Every 6 (Six) Hours As Needed for Severe Pain .    Dermatitis  Comments:  - good barrier ointment  - OTC hydrocortisone  - Zyrtec and Benadryl for itching      Call Dr. Bowers's office ASAP for f/u.    Return if symptoms worsen or fail to improve.

## 2019-12-25 LAB
BACTERIA UR CULT: ABNORMAL
BACTERIA UR CULT: ABNORMAL
OTHER ANTIBIOTIC SUSC ISLT: ABNORMAL

## 2020-01-02 ENCOUNTER — TRANSCRIBE ORDERS (OUTPATIENT)
Dept: ADMINISTRATIVE | Facility: HOSPITAL | Age: 55
End: 2020-01-02

## 2020-01-02 DIAGNOSIS — N20.0 KIDNEY STONES: Primary | ICD-10-CM

## 2020-01-08 ENCOUNTER — TRANSCRIBE ORDERS (OUTPATIENT)
Dept: ADMINISTRATIVE | Facility: HOSPITAL | Age: 55
End: 2020-01-08

## 2020-01-08 ENCOUNTER — OFFICE VISIT (OUTPATIENT)
Dept: INTERNAL MEDICINE | Facility: CLINIC | Age: 55
End: 2020-01-08

## 2020-01-08 VITALS
WEIGHT: 211 LBS | BODY MASS INDEX: 30.21 KG/M2 | OXYGEN SATURATION: 98 % | RESPIRATION RATE: 18 BRPM | HEIGHT: 70 IN | SYSTOLIC BLOOD PRESSURE: 132 MMHG | DIASTOLIC BLOOD PRESSURE: 80 MMHG | HEART RATE: 107 BPM

## 2020-01-08 DIAGNOSIS — E78.2 MIXED HYPERLIPIDEMIA: ICD-10-CM

## 2020-01-08 DIAGNOSIS — G89.29 CHRONIC PAIN OF BOTH KNEES: ICD-10-CM

## 2020-01-08 DIAGNOSIS — M25.562 CHRONIC PAIN OF BOTH KNEES: ICD-10-CM

## 2020-01-08 DIAGNOSIS — F32.A DEPRESSION, UNSPECIFIED DEPRESSION TYPE: ICD-10-CM

## 2020-01-08 DIAGNOSIS — F98.8 ATTENTION DEFICIT DISORDER (ADD) IN ADULT: Primary | ICD-10-CM

## 2020-01-08 DIAGNOSIS — M17.11 ARTHRITIS OF RIGHT KNEE: ICD-10-CM

## 2020-01-08 DIAGNOSIS — Z12.31 SCREENING MAMMOGRAM, ENCOUNTER FOR: Primary | ICD-10-CM

## 2020-01-08 DIAGNOSIS — M25.561 CHRONIC PAIN OF BOTH KNEES: ICD-10-CM

## 2020-01-08 DIAGNOSIS — Z87.442 HISTORY OF RENAL CALCULI: ICD-10-CM

## 2020-01-08 PROCEDURE — 99214 OFFICE O/P EST MOD 30 MIN: CPT | Performed by: INTERNAL MEDICINE

## 2020-01-08 RX ORDER — BUPROPION HYDROCHLORIDE 300 MG/1
300 TABLET ORAL EVERY MORNING
Qty: 90 TABLET | Refills: 1 | Status: SHIPPED | OUTPATIENT
Start: 2020-01-08 | End: 2020-07-09 | Stop reason: SDUPTHER

## 2020-01-08 RX ORDER — ATORVASTATIN CALCIUM 20 MG/1
20 TABLET, FILM COATED ORAL DAILY
Qty: 90 TABLET | Refills: 3 | Status: SHIPPED | OUTPATIENT
Start: 2020-01-08 | End: 2020-11-30

## 2020-01-08 RX ORDER — FLUTICASONE PROPIONATE 50 MCG
2 SPRAY, SUSPENSION (ML) NASAL DAILY
Qty: 1 BOTTLE | Refills: 5 | Status: SHIPPED | OUTPATIENT
Start: 2020-01-08 | End: 2021-04-22

## 2020-01-08 RX ORDER — TERAZOSIN 1 MG/1
1 CAPSULE ORAL DAILY
Qty: 90 CAPSULE | Refills: 3 | Status: SHIPPED | OUTPATIENT
Start: 2020-01-08 | End: 2021-06-02 | Stop reason: SDUPTHER

## 2020-01-08 RX ORDER — MELOXICAM 15 MG/1
15 TABLET ORAL DAILY
Qty: 90 TABLET | Refills: 1 | Status: SHIPPED | OUTPATIENT
Start: 2020-01-08 | End: 2020-01-16

## 2020-01-08 RX ORDER — BUSPIRONE HYDROCHLORIDE 30 MG/1
45 TABLET ORAL DAILY
Qty: 135 TABLET | Refills: 3 | Status: SHIPPED | OUTPATIENT
Start: 2020-01-08 | End: 2020-06-04 | Stop reason: SDUPTHER

## 2020-01-08 RX ORDER — LEVOTHYROXINE SODIUM 0.05 MG/1
50 TABLET ORAL DAILY
Qty: 90 TABLET | Refills: 3 | Status: SHIPPED | OUTPATIENT
Start: 2020-01-08 | End: 2020-11-18 | Stop reason: SDUPTHER

## 2020-01-08 NOTE — PROGRESS NOTES
Christine Villagomez is a 54 y.o. female, who presents with a chief complaint of   Chief Complaint   Patient presents with   • ADD       HPI   Pt here for f/u on ADD.  She is on adderall and feels better on the med.  bp ok today and hr slightly tachycardic.  On her fit bit her resting heart rate is usually in the 70-80 range.      She also had some lateral knee pain over the holidays.  This was different from her normal chronic knee pain.  After a few days it got some better but then worse again    Since her last OV she has also had kidney stones and lithotripsy.  She is following up with dr. Bowers.       The following portions of the patient's history were reviewed and updated as appropriate: allergies, current medications, past family history, past medical history, past social history, past surgical history and problem list.    Allergies: Sulfa antibiotics    Review of Systems   Constitutional: Negative.    HENT: Negative.    Eyes: Negative.    Respiratory: Negative.    Cardiovascular: Negative.    Gastrointestinal: Negative.    Endocrine: Negative.    Genitourinary: Negative.    Musculoskeletal:        Knee pain   Skin: Negative.    Allergic/Immunologic: Negative.    Neurological: Negative.    Hematological: Negative.    Psychiatric/Behavioral: Negative.    All other systems reviewed and are negative.            Wt Readings from Last 3 Encounters:   01/08/20 95.7 kg (211 lb)   12/23/19 92.1 kg (203 lb)   10/23/19 96.6 kg (213 lb)     Temp Readings from Last 3 Encounters:   12/23/19 98.2 °F (36.8 °C) (Oral)   10/23/19 99.5 °F (37.5 °C) (Oral)   10/23/19 97.6 °F (36.4 °C) (Oral)     BP Readings from Last 3 Encounters:   01/08/20 132/80   12/23/19 166/100   10/23/19 124/60     Pulse Readings from Last 3 Encounters:   01/08/20 107   12/23/19 (!) 127   10/23/19 106     Body mass index is 30.28 kg/m².  @LASTSAO2(3)@    Physical Exam   Constitutional: She is oriented to person, place, and time. She appears  well-developed and well-nourished. No distress.   HENT:   Head: Normocephalic and atraumatic.   Right Ear: External ear normal.   Left Ear: External ear normal.   Nose: Nose normal.   Mouth/Throat: Oropharynx is clear and moist.   Eyes: Pupils are equal, round, and reactive to light. Conjunctivae and EOM are normal.   Neck: Normal range of motion. Neck supple.   Cardiovascular: Normal rate, regular rhythm, normal heart sounds and intact distal pulses.   Pulmonary/Chest: Effort normal and breath sounds normal. No respiratory distress. She has no wheezes.   Musculoskeletal: Normal range of motion.   Normal gait   Neurological: She is alert and oriented to person, place, and time.   Skin: Skin is warm and dry.   Psychiatric: She has a normal mood and affect. Her behavior is normal. Judgment and thought content normal.   Nursing note and vitals reviewed.      Results for orders placed or performed in visit on 12/23/19   Urine Culture - Urine, Urine, Clean Catch   Result Value Ref Range    Urine Culture Final report (A)     Result 1 Escherichia coli (A)     Susceptibility Testing Comment    POC Urinalysis Dipstick, Automated   Result Value Ref Range    Color Yellow Yellow, Straw, Dark Yellow, Grace    Clarity, UA Turbid (A) Clear    Specific Gravity  1.020 1.005 - 1.030    pH, Urine 7.0 5.0 - 8.0    Leukocytes Large (3+) (A) Negative    Nitrite, UA Positive (A) Negative    Protein,  mg/dL (A) Negative mg/dL    Glucose,  mg/dL (A) Negative, 1000 mg/dL (3+) mg/dL    Ketones, UA Negative Negative    Urobilinogen, UA Normal Normal    Bilirubin Negative Negative    Blood, UA Moderate (A) Negative           Christine was seen today for add.    Diagnoses and all orders for this visit:    Attention deficit disorder (ADD) in adult- cont adderall    History of renal calculi - cont f/u with dr. Bowers    Chronic pain of both knees  -     Ambulatory Referral to Orthopedic Surgery    Mixed hyperlipidemia  -      atorvastatin (LIPITOR) 20 MG tablet; Take 1 tablet by mouth Daily.    Depression, unspecified depression type  -     busPIRone (BUSPAR) 30 MG tablet; Take 1.5 tablets by mouth Daily.  -     terazosin (HYTRIN) 1 MG capsule; Take 1 capsule by mouth Daily.    Arthritis of right knee  -     meloxicam (MOBIC) 15 MG tablet; Take 1 tablet by mouth Daily.    Other orders  -     buPROPion XL (WELLBUTRIN XL) 300 MG 24 hr tablet; Take 1 tablet by mouth Every Morning.  -     fluticasone (FLONASE) 50 MCG/ACT nasal spray; 2 sprays into the nostril(s) as directed by provider Daily.  -     levothyroxine (SYNTHROID, LEVOTHROID) 50 MCG tablet; Take 1 tablet by mouth Daily.          Outpatient Medications Prior to Visit   Medication Sig Dispense Refill   • amphetamine-dextroamphetamine (ADDERALL) 10 MG tablet Take 1 tablet by mouth 3 (Three) Times a Day. (Patient taking differently: Take 10 mg by mouth 2 (Two) Times a Day.) 90 tablet 0   • cetirizine (zyrTEC) 10 MG tablet Take 10 mg by mouth Daily.     • Cholecalciferol (VITAMIN D) 1000 UNITS tablet Take 1 tablet by mouth daily.     • Coenzyme Q10 (CO Q-10) 100 MG capsule Co Q-10     • docusate sodium (COLACE) 50 MG capsule Take  by mouth 2 (Two) Times a Day.     • FIBER PO Take  by mouth.     • Glucosamine 750 MG tablet glucosamin 375 mg-chond 300 mg-collagen 50 mg-hyaluronic acid 2 mg cap   Take by oral route.     • Green Tea, Camillia sinensis, (GREEN TEA EXTRACT PO) Take  by mouth.     • HYDROcodone-acetaminophen (NORCO) 5-325 MG per tablet Take 1 tablet by mouth Every 6 (Six) Hours As Needed for Severe Pain . 15 tablet 0   • Multiple Vitamin (MULTI-VITAMIN DAILY PO) Multi Vitamin     • Multiple Vitamins-Minerals (HAIR/SKIN/NAILS PO) Take 1 tablet by mouth daily.     • ondansetron (ZOFRAN) 8 MG tablet Take 1 tablet by mouth Every 8 (Eight) Hours As Needed for Nausea or Vomiting. 20 tablet 0   • ondansetron ODT (ZOFRAN-ODT) 4 MG disintegrating tablet Take 1 tablet by mouth 4 (Four)  Times a Day As Needed for Nausea or Vomiting. 12 tablet 0   • Probiotic Product (PROBIOTIC DAILY PO) Probiotic     • SHINGRIX 50 MCG/0.5ML reconstituted suspension      • atorvastatin (LIPITOR) 20 MG tablet TAKE 1 TABLET DAILY 90 tablet 3   • buPROPion XL (WELLBUTRIN XL) 300 MG 24 hr tablet Take 1 tablet by mouth Every Morning. 90 tablet 1   • busPIRone (BUSPAR) 30 MG tablet TAKE 1 AND 1/2 TABLETS     DAILY 135 tablet 3   • fluticasone (FLONASE) 50 MCG/ACT nasal spray fluticasone 50 mcg/actuation nasal spray,suspension     • levothyroxine (SYNTHROID, LEVOTHROID) 50 MCG tablet Take 1 tablet by mouth Daily. 90 tablet 3   • meloxicam (MOBIC) 15 MG tablet TAKE 1 TABLET DAILY 90 tablet 1   • terazosin (HYTRIN) 1 MG capsule Take 1 capsule by mouth Daily. 90 capsule 3     No facility-administered medications prior to visit.      New Medications Ordered This Visit   Medications   • atorvastatin (LIPITOR) 20 MG tablet     Sig: Take 1 tablet by mouth Daily.     Dispense:  90 tablet     Refill:  3   • buPROPion XL (WELLBUTRIN XL) 300 MG 24 hr tablet     Sig: Take 1 tablet by mouth Every Morning.     Dispense:  90 tablet     Refill:  1   • busPIRone (BUSPAR) 30 MG tablet     Sig: Take 1.5 tablets by mouth Daily.     Dispense:  135 tablet     Refill:  3   • fluticasone (FLONASE) 50 MCG/ACT nasal spray     Si sprays into the nostril(s) as directed by provider Daily.     Dispense:  1 bottle     Refill:  5   • levothyroxine (SYNTHROID, LEVOTHROID) 50 MCG tablet     Sig: Take 1 tablet by mouth Daily.     Dispense:  90 tablet     Refill:  3   • meloxicam (MOBIC) 15 MG tablet     Sig: Take 1 tablet by mouth Daily.     Dispense:  90 tablet     Refill:  1   • terazosin (HYTRIN) 1 MG capsule     Sig: Take 1 capsule by mouth Daily.     Dispense:  90 capsule     Refill:  3     [unfilled]  Medications Discontinued During This Encounter   Medication Reason   • atorvastatin (LIPITOR) 20 MG tablet Reorder   • buPROPion XL (WELLBUTRIN  XL) 300 MG 24 hr tablet Reorder   • busPIRone (BUSPAR) 30 MG tablet Reorder   • fluticasone (FLONASE) 50 MCG/ACT nasal spray Reorder   • levothyroxine (SYNTHROID, LEVOTHROID) 50 MCG tablet Reorder   • meloxicam (MOBIC) 15 MG tablet Reorder   • terazosin (HYTRIN) 1 MG capsule Reorder         Return in about 3 months (around 4/8/2020) for Recheck, labs.

## 2020-01-16 ENCOUNTER — OFFICE VISIT (OUTPATIENT)
Dept: ORTHOPEDIC SURGERY | Facility: CLINIC | Age: 55
End: 2020-01-16

## 2020-01-16 VITALS
HEIGHT: 70 IN | SYSTOLIC BLOOD PRESSURE: 142 MMHG | BODY MASS INDEX: 30.06 KG/M2 | DIASTOLIC BLOOD PRESSURE: 80 MMHG | HEART RATE: 101 BPM | WEIGHT: 210 LBS

## 2020-01-16 DIAGNOSIS — R52 PAIN: Primary | ICD-10-CM

## 2020-01-16 DIAGNOSIS — M17.11 ARTHRITIS OF RIGHT KNEE: ICD-10-CM

## 2020-01-16 DIAGNOSIS — M17.0 PRIMARY OSTEOARTHRITIS OF BOTH KNEES: ICD-10-CM

## 2020-01-16 PROCEDURE — 73562 X-RAY EXAM OF KNEE 3: CPT | Performed by: ORTHOPAEDIC SURGERY

## 2020-01-16 PROCEDURE — 20610 DRAIN/INJ JOINT/BURSA W/O US: CPT | Performed by: ORTHOPAEDIC SURGERY

## 2020-01-16 PROCEDURE — 99203 OFFICE O/P NEW LOW 30 MIN: CPT | Performed by: ORTHOPAEDIC SURGERY

## 2020-01-16 RX ORDER — LIDOCAINE HYDROCHLORIDE 10 MG/ML
4 INJECTION, SOLUTION EPIDURAL; INFILTRATION; INTRACAUDAL; PERINEURAL
Status: COMPLETED | OUTPATIENT
Start: 2020-01-16 | End: 2020-01-16

## 2020-01-16 RX ORDER — TRIAMCINOLONE ACETONIDE 40 MG/ML
80 INJECTION, SUSPENSION INTRA-ARTICULAR; INTRAMUSCULAR
Status: COMPLETED | OUTPATIENT
Start: 2020-01-16 | End: 2020-01-16

## 2020-01-16 RX ORDER — MELOXICAM 15 MG/1
TABLET ORAL
Qty: 90 TABLET | Refills: 1 | Status: SHIPPED | OUTPATIENT
Start: 2020-01-16 | End: 2020-02-05

## 2020-01-16 RX ADMIN — LIDOCAINE HYDROCHLORIDE 4 ML: 10 INJECTION, SOLUTION EPIDURAL; INFILTRATION; INTRACAUDAL; PERINEURAL at 09:17

## 2020-01-16 RX ADMIN — TRIAMCINOLONE ACETONIDE 80 MG: 40 INJECTION, SUSPENSION INTRA-ARTICULAR; INTRAMUSCULAR at 09:17

## 2020-01-16 NOTE — PROGRESS NOTES
Subjective: Bilateral knee pain     Patient ID: Christine Villagomez is a 54 y.o. female.    Chief Complaint:    History of Present Illness 54-year-old female seen for bilateral knee pain that she has had for over 5 years because of chronic and recurrent discomfort that is progressively worsening.  She does states she has lost 75 pounds and that has helped but she still has discomfort actives of daily living particular guarding that she likes to doing.  Has pain getting in and out of chair navigating steps.  Some discomfort at rest but mostly with activity.  She had her right knee scoped 9 years ago for meniscal tear but again the pain is been in the past 4 to 5 years.  He is currently taking meloxicam.  She had both knees injected by her primary care physician last February and it helped for about 3 months patient presents today for evaluation and treatment recommendations       Social History     Occupational History   • Not on file   Tobacco Use   • Smoking status: Never Smoker   • Smokeless tobacco: Never Used   Substance and Sexual Activity   • Alcohol use: No   • Drug use: No   • Sexual activity: Yes     Partners: Male     Birth control/protection: Surgical     Comment: vas      Review of Systems   Constitutional: Negative for chills, diaphoresis, fever and unexpected weight change.   HENT: Negative for hearing loss, nosebleeds, sore throat and tinnitus.    Eyes: Negative for pain and visual disturbance.   Respiratory: Negative for cough, shortness of breath and wheezing.    Cardiovascular: Negative for chest pain and palpitations.   Gastrointestinal: Negative for abdominal pain, diarrhea, nausea and vomiting.   Endocrine: Negative for cold intolerance, heat intolerance and polydipsia.   Genitourinary: Negative for difficulty urinating, dysuria and hematuria.   Musculoskeletal: Positive for arthralgias and myalgias. Negative for joint swelling.   Skin: Negative for rash and wound.   Allergic/Immunologic: Negative  for environmental allergies.   Neurological: Negative for dizziness, syncope and numbness.   Hematological: Does not bruise/bleed easily.   Psychiatric/Behavioral: Negative for dysphoric mood and sleep disturbance. The patient is not nervous/anxious.          Past Medical History:   Diagnosis Date   • Bipolar disorder (CMS/HCC)    • Depression    • Hyperlipidemia    • Hypothyroidism    • Kidney stone      Past Surgical History:   Procedure Laterality Date   • BLADDER SUSPENSION      TVT   • KIDNEY STONE SURGERY     • KNEE MENISCAL REPAIR Bilateral    • KNEE SURGERY       Family History   Problem Relation Age of Onset   • Cancer Mother         lung   • Heart attack Father    • Colon cancer Maternal Grandmother    • Breast cancer Neg Hx    • Ovarian cancer Neg Hx    • Pulmonary embolism Neg Hx    • Deep vein thrombosis Neg Hx          Objective:  Vitals:    01/16/20 0832   BP: 142/80   Pulse: 101         01/16/20  0832   Weight: 95.3 kg (210 lb)     Body mass index is 30.13 kg/m².        Ortho Exam   AP lateral sunrise view of both knees shows tricompartmental arthritis with near bone-on-bone in the medial compartment of both knees worse on the right than the left.  Significant spurring and osteophytes noted on the sunrise views of both knees.  No acute changes are noted.  No prior x-rays available for comparison.  She is alert and oriented x3.  Has normocephalic and sclerae clear.  Neither knee shows any swelling effusion erythema and she has 0 to 125 degrees of motion with moderate patellofemoral crepitus and pain with range of motion.  No subluxation.  There is joint line tenderness with negative Viry's.  No instability at 0 90 degrees nor any instability 0 30 degrees to varus valgus stressing on either knee.  No effusion is noted.  Quad function is 5/5 in the calves are nontender.  Good distal pulses no motor or sensory deficit good capillary refill.  Tolerates the meloxicam 7.5 daily but is not noting  significant relief of her discomfort.    Assessment:        1. Pain    2. Primary osteoarthritis of both knees           Plan: Over 20 minutes was spent with the patient and her  face-to-face reviewing her x-rays her history MRI pictures of that left knee from 2011 and her exam.  As far as her cortisone injection I think they can be safely given every 4 to 5 months I like to try to stretch it out in younger patients although I have told to have done every 3 months in older patients.  Also discussed possibility of gel injections or physical therapy.  My recommendation at this time is to proceed with a cortisone injection and she was in agreement.  With both knees were injected 4 cc lidocaine 1 cc Kenalog through the superolateral portal tolerated well.  She will return to see me as needed and depending on the timing of the return we will either reinject or cortisone if it is been more than 5 months or so or less and then proceed with the gel injections.  Discussed with the patient the gel injections and went to the hospital complex.  Those injections again can be given every 6 months.  She inquired about total knee replacement but I told her the only surgery of the help at this point is total knee but the only indication for surgery pain that she is having that she is not willing to live with.  Answered all questions again return to see me as needed.      Large Joint Arthrocentesis  Date/Time: 1/16/2020 9:17 AM  Consent given by: patient  Site marked: site marked  Timeout: Immediately prior to procedure a time out was called to verify the correct patient, procedure, equipment, support staff and site/side marked as required   Supporting Documentation  Indications: pain   Procedure Details  Location: knee - Knee joint: BILATERAL.  Needle size: 22 G  Approach: superior  Medications administered: 80 mg triamcinolone acetonide 40 MG/ML; 4 mL lidocaine PF 1% 1 %  Patient tolerance: patient tolerated the procedure  well with no immediate complications            Work Status:    JAYLEEN query complete.    Orders:  Orders Placed This Encounter   Procedures   • Large Joint Arthrocentesis   • XR Knee 3 View Bilateral       Medications:  No orders of the defined types were placed in this encounter.      Followup:  Return if symptoms worsen or fail to improve.          Dictated utilizing Dragon dictation   Answers for HPI/ROS submitted by the patient on 1/15/2020   How long have you been having these symptoms?: 1-4 weeks ago

## 2020-02-03 ENCOUNTER — HOSPITAL ENCOUNTER (OUTPATIENT)
Dept: ULTRASOUND IMAGING | Facility: HOSPITAL | Age: 55
Discharge: HOME | End: 2020-02-03
Admitting: UROLOGY

## 2020-02-03 ENCOUNTER — HOSPITAL ENCOUNTER (OUTPATIENT)
Dept: GENERAL RADIOLOGY | Facility: HOSPITAL | Age: 55
Discharge: HOME | End: 2020-02-03

## 2020-02-03 DIAGNOSIS — N20.0 KIDNEY STONES: ICD-10-CM

## 2020-02-03 DIAGNOSIS — F98.8 ATTENTION DEFICIT DISORDER (ADD) IN ADULT: Primary | ICD-10-CM

## 2020-02-03 PROCEDURE — 76775 US EXAM ABDO BACK WALL LIM: CPT

## 2020-02-03 PROCEDURE — 74018 RADEX ABDOMEN 1 VIEW: CPT

## 2020-02-04 RX ORDER — DEXTROAMPHETAMINE SACCHARATE, AMPHETAMINE ASPARTATE, DEXTROAMPHETAMINE SULFATE AND AMPHETAMINE SULFATE 2.5; 2.5; 2.5; 2.5 MG/1; MG/1; MG/1; MG/1
10 TABLET ORAL 3 TIMES DAILY
Qty: 90 TABLET | Refills: 0 | Status: SHIPPED | OUTPATIENT
Start: 2020-02-04 | End: 2020-09-09 | Stop reason: SDUPTHER

## 2020-02-05 ENCOUNTER — OFFICE VISIT (OUTPATIENT)
Dept: INTERNAL MEDICINE | Facility: CLINIC | Age: 55
End: 2020-02-05

## 2020-02-05 VITALS
RESPIRATION RATE: 16 BRPM | DIASTOLIC BLOOD PRESSURE: 96 MMHG | OXYGEN SATURATION: 96 % | HEIGHT: 70 IN | SYSTOLIC BLOOD PRESSURE: 150 MMHG | BODY MASS INDEX: 29.92 KG/M2 | TEMPERATURE: 98.1 F | WEIGHT: 209 LBS | HEART RATE: 127 BPM

## 2020-02-05 DIAGNOSIS — S80.862A INSECT BITE OF LEFT LOWER LEG, INITIAL ENCOUNTER: Primary | ICD-10-CM
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
## 2020-02-05 PROCEDURE — 99213 OFFICE O/P EST LOW 20 MIN: CPT | Performed by: INTERNAL MEDICINE

## 2020-02-05 NOTE — PROGRESS NOTES
Christine Villagomez is a 54 y.o. female, who presents with a chief complaint of   Chief Complaint   Patient presents with   • Boils on Leg       HPI   Pt had spot on leg then got in shower and had 2 more lesions.  Pt not sure if lesion a boil or a bite.  No fever.  Spots red.  No drainage.  No fever.     The following portions of the patient's history were reviewed and updated as appropriate: allergies, current medications, past family history, past medical history, past social history, past surgical history and problem list.    Allergies: Sulfa antibiotics    Review of Systems   Constitutional: Negative.    HENT: Negative.    Eyes: Negative.    Respiratory: Negative.    Cardiovascular: Negative.    Gastrointestinal: Negative.    Endocrine: Negative.    Genitourinary: Negative.    Musculoskeletal: Negative.    Skin: Positive for rash and wound.   Allergic/Immunologic: Negative.    Neurological: Negative.    Hematological: Negative.    Psychiatric/Behavioral: Negative.    All other systems reviewed and are negative.            Wt Readings from Last 3 Encounters:   02/05/20 94.8 kg (209 lb)   01/16/20 95.3 kg (210 lb)   01/08/20 95.7 kg (211 lb)     Temp Readings from Last 3 Encounters:   02/05/20 98.1 °F (36.7 °C) (Oral)   12/23/19 98.2 °F (36.8 °C) (Oral)   10/23/19 99.5 °F (37.5 °C) (Oral)     BP Readings from Last 3 Encounters:   02/05/20 150/96   01/16/20 142/80   01/08/20 132/80     Pulse Readings from Last 3 Encounters:   02/05/20 (!) 127   01/16/20 101   01/08/20 107     Body mass index is 29.99 kg/m².  @LASTSAO2(3)@    Physical Exam   Constitutional: She is oriented to person, place, and time. She appears well-developed and well-nourished. No distress.   HENT:   Head: Normocephalic and atraumatic.   Right Ear: External ear normal.   Left Ear: External ear normal.   Nose: Nose normal.   Mouth/Throat: Oropharynx is clear and moist.   Eyes: Pupils are equal, round, and reactive to light. Conjunctivae and EOM  are normal.   Neck: Normal range of motion. Neck supple.   Cardiovascular: Normal rate, regular rhythm, normal heart sounds and intact distal pulses.   Pulmonary/Chest: Effort normal and breath sounds normal. No respiratory distress. She has no wheezes.   Musculoskeletal: Normal range of motion.   Normal gait   Neurological: She is alert and oriented to person, place, and time.   Skin: Skin is warm and dry.   3 bites with black necrosis, ecchymosis and surrounding erythema.  See pictures in media file with measurements   Psychiatric: She has a normal mood and affect. Her behavior is normal. Judgment and thought content normal.   Nursing note and vitals reviewed.      Results for orders placed or performed in visit on 12/23/19   Urine Culture - Urine, Urine, Clean Catch   Result Value Ref Range    Urine Culture Final report (A)     Result 1 Escherichia coli (A)     Susceptibility Testing Comment    POC Urinalysis Dipstick, Automated   Result Value Ref Range    Color Yellow Yellow, Straw, Dark Yellow, Grace    Clarity, UA Turbid (A) Clear    Specific Gravity  1.020 1.005 - 1.030    pH, Urine 7.0 5.0 - 8.0    Leukocytes Large (3+) (A) Negative    Nitrite, UA Positive (A) Negative    Protein,  mg/dL (A) Negative mg/dL    Glucose,  mg/dL (A) Negative, 1000 mg/dL (3+) mg/dL    Ketones, UA Negative Negative    Urobilinogen, UA Normal Normal    Bilirubin Negative Negative    Blood, UA Moderate (A) Negative           Christine was seen today for boils on leg.    Diagnoses and all orders for this visit:    Insect bite of left lower leg, initial encounter  -     Comprehensive Metabolic Panel  -     CBC & Differential    no signs of purulent drainage at this time.  Check labs.  Recheck Friday am or sooner if any worsening.  Ok for benadryl.    Outpatient Medications Prior to Visit   Medication Sig Dispense Refill   • amphetamine-dextroamphetamine (ADDERALL) 10 MG tablet Take 1 tablet by mouth 3 (Three) Times a Day. 90  tablet 0   • atorvastatin (LIPITOR) 20 MG tablet Take 1 tablet by mouth Daily. 90 tablet 3   • buPROPion XL (WELLBUTRIN XL) 300 MG 24 hr tablet Take 1 tablet by mouth Every Morning. 90 tablet 1   • busPIRone (BUSPAR) 30 MG tablet Take 1.5 tablets by mouth Daily. 135 tablet 3   • cetirizine (zyrTEC) 10 MG tablet Take 10 mg by mouth Daily.     • Cholecalciferol (VITAMIN D) 1000 UNITS tablet Take 1 tablet by mouth daily.     • Coenzyme Q10 (CO Q-10) 100 MG capsule Co Q-10     • docusate sodium (COLACE) 50 MG capsule Take  by mouth 2 (Two) Times a Day.     • FIBER PO Take  by mouth.     • fluticasone (FLONASE) 50 MCG/ACT nasal spray 2 sprays into the nostril(s) as directed by provider Daily. 1 bottle 5   • Glucosamine 750 MG tablet glucosamin 375 mg-chond 300 mg-collagen 50 mg-hyaluronic acid 2 mg cap   Take by oral route.     • Green Tea, Camillia sinensis, (GREEN TEA EXTRACT PO) Take  by mouth.     • levothyroxine (SYNTHROID, LEVOTHROID) 50 MCG tablet Take 1 tablet by mouth Daily. 90 tablet 3   • Multiple Vitamin (MULTI-VITAMIN DAILY PO) Multi Vitamin     • Multiple Vitamins-Minerals (HAIR/SKIN/NAILS PO) Take 1 tablet by mouth daily.     • Probiotic Product (PROBIOTIC DAILY PO) Probiotic     • terazosin (HYTRIN) 1 MG capsule Take 1 capsule by mouth Daily. 90 capsule 3   • meloxicam (MOBIC) 15 MG tablet TAKE 1 TABLET DAILY 90 tablet 1     No facility-administered medications prior to visit.      No orders of the defined types were placed in this encounter.    [unfilled]  Medications Discontinued During This Encounter   Medication Reason   • meloxicam (MOBIC) 15 MG tablet *Therapy completed         Return in about 2 days (around 2/7/2020) for Recheck.

## 2020-02-06 ENCOUNTER — TRANSCRIBE ORDERS (OUTPATIENT)
Dept: ADMINISTRATIVE | Facility: HOSPITAL | Age: 55
End: 2020-02-06

## 2020-02-06 ENCOUNTER — HOSPITAL ENCOUNTER (EMERGENCY)
Facility: HOSPITAL | Age: 55
Discharge: HOME | End: 2020-02-06
Attending: EMERGENCY MEDICINE | Admitting: EMERGENCY MEDICINE

## 2020-02-06 VITALS
RESPIRATION RATE: 16 BRPM | DIASTOLIC BLOOD PRESSURE: 94 MMHG | TEMPERATURE: 98.1 F | WEIGHT: 206 LBS | SYSTOLIC BLOOD PRESSURE: 153 MMHG | HEART RATE: 89 BPM | HEIGHT: 70 IN | OXYGEN SATURATION: 98 % | BODY MASS INDEX: 29.49 KG/M2

## 2020-02-06 DIAGNOSIS — L03.90 CELLULITIS, UNSPECIFIED CELLULITIS SITE: ICD-10-CM
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
## 2020-02-06 DIAGNOSIS — N20.0 KIDNEY STONE: Primary | ICD-10-CM

## 2020-02-06 DIAGNOSIS — S80.862A INSECT BITE OF LEFT LOWER LEG, INITIAL ENCOUNTER: Primary | ICD-10-CM

## 2020-02-06 LAB
ALBUMIN SERPL-MCNC: 4.5 G/DL (ref 3.5–5.2)
ALBUMIN/GLOB SERPL: 1.7 G/DL
ALP SERPL-CCNC: 93 U/L (ref 39–117)
ALT SERPL-CCNC: 16 U/L (ref 1–33)
AST SERPL-CCNC: 16 U/L (ref 1–32)
BASOPHILS # BLD AUTO: 0.04 10*3/MM3 (ref 0–0.2)
BASOPHILS NFR BLD AUTO: 0.4 % (ref 0–1.5)
BILIRUB SERPL-MCNC: 0.5 MG/DL (ref 0.2–1.2)
BUN SERPL-MCNC: 16 MG/DL (ref 6–20)
BUN/CREAT SERPL: 15 (ref 7–25)
CALCIUM SERPL-MCNC: 9.5 MG/DL (ref 8.6–10.5)
CHLORIDE SERPL-SCNC: 101 MMOL/L (ref 98–107)
CO2 SERPL-SCNC: 27.3 MMOL/L (ref 22–29)
CREAT SERPL-MCNC: 1.07 MG/DL (ref 0.57–1)
EOSINOPHIL # BLD AUTO: 0.09 10*3/MM3 (ref 0–0.4)
EOSINOPHIL NFR BLD AUTO: 0.9 % (ref 0.3–6.2)
ERYTHROCYTE [DISTWIDTH] IN BLOOD BY AUTOMATED COUNT: 12.9 % (ref 12.3–15.4)
GLOBULIN SER CALC-MCNC: 2.7 GM/DL
GLUCOSE SERPL-MCNC: 98 MG/DL (ref 65–99)
HCT VFR BLD AUTO: 40 % (ref 34–46.6)
HGB BLD-MCNC: 13.5 G/DL (ref 12–15.9)
IMM GRANULOCYTES # BLD AUTO: 0.02 10*3/MM3 (ref 0–0.05)
IMM GRANULOCYTES NFR BLD AUTO: 0.2 % (ref 0–0.5)
LYMPHOCYTES # BLD AUTO: 0.86 10*3/MM3 (ref 0.7–3.1)
LYMPHOCYTES NFR BLD AUTO: 9.1 % (ref 19.6–45.3)
MCH RBC QN AUTO: 28.5 PG (ref 26.6–33)
MCHC RBC AUTO-ENTMCNC: 33.8 G/DL (ref 31.5–35.7)
MCV RBC AUTO: 84.4 FL (ref 79–97)
MONOCYTES # BLD AUTO: 0.99 10*3/MM3 (ref 0.1–0.9)
MONOCYTES NFR BLD AUTO: 10.4 % (ref 5–12)
NEUTROPHILS # BLD AUTO: 7.49 10*3/MM3 (ref 1.7–7)
NEUTROPHILS NFR BLD AUTO: 79 % (ref 42.7–76)
NRBC BLD AUTO-RTO: 0 /100 WBC (ref 0–0.2)
PLATELET # BLD AUTO: 235 10*3/MM3 (ref 140–450)
POTASSIUM SERPL-SCNC: 4.1 MMOL/L (ref 3.5–5.2)
PROT SERPL-MCNC: 7.2 G/DL (ref 6–8.5)
RBC # BLD AUTO: 4.74 10*6/MM3 (ref 3.77–5.28)
SODIUM SERPL-SCNC: 140 MMOL/L (ref 136–145)
WBC # BLD AUTO: 9.49 10*3/MM3 (ref 3.4–10.8)

## 2020-02-06 PROCEDURE — 99282 EMERGENCY DEPT VISIT SF MDM: CPT

## 2020-02-06 PROCEDURE — 99282 EMERGENCY DEPT VISIT SF MDM: CPT | Performed by: EMERGENCY MEDICINE

## 2020-02-06 RX ORDER — CEPHALEXIN 500 MG/1
500 CAPSULE ORAL 2 TIMES DAILY
Qty: 14 CAPSULE | Refills: 0 | Status: SHIPPED | OUTPATIENT
Start: 2020-02-06 | End: 2020-02-13

## 2020-02-07 ENCOUNTER — OFFICE VISIT (OUTPATIENT)
Dept: INTERNAL MEDICINE | Facility: CLINIC | Age: 55
End: 2020-02-07

## 2020-02-07 VITALS
RESPIRATION RATE: 16 BRPM | HEIGHT: 70 IN | SYSTOLIC BLOOD PRESSURE: 130 MMHG | DIASTOLIC BLOOD PRESSURE: 82 MMHG | OXYGEN SATURATION: 98 % | TEMPERATURE: 97.8 F | BODY MASS INDEX: 29.49 KG/M2 | WEIGHT: 206 LBS | HEART RATE: 94 BPM

## 2020-02-07 DIAGNOSIS — S80.862D INSECT BITE OF LEFT LOWER LEG, SUBSEQUENT ENCOUNTER: Primary | ICD-10-CM

## 2020-02-07 DIAGNOSIS — G89.29 CHRONIC BACK PAIN, UNSPECIFIED BACK LOCATION, UNSPECIFIED BACK PAIN LATERALITY: ICD-10-CM
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
## 2020-02-07 DIAGNOSIS — M54.9 CHRONIC BACK PAIN, UNSPECIFIED BACK LOCATION, UNSPECIFIED BACK PAIN LATERALITY: ICD-10-CM

## 2020-02-07 LAB
BILIRUB BLD-MCNC: NEGATIVE MG/DL
CLARITY, POC: ABNORMAL
COLOR UR: YELLOW
GLUCOSE UR STRIP-MCNC: NEGATIVE MG/DL
KETONES UR QL: NEGATIVE
LEUKOCYTE EST, POC: ABNORMAL
NITRITE UR-MCNC: NEGATIVE MG/ML
PH UR: 5.5 [PH] (ref 5–8)
PROT UR STRIP-MCNC: NEGATIVE MG/DL
RBC # UR STRIP: ABNORMAL /UL
SP GR UR: 1.02 (ref 1–1.03)
UROBILINOGEN UR QL: NORMAL

## 2020-02-07 PROCEDURE — 81003 URINALYSIS AUTO W/O SCOPE: CPT | Performed by: INTERNAL MEDICINE

## 2020-02-07 PROCEDURE — 99214 OFFICE O/P EST MOD 30 MIN: CPT | Performed by: INTERNAL MEDICINE

## 2020-02-07 NOTE — PROGRESS NOTES
Christine Villagomez is a 54 y.o. female, who presents with a chief complaint of   Chief Complaint   Patient presents with   • Follow-up   • Insect Bite       HPI   Pt here for f/u on leg lesion/bite.  It got worse yesterday and she ended up in the ED on keflex.  No fever.  No drainage.  No worsening of pain.    She also saw urology yesterday and there was a question as to whether she needs another ct scan.      The following portions of the patient's history were reviewed and updated as appropriate: allergies, current medications, past family history, past medical history, past social history, past surgical history and problem list.    Allergies: Sulfa antibiotics    Review of Systems   Constitutional: Negative.    HENT: Negative.    Eyes: Negative.    Respiratory: Negative.    Cardiovascular: Negative.    Gastrointestinal: Negative.    Endocrine: Negative.    Genitourinary: Negative.    Musculoskeletal: Negative.    Skin: Negative.    Allergic/Immunologic: Negative.    Neurological: Negative.    Hematological: Negative.    Psychiatric/Behavioral: Negative.    All other systems reviewed and are negative.            Wt Readings from Last 3 Encounters:   02/07/20 93.4 kg (206 lb)   02/06/20 93.4 kg (206 lb)   02/05/20 94.8 kg (209 lb)     Temp Readings from Last 3 Encounters:   02/07/20 97.8 °F (36.6 °C) (Oral)   02/06/20 98.1 °F (36.7 °C) (Oral)   02/05/20 98.1 °F (36.7 °C) (Oral)     BP Readings from Last 3 Encounters:   02/07/20 130/82   02/06/20 153/94   02/05/20 150/96     Pulse Readings from Last 3 Encounters:   02/07/20 94   02/06/20 89   02/05/20 (!) 127     Body mass index is 29.56 kg/m².  @LASTSAO2(3)@    Physical Exam   Constitutional: She is oriented to person, place, and time. She appears well-developed and well-nourished. No distress.   HENT:   Head: Normocephalic and atraumatic.   Right Ear: External ear normal.   Left Ear: External ear normal.   Nose: Nose normal.   Mouth/Throat: Oropharynx is clear  and moist.   Eyes: Pupils are equal, round, and reactive to light. Conjunctivae and EOM are normal.   Neck: Normal range of motion. Neck supple.   Cardiovascular: Normal rate, regular rhythm, normal heart sounds and intact distal pulses.   Pulmonary/Chest: Effort normal and breath sounds normal. No respiratory distress. She has no wheezes.   Musculoskeletal: Normal range of motion.   Normal gait   Neurological: She is alert and oriented to person, place, and time.   Skin: Skin is warm and dry.   3 bites with black necrosis, ecchymosis and decreased surrounding erythema.     Psychiatric: She has a normal mood and affect. Her behavior is normal. Judgment and thought content normal.   Nursing note and vitals reviewed.      Results for orders placed or performed in visit on 02/07/20   Urine Culture - Urine, Urine, Clean Catch   Result Value Ref Range    Urine Culture Final report     Result 1 No growth    POC Urinalysis Dipstick, Automated   Result Value Ref Range    Color Yellow Yellow, Straw, Dark Yellow, Grace    Clarity, UA Cloudy (A) Clear    Specific Gravity  1.025 1.005 - 1.030    pH, Urine 5.5 5.0 - 8.0    Leukocytes Small (1+) (A) Negative    Nitrite, UA Negative Negative    Protein, POC Negative Negative mg/dL    Glucose, UA Negative Negative, 1000 mg/dL (3+) mg/dL    Ketones, UA Negative Negative    Urobilinogen, UA Normal Normal    Bilirubin Negative Negative    Blood, UA Small (A) Negative           Christine was seen today for follow-up and insect bite.    Diagnoses and all orders for this visit:    Insect bite of left lower leg, subsequent encounter - cont keflex  Chronic back pain, unspecified back location, unspecified back pain laterality - urine improved.  Monitor closely for other signs/sx of kidney stones.  -     POC Urinalysis Dipstick, Automated  -     Urine Culture - Urine, Urine, Clean Catch          Outpatient Medications Prior to Visit   Medication Sig Dispense Refill   •  amphetamine-dextroamphetamine (ADDERALL) 10 MG tablet Take 1 tablet by mouth 3 (Three) Times a Day. 90 tablet 0   • atorvastatin (LIPITOR) 20 MG tablet Take 1 tablet by mouth Daily. 90 tablet 3   • buPROPion XL (WELLBUTRIN XL) 300 MG 24 hr tablet Take 1 tablet by mouth Every Morning. 90 tablet 1   • busPIRone (BUSPAR) 30 MG tablet Take 1.5 tablets by mouth Daily. 135 tablet 3   • cephalexin (KEFLEX) 500 MG capsule Take 1 capsule by mouth 2 (Two) Times a Day for 7 days. 14 capsule 0   • cetirizine (zyrTEC) 10 MG tablet Take 10 mg by mouth Daily.     • Cholecalciferol (VITAMIN D) 1000 UNITS tablet Take 1 tablet by mouth daily.     • Coenzyme Q10 (CO Q-10) 100 MG capsule Co Q-10     • docusate sodium (COLACE) 50 MG capsule Take  by mouth 2 (Two) Times a Day.     • FIBER PO Take  by mouth.     • fluticasone (FLONASE) 50 MCG/ACT nasal spray 2 sprays into the nostril(s) as directed by provider Daily. 1 bottle 5   • Glucosamine 750 MG tablet glucosamin 375 mg-chond 300 mg-collagen 50 mg-hyaluronic acid 2 mg cap   Take by oral route.     • Green Tea, Camillia sinensis, (GREEN TEA EXTRACT PO) Take  by mouth.     • levothyroxine (SYNTHROID, LEVOTHROID) 50 MCG tablet Take 1 tablet by mouth Daily. 90 tablet 3   • Multiple Vitamin (MULTI-VITAMIN DAILY PO) Multi Vitamin     • Multiple Vitamins-Minerals (HAIR/SKIN/NAILS PO) Take 1 tablet by mouth daily.     • Probiotic Product (PROBIOTIC DAILY PO) Probiotic     • terazosin (HYTRIN) 1 MG capsule Take 1 capsule by mouth Daily. 90 capsule 3     No facility-administered medications prior to visit.      No orders of the defined types were placed in this encounter.    [unfilled]  There are no discontinued medications.      Return if symptoms worsen or fail to improve.

## 2020-02-09 LAB
BACTERIA UR CULT: NO GROWTH
BACTERIA UR CULT: NORMAL

## 2020-02-10 ENCOUNTER — APPOINTMENT (OUTPATIENT)
Dept: CT IMAGING | Facility: HOSPITAL | Age: 55
End: 2020-02-10

## 2020-02-11 ENCOUNTER — PATIENT MESSAGE (OUTPATIENT)
Dept: INTERNAL MEDICINE | Facility: CLINIC | Age: 55
End: 2020-02-11

## 2020-02-12 ENCOUNTER — TELEPHONE (OUTPATIENT)
Dept: INTERNAL MEDICINE | Facility: CLINIC | Age: 55
End: 2020-02-12

## 2020-02-12 DIAGNOSIS — L29.9 SEVERE ITCHING: Primary | ICD-10-CM

## 2020-02-12 NOTE — TELEPHONE ENCOUNTER
----- Message from Shania Petit CMA sent at 2/12/2020 10:31 AM EST -----  Regarding: Visit Follow-Up Question  Contact: [phone]      ----- Message -----  From: Christine Villagomez  Sent: 2/11/2020   7:49 PM EST  To: [facility]  Subject: Visit Follow-Up Question                         Hello.  Thanks for helping me with the spider bite.   They look better but I my skin is itching, and I've scratched so much I'm drawing blood on my arms.    I'm keeping the spider bites covered but my entire body itches.  I've taken 2 benadryl and the only relief is when/if they put me to sleep.        Is there something I can be prescribed to help with the itching?    When I had poison ivy once I had a shot of steroids in my rear.   I'm up for anything to stop the itching.  I don't mind making an appointment if that is best.       I've attached photos of my arm and the spider bites.  The bites are red around where the bandaids are from my scratching.    Thank you,   Christine Villagomez

## 2020-02-13 ENCOUNTER — TELEPHONE (OUTPATIENT)
Dept: INTERNAL MEDICINE | Facility: CLINIC | Age: 55
End: 2020-02-13

## 2020-02-13 DIAGNOSIS — L29.9 SEVERE ITCHING: ICD-10-CM

## 2020-02-13 RX ORDER — HYDROXYZINE HYDROCHLORIDE 25 MG/1
25 TABLET, FILM COATED ORAL EVERY 8 HOURS PRN
Qty: 90 TABLET | Refills: 0 | Status: SHIPPED | OUTPATIENT
Start: 2020-02-13 | End: 2020-02-13 | Stop reason: SDUPTHER

## 2020-02-13 RX ORDER — HYDROXYZINE HYDROCHLORIDE 25 MG/1
25 TABLET, FILM COATED ORAL EVERY 8 HOURS PRN
Qty: 90 TABLET | Refills: 0 | Status: SHIPPED | OUTPATIENT
Start: 2020-02-13 | End: 2020-06-08 | Stop reason: ALTCHOICE

## 2020-02-13 NOTE — TELEPHONE ENCOUNTER
PATIENT'S SPOUSE HAS CALLED AGAIN AND SAID PATIENT IS BLOODY FROM ALL THE SCRATCHING. PLEASE CALL IN TO [facility].

## 2020-02-13 NOTE — TELEPHONE ENCOUNTER
Spouse called to say that we sent the rx to the wrong pharmacy. Please cancel the [facility] one and resend to [facility] in [place]    hydrOXYzine (ATARAX) 25 MG tablet        Sig: Take 1 tablet by mouth Every 8 (Eight) Hours As Needed for Itching.        Sent to pharmacy as: hydrOXYzine HCl 25 MG Oral Tablet (ATARAX)          Take [facility] out of the file, no longer eligible for that one this is urgent do to being bitten by spider

## 2020-02-19 ENCOUNTER — TELEPHONE (OUTPATIENT)
Dept: INTERNAL MEDICINE | Facility: CLINIC | Age: 55
End: 2020-02-19

## 2020-02-19 RX ORDER — PREDNISONE 10 MG/1
TABLET ORAL
Qty: 48 TABLET | Refills: 0 | Status: SHIPPED | OUTPATIENT
Start: 2020-02-19 | End: 2020-03-09

## 2020-02-19 NOTE — TELEPHONE ENCOUNTER
----- Message from Christine Villagomez sent at 2/19/2020  8:58 AM EST -----  Regarding: RE: Visit Follow-Up Question  Contact: [phone]  Thank you.   It does seem to have turned the point from being horrible to improving.   I will  the prescription today.      I will keep you posted, and make an appt for when the bites seem to be at their best in regards to healing.   That way you can determine if anything else will need to be done.  I believe it will just scar,  it I’m ok with that.     Have a good day, and know how grateful I am to have Porshia and you helping me.     Ashly  ----- Message -----  From: Graciela Cadena MD  Sent: 2/19/2020  8:41 AM EST  To: Christine Villagomez  Subject: RE: Visit Follow-Up Question  We can try some steroids to see if this will calm things down.  [facility] [place].  Thanks for keeping me updated. Let me know if this helps or not.     Dr. LLOYD      ----- Message -----     From: Christine Villagomez     Sent: 2/17/2020  6:42 PM EST       To: Graciela Cadena MD  Subject: Visit Follow-Up Question    Hi Porshia,  it’s your favorite spider bitten lady.   Actually the hydroxyzine isn’t doing much.   I’m itchy  as all heck.   I’ve included a shot of my neck area, which basically looks like the rest of my body.   I’ve done oatmeal baths, baking sodas baths, antihistamines.  It’s not better and with my anxiety I keep scratching at it.    When I’ve had poison ivy the only thing that would help is steroids.   Is there a chance dr Cadena and you would try that with me.   As always, I do not mind coming in for an appointment.  I’m just in an itchy State of misery.       Just let me know what you all think should be the next step.      Thanks  Ashly (the itchy one)  ----- Message -----  From: JOSE WHALEY  Sent: 2/14/2020 10:01 AM EST  To: Christine Villagomez  Subject: RE: Visit Follow-Up Question  Good Morning Mrs. Villagomez,    I hope the hydroxyzine has been working with the  itching. I am sorry it took so long to reach back out yesterday regarding the mishap of where the original script had been sent. I hope you are feeling better and let me know if you need anything.    Have a great weekend,    -Shania WHALEY CMA      ----- Message -----     From: Christine Villagomez     Sent: 2/13/2020 12:33 PM EST       To: Graciela Cadena MD  Subject: RE: Visit Follow-Up Question    Shania,  Was the prescription called into [facility]? Someone at your office said it was called into Some mail order place that doesn’t work with my insurance any longer     The person told Marly the RX would be called into the [facility]. [facility] and they said they have received nothing.  ----- Message -----  From: JOSE WHALEY  Sent: 2/12/2020 10:33 AM EST  To: Christine Villagomez  Subject: RE: Visit Follow-Up Question  Good Morning Mrs. Villagomez,    I have reached out to Dr. Cadena to see if we can send in something stronger for the itching. I am glad to see that your spider bites are healing up nicely. Once Dr. Cadena gets back to me I will reach out.    Thanks,     Shania WHALEY CMA    ----- Message -----     From: Christine Villagomez     Sent: 2/11/2020  7:49 PM EST       To: Graciela Cadena MD  Subject: Visit Follow-Up Question    Hello.  Thanks for helping me with the spider bite.   They look better but I my skin is itching, and I've scratched so much I'm drawing blood on my arms.    I'm keeping the spider bites covered but my entire body itches.  I've taken 2 benadryl and the only relief is when/if they put me to sleep.        Is there something I can be prescribed to help with the itching?    When I had poison ivy once I had a shot of steroids in my rear.   I'm up for anything to stop the itching.  I don't mind making an appointment if that is best.       I've attached photos of my arm and the spider bites.  The bites are red around where the bandaids are from my scratching.    Thank you,   Christine TOTH  [place]

## 2020-02-24 ENCOUNTER — APPOINTMENT (OUTPATIENT)
Dept: MAMMOGRAPHY | Facility: HOSPITAL | Age: 55
End: 2020-02-24

## 2020-03-09 ENCOUNTER — OFFICE VISIT (OUTPATIENT)
Dept: OBSTETRICS AND GYNECOLOGY | Facility: CLINIC | Age: 55
End: 2020-03-09

## 2020-03-09 VITALS
HEIGHT: 70 IN | SYSTOLIC BLOOD PRESSURE: 140 MMHG | DIASTOLIC BLOOD PRESSURE: 80 MMHG | BODY MASS INDEX: 31.48 KG/M2 | WEIGHT: 219.9 LBS

## 2020-03-09 DIAGNOSIS — Z01.419 ROUTINE GYNECOLOGICAL EXAMINATION: Primary | ICD-10-CM

## 2020-03-09 DIAGNOSIS — Z01.419 PAP SMEAR, LOW-RISK: ICD-10-CM

## 2020-03-09 DIAGNOSIS — Z13.9 SCREENING FOR CONDITION: ICD-10-CM

## 2020-03-09 LAB
BILIRUB BLD-MCNC: NEGATIVE MG/DL
CLARITY, POC: CLEAR
COLOR UR: YELLOW
GLUCOSE UR STRIP-MCNC: NEGATIVE MG/DL
KETONES UR QL: NEGATIVE
LEUKOCYTE EST, POC: NEGATIVE
NITRITE UR-MCNC: NEGATIVE MG/ML
PH UR: 5 [PH] (ref 5–8)
PROT UR STRIP-MCNC: NEGATIVE MG/DL
RBC # UR STRIP: NEGATIVE /UL
SP GR UR: 1 (ref 1–1.03)
UROBILINOGEN UR QL: NORMAL

## 2020-03-09 PROCEDURE — 81002 URINALYSIS NONAUTO W/O SCOPE: CPT | Performed by: OBSTETRICS & GYNECOLOGY

## 2020-03-09 PROCEDURE — 99396 PREV VISIT EST AGE 40-64: CPT | Performed by: OBSTETRICS & GYNECOLOGY

## 2020-03-09 NOTE — PROGRESS NOTES
GYN Annual Exam     CC- Here for annual exam.     Christine Villagomez is a 54 y.o. female established patient who presents for annual well woman exam. She has not had a cycles in > 1 year. No HF. She was bit by a brown recluse spider and has been on steroids for a month.     OB History        1    Para   1    Term   1            AB        Living   1       SAB        TAB        Ectopic        Molar        Multiple        Live Births              Obstetric Comments   1              Menarche:12  Menopause:52  HRT:none  Current contraception: vasectomy  History of abnormal Pap smear: no  History of abnormal mammogram: no  Family history of uterine, colon or ovarian cancer: yes - MGM colon age 60  Family history of breast cancer: no  STD's: none  Last pap smear:18- normal pap/HPV  CAROLE; none      Health Maintenance   Topic Date Due   • ANNUAL PHYSICAL  1968   • PNEUMOCOCCAL VACCINE (19-64 MEDIUM RISK) (1 of 1 - PPSV23) 1983   • HEPATITIS C SCREENING  2016   • Annual Gynecologic Pelvic and Breast Exam  2020   • ZOSTER VACCINE (2 of 2) 10/09/2020 (Originally 2019)   • HEMOGLOBIN A1C  2020   • LIPID PANEL  10/02/2020   • PAP SMEAR  2021   • MAMMOGRAM  2021   • COLONOSCOPY  2026   • TDAP/TD VACCINES (3 - Td) 10/18/2027   • INFLUENZA VACCINE  Completed   • DIABETIC FOOT EXAM  Discontinued   • DIABETIC EYE EXAM  Discontinued   • URINE MICROALBUMIN  Discontinued       Past Medical History:   Diagnosis Date   • Bipolar disorder (CMS/HCC)    • Depression    • Hyperlipidemia    • Hypothyroidism    • Kidney stone        Past Surgical History:   Procedure Laterality Date   • BLADDER SUSPENSION      TVT   • KIDNEY STONE SURGERY     • KNEE MENISCAL REPAIR Bilateral    • KNEE SURGERY           Current Outpatient Medications:   •  amphetamine-dextroamphetamine (ADDERALL) 10 MG tablet, Take 1 tablet by mouth 3 (Three) Times a Day., Disp: 90 tablet, Rfl: 0  •   atorvastatin (LIPITOR) 20 MG tablet, Take 1 tablet by mouth Daily., Disp: 90 tablet, Rfl: 3  •  buPROPion XL (WELLBUTRIN XL) 300 MG 24 hr tablet, Take 1 tablet by mouth Every Morning., Disp: 90 tablet, Rfl: 1  •  busPIRone (BUSPAR) 30 MG tablet, Take 1.5 tablets by mouth Daily., Disp: 135 tablet, Rfl: 3  •  cetirizine (zyrTEC) 10 MG tablet, Take 10 mg by mouth Daily., Disp: , Rfl:   •  Cholecalciferol (VITAMIN D) 1000 UNITS tablet, Take 1 tablet by mouth daily., Disp: , Rfl:   •  Coenzyme Q10 (CO Q-10) 100 MG capsule, Co Q-10, Disp: , Rfl:   •  docusate sodium (COLACE) 50 MG capsule, Take  by mouth 2 (Two) Times a Day., Disp: , Rfl:   •  FIBER PO, Take  by mouth., Disp: , Rfl:   •  fluticasone (FLONASE) 50 MCG/ACT nasal spray, 2 sprays into the nostril(s) as directed by provider Daily., Disp: 1 bottle, Rfl: 5  •  Green Tea, Camillia sinensis, (GREEN TEA EXTRACT PO), Take  by mouth., Disp: , Rfl:   •  hydrOXYzine (ATARAX) 25 MG tablet, Take 1 tablet by mouth Every 8 (Eight) Hours As Needed for Itching., Disp: 90 tablet, Rfl: 0  •  levothyroxine (SYNTHROID, LEVOTHROID) 50 MCG tablet, Take 1 tablet by mouth Daily., Disp: 90 tablet, Rfl: 3  •  Multiple Vitamin (MULTI-VITAMIN DAILY PO), Multi Vitamin, Disp: , Rfl:   •  Multiple Vitamins-Minerals (HAIR/SKIN/NAILS PO), Take 1 tablet by mouth daily., Disp: , Rfl:   •  Probiotic Product (PROBIOTIC DAILY PO), Probiotic, Disp: , Rfl:   •  terazosin (HYTRIN) 1 MG capsule, Take 1 capsule by mouth Daily., Disp: 90 capsule, Rfl: 3    Allergies   Allergen Reactions   • Sulfa Antibiotics Rash     fever       Social History     Tobacco Use   • Smoking status: Never Smoker   • Smokeless tobacco: Never Used   Substance Use Topics   • Alcohol use: No   • Drug use: No       Family History   Problem Relation Age of Onset   • Cancer Mother         lung   • Heart attack Father    • Colon cancer Maternal Grandmother    • Breast cancer Neg Hx    • Ovarian cancer Neg Hx    • Pulmonary embolism  "Neg Hx    • Deep vein thrombosis Neg Hx        Review of Systems   Constitutional: Negative for appetite change, fatigue, fever and unexpected weight change.   Respiratory: Negative for cough and shortness of breath.    Cardiovascular: Negative for chest pain and palpitations.   Gastrointestinal: Negative for abdominal distention, abdominal pain, constipation, diarrhea and nausea.   Endocrine: Negative for cold intolerance and heat intolerance.   Genitourinary: Negative for dyspareunia, dysuria, menstrual problem, pelvic pain and vaginal discharge.   Skin: Positive for rash. Negative for color change.   Neurological: Negative for headaches.   Psychiatric/Behavioral: The patient is not nervous/anxious.        /80   Ht 177.8 cm (70\")   Wt 99.7 kg (219 lb 14.4 oz)   LMP  (LMP Unknown)   Breastfeeding No   BMI 31.55 kg/m²     Physical Exam   Constitutional: She is oriented to person, place, and time. She appears well-developed and well-nourished.   HENT:   Head: Normocephalic and atraumatic.   Eyes: Conjunctivae are normal. No scleral icterus.   Neck: Neck supple. No thyromegaly present.   Cardiovascular: Normal rate and regular rhythm.   Pulmonary/Chest: Effort normal and breath sounds normal. Right breast exhibits no inverted nipple, no mass, no nipple discharge, no skin change and no tenderness. Left breast exhibits no inverted nipple, no mass, no nipple discharge, no skin change and no tenderness.   Abdominal: Soft. Bowel sounds are normal. She exhibits no distension and no mass. There is no tenderness. There is no rebound and no guarding. No hernia.   Genitourinary: Uterus normal. Pelvic exam was performed with patient supine. There is no rash, tenderness or lesion on the right labia. There is no rash, tenderness or lesion on the left labia. Cervix exhibits no motion tenderness, no discharge and no friability. Right adnexum displays no mass, no tenderness and no fullness. Left adnexum displays no mass, " no tenderness and no fullness. No erythema, tenderness or bleeding in the vagina. No foreign body in the vagina. No signs of injury around the vagina. No vaginal discharge found.   Genitourinary Comments: Cystocele noted   Neurological: She is alert and oriented to person, place, and time.   Skin: Skin is warm and dry. Rash noted.   Scattered rash w/excoriations over her extremities   Psychiatric: She has a normal mood and affect. Her behavior is normal. Judgment and thought content normal.   Nursing note and vitals reviewed.         Assessment/Plan    1) GYN HM: normal pap/HPV 2/2018. Check pap/HPV    SBE demonstrated and encouraged.  2) STD screening: declines Condoms encouraged.  3) Bone health - Weight bearing exercise, dietary calcium recommendations and vitamin D reviewed.   4) Diet and Exercise discussed  5) Smoking Status: No   6) Social: no issue  7)MMG:  UTD 2/21/19- B1, schedule now  8) DEXA- schedule now  9)C scope- UTD 3/2016, repeat 10 years.   10) Parts of this document have been copied or forwarded from her previous visits and have been reviewed, updated and edited as indicated.    11)Follow up prn and 1 year       Christine was seen today for gynecologic exam.    Diagnoses and all orders for this visit:    Routine gynecological examination  -     Pap IG, HPV-hr    Screening for condition  -     POC Urinalysis Dipstick  -     Mammo Screening Bilateral With CAD; Future  -     DEXA Bone Density Axial; Future    Pap smear, low-risk  -     Pap IG, HPV-hr        Trinity Clark MD  3/9/2020  12:20 PM

## 2020-03-11 LAB
CYTOLOGIST CVX/VAG CYTO: NORMAL
CYTOLOGY CVX/VAG DOC CYTO: NORMAL
CYTOLOGY CVX/VAG DOC THIN PREP: NORMAL
DX ICD CODE: NORMAL
HIV 1 & 2 AB SER-IMP: NORMAL
HPV I/H RISK 1 DNA CVX QL PROBE+SIG AMP: NEGATIVE
OTHER STN SPEC: NORMAL
STAT OF ADQ CVX/VAG CYTO-IMP: NORMAL

## 2020-03-30 ENCOUNTER — HOSPITAL ENCOUNTER (OUTPATIENT)
Dept: MAMMOGRAPHY | Facility: HOSPITAL | Age: 55
End: 2020-03-30

## 2020-03-30 ENCOUNTER — APPOINTMENT (OUTPATIENT)
Dept: BONE DENSITY | Facility: HOSPITAL | Age: 55
End: 2020-03-30

## 2020-04-21 ENCOUNTER — TELEPHONE (OUTPATIENT)
Dept: ORTHOPEDIC SURGERY | Facility: CLINIC | Age: 55
End: 2020-04-21

## 2020-04-21 DIAGNOSIS — M17.0 PRIMARY OSTEOARTHRITIS OF BOTH KNEES: Primary | ICD-10-CM

## 2020-04-21 NOTE — TELEPHONE ENCOUNTER
Patient calling her bilateral knee pain has returned per the chart if the cortisone does not last 5 months you wanted the patient to try visco.     She does do home PT and an exercise program where she has lost more than 75lbs she also has tried Ibuprofen and she takes Mobic daily.    Please advise.

## 2020-04-24 ENCOUNTER — TELEPHONE (OUTPATIENT)
Dept: ORTHOPEDIC SURGERY | Facility: CLINIC | Age: 55
End: 2020-04-24

## 2020-04-24 NOTE — TELEPHONE ENCOUNTER
Visco injections have been denied. Do not meet criteria by means of office notes and medical/pharmacy claims:  Have failed 2-3 month trial of non-medication therapies (bracing/orthotics, physical therapy).

## 2020-04-24 NOTE — TELEPHONE ENCOUNTER
Tell her I can order physical therapy once they start seeing patients again we can try a different anti-inflammatory

## 2020-04-27 ENCOUNTER — TELEPHONE (OUTPATIENT)
Dept: ORTHOPEDIC SURGERY | Facility: CLINIC | Age: 55
End: 2020-04-27

## 2020-04-27 ENCOUNTER — OFFICE VISIT (OUTPATIENT)
Dept: ORTHOPEDIC SURGERY | Facility: CLINIC | Age: 55
End: 2020-04-27

## 2020-04-27 VITALS — HEIGHT: 70 IN | WEIGHT: 219 LBS | BODY MASS INDEX: 31.35 KG/M2

## 2020-04-27 DIAGNOSIS — M17.0 PRIMARY OSTEOARTHRITIS OF BOTH KNEES: Primary | ICD-10-CM

## 2020-04-27 DIAGNOSIS — R52 PAIN: ICD-10-CM

## 2020-04-27 PROCEDURE — 20610 DRAIN/INJ JOINT/BURSA W/O US: CPT | Performed by: ORTHOPAEDIC SURGERY

## 2020-04-27 RX ORDER — LIDOCAINE HYDROCHLORIDE 10 MG/ML
4 INJECTION, SOLUTION INFILTRATION; PERINEURAL
Status: COMPLETED | OUTPATIENT
Start: 2020-04-27 | End: 2020-04-27

## 2020-04-27 RX ORDER — TRIAMCINOLONE ACETONIDE 40 MG/ML
40 INJECTION, SUSPENSION INTRA-ARTICULAR; INTRAMUSCULAR
Status: COMPLETED | OUTPATIENT
Start: 2020-04-27 | End: 2020-04-27

## 2020-04-27 RX ADMIN — LIDOCAINE HYDROCHLORIDE 4 ML: 10 INJECTION, SOLUTION INFILTRATION; PERINEURAL at 12:55

## 2020-04-27 RX ADMIN — TRIAMCINOLONE ACETONIDE 40 MG: 40 INJECTION, SUSPENSION INTRA-ARTICULAR; INTRAMUSCULAR at 12:55

## 2020-04-27 NOTE — PROGRESS NOTES
Subjective: Osteoarthritis both knees     Patient ID: Christine Villagomez is a 54 y.o. female.    Chief Complaint:    History of Present Illness 54-year female returns persistent pain discomfort in both knees.  Again a cortisone shot given in January lasted till just recently.  She been using topical gel as she can take an oral agent for her primary care physician.  Has been doing exercises at home as far as range of motion walking and other such activities but is having severe pain that limits her activity.  Her insurance denied the gel injections which inquires today about repeat cortisone injections       Social History     Occupational History   • Not on file   Tobacco Use   • Smoking status: Never Smoker   • Smokeless tobacco: Never Used   Substance and Sexual Activity   • Alcohol use: No   • Drug use: No   • Sexual activity: Yes     Partners: Male     Birth control/protection: Surgical, Post-menopausal     Comment: vas      Review of Systems   Constitutional: Negative for chills, diaphoresis, fever and unexpected weight change.   HENT: Negative for hearing loss, nosebleeds, sore throat and tinnitus.    Eyes: Negative for pain and visual disturbance.   Respiratory: Negative for cough, shortness of breath and wheezing.    Cardiovascular: Negative for chest pain and palpitations.   Gastrointestinal: Negative for abdominal pain, diarrhea, nausea and vomiting.   Endocrine: Negative for cold intolerance, heat intolerance and polydipsia.   Genitourinary: Negative for difficulty urinating, dysuria and hematuria.   Musculoskeletal: Positive for arthralgias and myalgias. Negative for joint swelling.   Skin: Negative for rash and wound.   Allergic/Immunologic: Negative for environmental allergies.   Neurological: Negative for dizziness, syncope and numbness.   Hematological: Does not bruise/bleed easily.   Psychiatric/Behavioral: Negative for dysphoric mood and sleep disturbance. The patient is not nervous/anxious.           Past Medical History:   Diagnosis Date   • Bipolar disorder (CMS/HCC)    • Depression    • Hyperlipidemia    • Hypothyroidism    • Kidney stone      Past Surgical History:   Procedure Laterality Date   • BLADDER SUSPENSION      TVT   • KIDNEY STONE SURGERY     • KNEE MENISCAL REPAIR Bilateral    • KNEE SURGERY       Family History   Problem Relation Age of Onset   • Cancer Mother         lung   • Heart attack Father    • Colon cancer Maternal Grandmother    • Breast cancer Neg Hx    • Ovarian cancer Neg Hx    • Pulmonary embolism Neg Hx    • Deep vein thrombosis Neg Hx          Objective:  There were no vitals filed for this visit.      04/27/20  1254   Weight: 99.3 kg (219 lb)     Body mass index is 31.42 kg/m².        Ortho Exam she is alert and oriented x3.  Both knees show moderate pain and crepitus with range of motion with no instability.  No motor or sensory deficit good capillary refill skin is cool to touch         Assessment:        1. Primary osteoarthritis of both knees    2. Pain           Plan: Both knees were injected with 4 cc of lidocaine 1 cc Kenalog tolerated well to the superolateral portal.  Postinjection instructions given to the patient.  Return to see me in 4 weeks.  Large Joint Arthrocentesis  Date/Time: 4/27/2020 12:55 PM  Consent given by: patient  Site marked: site marked  Timeout: Immediately prior to procedure a time out was called to verify the correct patient, procedure, equipment, support staff and site/side marked as required   Supporting Documentation  Indications: pain   Procedure Details  Location: knee - Knee joint: BILATERAL.  Preparation: Patient was prepped and draped in the usual sterile fashion  Needle size: 22 G  Approach: superior (LATERAL)  Medications administered: 4 mL lidocaine 1 %; 40 mg triamcinolone acetonide 40 MG/ML  Patient tolerance: patient tolerated the procedure well with no immediate complications                Work Status:    JAYLEEN query  complete.    Orders:  Orders Placed This Encounter   Procedures   • Large Joint Arthrocentesis       Medications:  No orders of the defined types were placed in this encounter.      Followup:  Return in about 4 weeks (around 5/25/2020), or if symptoms worsen or fail to improve.          Dictated utilizing Dragon dictation

## 2020-05-04 ENCOUNTER — APPOINTMENT (OUTPATIENT)
Dept: MAMMOGRAPHY | Facility: HOSPITAL | Age: 55
End: 2020-05-04

## 2020-05-04 ENCOUNTER — APPOINTMENT (OUTPATIENT)
Dept: BONE DENSITY | Facility: HOSPITAL | Age: 55
End: 2020-05-04

## 2020-06-04 DIAGNOSIS — F32.A DEPRESSION, UNSPECIFIED DEPRESSION TYPE: ICD-10-CM

## 2020-06-04 NOTE — TELEPHONE ENCOUNTER
busPIRone (BUSPAR) 30 MG tablet  Qty 135 w/3 refills (90 day supply)  Take 1 and 1/2 tablets daily  [facility]

## 2020-06-05 RX ORDER — BUSPIRONE HYDROCHLORIDE 30 MG/1
45 TABLET ORAL DAILY
Qty: 135 TABLET | Refills: 3 | Status: SHIPPED | OUTPATIENT
Start: 2020-06-05 | End: 2020-06-23 | Stop reason: SDUPTHER

## 2020-06-08 ENCOUNTER — APPOINTMENT (OUTPATIENT)
Dept: BONE DENSITY | Facility: HOSPITAL | Age: 55
End: 2020-06-08

## 2020-06-08 ENCOUNTER — OFFICE VISIT (OUTPATIENT)
Dept: ORTHOPEDIC SURGERY | Facility: CLINIC | Age: 55
End: 2020-06-08

## 2020-06-08 ENCOUNTER — HOSPITAL ENCOUNTER (OUTPATIENT)
Dept: MAMMOGRAPHY | Facility: HOSPITAL | Age: 55
Discharge: HOME | End: 2020-06-08
Admitting: OBSTETRICS & GYNECOLOGY

## 2020-06-08 VITALS — HEIGHT: 70 IN | WEIGHT: 219 LBS | BODY MASS INDEX: 31.35 KG/M2

## 2020-06-08 DIAGNOSIS — R52 PAIN: ICD-10-CM

## 2020-06-08 DIAGNOSIS — Z13.9 SCREENING FOR CONDITION: ICD-10-CM

## 2020-06-08 DIAGNOSIS — Z12.31 SCREENING MAMMOGRAM, ENCOUNTER FOR: ICD-10-CM

## 2020-06-08 DIAGNOSIS — M17.0 PRIMARY OSTEOARTHRITIS OF BOTH KNEES: Primary | ICD-10-CM

## 2020-06-08 PROCEDURE — 77067 SCR MAMMO BI INCL CAD: CPT

## 2020-06-08 PROCEDURE — 77063 BREAST TOMOSYNTHESIS BI: CPT

## 2020-06-08 PROCEDURE — 99213 OFFICE O/P EST LOW 20 MIN: CPT | Performed by: ORTHOPAEDIC SURGERY

## 2020-06-08 PROCEDURE — 77080 DXA BONE DENSITY AXIAL: CPT

## 2020-06-08 NOTE — PROGRESS NOTES
Subjective: Osteoarthritis both knees     Patient ID: Christine Villagomez is a 54 y.o. female.    Chief Complaint:    History of Present Illness 54-year-old female returns once again are both asymptomatic.  Cortisone injection given the end of April has helped for short period of time.  Patient is failed modification of activity, anti-inflammatory medication and the cortisone injections.  She is lost 75 pounds but continues to have pain on a daily basis.       Social History     Occupational History   • Not on file   Tobacco Use   • Smoking status: Never Smoker   • Smokeless tobacco: Never Used   Substance and Sexual Activity   • Alcohol use: No   • Drug use: No   • Sexual activity: Yes     Partners: Male     Birth control/protection: Surgical, Post-menopausal     Comment: vas      Review of Systems   Constitutional: Negative for chills, diaphoresis, fever and unexpected weight change.   HENT: Negative for hearing loss, nosebleeds, sore throat and tinnitus.    Eyes: Negative for pain and visual disturbance.   Respiratory: Negative for cough, shortness of breath and wheezing.    Cardiovascular: Negative for chest pain and palpitations.   Gastrointestinal: Negative for abdominal pain, diarrhea, nausea and vomiting.   Endocrine: Negative for cold intolerance, heat intolerance and polydipsia.   Genitourinary: Negative for difficulty urinating, dysuria and hematuria.   Musculoskeletal: Positive for arthralgias and myalgias. Negative for joint swelling.   Skin: Negative for rash and wound.   Allergic/Immunologic: Negative for environmental allergies.   Neurological: Negative for dizziness, syncope and numbness.   Hematological: Does not bruise/bleed easily.   Psychiatric/Behavioral: Negative for dysphoric mood and sleep disturbance. The patient is not nervous/anxious.          Past Medical History:   Diagnosis Date   • Bipolar disorder (CMS/[facility])    • Depression    • Hyperlipidemia    • Hypothyroidism    • Kidney stone       Past Surgical History:   Procedure Laterality Date   • BLADDER SUSPENSION      TVT   • KIDNEY STONE SURGERY     • KNEE MENISCAL REPAIR Bilateral    • KNEE SURGERY       Family History   Problem Relation Age of Onset   • Cancer Mother         lung   • Heart attack Father    • Colon cancer Maternal Grandmother    • Breast cancer Neg Hx    • Ovarian cancer Neg Hx    • Pulmonary embolism Neg Hx    • Deep vein thrombosis Neg Hx          Objective:  There were no vitals filed for this visit.      06/08/20  1110   Weight: 99.3 kg (219 lb)     Body mass index is 31.42 kg/m².        Ortho Exam   She is alert and oriented x3.  Neither knee shows any swelling effusion erythema she has 0 to 125 degrees of motion of both knees but there is moderate pain and crepitus with range of motion.  No instability 0 90 degrees nor any varus or valgus instability with stressing.  Quad and hamstring function remains 5/5 the calf remains nontender.  She has good distal pulses of both legs good capillary refill the skin is cool to touch.  No swelling erythema noted.  Tolerates anti-inflammatories but again is having pain with all actives of daily living.    Assessment:        1. Primary osteoarthritis of both knees    2. Pain           Plan: Over 10 minutes was spent reviewing the treatment going forward.  We can set her up for physical therapy to see if we can get some further relief of her pain.  Return in 4 weeks if she show no improvement we will get a contact insurance carrier regarding once again doing gel injections.  Answered all questions            Work Status:    JAYLEEN query complete.    Orders:  Orders Placed This Encounter   Procedures   • Ambulatory Referral to Physical Therapy Evaluate and treat       Medications:  No orders of the defined types were placed in this encounter.      Followup:  Return in about 4 weeks (around 7/6/2020).          Dictated utilizing Dragon dictation

## 2020-06-12 ENCOUNTER — HOSPITAL ENCOUNTER (OUTPATIENT)
Dept: PHYSICAL THERAPY | Facility: HOSPITAL | Age: 55
Setting detail: THERAPIES SERIES
Discharge: HOME | End: 2020-06-12

## 2020-06-12 DIAGNOSIS — M17.0 PRIMARY OSTEOARTHRITIS OF BOTH KNEES: Primary | ICD-10-CM

## 2020-06-12 PROCEDURE — 97110 THERAPEUTIC EXERCISES: CPT

## 2020-06-12 PROCEDURE — 97161 PT EVAL LOW COMPLEX 20 MIN: CPT

## 2020-06-12 NOTE — THERAPY EVALUATION
Outpatient Physical Therapy Ortho Initial Evaluation  MIKAELA Hawk     Patient Name: Christine Villagomez  : 1965  MRN: [id number]  Today's Date: 2020      Visit Date: 2020    Patient Active Problem List   Diagnosis   • Atopic rhinitis   • Anxiety   • Arthritis   • Back pain   • Contact dermatitis   • Hypertriglyceridemia   • Hypercholesterolemia   • Menopausal flushing   • Essential hypertension   • Hypothyroidism   • Overactive bladder   • Rash   • Obstructive sleep apnea syndrome   • Skin tags, multiple acquired   • Knee pain   • Paresthesia of foot   • Bipolar disorder (CMS/HCC)   • History of depression   • Attention deficit disorder (ADD) in adult        Past Medical History:   Diagnosis Date   • Bipolar disorder (CMS/HCC)    • Depression    • Hyperlipidemia    • Hypothyroidism    • Kidney stone         Past Surgical History:   Procedure Laterality Date   • BLADDER SUSPENSION      TVT   • KIDNEY STONE SURGERY     • KNEE MENISCAL REPAIR Bilateral    • KNEE SURGERY         Visit Dx:     ICD-10-CM ICD-9-CM   1. Primary osteoarthritis of both knees M17.0 715.16         Patient History     Row Name 20 0900             History    Chief Complaint  Difficulty Walking;Difficulty with daily activities;Joint stiffness;Joint swelling;Muscle tenderness;Muscle weakness;Pain;Swelling  -AS      Type of Pain  Knee pain  -AS      Brief Description of Current Complaint  Christine Villagomez reports to outpatient PT with a diagnosis of bilateral knee OA. She states she has had injections that helped temporarily. She continues to report bilateral knee pain and discomfort. Increased pain noted with long periods of standing, walking, activities involving deep flexion.  Maximal tenderness medial joint line with associated swelling.  Denies of the knees is locking, catching or giving away.  Denies presence of numbness or tingling left lower extremity.  She has noticed decreased strength bilateral lower extremities.  She has also lost a lot of weight but states this did not help her pain much.   -AS      Patient/Caregiver Goals  Relieve pain;Improve mobility;Improve strength  -AS      Patient's Rating of General Health  Good  -AS      Hand Dominance  right-handed  -AS      Occupation/sports/leisure activities  Contract Work  -AS      Patient seeing anyone else for problem(s)?  Dr. Sharp  -AS      How has patient tried to help current problem?  rest, ice, pain meds  -AS      What clinical tests have you had for this problem?  X-ray  -AS      Results of Clinical Tests  Edi. Knee OA  -AS         Pain     Pain Location  Knee  -AS      Pain at Present  4  -AS      Pain at Best  0  -AS      Pain at Worst  8  -AS      Pain Frequency  Intermittent  -AS      Pain Description  Aching  -AS      What Performance Factors Make the Current Problem(s) WORSE?  walking, standing, bending  -AS      What Performance Factors Make the Current Problem(s) BETTER?  rest  -AS         Daily Activities    Primary Language  English  -AS      Are you able to read  Yes  -AS      Are you able to write  Yes  -AS      How does patient learn best?  Listening;Reading;Demonstration  -AS      Teaching needs identified  Home Exercise Program;Management of Condition  -AS      Patient is concerned about/has problems with  Climbing Stairs;Difficulty with self care (i.e. bathing, dressing, toileting:;Flexibility;Performing home management (household chores, shopping, care of dependents);Performing job responsibilities/community activities (work, school,;Performing sports, recreation, and play activities;Standing;Walking  -AS      Does patient have problems with the following?  None  -AS      Barriers to learning  None  -AS      Pt Participated in POC and Goals  Yes  -AS         Safety    Are you being hurt, hit, or frightened by anyone at home or in your life?  No  -AS      Are you being neglected by a caregiver  No  -AS        User Key  (r) = Recorded By, (t) = Taken  By, (c) = Cosigned By    Initials Name Provider Type    AS Geo Lake, PT Physical Therapist          PT Ortho     Row Name 06/12/20 0900       Precautions and Contraindications    Precautions/Limitations  no known precautions/limitations  -AS       Posture/Observations    Posture- WNL  Posture is WNL  -AS       Knee Palpation    Patella  Bilateral:;Tender  -AS    Medial Joint Line  Bilateral:;Tender  -AS       Patellar Accessory Motions    Superior glide  Right:;Left:;Hypomobile  -AS    Inferior glide  Right:;Left:;Hypomobile  -AS    Medial glide  Right:;Left:;Hypomobile  -AS    Lateral glide  Right:;Left:;Hypomobile  -AS       Right Lower Ext    Rt Knee Extension/Flexion AROM  0-3-116 post stretch 0-7-116 pre stretch  -AS       Left Lower Ext    Lt Knee Extension/Flexion AROM  0-2-117 post stretch 0-7-117 pre stretch  -AS       MMT Right Lower Ext    Rt Hip Flexion MMT, Gross Movement  (4-/5) good minus  -AS    Rt Hip Extension MMT, Gross Movement  (4-/5) good minus  -AS    Rt Hip ABduction MMT, Gross Movement  (4-/5) good minus  -AS    Rt Knee Extension MMT, Gross Movement  (4/5) good  -AS    Rt Knee Flexion MMT, Gross Movement  (4/5) good  -AS       MMT Left Lower Ext    Lt Hip Flexion MMT, Gross Movement  (4-/5) good minus  -AS    Lt Hip Extension MMT, Gross Movement  (4-/5) good minus  -AS    Lt Hip ABduction MMT, Gross Movement  (4-/5) good minus  -AS    Lt Knee Extension MMT, Gross Movement  (4/5) good  -AS    Lt Knee Flexion MMT, Gross Movement  (4/5) good  -AS       Sensation    Sensation WNL?  WNL  -AS    Light Touch  No apparent deficits  -AS       Lower Extremity Flexibility    Hamstrings  Right:;Left:;Moderately limited  -AS       Gait/Stairs Assessment/Training    Comment (Gait/Stairs)  Patient ambulates without the use of an AD at this time. She has an antalgic gait pattern with shortened step and stride length bilaterally. She also presents with decreased heel strike bilaterally due to  limited bilateral knee extension ROM.  -AS      User Key  (r) = Recorded By, (t) = Taken By, (c) = Cosigned By    Initials Name Provider Type    AS Geo Lake, PT Physical Therapist                      Therapy Education  Given: HEP, Symptoms/condition management, Pain management  Program: New  How Provided: Verbal, Demonstration, Written  Provided to: Patient  Level of Understanding: Teach back education performed, Verbalized, Demonstrated     PT OP Goals     Row Name 06/12/20 0900          PT Short Term Goals    STG Date to Achieve  06/26/20  -AS     STG 1  Patient to demonstrate compliance with her initial HEP for flexibility, ROM and strengthening.  -AS     STG 2  Patient to report bilateral knee pain on VAS of 4-5/10 at worst with activity.  -AS     STG 3  Patient to demonstrate improved bilateral knee and hip strength to 4/5 in all planes.  -AS        Long Term Goals    LTG Date to Achieve  07/10/20  -AS     LTG 1  Patient to demonstrate compliance with her advanced HEP for flexibility, ROM and strengthening.  -AS     LTG 2  Patient to report bilateral knee pain on VAS of 2-3/10 at worst with activity.  -AS     LTG 3  Patient to demonstrate improved bilateral knee and hip strength to 4+/5 in all planes.  -AS     LTG 4  Patient to demonstrate improved bilateral knee ROM to WFL in all planes.  -AS     LTG 5  Patient to report overall improved function on LEFS to 55/80.  -AS        Time Calculation    PT Goal Re-Cert Due Date  07/10/20  -AS       User Key  (r) = Recorded By, (t) = Taken By, (c) = Cosigned By    Initials Name Provider Type    AS Geo Lake, PT Physical Therapist          PT Assessment/Plan     Row Name 06/12/20 0900          PT Assessment    Functional Limitations  Impaired gait;Impaired locomotion;Limitation in home management;Limitations in community activities;Performance in leisure activities;Performance in sport activities;Performance in work activities  -AS      Impairments  Edema;Gait;Impaired flexibility;Joint mobility;Muscle strength;Pain;Range of motion  -AS     Assessment Comments  Patient presents to outpatient PT with a diagnosis of bilateral knee OA. Patient has limited knee ROM, limited LE strength, and increased knee pain with activity. Patient has limited function at this time secondary to the above.  -AS     Please refer to paper survey for additional self-reported information  Yes  -AS     Rehab Potential  Good  -AS     Patient/caregiver participated in establishment of treatment plan and goals  Yes  -AS     Patient would benefit from skilled therapy intervention  Yes  -AS        PT Plan    PT Frequency  1x/week;2x/week  -AS     Predicted Duration of Therapy Intervention (Therapy Eval)  3-4 weeks  -AS     Planned CPT's?  PT RE-EVAL: 36938;PT THER PROC EA 15 MIN: 11034;PT THER ACT EA 15 MIN: 18174;PT MANUAL THERAPY EA 15 MIN: 13576;PT NEUROMUSC RE-EDUCATION EA 15 MIN: 11995;PT ELECTRICAL STIM UNATTEND: ;PT ULTRASOUND EA 15 MIN: 44839;PT HOT/COLD PACK WC NONMCARE: 89721  -AS       User Key  (r) = Recorded By, (t) = Taken By, (c) = Cosigned By    Initials Name Provider Type    AS Geo Lake, PT Physical Therapist            OP Exercises     Row Name 06/12/20 0900             Subjective Pain    Able to rate subjective pain?  yes  -AS      Pre-Treatment Pain Level  4  -AS      Post-Treatment Pain Level  3  -AS         Exercise 1    Exercise Name 1  Edi. HS Stretch  -AS      Reps 1  10  -AS      Time 1  10 sec hold each  -AS         Exercise 2    Exercise Name 2  Edi. Heel Prop  -AS      Time 2  5 min  -AS         Exercise 3    Exercise Name 3  QS  -AS      Reps 3  25  -AS      Time 3  5 sec hold each  -AS         Exercise 4    Exercise Name 4  3-Way Hip  -AS      Reps 4  25 each  -AS         Exercise 5    Exercise Name 5  SAQ Ball Squeeze  -AS      Reps 5  25  -AS         Exercise 6    Exercise Name 6  LAQ Ball Squeeze  -AS      Reps 6  25  -AS          Exercise 7    Exercise Name 7  TKE  -AS      Reps 7  25  -AS      Time 7  Gold  -AS        User Key  (r) = Recorded By, (t) = Taken By, (c) = Cosigned By    Initials Name Provider Type    AS Geo Lake, PT Physical Therapist                        Outcome Measure Options: Lower Extremity Functional Scale (LEFS)(Left)  Lower Extremity Functional Index  Any of your usual work, housework or school activities: Quite a bit of difficulty  Your usual hobbies, recreational or sporting activities: Quite a bit of difficulty  Getting into or out of the bath: Moderate difficulty  Walking between rooms: Moderate difficulty  Putting on your shoes or socks: Quite a bit of difficulty  Squatting: Moderate difficulty  Lifting an object, like a bag of groceries from the floor: Moderate difficulty  Performing light activities around your home: A little bit of difficulty  Performing heavy activities around your home: Extreme difficulty or unable to perform activity  Getting into or out of a car: Moderate difficulty  Walking 2 blocks: Quite a bit of difficulty  Walking a mile: Extreme difficulty or unable to perform activity  Going up or down 10 stairs (about 1 flight of stairs): Quite a bit of difficulty  Standing for 1 hour: Quite a bit of difficulty  Sitting for 1 hour: No difficulty  Running on even ground: Extreme difficulty or unable to perform activity  Running on uneven ground: Extreme difficulty or unable to perform activity  Making sharp turns while running fast: Extreme difficulty or unable to perform activity  Hopping: Extreme difficulty or unable to perform activity  Rolling over in bed: No difficulty  Total: 27      Time Calculation:     Start Time: 0930  Stop Time: 1025  Time Calculation (min): 55 min     Therapy Charges for Today     Code Description Service Date Service Provider Modifiers Qty    03803112393  PT EVAL LOW COMPLEXITY 1 6/12/2020 Geo Lake, PT GP 1    [id number]  PT THER PROC  EA 15 MIN 6/12/2020 Geo Lake, PT GP 3          PT G-Codes  Outcome Measure Options: Lower Extremity Functional Scale (LEFS)(Left)  Total: 27         Geo Lake, PT  6/12/2020

## 2020-06-17 ENCOUNTER — HOSPITAL ENCOUNTER (OUTPATIENT)
Dept: PHYSICAL THERAPY | Facility: HOSPITAL | Age: 55
Setting detail: THERAPIES SERIES
Discharge: HOME | End: 2020-06-17

## 2020-06-17 DIAGNOSIS — M17.0 PRIMARY OSTEOARTHRITIS OF BOTH KNEES: Primary | ICD-10-CM

## 2020-06-17 PROCEDURE — 97110 THERAPEUTIC EXERCISES: CPT

## 2020-06-17 NOTE — THERAPY TREATMENT NOTE
Outpatient Physical Therapy Ortho Treatment Note  MIKAELA Hawk     Patient Name: Christine Villagomez  : 1965  MRN: [id number]  Today's Date: 2020      Visit Date: 2020    Visit Dx:    ICD-10-CM ICD-9-CM   1. Primary osteoarthritis of both knees M17.0 715.16       Patient Active Problem List   Diagnosis   • Atopic rhinitis   • Anxiety   • Arthritis   • Back pain   • Contact dermatitis   • Hypertriglyceridemia   • Hypercholesterolemia   • Menopausal flushing   • Essential hypertension   • Hypothyroidism   • Overactive bladder   • Rash   • Obstructive sleep apnea syndrome   • Skin tags, multiple acquired   • Knee pain   • Paresthesia of foot   • Bipolar disorder (CMS/HCC)   • History of depression   • Attention deficit disorder (ADD) in adult        Past Medical History:   Diagnosis Date   • Bipolar disorder (CMS/HCC)    • Depression    • Hyperlipidemia    • Hypothyroidism    • Kidney stone         Past Surgical History:   Procedure Laterality Date   • BLADDER SUSPENSION      TVT   • KIDNEY STONE SURGERY     • KNEE MENISCAL REPAIR Bilateral    • KNEE SURGERY                         PT Assessment/Plan     Row Name 20 1056          PT Assessment    Assessment Comments  Pt tolerated progression of ther ex well along with the addition of CKC exercises.   -KM        PT Plan    PT Plan Comments  Pt to continue with HEP daily. Progress as tolerated.   -KM       User Key  (r) = Recorded By, (t) = Taken By, (c) = Cosigned By    Initials Name Provider Type    Jessi Toth PTA Physical Therapy Assistant            OP Exercises     Row Name 20 1054             Subjective Comments    Subjective Comments  Pt states she has completed the HEP daily besides on  due to experiencing increased pain. However, she feels the exercises has helped overall.   -KM         Exercise 1    Exercise Name 1  Edi. HS Stretch  -KM      Reps 1  10  -KM      Time 1  10 sec hold each  -KM         Exercise 2     Exercise Name 2  Edi. Heel Prop  -KM      Time 2  5 min  -KM         Exercise 3    Exercise Name 3  QS  -KM      Reps 3  25  -KM      Time 3  5 sec hold each  -KM         Exercise 4    Exercise Name 4  3-Way Hip  -KM      Reps 4  25 each  -KM         Exercise 5    Exercise Name 5  SAQ Ball Squeeze  -KM      Reps 5  25  -KM      Time 5  1#  -KM         Exercise 6    Exercise Name 6  LAQ Ball Squeeze  -KM      Reps 6  25  -KM      Time 6  1#  -KM         Exercise 7    Exercise Name 7  TKE  -KM      Reps 7  25  -KM      Time 7  Gold  -KM         Exercise 8    Exercise Name 8  Heel Raises  -KM      Reps 8  25  -KM         Exercise 9    Exercise Name 9  Mini Squats  -KM      Reps 9  25  -KM        User Key  (r) = Recorded By, (t) = Taken By, (c) = Cosigned By    Initials Name Provider Type    Jessi Toth PTA Physical Therapy Assistant                                          Time Calculation:   Start Time: 1055  Stop Time: 1130  Time Calculation (min): 35 min  Therapy Charges for Today     Code Description Service Date Service Provider Modifiers Qty    41308450403 HC PT THER PROC EA 15 MIN 6/17/2020 Jessi Ravi PTA GP 2                    Jessi Ravi PTA  6/17/2020

## 2020-06-23 ENCOUNTER — PATIENT MESSAGE (OUTPATIENT)
Dept: INTERNAL MEDICINE | Facility: CLINIC | Age: 55
End: 2020-06-23

## 2020-06-23 DIAGNOSIS — F32.A DEPRESSION, UNSPECIFIED DEPRESSION TYPE: ICD-10-CM

## 2020-06-23 RX ORDER — BUSPIRONE HYDROCHLORIDE 30 MG/1
45 TABLET ORAL DAILY
Qty: 135 TABLET | Refills: 3 | Status: SHIPPED | OUTPATIENT
Start: 2020-06-23 | End: 2021-05-03

## 2020-06-23 NOTE — TELEPHONE ENCOUNTER
From: Christine Villagomez  To: Graciela Cadena MD  Sent: 6/23/2020 9:11 AM EDT  Subject: Prescription Question    Two items, possibly related:    I continue to get notices from [facility] that they can't fill a recent prescription order. Orders should not be sent to them, but I cannot figure out how to remove that pharmacy from the list on [facility].    I got an email from [facility] (my current home delivery place) that they cannot fill Buspirone Hcl Tabs30 mg Rx Number [id number] because they don't have doctor authorization, and we should request a new electronic authorization.    I'd appreciate any help you could provide on this.    --Ashly

## 2020-06-24 ENCOUNTER — HOSPITAL ENCOUNTER (OUTPATIENT)
Dept: PHYSICAL THERAPY | Facility: HOSPITAL | Age: 55
Setting detail: THERAPIES SERIES
Discharge: HOME | End: 2020-06-24

## 2020-06-24 DIAGNOSIS — M17.0 PRIMARY OSTEOARTHRITIS OF BOTH KNEES: Primary | ICD-10-CM

## 2020-06-24 PROCEDURE — 97110 THERAPEUTIC EXERCISES: CPT

## 2020-06-24 NOTE — THERAPY TREATMENT NOTE
Outpatient Physical Therapy Ortho Treatment Note  MIKAELA Hawk     Patient Name: Christine Villagomez  : 1965  MRN: [id number]  Today's Date: 2020      Visit Date: 2020    Visit Dx:    ICD-10-CM ICD-9-CM   1. Primary osteoarthritis of both knees M17.0 715.16       Patient Active Problem List   Diagnosis   • Atopic rhinitis   • Anxiety   • Arthritis   • Back pain   • Contact dermatitis   • Hypertriglyceridemia   • Hypercholesterolemia   • Menopausal flushing   • Essential hypertension   • Hypothyroidism   • Overactive bladder   • Rash   • Obstructive sleep apnea syndrome   • Skin tags, multiple acquired   • Knee pain   • Paresthesia of foot   • Bipolar disorder (CMS/HCC)   • History of depression   • Attention deficit disorder (ADD) in adult        Past Medical History:   Diagnosis Date   • Bipolar disorder (CMS/HCC)    • Depression    • Hyperlipidemia    • Hypothyroidism    • Kidney stone         Past Surgical History:   Procedure Laterality Date   • BLADDER SUSPENSION      TVT   • KIDNEY STONE SURGERY     • KNEE MENISCAL REPAIR Bilateral    • KNEE SURGERY                         PT Assessment/Plan     Row Name 20 1055          PT Assessment    Assessment Comments  Pt demonstrates improved tolerance with prescribed exercises and strength  -KM        PT Plan    PT Plan Comments  Continue to progress as tolerated.   -KM       User Key  (r) = Recorded By, (t) = Taken By, (c) = Cosigned By    Initials Name Provider Type    Jessi Toth, HANS Physical Therapy Assistant            OP Exercises     Row Name 20 1055             Subjective Comments    Subjective Comments  Pt states she has been compliant with HEP and  pleased with the results so far. Pt states she has noticed improved overall strength and decreased pain.   -KM         Exercise 1    Exercise Name 1  Edi. HS Stretch  -KM      Reps 1  10  -KM      Time 1  10 sec hold each  -KM         Exercise 2    Exercise Name 2  Edi. Heel  Prop  -KM      Time 2  5 min  -KM         Exercise 3    Exercise Name 3  QS  -KM      Reps 3  25  -KM      Time 3  5 sec hold each  -KM         Exercise 4    Exercise Name 4  3-Way Hip  -KM      Reps 4  25 each  -KM      Time 4  1#  -KM         Exercise 5    Exercise Name 5  SAQ Ball Squeeze  -KM      Reps 5  25  -KM      Time 5  1#  -KM         Exercise 6    Exercise Name 6  LAQ Ball Squeeze  -KM      Reps 6  25  -KM      Time 6  1#  -KM         Exercise 7    Exercise Name 7  TKE  -KM      Reps 7  25  -KM      Time 7  Gold  -KM         Exercise 8    Exercise Name 8  Heel Raises  -KM      Reps 8  25  -KM         Exercise 9    Exercise Name 9  Mini Squats  -KM      Reps 9  25  -KM        User Key  (r) = Recorded By, (t) = Taken By, (c) = Cosigned By    Initials Name Provider Type    Jessi Toth PTA Physical Therapy Assistant                                          Time Calculation:   Start Time: 1052  Stop Time: 1120  Time Calculation (min): 28 min  Therapy Charges for Today     Code Description Service Date Service Provider Modifiers Qty    18298580716 HC PT THER PROC EA 15 MIN 6/24/2020 Jessi Ravi PTA GP 1                    Jessi Ravi PTA  6/24/2020

## 2020-07-01 ENCOUNTER — HOSPITAL ENCOUNTER (OUTPATIENT)
Dept: PHYSICAL THERAPY | Facility: HOSPITAL | Age: 55
Setting detail: THERAPIES SERIES
Discharge: HOME | End: 2020-07-01

## 2020-07-01 DIAGNOSIS — M17.0 PRIMARY OSTEOARTHRITIS OF BOTH KNEES: Primary | ICD-10-CM

## 2020-07-01 PROCEDURE — 97110 THERAPEUTIC EXERCISES: CPT

## 2020-07-01 NOTE — THERAPY TREATMENT NOTE
Outpatient Physical Therapy Ortho Treatment Note  MIKAELA Hawk     Patient Name: Christine Villagomez  : 1965  MRN: [id number]  Today's Date: 2020      Visit Date: 2020    Visit Dx:    ICD-10-CM ICD-9-CM   1. Primary osteoarthritis of both knees M17.0 715.16       Patient Active Problem List   Diagnosis   • Atopic rhinitis   • Anxiety   • Arthritis   • Back pain   • Contact dermatitis   • Hypertriglyceridemia   • Hypercholesterolemia   • Menopausal flushing   • Essential hypertension   • Hypothyroidism   • Overactive bladder   • Rash   • Obstructive sleep apnea syndrome   • Skin tags, multiple acquired   • Knee pain   • Paresthesia of foot   • Bipolar disorder (CMS/HCC)   • History of depression   • Attention deficit disorder (ADD) in adult        Past Medical History:   Diagnosis Date   • Bipolar disorder (CMS/HCC)    • Depression    • Hyperlipidemia    • Hypothyroidism    • Kidney stone         Past Surgical History:   Procedure Laterality Date   • BLADDER SUSPENSION      TVT   • KIDNEY STONE SURGERY     • KNEE MENISCAL REPAIR Bilateral    • KNEE SURGERY                         PT Assessment/Plan     Row Name 20 1100          PT Assessment    Assessment Comments  Patient is doing well and has improved knee ROM and LE strength. Patient reports relief in symptoms with exercises. Plan to D/C patient at this time and have her continue on her HEP daily.  -AS        PT Plan    PT Plan Comments  D/C patient with a HEP.  -AS       User Key  (r) = Recorded By, (t) = Taken By, (c) = Cosigned By    Initials Name Provider Type    AS Geo Lake, PT Physical Therapist            OP Exercises     Row Name 20 1100             Subjective Comments    Subjective Comments  Patient reports she is doing well and can tell; she is feeling better.   -AS         Exercise 1    Exercise Name 1  Edi. HS Stretch  -AS      Reps 1  10  -AS      Time 1  10 sec hold each  -AS         Exercise 2    Exercise  Name 2  Edi. Heel Prop  -AS      Time 2  5 min  -AS         Exercise 3    Exercise Name 3  QS  -AS      Reps 3  25  -AS      Time 3  5 sec hold each  -AS         Exercise 4    Exercise Name 4  3-Way Hip  -AS      Reps 4  25 each  -AS      Time 4  1#  -AS         Exercise 5    Exercise Name 5  SAQ Ball Squeeze  -AS      Reps 5  25  -AS      Time 5  1#  -AS         Exercise 6    Exercise Name 6  LAQ Ball Squeeze  -AS      Reps 6  25  -AS      Time 6  1#  -AS         Exercise 7    Exercise Name 7  TKE  -AS      Reps 7  25  -AS      Time 7  Gold  -AS         Exercise 8    Exercise Name 8  Heel Raises  -AS      Reps 8  25  -AS         Exercise 9    Exercise Name 9  Mini Squats  -AS      Reps 9  25  -AS        User Key  (r) = Recorded By, (t) = Taken By, (c) = Cosigned By    Initials Name Provider Type    AS Geo Lake, PT Physical Therapist                                          Time Calculation:   Start Time: 1055  Stop Time: 1130  Time Calculation (min): 35 min  Therapy Charges for Today     Code Description Service Date Service Provider Modifiers Qty    01017668208  PT THER PROC EA 15 MIN 7/1/2020 Geo Lake, PT GP 2                    Geo Lake, PT  7/1/2020

## 2020-07-06 ENCOUNTER — OFFICE VISIT (OUTPATIENT)
Dept: ORTHOPEDIC SURGERY | Facility: CLINIC | Age: 55
End: 2020-07-06

## 2020-07-06 VITALS — BODY MASS INDEX: 31.35 KG/M2 | HEIGHT: 70 IN | WEIGHT: 219 LBS

## 2020-07-06 DIAGNOSIS — R52 PAIN: ICD-10-CM

## 2020-07-06 DIAGNOSIS — M17.0 PRIMARY OSTEOARTHRITIS OF BOTH KNEES: Primary | ICD-10-CM

## 2020-07-06 PROCEDURE — 99213 OFFICE O/P EST LOW 20 MIN: CPT | Performed by: ORTHOPAEDIC SURGERY

## 2020-07-06 NOTE — PROGRESS NOTES
Subjective: Osteoarthritis both knees     Patient ID: Christine Villagomez is a 54 y.o. female.    Chief Complaint:    History of Present Illness 54-year-old female is seen back regarding both knees.  She remains very symptomatic and painful.  She is been in physical therapy which is helped with the motion but has not shown any improvement as far as her pain.  Again she been doing Voltaren gel faithfully she is lost over 80 pounds is been involved in physical therapy she is failed a cortisone injection and she continues to have moderate pain and discomfort that limits all actives of daily living.  Some pain at rest but primarily with activities as getting in and out of chair navigating steps and doing household chores.       Social History     Occupational History   • Not on file   Tobacco Use   • Smoking status: Never Smoker   • Smokeless tobacco: Never Used   Substance and Sexual Activity   • Alcohol use: No   • Drug use: No   • Sexual activity: Yes     Partners: Male     Birth control/protection: Surgical, Post-menopausal     Comment: vas      Review of Systems   Constitutional: Negative for chills, diaphoresis, fever and unexpected weight change.   HENT: Negative for hearing loss, nosebleeds, sore throat and tinnitus.    Eyes: Negative for pain and visual disturbance.   Respiratory: Negative for cough, shortness of breath and wheezing.    Cardiovascular: Negative for chest pain and palpitations.   Gastrointestinal: Negative for abdominal pain, diarrhea, nausea and vomiting.   Endocrine: Negative for cold intolerance, heat intolerance and polydipsia.   Genitourinary: Negative for difficulty urinating, dysuria and hematuria.   Musculoskeletal: Positive for arthralgias and myalgias. Negative for joint swelling.   Skin: Negative for rash and wound.   Allergic/Immunologic: Negative for environmental allergies.   Neurological: Negative for dizziness, syncope and numbness.   Hematological: Does not bruise/bleed easily.    Psychiatric/Behavioral: Negative for dysphoric mood and sleep disturbance. The patient is not nervous/anxious.          Past Medical History:   Diagnosis Date   • Bipolar disorder (CMS/HCC)    • Depression    • Hyperlipidemia    • Hypothyroidism    • Kidney stone      Past Surgical History:   Procedure Laterality Date   • BLADDER SUSPENSION      TVT   • KIDNEY STONE SURGERY     • KNEE MENISCAL REPAIR Bilateral    • KNEE SURGERY       Family History   Problem Relation Age of Onset   • Cancer Mother         lung   • Heart attack Father    • Colon cancer Maternal Grandmother    • Breast cancer Neg Hx    • Ovarian cancer Neg Hx    • Pulmonary embolism Neg Hx    • Deep vein thrombosis Neg Hx          Objective:  There were no vitals filed for this visit.      07/06/20  0900   Weight: 99.3 kg (219 lb)     Body mass index is 31.42 kg/m².        Ortho Exam   She is alert and oriented x3.  Again her x-ray shows bilateral osteoarthritis of both knees.  There is pain and crepitance with range of motion to both knees but there is no instability at 09 degrees nor any varus or valgus instability 0 30 degrees.  Quad function is 4 out of 5 secondary to pain hamstring function is 5/5.  There is joint line tenderness but negative Viry's.  Her calf is nontender.  Is good distal pulses no motor or sensory deficit good capillary refill the skin is cool to touch.  She tolerates the Voltaren gel but showing no improvement.    Assessment:        1. Primary osteoarthritis of both knees    2. Pain           Plan: Again the patient is failed anti-inflammatory medications, cortisone injections, physical therapy, modification of activity and weight reduction having lost over 80 pounds.  She persisted having pain that limits all activities of daily living.  Return to contact insurance carrier once again regarding proceeding with gel injections.  Answered all questions return to begin those injections once approval has been obtained.             Work Status:    JAYLEEN query complete.    Orders:  Orders Placed This Encounter   Procedures   • Visco Treatment       Medications:  No orders of the defined types were placed in this encounter.      Followup:  Return in about 3 weeks (around 7/27/2020).          Dictated utilizing Dragon dictation

## 2020-07-10 RX ORDER — BUPROPION HYDROCHLORIDE 300 MG/1
300 TABLET ORAL EVERY MORNING
Qty: 90 TABLET | Refills: 1 | Status: SHIPPED | OUTPATIENT
Start: 2020-07-10 | End: 2020-12-18

## 2020-07-30 ENCOUNTER — CLINICAL SUPPORT (OUTPATIENT)
Dept: ORTHOPEDIC SURGERY | Facility: CLINIC | Age: 55
End: 2020-07-30

## 2020-07-30 VITALS — HEIGHT: 70 IN | BODY MASS INDEX: 31.35 KG/M2 | WEIGHT: 219 LBS

## 2020-07-30 DIAGNOSIS — M17.0 PRIMARY OSTEOARTHRITIS OF BOTH KNEES: Primary | ICD-10-CM

## 2020-07-30 DIAGNOSIS — R52 PAIN: ICD-10-CM

## 2020-07-30 PROCEDURE — 20610 DRAIN/INJ JOINT/BURSA W/O US: CPT | Performed by: ORTHOPAEDIC SURGERY

## 2020-07-30 NOTE — PROGRESS NOTES
Subjective: Osteoarthritis both knees     Patient ID: Christine Villagomez is a 54 y.o. female.    Chief Complaint:    History of Present Illness patient seen today for Visco supplement injection into both knees       Social History     Occupational History   • Not on file   Tobacco Use   • Smoking status: Never Smoker   • Smokeless tobacco: Never Used   Substance and Sexual Activity   • Alcohol use: No   • Drug use: No   • Sexual activity: Yes     Partners: Male     Birth control/protection: Surgical, Post-menopausal     Comment: vas      Review of Systems   Constitutional: Negative for chills, diaphoresis, fever and unexpected weight change.   HENT: Negative for hearing loss, nosebleeds, sore throat and tinnitus.    Eyes: Negative for pain and visual disturbance.   Respiratory: Negative for cough, shortness of breath and wheezing.    Cardiovascular: Negative for chest pain and palpitations.   Gastrointestinal: Negative for abdominal pain, diarrhea, nausea and vomiting.   Endocrine: Negative for cold intolerance, heat intolerance and polydipsia.   Genitourinary: Negative for difficulty urinating, dysuria and hematuria.   Musculoskeletal: Positive for arthralgias and myalgias. Negative for joint swelling.   Skin: Negative for rash and wound.   Allergic/Immunologic: Negative for environmental allergies.   Neurological: Negative for dizziness, syncope and numbness.   Hematological: Does not bruise/bleed easily.   Psychiatric/Behavioral: Negative for dysphoric mood and sleep disturbance. The patient is not nervous/anxious.          Past Medical History:   Diagnosis Date   • Bipolar disorder (CMS/HCC)    • Depression    • Hyperlipidemia    • Hypothyroidism    • Kidney stone      Past Surgical History:   Procedure Laterality Date   • BLADDER SUSPENSION      TVT   • KIDNEY STONE SURGERY     • KNEE MENISCAL REPAIR Bilateral    • KNEE SURGERY       Family History   Problem Relation Age of Onset   • Cancer Mother         lung   •  Heart attack Father    • Colon cancer Maternal Grandmother    • Breast cancer Neg Hx    • Ovarian cancer Neg Hx    • Pulmonary embolism Neg Hx    • Deep vein thrombosis Neg Hx          Objective:  There were no vitals filed for this visit.      07/30/20  0946   Weight: 99.3 kg (219 lb)     Body mass index is 31.42 kg/m².        Ortho Exam 2 mL injection given each knee through the superolateral portal tolerated well after sterile prepping.  Postinjection instructions given to the patient.         Assessment:        1. Primary osteoarthritis of both knees    2. Pain           Plan: Return next week for next injection  Large Joint Arthrocentesis  Date/Time: 7/30/2020 9:47 AM  Consent given by: patient  Site marked: site marked  Timeout: Immediately prior to procedure a time out was called to verify the correct patient, procedure, equipment, support staff and site/side marked as required   Supporting Documentation  Indications: pain   Procedure Details  Location: knee - Knee joint: BILATERAL KNEE.  Preparation: Patient was prepped and draped in the usual sterile fashion  Needle gauge: 21G.  Approach: superior (LATERAL)  Patient tolerance: patient tolerated the procedure well with no immediate complications      BIOVENTUS GELSYN 3 INJECTIONS PROVIDED VIA [facility]  GELSYN-3- 16.8MG/2ML PFS  GTIN (01) [id number]  LOT 10 [id number]  EXP 17) 01.31.2023           Work Status:    JAYLEEN query complete.    Orders:  Orders Placed This Encounter   Procedures   • Large Joint Arthrocentesis       Medications:  No orders of the defined types were placed in this encounter.      Followup:  Return in about 1 week (around 8/6/2020).          Dictated utilizing Dragon dictation

## 2020-08-06 ENCOUNTER — CLINICAL SUPPORT (OUTPATIENT)
Dept: ORTHOPEDIC SURGERY | Facility: CLINIC | Age: 55
End: 2020-08-06

## 2020-08-06 DIAGNOSIS — M17.0 PRIMARY OSTEOARTHRITIS OF BOTH KNEES: Primary | ICD-10-CM

## 2020-08-06 DIAGNOSIS — R52 PAIN: ICD-10-CM

## 2020-08-06 PROCEDURE — 20610 DRAIN/INJ JOINT/BURSA W/O US: CPT | Performed by: ORTHOPAEDIC SURGERY

## 2020-08-06 NOTE — PROGRESS NOTES
Subjective: Osteoarthritis both knees     Patient ID: Christine Villagomez is a 54 y.o. female.    Chief Complaint:    History of Present Illness patient seen for second Visco supplement injection into both knees.  Has noted improvement following the first injection.       Social History     Occupational History   • Not on file   Tobacco Use   • Smoking status: Never Smoker   • Smokeless tobacco: Never Used   Substance and Sexual Activity   • Alcohol use: No   • Drug use: No   • Sexual activity: Yes     Partners: Male     Birth control/protection: Surgical, Post-menopausal     Comment: vas      Review of Systems   Constitutional: Negative for chills, diaphoresis, fever and unexpected weight change.   HENT: Negative for hearing loss, nosebleeds, sore throat and tinnitus.    Eyes: Negative for pain and visual disturbance.   Respiratory: Negative for cough, shortness of breath and wheezing.    Cardiovascular: Negative for chest pain and palpitations.   Gastrointestinal: Negative for abdominal pain, diarrhea, nausea and vomiting.   Endocrine: Negative for cold intolerance, heat intolerance and polydipsia.   Genitourinary: Negative for difficulty urinating, dysuria and hematuria.   Musculoskeletal: Positive for arthralgias. Negative for joint swelling and myalgias.   Skin: Negative for rash and wound.   Allergic/Immunologic: Negative for environmental allergies.   Neurological: Negative for dizziness, syncope and numbness.   Hematological: Does not bruise/bleed easily.   Psychiatric/Behavioral: Negative for dysphoric mood and sleep disturbance. The patient is not nervous/anxious.          Past Medical History:   Diagnosis Date   • Bipolar disorder (CMS/HCC)    • Depression    • Hyperlipidemia    • Hypothyroidism    • Kidney stone      Past Surgical History:   Procedure Laterality Date   • BLADDER SUSPENSION      TVT   • KIDNEY STONE SURGERY     • KNEE MENISCAL REPAIR Bilateral    • KNEE SURGERY       Family History   Problem  Relation Age of Onset   • Cancer Mother         lung   • Heart attack Father    • Colon cancer Maternal Grandmother    • Breast cancer Neg Hx    • Ovarian cancer Neg Hx    • Pulmonary embolism Neg Hx    • Deep vein thrombosis Neg Hx          Objective:  There were no vitals filed for this visit.  There were no vitals filed for this visit.  There is no height or weight on file to calculate BMI.        Ortho Exam 2 mL injection given each knee through superolateral portal tolerating it well.  Postinjection instructions given to the patient.         Assessment:        1. Primary osteoarthritis of both knees    2. Pain           Plan: Return next week for final injection  Large Joint Arthrocentesis  Date/Time: 8/6/2020 10:58 AM  Consent given by: patient  Site marked: site marked  Timeout: Immediately prior to procedure a time out was called to verify the correct patient, procedure, equipment, support staff and site/side marked as required   Supporting Documentation  Indications: pain   Procedure Details  Location: knee - Knee joint: bilateral.  Preparation: Patient was prepped and draped in the usual sterile fashion  Needle size: 22 G  Approach: superior  Patient tolerance: patient tolerated the procedure well with no immediate complications      GELSYN-3 provided by patient, NDC:[id number]  LOT: [id number]  EXP:01-          Work Status:    JAYLEEN query complete.    Orders:  Orders Placed This Encounter   Procedures   • Large Joint Arthrocentesis       Medications:  No orders of the defined types were placed in this encounter.      Followup:  Return in about 1 week (around 8/13/2020).          Dictated utilizing Dragon dictation

## 2020-08-13 ENCOUNTER — CLINICAL SUPPORT (OUTPATIENT)
Dept: ORTHOPEDIC SURGERY | Facility: CLINIC | Age: 55
End: 2020-08-13

## 2020-08-13 VITALS — HEIGHT: 70 IN | WEIGHT: 219 LBS | BODY MASS INDEX: 31.35 KG/M2

## 2020-08-13 DIAGNOSIS — M17.0 PRIMARY OSTEOARTHRITIS OF BOTH KNEES: Primary | ICD-10-CM

## 2020-08-13 DIAGNOSIS — R52 PAIN: ICD-10-CM

## 2020-08-13 PROCEDURE — 20610 DRAIN/INJ JOINT/BURSA W/O US: CPT | Performed by: ORTHOPAEDIC SURGERY

## 2020-08-13 NOTE — PROGRESS NOTES
Subjective: Osteoarthritis both knees     Patient ID: Christine Villagomez is a 54 y.o. female.    Chief Complaint:    History of Present Illness patient seen for follow-up viscosupplementation into both knees.  She has noted an improvement following the first 2 injections       Social History     Occupational History   • Not on file   Tobacco Use   • Smoking status: Never Smoker   • Smokeless tobacco: Never Used   Substance and Sexual Activity   • Alcohol use: No   • Drug use: No   • Sexual activity: Yes     Partners: Male     Birth control/protection: Surgical, Post-menopausal     Comment: vas      Review of Systems   Constitutional: Negative for chills, diaphoresis, fever and unexpected weight change.   HENT: Negative for hearing loss, nosebleeds, sore throat and tinnitus.    Eyes: Negative for pain and visual disturbance.   Respiratory: Negative for cough, shortness of breath and wheezing.    Cardiovascular: Negative for chest pain and palpitations.   Gastrointestinal: Negative for abdominal pain, diarrhea, nausea and vomiting.   Endocrine: Negative for cold intolerance, heat intolerance and polydipsia.   Genitourinary: Negative for difficulty urinating, dysuria and hematuria.   Musculoskeletal: Positive for arthralgias and myalgias. Negative for joint swelling.   Skin: Negative for rash and wound.   Allergic/Immunologic: Negative for environmental allergies.   Neurological: Negative for dizziness, syncope and numbness.   Hematological: Does not bruise/bleed easily.   Psychiatric/Behavioral: Negative for dysphoric mood and sleep disturbance. The patient is not nervous/anxious.          Past Medical History:   Diagnosis Date   • Bipolar disorder (CMS/HCC)    • Depression    • Hyperlipidemia    • Hypothyroidism    • Kidney stone      Past Surgical History:   Procedure Laterality Date   • BLADDER SUSPENSION      TVT   • KIDNEY STONE SURGERY     • KNEE MENISCAL REPAIR Bilateral    • KNEE SURGERY       Family History    Problem Relation Age of Onset   • Cancer Mother         lung   • Heart attack Father    • Colon cancer Maternal Grandmother    • Breast cancer Neg Hx    • Ovarian cancer Neg Hx    • Pulmonary embolism Neg Hx    • Deep vein thrombosis Neg Hx          Objective:  There were no vitals filed for this visit.      08/13/20  0908   Weight: 99.3 kg (219 lb)     Body mass index is 31.42 kg/m².        Ortho Exam 2 mL injection given each knee through the superolateral portal tolerated well.  Postinjection instructions given to the patient.         Assessment:        1. Primary osteoarthritis of both knees    2. Pain           Plan: Return in 6 weeks if still symptomatic otherwise PRN.  Answered all questions  Large Joint Arthrocentesis  Date/Time: 8/13/2020 9:12 AM  Consent given by: patient  Site marked: site marked  Timeout: Immediately prior to procedure a time out was called to verify the correct patient, procedure, equipment, support staff and site/side marked as required   Supporting Documentation  Indications: pain   Procedure Details  Location: knee - Knee joint: BILATERAL KNEE.  Preparation: Patient was prepped and draped in the usual sterile fashion  Needle gauge: 21G.  Approach: superior (LATERAL)  Patient tolerance: patient tolerated the procedure well with no immediate complications      GELSYN-3 provided by patient, NDC:[id number]  LOT: [id number]  EXP:01-          Work Status:    JAYLEEN query complete.    Orders:  Orders Placed This Encounter   Procedures   • Large Joint Arthrocentesis       Medications:  No orders of the defined types were placed in this encounter.      Followup:  Return in about 6 weeks (around 9/24/2020).          Dictated utilizing Dragon dictation

## 2020-09-09 DIAGNOSIS — F98.8 ATTENTION DEFICIT DISORDER (ADD) IN ADULT: ICD-10-CM

## 2020-09-09 RX ORDER — MELOXICAM 7.5 MG/1
7.5 TABLET ORAL DAILY
Qty: 30 TABLET | Refills: 0 | Status: SHIPPED | OUTPATIENT
Start: 2020-09-09 | End: 2020-09-25

## 2020-09-09 RX ORDER — DEXTROAMPHETAMINE SACCHARATE, AMPHETAMINE ASPARTATE, DEXTROAMPHETAMINE SULFATE AND AMPHETAMINE SULFATE 2.5; 2.5; 2.5; 2.5 MG/1; MG/1; MG/1; MG/1
10 TABLET ORAL 3 TIMES DAILY
Qty: 90 TABLET | Refills: 0 | Status: SHIPPED | OUTPATIENT
Start: 2020-09-09 | End: 2021-01-08 | Stop reason: SDUPTHER

## 2020-09-09 NOTE — TELEPHONE ENCOUNTER
----- Message from Christine Villagomez sent at 9/9/2020  9:22 AM EDT -----  Regarding: Prescription Question  Contact: [phone]  Dr. Cadena--    I would like new prescriptions for Meloxicam 15mg and Adderal 10mg.    If needed, I'd be glad to come in to discuss these and a few other things (general itching, spot on my face), or do a video visit.    --Ashly

## 2020-09-22 ENCOUNTER — TELEMEDICINE (OUTPATIENT)
Dept: INTERNAL MEDICINE | Facility: CLINIC | Age: 55
End: 2020-09-22

## 2020-09-22 DIAGNOSIS — R00.0 TACHYCARDIA: Primary | ICD-10-CM

## 2020-09-22 DIAGNOSIS — R50.9 FEVER, UNSPECIFIED FEVER CAUSE: ICD-10-CM

## 2020-09-22 DIAGNOSIS — F98.8 ATTENTION DEFICIT DISORDER (ADD) IN ADULT: ICD-10-CM

## 2020-09-22 PROCEDURE — 99214 OFFICE O/P EST MOD 30 MIN: CPT | Performed by: INTERNAL MEDICINE

## 2020-09-22 NOTE — PROGRESS NOTES
Christine Villagomez is a 54 y.o. female, who presents with a chief complaint of   Chief Complaint   Patient presents with   • Rapid Heart Rate       HPI   This visit has been scheduled as a telehealth visit to comply with patient safety concerns in accordance with CDC recommendations. This was an audio and video enabled telemedicine encounter.    You have chosen to receive care through a televisit visit. Do you consent to use a televisit visit for your medical care today? Yes    Pt concerned about tachycardia.  She has had intermittent tachycardia in the past.  Last Monday she was cleaning and got tired and hot quickly.  At rest she noted her HR on her fit bit to be 135.  The day after she had a temp of 101.9.  She had gotten her flu shot 2 1/2 days before she felt bad.  She had generalized body aches and was tire.  No cough, soa, or loss of taste/smell.  She noticed that as her temp went up her HR went up.  Then as her temp has gotten back to normal her HR is coming back down.  She had been on adderall and she stopped it.  She also stopped caffeine.  She doesn't think the adderall does much to help her unless she has a specific task to do.      Pt's spouse sent graphs of pt's HR which are in the patient message dated today.     The following portions of the patient's history were reviewed and updated as appropriate: allergies, current medications, past family history, past medical history, past social history, past surgical history and problem list.    Allergies: Sulfa antibiotics    Review of Systems   Constitutional: Negative.    HENT: Negative.    Eyes: Negative.    Respiratory: Negative.    Cardiovascular: Negative.    Gastrointestinal: Negative.    Endocrine: Negative.    Genitourinary: Negative.    Musculoskeletal: Negative.    Skin: Negative.    Allergic/Immunologic: Negative.    Neurological: Negative.    Hematological: Negative.    Psychiatric/Behavioral: Negative.    All other systems reviewed and are  negative.            Wt Readings from Last 3 Encounters:   08/13/20 99.3 kg (219 lb)   07/30/20 99.3 kg (219 lb)   07/06/20 99.3 kg (219 lb)     Temp Readings from Last 3 Encounters:   02/07/20 97.8 °F (36.6 °C) (Oral)   02/06/20 98.1 °F (36.7 °C) (Oral)   02/05/20 98.1 °F (36.7 °C) (Oral)     BP Readings from Last 3 Encounters:   03/09/20 140/80   02/07/20 130/82   02/06/20 153/94     Pulse Readings from Last 3 Encounters:   02/07/20 94   02/06/20 89   02/05/20 (!) 127     There is no height or weight on file to calculate BMI.  @LASTSAO2(3)@    Physical Exam  Vitals signs and nursing note reviewed.   Constitutional:       General: She is not in acute distress.     Appearance: She is well-developed.   HENT:      Head: Normocephalic and atraumatic.      Right Ear: External ear normal.      Left Ear: External ear normal.      Nose: Nose normal.   Eyes:      Conjunctiva/sclera: Conjunctivae normal.      Pupils: Pupils are equal, round, and reactive to light.   Neck:      Musculoskeletal: Normal range of motion and neck supple.   Cardiovascular:      Rate and Rhythm: Normal rate and regular rhythm.      Heart sounds: Normal heart sounds.   Pulmonary:      Effort: Pulmonary effort is normal. No respiratory distress.      Breath sounds: Normal breath sounds. No wheezing.   Musculoskeletal: Normal range of motion.      Comments: Normal gait   Skin:     General: Skin is warm and dry.   Neurological:      Mental Status: She is alert and oriented to person, place, and time.   Psychiatric:         Behavior: Behavior normal.         Thought Content: Thought content normal.         Judgment: Judgment normal.         Results for orders placed or performed in visit on 03/09/20   POC Urinalysis Dipstick    Specimen: Urine   Result Value Ref Range    Color Yellow Yellow, Straw, Dark Yellow, Grace    Clarity, UA Clear Clear    Glucose, UA Negative Negative, 1000 mg/dL (3+) mg/dL    Bilirubin Negative Negative    Ketones, UA Negative  Negative    Specific Gravity  1.005 1.005 - 1.030    Blood, UA Negative Negative    pH, Urine 5.0 5.0 - 8.0    Protein, POC Negative Negative mg/dL    Urobilinogen, UA Normal Normal    Leukocytes Negative Negative    Nitrite, UA Negative Negative   Pap IG, HPV-hr    Specimen: Cervix; ThinPrep Vial   Result Value Ref Range    Diagnosis Comment     Specimen adequacy: Comment     Clinician Provided ICD-10: Comment     Performed by: Comment     . .     Note: Comment     Method: Comment     HPV, high-risk Negative Negative           Christine was seen today for rapid heart rate.    Diagnoses and all orders for this visit:    Tachycardia - cont to monitor.  Tachycardia in setting of fever/aches appropriate response.  HR now coming down.  Cont to monitor.  If pt experiences unprovoked tachycardia would check holter monitor.     Attention deficit disorder (ADD) in adult - change adderall dosing to PRN instead of bid scheduled.    Fever - possibly related to immune response from flu shot vs viral illness.  Pt now improving with supportive care.        25 minutes was spent in patient care with >50% in counseling about the above issues including medication dosing change, monitoring, and disease management plan.         Outpatient Medications Prior to Visit   Medication Sig Dispense Refill   • amphetamine-dextroamphetamine (ADDERALL) 10 MG tablet Take 1 tablet by mouth 3 (Three) Times a Day. 90 tablet 0   • atorvastatin (LIPITOR) 20 MG tablet Take 1 tablet by mouth Daily. 90 tablet 3   • buPROPion XL (WELLBUTRIN XL) 300 MG 24 hr tablet Take 1 tablet by mouth Every Morning. 90 tablet 1   • busPIRone (BUSPAR) 30 MG tablet Take 1.5 tablets by mouth Daily. 135 tablet 3   • cetirizine (zyrTEC) 10 MG tablet Take 10 mg by mouth Daily.     • Cholecalciferol (VITAMIN D) 1000 UNITS tablet Take 1 tablet by mouth daily.     • Coenzyme Q10 (CO Q-10) 100 MG capsule Co Q-10     • FIBER PO Take  by mouth.     • fluticasone (FLONASE) 50 MCG/ACT  nasal spray 2 sprays into the nostril(s) as directed by provider Daily. 1 bottle 5   • Green Tea, Camillia sinensis, (GREEN TEA EXTRACT PO) Take  by mouth.     • levothyroxine (SYNTHROID, LEVOTHROID) 50 MCG tablet Take 1 tablet by mouth Daily. 90 tablet 3   • meloxicam (Mobic) 7.5 MG tablet Take 1 tablet by mouth Daily. 30 tablet 0   • Multiple Vitamin (MULTI-VITAMIN DAILY PO) Multi Vitamin     • Multiple Vitamins-Minerals (HAIR/SKIN/NAILS PO) Take 1 tablet by mouth daily.     • Probiotic Product (PROBIOTIC DAILY PO) Probiotic     • terazosin (HYTRIN) 1 MG capsule Take 1 capsule by mouth Daily. 90 capsule 3     No facility-administered medications prior to visit.      No orders of the defined types were placed in this encounter.    [unfilled]  There are no discontinued medications.      Return if symptoms worsen or fail to improve.

## 2020-09-25 RX ORDER — MELOXICAM 7.5 MG/1
TABLET ORAL
Qty: 30 TABLET | Refills: 11 | Status: SHIPPED | OUTPATIENT
Start: 2020-09-25 | End: 2021-04-22

## 2020-10-05 ENCOUNTER — TELEMEDICINE (OUTPATIENT)
Dept: FAMILY MEDICINE CLINIC | Facility: TELEHEALTH | Age: 55
End: 2020-10-05

## 2020-10-05 DIAGNOSIS — R39.89 SUSPECTED UTI: Primary | ICD-10-CM

## 2020-10-05 PROCEDURE — 99213 OFFICE O/P EST LOW 20 MIN: CPT | Performed by: NURSE PRACTITIONER

## 2020-10-05 RX ORDER — CIPROFLOXACIN 500 MG/1
500 TABLET, FILM COATED ORAL 2 TIMES DAILY
Qty: 10 TABLET | Refills: 0 | Status: SHIPPED | OUTPATIENT
Start: 2020-10-05 | End: 2020-10-10

## 2020-10-05 NOTE — PROGRESS NOTES
Subjective   Christine Villagomez is a 54 y.o. female.     The patient has had these symptoms for two weeks. She has had a low grade fever and fast heart rate. She saw her primary care provider for these symptoms via telehealth and it was suspected these symptoms were related to a viral illness or as a response to recent influenza vaccination and was told to monitor. She was starting to feel better, two days ago she started having frequency of urination. She has a history of UTIs and kidney stones. She also has urgency and feeling of incomplete bladder emptying. She has not noticed any strange urine color or odor, but she hasn't been paying much attention to it. She has been laying in bed a lot, fatigued and achy. The patient has been self-isolating due to her fever and body aches in light of the Covid-19 pandemic.    Urinary Tract Infection   This is a new problem. The current episode started in the past 7 days. The problem occurs every urination. The problem has been unchanged. The quality of the pain is described as aching. The patient is experiencing no pain. Maximum temperature: .1. The fever has been present for 1 - 2 days. History of pyelonephritis: unsure. Associated symptoms include chills, frequency, hesitancy and urgency. Pertinent negatives include no flank pain, nausea, sweats or vomiting. She has tried acetaminophen for the symptoms. The treatment provided mild relief. Her past medical history is significant for kidney stones.        The following portions of the patient's history were reviewed and updated as appropriate: allergies, current medications, past family history, past medical history, past social history, past surgical history and problem list.    Review of Systems   Constitutional: Positive for chills, diaphoresis, fatigue and fever.   Gastrointestinal: Negative for abdominal pain, nausea and vomiting.   Genitourinary: Positive for decreased urine volume, frequency, hesitancy and urgency.  Negative for dysuria and flank pain.   Musculoskeletal: Positive for arthralgias.       Objective   Physical Exam  Constitutional:       General: She is not in acute distress.     Appearance: She is well-developed. She is not diaphoretic.   Pulmonary:      Effort: Pulmonary effort is normal.   Neurological:      Mental Status: She is alert and oriented to person, place, and time.   Psychiatric:         Behavior: Behavior normal.           Assessment/Plan   Diagnoses and all orders for this visit:    Suspected UTI    Other orders  -     ciprofloxacin (CIPRO) 500 MG tablet; Take 1 tablet by mouth 2 (Two) Times a Day for 5 days.             I spent 15 minutes in the patient's chart for this video visit.

## 2020-10-05 NOTE — PATIENT INSTRUCTIONS
Increase fluid intake  Avoid caffeine and carbonation  If you develop fever or mid-back pain, go to ER  If symptoms worsen or do not improve in 48 hours, go to urgent care   Please avoid taking ciprofloxacin at the same time as your hair, skin and nails vitamin and this can decrease the efficiency of the ciprofloxacin. Also, please note the black box warning with ciprofloxacin (WebMD):       Urinary Tract Infection, Adult  A urinary tract infection (UTI) is an infection of any part of the urinary tract. The urinary tract includes:  · The kidneys.  · The ureters.  · The bladder.  · The urethra.  These organs make, store, and get rid of pee (urine) in the body.  What are the causes?  This is caused by germs (bacteria) in your genital area. These germs grow and cause swelling (inflammation) of your urinary tract.  What increases the risk?  You are more likely to develop this condition if:  · You have a small, thin tube (catheter) to drain pee.  · You cannot control when you pee or poop (incontinence).  · You are female, and:  ? You use these methods to prevent pregnancy:  ? A medicine that kills sperm (spermicide).  ? A device that blocks sperm (diaphragm).  ? You have low levels of a female hormone (estrogen).  ? You are pregnant.  · You have genes that add to your risk.  · You are sexually active.  · You take antibiotic medicines.  · You have trouble peeing because of:  ? A prostate that is bigger than normal, if you are male.  ? A blockage in the part of your body that drains pee from the bladder (urethra).  ? A kidney stone.  ? A nerve condition that affects your bladder (neurogenic bladder).  ? Not getting enough to drink.  ? Not peeing often enough.  · You have other conditions, such as:  ? Diabetes.  ? A weak disease-fighting system (immune system).  ? Sickle cell disease.  ? Gout.  ? Injury of the spine.  What are the signs or symptoms?  Symptoms of this condition include:  · Needing to pee right away  (urgently).  · Peeing often.  · Peeing small amounts often.  · Pain or burning when peeing.  · Blood in the pee.  · Pee that smells bad or not like normal.  · Trouble peeing.  · Pee that is cloudy.  · Fluid coming from the vagina, if you are female.  · Pain in the belly or lower back.  Other symptoms include:  · Throwing up (vomiting).  · No urge to eat.  · Feeling mixed up (confused).  · Being tired and grouchy (irritable).  · A fever.  · Watery poop (diarrhea).  How is this treated?  This condition may be treated with:  · Antibiotic medicine.  · Other medicines.  · Drinking enough water.  Follow these instructions at home:    Medicines  · Take over-the-counter and prescription medicines only as told by your doctor.  · If you were prescribed an antibiotic medicine, take it as told by your doctor. Do not stop taking it even if you start to feel better.  General instructions  · Make sure you:  ? Pee until your bladder is empty.  ? Do not hold pee for a long time.  ? Empty your bladder after sex.  ? Wipe from front to back after pooping if you are a female. Use each tissue one time when you wipe.  · Drink enough fluid to keep your pee pale yellow.  · Keep all follow-up visits as told by your doctor. This is important.  Contact a doctor if:  · You do not get better after 1-2 days.  · Your symptoms go away and then come back.  Get help right away if:  · You have very bad back pain.  · You have very bad pain in your lower belly.  · You have a fever.  · You are sick to your stomach (nauseous).  · You are throwing up.  Summary  · A urinary tract infection (UTI) is an infection of any part of the urinary tract.  · This condition is caused by germs in your genital area.  · There are many risk factors for a UTI. These include having a small, thin tube to drain pee and not being able to control when you pee or poop.  · Treatment includes antibiotic medicines for germs.  · Drink enough fluid to keep your pee pale yellow.  This  information is not intended to replace advice given to you by your health care provider. Make sure you discuss any questions you have with your health care provider.  Document Released: 06/05/2009 Document Revised: 12/05/2019 Document Reviewed: 06/27/2019  [employer] Patient Education © 2020 [employer]    Ciprofloxacin tablets  What is this medicine?  CIPROFLOXACIN (sip cornelius FLOX a sin) is a quinolone antibiotic. It is used to treat certain kinds of bacterial infections. It will not work for colds, flu, or other viral infections.  This medicine may be used for other purposes; ask your health care provider or pharmacist if you have questions.  COMMON BRAND NAME(S): Cipro  What should I tell my health care provider before I take this medicine?  They need to know if you have any of these conditions:  · bone problems  · diabetes  · heart disease  · high blood pressure  · history of irregular heartbeat  · history of low levels of potassium in the blood  · joint problems  · kidney disease  · liver disease  · mental illness  · myasthenia gravis  · seizures  · tendon problems  · tingling of the fingers or toes, or other nerve disorder  · an unusual or allergic reaction to ciprofloxacin, other antibiotics or medicines, foods, dyes, or preservatives  · pregnant or trying to get pregnant  · breast-feeding  How should I use this medicine?  Take this medicine by mouth with a full glass of water. Follow the directions on the prescription label. You can take it with or without food. If it upsets your stomach, take it with food. Take your medicine at regular intervals. Do not take your medicine more often than directed. Take all of your medicine as directed even if you think you are better. Do not skip doses or stop your medicine early.  Avoid antacids, aluminum, calcium, iron, magnesium, and zinc products for 6 hours before and 2 hours after taking a dose of this medicine.  A special MedGuide will be given to you by the pharmacist  with each prescription and refill. Be sure to read this information carefully each time.  Talk to your pediatrician regarding the use of this medicine in children. Special care may be needed.  Overdosage: If you think you have taken too much of this medicine contact a poison control center or emergency room at once.  NOTE: This medicine is only for you. Do not share this medicine with others.  What if I miss a dose?  If you miss a dose, take it as soon as you can. If it is almost time for your next dose, take only that dose. Do not take double or extra doses.  What may interact with this medicine?  Do not take this medicine with any of the following medications:  · cisapride  · dronedarone  · flibanserin  · lomitapide  · pimozide  · thioridazine  · tizanidine  This medicine may also interact with the following medications:  · antacids  · birth control pills  · caffeine  · certain medicines for diabetes, like glipizide, glyburide, or insulin  · certain medicines that treat or prevent blood clots like warfarin  · clozapine  · cyclosporine  · didanosine buffered tablets or powder  · dofetilide  · duloxetine  · lanthanum carbonate  · lidocaine  · methotrexate  · multivitamins  · NSAIDS, medicines for pain and inflammation, like ibuprofen or naproxen  · olanzapine  · omeprazole  · other medicines that prolong the QT interval (cause an abnormal heart rhythm)  · phenytoin  · probenecid  · ropinirole  · sevelamer  · sildenafil  · sucralfate  · theophylline  · ziprasidone  · zolpidem  This list may not describe all possible interactions. Give your health care provider a list of all the medicines, herbs, non-prescription drugs, or dietary supplements you use. Also tell them if you smoke, drink alcohol, or use illegal drugs. Some items may interact with your medicine.  What should I watch for while using this medicine?  Tell your doctor or health care provider if your symptoms do not start to get better or if they get  worse.  This medicine may cause serious skin reactions. They can happen weeks to months after starting the medicine. Contact your health care provider right away if you notice fevers or flu-like symptoms with a rash. The rash may be red or purple and then turn into blisters or peeling of the skin. Or, you might notice a red rash with swelling of the face, lips or lymph nodes in your neck or under your arms.  Do not treat diarrhea with over the counter products. Contact your doctor if you have diarrhea that lasts more than 2 days or if it is severe and watery.  Check with your doctor or health care provider if you get an attack of severe diarrhea, nausea and vomiting, or if you sweat a lot. The loss of too much body fluid can make it dangerous for you to take this medicine.  This medicine may increase blood sugar. Ask your health care provider if changes in diet or medicines are needed if you have diabetes.  You may get drowsy or dizzy. Do not drive, use machinery, or do anything that needs mental alertness until you know how this medicine affects you. Do not sit or stand up quickly, especially if you are an older patient. This reduces the risk of dizzy or fainting spells.  This medicine can make you more sensitive to the sun. Keep out of the sun. If you cannot avoid being in the sun, wear protective clothing and use sunscreen. Do not use sun lamps or tanning beds/booths.  What side effects may I notice from receiving this medicine?  Side effects that you should report to your doctor or health care professional as soon as possible:  · allergic reactions like skin rash or hives, swelling of the face, lips, or tongue  · anxious  · bloody or watery diarrhea  · confusion  · depressed mood  · fast, irregular heartbeat  · fever  · hallucination, loss of contact with reality  · joint, muscle, or tendon pain or swelling  · loss of memory  · pain, tingling, numbness in the hands or feet  · redness, blistering, peeling or  loosening of the skin, including inside the mouth  · seizures  · signs and symptoms of aortic dissection such as sudden chest, stomach, or back pain  · signs and symptoms of high blood sugar such as being more thirsty or hungry or having to urinate more than normal. You may also feel very tired or have blurry vision.  · signs and symptoms of liver injury like dark yellow or brown urine; general ill feeling or flu-like symptoms; light-colored stools; loss of appetite; nausea; right upper belly pain; unusually weak or tired; yellowing of the eyes or skin  · signs and symptoms of low blood sugar such as feeling anxious; confusion; dizziness; increased hunger; unusually weak or tired; sweating; shakiness; cold; irritable; headache; blurred vision; fast heartbeat; loss of consciousness; pale skin  · suicidal thoughts or other mood changes  · sunburn  · unusually weak or tired  Side effects that usually do not require medical attention (report to your doctor or health care professional if they continue or are bothersome):  · dry mouth  · headache  · nausea  · trouble sleeping  This list may not describe all possible side effects. Call your doctor for medical advice about side effects. You may report side effects to FDA at 6-197-ERB-1645.  Where should I keep my medicine?  Keep out of the reach of children.  Store at room temperature below 30 degrees C (86 degrees F). Keep container tightly closed. Throw away any unused medicine after the expiration date.  NOTE: This sheet is a summary. It may not cover all possible information. If you have questions about this medicine, talk to your doctor, pharmacist, or health care provider.  © 2020 [employer]vier/Gold Standard (2020-03-19 11:26:08)

## 2020-10-06 ENCOUNTER — OFFICE VISIT (OUTPATIENT)
Dept: SLEEP MEDICINE | Facility: HOSPITAL | Age: 55
End: 2020-10-06

## 2020-10-06 VITALS
SYSTOLIC BLOOD PRESSURE: 121 MMHG | BODY MASS INDEX: 30.21 KG/M2 | WEIGHT: 211 LBS | HEIGHT: 70 IN | DIASTOLIC BLOOD PRESSURE: 77 MMHG | HEART RATE: 105 BPM

## 2020-10-06 DIAGNOSIS — G47.33 OBSTRUCTIVE SLEEP APNEA SYNDROME: Primary | ICD-10-CM

## 2020-10-06 DIAGNOSIS — E03.8 OTHER SPECIFIED HYPOTHYROIDISM: ICD-10-CM

## 2020-10-06 DIAGNOSIS — I10 ESSENTIAL HYPERTENSION: ICD-10-CM

## 2020-10-06 PROCEDURE — G0463 HOSPITAL OUTPT CLINIC VISIT: HCPCS

## 2020-10-06 NOTE — PROGRESS NOTES
PULMONARY  CONSULT NOTE      PATIENT IDENTIFICATION:  Name: Christine Villagomez  Age: 54 y.o.  Sex: female  :  1965  MRN: [id number]    DATE OF CONSULTATION:  10/6/2020   Referring Physician: No admitting provider for patient encounter.                  CHIEF COMPLAINT: Obstructive sleep apnea    History of Present Illness:   Christine Villagomez is a 54 y.o. female Pt on CPAP feeling better more energy especially the night he use it more than 4 hours, no sleepiness no fatigue no tiredness, no mask irritation no dryness, compliance report reviewed with pt AHI< 5 with good usage.       Review of Systems:   Constitutional: negative   Eyes: negative   ENT/oropharynx: negative   Cardiovascular: negative   Respiratory: negative   Gastrointestinal: negative   Genitourinary: negative   Neurological: negative   Musculoskeletal: negative   Integument/breast: negative   Endocrine: negative   Allergic/Immunologic: negative     Past Medical History:  Past Medical History:   Diagnosis Date   • Bipolar disorder (CMS/HCC)    • Depression    • Hyperlipidemia    • Hypothyroidism    • Kidney stone        Past Surgical History:  Past Surgical History:   Procedure Laterality Date   • BLADDER SUSPENSION      TVT   • KIDNEY STONE SURGERY     • KNEE MENISCAL REPAIR Bilateral    • KNEE SURGERY          Family History:  Family History   Problem Relation Age of Onset   • Cancer Mother         lung   • Heart attack Father    • Colon cancer Maternal Grandmother    • Breast cancer Neg Hx    • Ovarian cancer Neg Hx    • Pulmonary embolism Neg Hx    • Deep vein thrombosis Neg Hx         Social History:   Social History     Tobacco Use   • Smoking status: Never Smoker   • Smokeless tobacco: Never Used   Substance Use Topics   • Alcohol use: No        Allergies:  Allergies   Allergen Reactions   • Sulfa Antibiotics Rash     fever       Home Meds:  (Not in a hospital admission)      Objective:    Vitals Ranges:   Heart Rate:  [105] 105  BP:  (121)/(77) 121/77  Body mass index is 30.28 kg/m².     Exam:  General Appearance:  WDWN    HEENT:   without obvious abnormality,  Conjunctiva/corneas clear,  Normal external ear canals, no drainage    Clear orsalmucosa,  Mallampati score 3    Neck:  Supple, symmetrical, trachea midline. No JVD.  Lungs:   Bilateral basal rhonchi bilaterally, respirations unlabored symmetrical wall movement.    Chest wall:  No tenderness or deformity.    Heart:  Regular rate and rhythm, S1 and S2 normal.  Extremities: Trace edema no clubbing or Cyanosis        Data Review:  All labs (24hrs): No results found for this or any previous visit (from the past 24 hour(s)).     Imaging:  Mammo Screening Digital Tomosynthesis Bilateral With CAD  Narrative: EXAM, 06/08/2020:  1. Bilateral digital screening mammogram with CAD.  2. Bilateral digital screening breast tomosynthesis.     INDICATION:  Routine Screening  Family History: None     TECHNIQUE:  Bilateral digital screening mammogram images were obtained including  digital breast tomosynthesis and CAD review.     COMPARISON:  *  Screening mammogram, 02/21/2019, 02/19/2018     FINDINGS:  The breasts are almost entirely fatty.   There is a stable 6 motor nodule in the right breast laterally. There  are no masses or abnormal calcifications.     Impression: No change, no evidence of malignancy     BI-RADS CATEGORY 2: Benign Findings.  Recommend routine screening mammogram        Women over the age of 40 undergoing screening mammography are entered  into a reminder system with target due date for the next mammogram.     This report was finalized on 6/8/2020 8:20 AM by Dr. Richard Osorio MD.     DEXA Bone Density Axial  Narrative: DXA BONE MINERAL DENSITY MEASUREMENT, 06/08/2020     INDICATION:  Post menopausal Screening     COMPARISON:  none     TECHNIQUE:  DXA bone mineral density measurements were obtained at the lumbar spine  and left hip.     FINDINGS:  Left Hip neckBMD 0.82 g/cm2.  T score  -0.3,  Z score 0.8     Lspine  L1-L4   BMD  1.14 g/cm2.   T score 0.9,  Z score  2.0     Comment:none        Impression: Normal bone mineral density     This report was finalized on 6/8/2020 7:49 AM by Dr. Richard Osorio MD.          ASSESSMENT:  Diagnoses and all orders for this visit:    Obstructive sleep apnea syndrome    Essential hypertension    Other specified hypothyroidism        PLAN:   This patient with obstructive sleep apnea, compliance is improved. Encourage to use it more frequent, I re-emphasized on pt the long and short term benefit of treating SANA.     Treating SANA will improve BP control    It is recommended to keep thyroid function optimized to improve quality of sleep    Jone Seth MD. D, ABSM.  10/6/2020  10:53 EDT

## 2020-11-20 RX ORDER — LEVOTHYROXINE SODIUM 0.05 MG/1
50 TABLET ORAL DAILY
Qty: 90 TABLET | Refills: 3 | Status: SHIPPED | OUTPATIENT
Start: 2020-11-20 | End: 2021-11-01

## 2020-11-28 DIAGNOSIS — E78.2 MIXED HYPERLIPIDEMIA: ICD-10-CM

## 2020-11-30 RX ORDER — ATORVASTATIN CALCIUM 20 MG/1
TABLET, FILM COATED ORAL
Qty: 90 TABLET | Refills: 3 | Status: SHIPPED | OUTPATIENT
Start: 2020-11-30 | End: 2021-04-09 | Stop reason: SDUPTHER

## 2020-12-18 RX ORDER — BUPROPION HYDROCHLORIDE 300 MG/1
TABLET ORAL
Qty: 90 TABLET | Refills: 3 | Status: SHIPPED | OUTPATIENT
Start: 2020-12-18 | End: 2021-11-29

## 2021-01-08 DIAGNOSIS — F98.8 ATTENTION DEFICIT DISORDER (ADD) IN ADULT: ICD-10-CM

## 2021-01-11 RX ORDER — DEXTROAMPHETAMINE SACCHARATE, AMPHETAMINE ASPARTATE, DEXTROAMPHETAMINE SULFATE AND AMPHETAMINE SULFATE 2.5; 2.5; 2.5; 2.5 MG/1; MG/1; MG/1; MG/1
10 TABLET ORAL 3 TIMES DAILY
Qty: 90 TABLET | Refills: 0 | Status: SHIPPED | OUTPATIENT
Start: 2021-01-11 | End: 2021-07-27 | Stop reason: SDUPTHER

## 2021-01-27 ENCOUNTER — TELEPHONE (OUTPATIENT)
Dept: INTERNAL MEDICINE | Facility: CLINIC | Age: 56
End: 2021-01-27

## 2021-01-27 NOTE — TELEPHONE ENCOUNTER
----- Message from Shania Petit CMA sent at 1/12/2021  3:47 PM EST -----  Regarding: FW: Non-Urgent Medical Question  Contact: [phone]    ----- Message -----  From: Christine Villagomez  Sent: 1/12/2021   3:38 PM EST  To: [facility] Clinical Pool  Subject: Non-Urgent Medical Question                      Ps.  To my prior note-   The [facility] is one is considered a long term care, if that info helps in any way.

## 2021-01-27 NOTE — TELEPHONE ENCOUNTER
Spoke with Ms. Christine Villagomez regarding a question about the Covid-19 vaccine as pertains to Elie Wagner 05/13/1992. Ms. Villagomez verbalized understanding regarding the vaccine as it pertains to the general public and also informed per PCP that [employer] is only giving the vaccine to Employees at this time. And, to keep ears open with the Health Department to see when they'll start vaccinating the general public.

## 2021-03-01 ENCOUNTER — OFFICE VISIT (OUTPATIENT)
Dept: INTERNAL MEDICINE | Facility: CLINIC | Age: 56
End: 2021-03-01

## 2021-03-01 VITALS
TEMPERATURE: 97.7 F | HEIGHT: 70 IN | BODY MASS INDEX: 33.47 KG/M2 | HEART RATE: 97 BPM | RESPIRATION RATE: 16 BRPM | WEIGHT: 233.8 LBS | OXYGEN SATURATION: 97 % | DIASTOLIC BLOOD PRESSURE: 78 MMHG | SYSTOLIC BLOOD PRESSURE: 140 MMHG

## 2021-03-01 DIAGNOSIS — G89.29 CHRONIC PAIN OF BOTH KNEES: ICD-10-CM

## 2021-03-01 DIAGNOSIS — E03.8 OTHER SPECIFIED HYPOTHYROIDISM: ICD-10-CM

## 2021-03-01 DIAGNOSIS — Z00.00 HEALTHCARE MAINTENANCE: ICD-10-CM

## 2021-03-01 DIAGNOSIS — M25.561 CHRONIC PAIN OF BOTH KNEES: ICD-10-CM

## 2021-03-01 DIAGNOSIS — F32.A DEPRESSION, UNSPECIFIED DEPRESSION TYPE: ICD-10-CM

## 2021-03-01 DIAGNOSIS — F98.8 ATTENTION DEFICIT DISORDER (ADD) IN ADULT: Primary | ICD-10-CM

## 2021-03-01 DIAGNOSIS — E78.2 MIXED HYPERLIPIDEMIA: ICD-10-CM

## 2021-03-01 DIAGNOSIS — M25.562 CHRONIC PAIN OF BOTH KNEES: ICD-10-CM

## 2021-03-01 DIAGNOSIS — I10 ESSENTIAL HYPERTENSION: ICD-10-CM

## 2021-03-01 PROCEDURE — 99214 OFFICE O/P EST MOD 30 MIN: CPT | Performed by: INTERNAL MEDICINE

## 2021-03-01 NOTE — PROGRESS NOTES
Christine Villagomez is a 55 y.o. female, who presents with a chief complaint of   Chief Complaint   Patient presents with   • ADHD           HPI   Pt here for follow up    Pt with chronic knee pain.  She has had to have steroid injections and gel injections but the pain has continued.  She hasnt been to the doctor in person recently bc of covid.  She is trying to delay knee replacement.  She takes nsaids chronically and this helps some but she still has lots of breakthrough pain.  She had been going to the [facility] to do water aerobics but then covid.  She would like to have steroid injections in her knees today    Hypothyroidism - no hair/skin changes    HTN  - currently managing with diet/exercise.      HLE - on statin.  No cramps or myalgias    adhd - pt has adderall and takes this most days.  She uses it more when she has something she has to do.    Anxiety/depression - on wlelbutrin and buspar.     Pt recently got her 2nd covid shot.     The following portions of the patient's history were reviewed and updated as appropriate: allergies, current medications, past family history, past medical history, past social history, past surgical history and problem list.    Allergies: Sulfa antibiotics    Review of Systems   Constitutional: Negative.    HENT: Negative.    Eyes: Negative.    Respiratory: Negative.    Cardiovascular: Negative.    Gastrointestinal: Negative.    Endocrine: Negative.    Genitourinary: Negative.    Musculoskeletal: Positive for arthralgias.        Knee pain   Skin: Negative.    Allergic/Immunologic: Negative.    Neurological: Negative.    Hematological: Negative.    Psychiatric/Behavioral: Negative.    All other systems reviewed and are negative.            Wt Readings from Last 3 Encounters:   03/01/21 106 kg (233 lb 12.8 oz)   10/06/20 95.7 kg (211 lb)   08/13/20 99.3 kg (219 lb)     Temp Readings from Last 3 Encounters:   03/01/21 97.7 °F (36.5 °C) (Temporal)   02/07/20 97.8 °F (36.6 °C) (Oral)    02/06/20 98.1 °F (36.7 °C) (Oral)     BP Readings from Last 3 Encounters:   03/01/21 140/78   10/06/20 121/77   03/09/20 140/80     Pulse Readings from Last 3 Encounters:   03/01/21 97   10/06/20 105   02/07/20 94     Body mass index is 33.55 kg/m².  SpO2 Readings from Last 3 Encounters:   03/01/21 97%   02/07/20 98%   02/06/20 98%          Physical Exam  Vitals signs and nursing note reviewed.   Constitutional:       General: She is not in acute distress.     Appearance: She is well-developed.   HENT:      Head: Normocephalic and atraumatic.      Right Ear: External ear normal.      Left Ear: External ear normal.      Nose: Nose normal.   Eyes:      Conjunctiva/sclera: Conjunctivae normal.      Pupils: Pupils are equal, round, and reactive to light.   Neck:      Musculoskeletal: Normal range of motion and neck supple.   Cardiovascular:      Rate and Rhythm: Normal rate and regular rhythm.      Heart sounds: Normal heart sounds.   Pulmonary:      Effort: Pulmonary effort is normal. No respiratory distress.      Breath sounds: Normal breath sounds. No wheezing.   Musculoskeletal: Normal range of motion.      Comments: Crepitus of knees bilat   Skin:     General: Skin is warm and dry.      Capillary Refill: Capillary refill takes less than 2 seconds.   Neurological:      General: No focal deficit present.      Mental Status: She is alert and oriented to person, place, and time.   Psychiatric:         Behavior: Behavior normal.         Thought Content: Thought content normal.         Judgment: Judgment normal.             Results for orders placed or performed in visit on 03/09/20   POC Urinalysis Dipstick    Specimen: Urine   Result Value Ref Range    Color Yellow Yellow, Straw, Dark Yellow, Grace    Clarity, UA Clear Clear    Glucose, UA Negative Negative, 1000 mg/dL (3+) mg/dL    Bilirubin Negative Negative    Ketones, UA Negative Negative    Specific Gravity  1.005 1.005 - 1.030    Blood, UA Negative Negative     pH, Urine 5.0 5.0 - 8.0    Protein, POC Negative Negative mg/dL    Urobilinogen, UA Normal Normal    Leukocytes Negative Negative    Nitrite, UA Negative Negative   Pap IG, HPV-hr    Specimen: Cervix; ThinPrep Vial   Result Value Ref Range    Diagnosis Comment     Specimen adequacy: Comment     Clinician Provided ICD-10: Comment     Performed by: Comment     . .     Note: Comment     Method: Comment     HPV, high-risk Negative Negative     Result Review :                  Assessment and Plan    Diagnoses and all orders for this visit:    1. Attention deficit disorder (ADD) in adult (Primary)    2. Mixed hyperlipidemia  -     Comprehensive Metabolic Panel; Future    3. Depression, unspecified depression type  -     Comprehensive Metabolic Panel; Future  -     CBC & Differential; Future  -     T4, Free; Future  -     TSH; Future  -     Lipid Panel With LDL / HDL Ratio; Future  -     Hemoglobin A1c; Future    4. Chronic pain of both knees - would hold off on injections of knees today.  Will schedule appt for steroid injection 4 weeks out from last covid shot     5. Healthcare maintenance  -     Comprehensive Metabolic Panel; Future  -     CBC & Differential; Future  -     T4, Free; Future  -     TSH; Future  -     Lipid Panel With LDL / HDL Ratio; Future  -     Hemoglobin A1c; Future    6. Other specified hypothyroidism  -     T4, Free; Future  -     TSH; Future    7. Essential hypertension  -     Comprehensive Metabolic Panel; Future  -     CBC & Differential; Future  -     T4, Free; Future  -     TSH; Future           Continue current medications.  Encouraged healthy diet/exercise.  Lab orders placed  Pt to call sooner if any other issues arise.      Outpatient Medications Prior to Visit   Medication Sig Dispense Refill   • amphetamine-dextroamphetamine (ADDERALL) 10 MG tablet Take 1 tablet by mouth 3 (Three) Times a Day. 90 tablet 0   • atorvastatin (LIPITOR) 20 MG tablet TAKE 1 TABLET DAILY 90 tablet 3   •  buPROPion XL (WELLBUTRIN XL) 300 MG 24 hr tablet TAKE 1 TABLET EVERY MORNING 90 tablet 3   • busPIRone (BUSPAR) 30 MG tablet Take 1.5 tablets by mouth Daily. 135 tablet 3   • cetirizine (zyrTEC) 10 MG tablet Take 10 mg by mouth Daily.     • Cholecalciferol (VITAMIN D) 1000 UNITS tablet Take 1 tablet by mouth daily.     • Coenzyme Q10 (CO Q-10) 100 MG capsule Co Q-10     • FIBER PO Take  by mouth.     • fluticasone (FLONASE) 50 MCG/ACT nasal spray 2 sprays into the nostril(s) as directed by provider Daily. 1 bottle 5   • Green Tea, Camillia sinensis, (GREEN TEA EXTRACT PO) Take  by mouth.     • levothyroxine (SYNTHROID, LEVOTHROID) 50 MCG tablet Take 1 tablet by mouth Daily. 90 tablet 3   • meloxicam (MOBIC) 7.5 MG tablet TAKE 1 TABLET DAILY 30 tablet 11   • Multiple Vitamin (MULTI-VITAMIN DAILY PO) Multi Vitamin     • Multiple Vitamins-Minerals (HAIR/SKIN/NAILS PO) Take 1 tablet by mouth daily.     • Probiotic Product (PROBIOTIC DAILY PO) Probiotic     • terazosin (HYTRIN) 1 MG capsule Take 1 capsule by mouth Daily. 90 capsule 3     No facility-administered medications prior to visit.      No orders of the defined types were placed in this encounter.    [unfilled]  There are no discontinued medications.      Return in about 4 weeks (around 3/29/2021) for Recheck, labs.    Patient was given instructions and counseling regarding her condition or for health maintenance advice. Please see specific information pulled into the AVS if appropriate.

## 2021-03-15 ENCOUNTER — OFFICE VISIT (OUTPATIENT)
Dept: OBSTETRICS AND GYNECOLOGY | Facility: CLINIC | Age: 56
End: 2021-03-15

## 2021-03-15 VITALS
DIASTOLIC BLOOD PRESSURE: 72 MMHG | WEIGHT: 229.4 LBS | BODY MASS INDEX: 38.22 KG/M2 | HEIGHT: 65 IN | SYSTOLIC BLOOD PRESSURE: 124 MMHG

## 2021-03-15 DIAGNOSIS — Z13.9 SCREENING FOR CONDITION: Primary | ICD-10-CM

## 2021-03-15 DIAGNOSIS — Z01.419 ENCOUNTER FOR GYNECOLOGICAL EXAMINATION WITHOUT ABNORMAL FINDING: ICD-10-CM

## 2021-03-15 PROCEDURE — 99396 PREV VISIT EST AGE 40-64: CPT | Performed by: OBSTETRICS & GYNECOLOGY

## 2021-03-15 PROCEDURE — 81002 URINALYSIS NONAUTO W/O SCOPE: CPT | Performed by: OBSTETRICS & GYNECOLOGY

## 2021-03-15 RX ORDER — UBIDECARENONE 30 MG
1 CAPSULE ORAL
COMMUNITY

## 2021-03-15 RX ORDER — CHLORAL HYDRATE 500 MG
CAPSULE ORAL
COMMUNITY
End: 2021-05-03 | Stop reason: SDUPTHER

## 2021-03-15 RX ORDER — ATORVASTATIN CALCIUM 10 MG/1
10 TABLET, FILM COATED ORAL DAILY
COMMUNITY
End: 2021-07-27 | Stop reason: SDUPTHER

## 2021-03-15 RX ORDER — DEXTROAMPHETAMINE SACCHARATE, AMPHETAMINE ASPARTATE MONOHYDRATE, DEXTROAMPHETAMINE SULFATE AND AMPHETAMINE SULFATE 2.5; 2.5; 2.5; 2.5 MG/1; MG/1; MG/1; MG/1
CAPSULE, EXTENDED RELEASE ORAL
COMMUNITY
End: 2021-04-22

## 2021-03-15 NOTE — PROGRESS NOTES
GYN Annual Exam     CC- Here for annual exam.     Christine Villagomez is a 55 y.o. female established patient who presents for annual well woman exam. No  VB. She plans B knee replacements this year.     OB History        1    Para   1    Term   1            AB        Living   1       SAB        TAB        Ectopic        Molar        Multiple        Live Births              Obstetric Comments   1              Menarche:12  Menopause:52  HRT:none  Current contraception: vasectomy  History of abnormal Pap smear: no  History of abnormal mammogram: no  Family history of uterine, colon or ovarian cancer: yes - MGM colon age 60  Family history of breast cancer: no  STD's: none  Last pap smear: 3/2020- normal pap/HPV  CAROLE; none      Health Maintenance   Topic Date Due   • ANNUAL PHYSICAL  Never done   • Pneumococcal Vaccine 0-64 (1 of 1 - PPSV23) Never done   • Hepatitis B (1 of 3 - Risk 3-dose series) Never done   • HEPATITIS C SCREENING  Never done   • HEMOGLOBIN A1C  2020   • LIPID PANEL  10/02/2020   • Annual Gynecologic Pelvic and Breast Exam  03/10/2021   • MAMMOGRAM  2022   • PAP SMEAR  2023   • COLONOSCOPY  2026   • TDAP/TD VACCINES (3 - Td) 10/18/2027   • COVID-19 Vaccine  Completed   • INFLUENZA VACCINE  Completed   • ZOSTER VACCINE  Completed   • MENINGOCOCCAL VACCINE  Aged Out   • DIABETIC FOOT EXAM  Discontinued   • DIABETIC EYE EXAM  Discontinued   • URINE MICROALBUMIN  Discontinued       Past Medical History:   Diagnosis Date   • Bipolar disorder (CMS/HCC)    • Depression    • Hyperlipidemia    • Hypothyroidism    • Kidney stone        Past Surgical History:   Procedure Laterality Date   • BLADDER SUSPENSION      TVT   • KIDNEY STONE SURGERY     • KNEE MENISCAL REPAIR Bilateral    • KNEE SURGERY           Current Outpatient Medications:   •  amphetamine-dextroamphetamine (ADDERALL) 10 MG tablet, Take 1 tablet by mouth 3 (Three) Times a Day., Disp: 90 tablet, Rfl: 0  •   amphetamine-dextroamphetamine XR (ADDERALL XR) 10 MG 24 hr capsule, , Disp: , Rfl:   •  atorvastatin (LIPITOR) 10 MG tablet, Take 10 mg by mouth Daily., Disp: , Rfl:   •  atorvastatin (LIPITOR) 20 MG tablet, TAKE 1 TABLET DAILY, Disp: 90 tablet, Rfl: 3  •  buPROPion XL (WELLBUTRIN XL) 300 MG 24 hr tablet, TAKE 1 TABLET EVERY MORNING, Disp: 90 tablet, Rfl: 3  •  busPIRone (BUSPAR) 30 MG tablet, Take 1.5 tablets by mouth Daily., Disp: 135 tablet, Rfl: 3  •  cetirizine (zyrTEC) 10 MG tablet, Take 10 mg by mouth Daily., Disp: , Rfl:   •  Cholecalciferol (VITAMIN D) 1000 UNITS tablet, Take 1 tablet by mouth daily., Disp: , Rfl:   •  Coenzyme Q10 (CO Q-10) 100 MG capsule, Co Q-10, Disp: , Rfl:   •  FIBER PO, Take  by mouth., Disp: , Rfl:   •  fluticasone (FLONASE) 50 MCG/ACT nasal spray, 2 sprays into the nostril(s) as directed by provider Daily., Disp: 1 bottle, Rfl: 5  •  Green Tea, Camillia sinensis, (GREEN TEA EXTRACT PO), Take  by mouth., Disp: , Rfl:   •  levothyroxine (SYNTHROID, LEVOTHROID) 50 MCG tablet, Take 1 tablet by mouth Daily., Disp: 90 tablet, Rfl: 3  •  meloxicam (MOBIC) 7.5 MG tablet, TAKE 1 TABLET DAILY, Disp: 30 tablet, Rfl: 11  •  Multiple Vitamin (MULTI-VITAMIN DAILY PO), Multi Vitamin, Disp: , Rfl:   •  Multiple Vitamins-Minerals (HAIR/SKIN/NAILS PO), Take 1 tablet by mouth daily., Disp: , Rfl:   •  Omega-3 Fatty Acids (fish oil) 1000 MG capsule capsule, , Disp: , Rfl:   •  Potassium Gluconate 550 MG tablet, , Disp: , Rfl:   •  Probiotic Product (PROBIOTIC DAILY PO), Probiotic, Disp: , Rfl:   •  terazosin (HYTRIN) 1 MG capsule, Take 1 capsule by mouth Daily., Disp: 90 capsule, Rfl: 3  •  Ubiquinol 100 MG capsule, , Disp: , Rfl:     Allergies   Allergen Reactions   • Sulfa Antibiotics Rash     fever  fever  fever       Social History     Tobacco Use   • Smoking status: Never Smoker   • Smokeless tobacco: Never Used   Substance Use Topics   • Alcohol use: No   • Drug use: No       Family History  "  Problem Relation Age of Onset   • Cancer Mother         lung   • Heart attack Father    • Colon cancer Maternal Grandmother    • Breast cancer Neg Hx    • Ovarian cancer Neg Hx    • Pulmonary embolism Neg Hx    • Deep vein thrombosis Neg Hx        Review of Systems   Constitutional: Positive for activity change. Negative for appetite change, fatigue, fever and unexpected weight change.   Respiratory: Negative for cough and shortness of breath.    Cardiovascular: Negative for chest pain and palpitations.   Gastrointestinal: Negative for abdominal distention, abdominal pain, constipation, diarrhea and nausea.   Endocrine: Negative for cold intolerance and heat intolerance.   Genitourinary: Negative for dyspareunia, dysuria, menstrual problem, pelvic pain and vaginal discharge.   Musculoskeletal: Positive for arthralgias and gait problem (knee).   Skin: Positive for rash. Negative for color change.   Neurological: Negative for headaches.   Psychiatric/Behavioral: The patient is not nervous/anxious.        /72   Ht 165.1 cm (65\")   Wt 104 kg (229 lb 6.4 oz)   LMP  (Exact Date)   Breastfeeding No   BMI 38.17 kg/m²     Physical Exam   Constitutional: She is oriented to person, place, and time. She appears well-developed.   HENT:   Head: Normocephalic and atraumatic.   Eyes: Conjunctivae are normal. No scleral icterus.   Neck: No thyromegaly present.   Cardiovascular: Normal rate and regular rhythm.   Pulmonary/Chest: Effort normal and breath sounds normal. Right breast exhibits no inverted nipple, no mass, no nipple discharge, no skin change and no tenderness. Left breast exhibits no inverted nipple, no mass, no nipple discharge, no skin change and no tenderness.   Abdominal: Soft. Normal appearance and bowel sounds are normal. She exhibits no distension and no mass. There is no abdominal tenderness. There is no rebound and no guarding. No hernia.   Genitourinary:    Pelvic exam was performed with patient " supine.   There is no rash, tenderness or lesion on the right labia. There is no rash, tenderness or lesion on the left labia. Cervix exhibits no motion tenderness, no discharge and no friability. Right adnexum displays no mass, no tenderness and no fullness. Left adnexum displays no mass, no tenderness and no fullness.    No vaginal discharge, erythema, tenderness or bleeding.   No erythema, tenderness or bleeding in the vagina.    No foreign body in the vagina.      No signs of injury in the vagina.      Genitourinary Comments: Cystocele noted     Neurological: She is alert and oriented to person, place, and time.   Skin: Skin is warm and dry. No rash noted.   Scattered rash w/excoriations over her extremities   Psychiatric: Her behavior is normal. Mood, judgment and thought content normal.   Nursing note and vitals reviewed.         Assessment/Plan    1) GYN HM: normal pap/HPV 3/2020  SBE demonstrated and encouraged.  2) STD screening: declines Condoms encouraged.  3) Bone health - Weight bearing exercise, dietary calcium recommendations and vitamin D reviewed.   4) Diet and Exercise discussed  5) Smoking Status: No   6) Social: no issue  7)MMG:  UTD 6/2020- B2, sched MMG 6/2021  8) DEXA- UTD 6/2020- normal, repeat 3-5 years  9)C scope- UTD 3/2016, repeat 10 years.   10) Parts of this document have been copied or forwarded from her previous visits and have been reviewed, updated and edited as indicated.    11)I saw the patient with a face mask, gloves and eye protection  The patient herself was masked.  Social distancing was observed as appropriate.  12)Follow up prn and 1 year       Diagnoses and all orders for this visit:    1. Screening for condition (Primary)  -     POC Urinalysis Dipstick  -     Mammo Screening Bilateral With CAD; Future    2. Encounter for gynecological examination without abnormal finding        Trinity Clark MD  3/15/2021  09:21 EDT

## 2021-04-09 ENCOUNTER — OFFICE VISIT (OUTPATIENT)
Dept: INTERNAL MEDICINE | Facility: CLINIC | Age: 56
End: 2021-04-09

## 2021-04-09 VITALS
RESPIRATION RATE: 16 BRPM | HEART RATE: 113 BPM | BODY MASS INDEX: 38.15 KG/M2 | SYSTOLIC BLOOD PRESSURE: 130 MMHG | WEIGHT: 229 LBS | TEMPERATURE: 97.5 F | DIASTOLIC BLOOD PRESSURE: 80 MMHG | OXYGEN SATURATION: 95 % | HEIGHT: 65 IN

## 2021-04-09 DIAGNOSIS — F31.9 BIPOLAR AFFECTIVE DISORDER, REMISSION STATUS UNSPECIFIED (HCC): ICD-10-CM

## 2021-04-09 DIAGNOSIS — E78.00 HYPERCHOLESTEROLEMIA: ICD-10-CM

## 2021-04-09 DIAGNOSIS — Z01.818 PRE-OP EXAMINATION: ICD-10-CM

## 2021-04-09 DIAGNOSIS — F41.9 ANXIETY: ICD-10-CM

## 2021-04-09 DIAGNOSIS — M17.11 PRIMARY OSTEOARTHRITIS OF RIGHT KNEE: Primary | ICD-10-CM

## 2021-04-09 DIAGNOSIS — I10 ESSENTIAL HYPERTENSION: ICD-10-CM

## 2021-04-09 DIAGNOSIS — E03.8 OTHER SPECIFIED HYPOTHYROIDISM: ICD-10-CM

## 2021-04-09 DIAGNOSIS — F98.8 ATTENTION DEFICIT DISORDER (ADD) IN ADULT: ICD-10-CM

## 2021-04-09 PROCEDURE — 99214 OFFICE O/P EST MOD 30 MIN: CPT | Performed by: INTERNAL MEDICINE

## 2021-04-09 NOTE — PROGRESS NOTES
Christine Villagomez is a 55 y.o. female, who presents with a chief complaint of   Chief Complaint   Patient presents with   • surgery release           HPI     Pre-Op Evaluation  Christine Villagomez is a 55 y.o. female who presents to the office today for a preoperative consultation at the request of surgeon dr. Foley who plans on performing Right TKA on April 16. This consultation is requested for the specific conditions prompting preoperative evaluation (i.e. because of potential affect on operative risk): HLD, htn, anxiety, add. Planned anesthesia is epidural. The patient has the following known anesthesia issues: none. Patient has a bleeding risk of: none. Patient does not have objections to receiving blood products if needed.      Pt with chronic knee pain.  She has had to have steroid injections and gel injections but the pain has continued.  She takes nsaids chronically and this helps some but she still has lots of breakthrough pain. No help with PT.  Plan for right TKA as above     Hypothyroidism - no hair/skin changes     HTN  - currently managing with diet/exercise.       HLE - on statin.  No cramps or myalgias     adhd - pt has adderall and takes this intermittently.  She uses it more when she has something she has to do.     Anxiety/depression - on wlelbutrin and buspar.     The following portions of the patient's history were reviewed and updated as appropriate: allergies, current medications, past family history, past medical history, past social history, past surgical history and problem list.        The following portions of the patient's history were reviewed and updated as appropriate: allergies, current medications, past family history, past medical history, past social history, past surgical history and problem list.    Allergies: Sulfa antibiotics    Review of Systems   Constitutional: Negative.    HENT: Negative.    Eyes: Negative.    Respiratory: Negative.    Cardiovascular: Negative.     Gastrointestinal: Negative.    Endocrine: Negative.    Genitourinary: Negative.    Musculoskeletal:        Chronic knee pain   Skin: Negative.    Allergic/Immunologic: Negative.    Neurological: Negative.    Hematological: Negative.    Psychiatric/Behavioral: Negative.    All other systems reviewed and are negative.            Wt Readings from Last 3 Encounters:   04/09/21 104 kg (229 lb)   03/15/21 104 kg (229 lb 6.4 oz)   03/01/21 106 kg (233 lb 12.8 oz)     Temp Readings from Last 3 Encounters:   04/09/21 97.5 °F (36.4 °C) (Temporal)   03/01/21 97.7 °F (36.5 °C) (Temporal)   02/07/20 97.8 °F (36.6 °C) (Oral)     BP Readings from Last 3 Encounters:   04/09/21 130/80   03/15/21 124/72   03/01/21 140/78     Pulse Readings from Last 3 Encounters:   04/09/21 113   03/01/21 97   10/06/20 105     Body mass index is 38.11 kg/m².  SpO2 Readings from Last 3 Encounters:   04/09/21 95%   03/01/21 97%   02/07/20 98%          Physical Exam  Vitals and nursing note reviewed.   Constitutional:       General: She is not in acute distress.     Appearance: She is well-developed.   HENT:      Head: Normocephalic and atraumatic.      Right Ear: External ear normal.      Left Ear: External ear normal.      Nose: Nose normal.   Eyes:      Conjunctiva/sclera: Conjunctivae normal.      Pupils: Pupils are equal, round, and reactive to light.   Cardiovascular:      Rate and Rhythm: Normal rate and regular rhythm.      Heart sounds: Normal heart sounds.   Pulmonary:      Effort: Pulmonary effort is normal. No respiratory distress.      Breath sounds: Normal breath sounds. No wheezing.   Musculoskeletal:         General: Normal range of motion.      Cervical back: Normal range of motion and neck supple.      Comments: Normal gait   Skin:     General: Skin is warm and dry.      Capillary Refill: Capillary refill takes less than 2 seconds.   Neurological:      General: No focal deficit present.      Mental Status: She is alert and oriented  to person, place, and time.   Psychiatric:         Mood and Affect: Mood normal.         Behavior: Behavior normal.         Thought Content: Thought content normal.         Judgment: Judgment normal.         Results for orders placed or performed in visit on 03/15/21   POC Urinalysis Dipstick    Specimen: Urine   Result Value Ref Range    Color Yellow Yellow, Straw, Dark Yellow, Grace    Clarity, UA Clear Clear    Glucose, UA Negative Negative, 1000 mg/dL (3+) mg/dL    Bilirubin Negative Negative    Ketones, UA Negative Negative    Specific Gravity  1.005 1.005 - 1.030    Blood, UA Negative Negative    pH, Urine 5.0 5.0 - 8.0    Protein, POC Negative Negative mg/dL    Urobilinogen, UA Normal Normal    Leukocytes Negative Negative    Nitrite, UA Negative Negative     Result Review :     Common labs    Common Labsle 3/23/21 3/23/21 3/23/21 3/23/21    0856 0856 0856 0856   Glucose 97      BUN 20      Creatinine 0.92      eGFR Non  Am 63      eGFR African Am 77      Sodium 143      Potassium 4.4      Chloride 109 (A)      Calcium 9.3      Total Protein 7.3      Albumin 4.70      Total Bilirubin 0.3      Alkaline Phosphatase 101      AST (SGOT) 19      ALT (SGPT) 19      WBC  4.70     Hemoglobin  13.5     Hematocrit  40.4     Platelets  236     Total Cholesterol   151    Triglycerides   76    HDL Cholesterol   60    LDL Cholesterol    76    Hemoglobin A1C    5.00   (A) Abnormal value       Comments are available for some flowsheets but are not being displayed.           Data reviewed: Radiologic studies cxr and Cardiology studies ecg           Assessment and Plan {CC Problem List  Visit Diagnosis  ROS  Review (Popup)  Health Maintenance  Quality  BestPractice  Medications  SmartSets  SnapShot Encounters  Media :23}   Diagnoses and all orders for this visit:    1. Primary osteoarthritis of right knee (Primary)    2. Pre-op examination    3. Essential hypertension    4. Hypercholesterolemia    5. Other  specified hypothyroidism    6. Anxiety    7. Bipolar affective disorder, remission status unspecified (CMS/[facility])    8. Attention deficit disorder (ADD) in adult    stop nsaids and vitamin supplements at this time.  Cont current daily meds and hold prn meds.     Pre-Op Evaluation Assessment  55 y.o. female with planned surgery right TKA    Known risk factors for perioperative complications: None  htn.    Difficulty with intubation is not anticipated.    Cardiac Risk Estimation: per the Revised Cardiac Risk Index (Circ. 100:1043, 1999), the patient's risk factors for cardiac complications include none, putting her in: RCI RISK CLASS I (0 risk factors, risk of major cardiac compl. appr. 0.5%).    Current medications which may produce withdrawal symptoms if withheld perioperatively: none    Pre-Op Evaluation Plan  1. Preoperative workup as follows labs, ecg, and cxr.  2. Change in medication regimen before surgery: discontinue ASA 14 days before surgery.  3. Prophylaxis for cardiac events with perioperative beta-blockers: should be considered, specific regimen per anesthesia.  4. Invasive hemodynamic monitoring perioperatively: at the discretion of anesthesiologist.  5. Deep vein thrombosis prophylaxis postoperatively:regimen to be chosen by surgical team.  6. Surveillance for postoperative MI with ECG immediately postoperatively and on postoperative days 1 and 2 AND troponin levels 24 hours postoperatively and on day 4 or hospital discharge (whichever comes first): at the discretion of anesthesiologist.  7. Other measures:    Patient's Body mass index is 38.11 kg/m². BMI is above normal parameters. Recommendations include: exercise counseling and nutrition counseling.             Outpatient Medications Prior to Visit   Medication Sig Dispense Refill   • atorvastatin (LIPITOR) 10 MG tablet Take 10 mg by mouth Daily.     • buPROPion XL (WELLBUTRIN XL) 300 MG 24 hr tablet TAKE 1 TABLET EVERY MORNING 90 tablet 3   •  busPIRone (BUSPAR) 30 MG tablet Take 1.5 tablets by mouth Daily. 135 tablet 3   • cetirizine (zyrTEC) 10 MG tablet Take 10 mg by mouth Daily.     • Cholecalciferol (VITAMIN D) 1000 UNITS tablet Take 1 tablet by mouth daily.     • fluticasone (FLONASE) 50 MCG/ACT nasal spray 2 sprays into the nostril(s) as directed by provider Daily. 1 bottle 5   • levothyroxine (SYNTHROID, LEVOTHROID) 50 MCG tablet Take 1 tablet by mouth Daily. 90 tablet 3   • meloxicam (MOBIC) 7.5 MG tablet TAKE 1 TABLET DAILY 30 tablet 11   • terazosin (HYTRIN) 1 MG capsule Take 1 capsule by mouth Daily. 90 capsule 3   • amphetamine-dextroamphetamine (ADDERALL) 10 MG tablet Take 1 tablet by mouth 3 (Three) Times a Day. 90 tablet 0   • amphetamine-dextroamphetamine XR (ADDERALL XR) 10 MG 24 hr capsule      • Coenzyme Q10 (CO Q-10) 100 MG capsule Co Q-10     • FIBER PO Take  by mouth.     • Green Tea, Camillia sinensis, (GREEN TEA EXTRACT PO) Take  by mouth.     • Multiple Vitamin (MULTI-VITAMIN DAILY PO) Multi Vitamin     • Multiple Vitamins-Minerals (HAIR/SKIN/NAILS PO) Take 1 tablet by mouth daily.     • Omega-3 Fatty Acids (fish oil) 1000 MG capsule capsule      • Potassium Gluconate 550 MG tablet      • Probiotic Product (PROBIOTIC DAILY PO) Probiotic     • Ubiquinol 100 MG capsule      • atorvastatin (LIPITOR) 20 MG tablet TAKE 1 TABLET DAILY 90 tablet 3     No facility-administered medications prior to visit.     No orders of the defined types were placed in this encounter.    [unfilled]  Medications Discontinued During This Encounter   Medication Reason   • atorvastatin (LIPITOR) 20 MG tablet Duplicate order         Return if symptoms worsen or fail to improve.    Patient was given instructions and counseling regarding her condition or for health maintenance advice. Please see specific information pulled into the AVS if appropriate.

## 2021-04-22 ENCOUNTER — APPOINTMENT (OUTPATIENT)
Dept: ULTRASOUND IMAGING | Facility: HOSPITAL | Age: 56
End: 2021-04-22

## 2021-04-22 ENCOUNTER — HOSPITAL ENCOUNTER (EMERGENCY)
Facility: HOSPITAL | Age: 56
Discharge: HOME | End: 2021-04-22
Attending: EMERGENCY MEDICINE | Admitting: EMERGENCY MEDICINE

## 2021-04-22 VITALS
BODY MASS INDEX: 33 KG/M2 | DIASTOLIC BLOOD PRESSURE: 74 MMHG | HEIGHT: 70 IN | HEART RATE: 100 BPM | RESPIRATION RATE: 16 BRPM | SYSTOLIC BLOOD PRESSURE: 148 MMHG | OXYGEN SATURATION: 96 % | TEMPERATURE: 97.3 F | WEIGHT: 230.5 LBS

## 2021-04-22 DIAGNOSIS — N93.9 ABNORMAL UTERINE BLEEDING: Primary | ICD-10-CM

## 2021-04-22 DIAGNOSIS — R93.89 ENDOMETRIAL THICKENING ON ULTRASOUND: ICD-10-CM

## 2021-04-22 LAB
ALBUMIN SERPL-MCNC: 3.5 G/DL (ref 3.5–5.2)
ALBUMIN/GLOB SERPL: 1 G/DL
ALP SERPL-CCNC: 89 U/L (ref 39–117)
ALT SERPL W P-5'-P-CCNC: 12 U/L (ref 1–33)
ANION GAP SERPL CALCULATED.3IONS-SCNC: 8.9 MMOL/L (ref 5–15)
APTT PPP: 31.8 SECONDS (ref 24.3–38.1)
AST SERPL-CCNC: 15 U/L (ref 1–32)
BACTERIA UR QL AUTO: ABNORMAL /HPF
BASOPHILS # BLD AUTO: 0.03 10*3/MM3 (ref 0–0.2)
BASOPHILS NFR BLD AUTO: 0.4 % (ref 0–1.5)
BILIRUB SERPL-MCNC: 0.4 MG/DL (ref 0–1.2)
BILIRUB UR QL STRIP: NEGATIVE
BUN SERPL-MCNC: 16 MG/DL (ref 6–20)
BUN/CREAT SERPL: 22.5 (ref 7–25)
CALCIUM SPEC-SCNC: 9.1 MG/DL (ref 8.6–10.5)
CHLORIDE SERPL-SCNC: 104 MMOL/L (ref 98–107)
CLARITY UR: ABNORMAL
CO2 SERPL-SCNC: 24.1 MMOL/L (ref 22–29)
COLOR UR: YELLOW
CREAT SERPL-MCNC: 0.71 MG/DL (ref 0.57–1)
DEPRECATED RDW RBC AUTO: 39.9 FL (ref 37–54)
EOSINOPHIL # BLD AUTO: 0.34 10*3/MM3 (ref 0–0.4)
EOSINOPHIL NFR BLD AUTO: 4.9 % (ref 0.3–6.2)
ERYTHROCYTE [DISTWIDTH] IN BLOOD BY AUTOMATED COUNT: 12.3 % (ref 12.3–15.4)
GFR SERPL CREATININE-BSD FRML MDRD: 85 ML/MIN/1.73
GLOBULIN UR ELPH-MCNC: 3.6 GM/DL
GLUCOSE SERPL-MCNC: 105 MG/DL (ref 65–99)
GLUCOSE UR STRIP-MCNC: NEGATIVE MG/DL
HCT VFR BLD AUTO: 37.5 % (ref 34–46.6)
HGB BLD-MCNC: 11.7 G/DL (ref 12–15.9)
HGB UR QL STRIP.AUTO: ABNORMAL
HYALINE CASTS UR QL AUTO: ABNORMAL /LPF
IMM GRANULOCYTES # BLD AUTO: 0.02 10*3/MM3 (ref 0–0.05)
IMM GRANULOCYTES NFR BLD AUTO: 0.3 % (ref 0–0.5)
INR PPP: 1.11 (ref 0.9–1.1)
KETONES UR QL STRIP: NEGATIVE
LEUKOCYTE ESTERASE UR QL STRIP.AUTO: ABNORMAL
LYMPHOCYTES # BLD AUTO: 0.78 10*3/MM3 (ref 0.7–3.1)
LYMPHOCYTES NFR BLD AUTO: 11.1 % (ref 19.6–45.3)
MCH RBC QN AUTO: 27.5 PG (ref 26.6–33)
MCHC RBC AUTO-ENTMCNC: 31.2 G/DL (ref 31.5–35.7)
MCV RBC AUTO: 88.2 FL (ref 79–97)
MONOCYTES # BLD AUTO: 0.65 10*3/MM3 (ref 0.1–0.9)
MONOCYTES NFR BLD AUTO: 9.3 % (ref 5–12)
NEUTROPHILS NFR BLD AUTO: 5.19 10*3/MM3 (ref 1.7–7)
NEUTROPHILS NFR BLD AUTO: 74 % (ref 42.7–76)
NITRITE UR QL STRIP: NEGATIVE
PH UR STRIP.AUTO: 5.5 [PH] (ref 4.5–8)
PLATELET # BLD AUTO: 298 10*3/MM3 (ref 140–450)
PMV BLD AUTO: 8.7 FL (ref 6–12)
POTASSIUM SERPL-SCNC: 4 MMOL/L (ref 3.5–5.2)
PROT SERPL-MCNC: 7.1 G/DL (ref 6–8.5)
PROT UR QL STRIP: ABNORMAL
PROTHROMBIN TIME: 14 SECONDS (ref 12.1–15)
RBC # BLD AUTO: 4.25 10*6/MM3 (ref 3.77–5.28)
RBC # UR: ABNORMAL /HPF
REF LAB TEST METHOD: ABNORMAL
SODIUM SERPL-SCNC: 137 MMOL/L (ref 136–145)
SP GR UR STRIP: 1.02 (ref 1–1.03)
SQUAMOUS #/AREA URNS HPF: ABNORMAL /HPF
UROBILINOGEN UR QL STRIP: ABNORMAL
WBC # BLD AUTO: 7.01 10*3/MM3 (ref 3.4–10.8)
WBC UR QL AUTO: ABNORMAL /HPF

## 2021-04-22 PROCEDURE — 99284 EMERGENCY DEPT VISIT MOD MDM: CPT

## 2021-04-22 PROCEDURE — 85025 COMPLETE CBC W/AUTO DIFF WBC: CPT | Performed by: EMERGENCY MEDICINE

## 2021-04-22 PROCEDURE — 85610 PROTHROMBIN TIME: CPT | Performed by: EMERGENCY MEDICINE

## 2021-04-22 PROCEDURE — 36415 COLL VENOUS BLD VENIPUNCTURE: CPT

## 2021-04-22 PROCEDURE — 76856 US EXAM PELVIC COMPLETE: CPT

## 2021-04-22 PROCEDURE — 99283 EMERGENCY DEPT VISIT LOW MDM: CPT | Performed by: EMERGENCY MEDICINE

## 2021-04-22 PROCEDURE — 76830 TRANSVAGINAL US NON-OB: CPT

## 2021-04-22 PROCEDURE — 81001 URINALYSIS AUTO W/SCOPE: CPT | Performed by: EMERGENCY MEDICINE

## 2021-04-22 PROCEDURE — 85730 THROMBOPLASTIN TIME PARTIAL: CPT | Performed by: EMERGENCY MEDICINE

## 2021-04-22 PROCEDURE — 80053 COMPREHEN METABOLIC PANEL: CPT | Performed by: EMERGENCY MEDICINE

## 2021-04-22 RX ORDER — OXYCODONE HYDROCHLORIDE AND ACETAMINOPHEN 5; 325 MG/1; MG/1
1 TABLET ORAL EVERY 6 HOURS PRN
COMMUNITY
End: 2021-05-03

## 2021-04-22 RX ORDER — DOCUSATE SODIUM 100 MG/1
100 CAPSULE, LIQUID FILLED ORAL 2 TIMES DAILY
COMMUNITY
End: 2021-07-27 | Stop reason: SDUPTHER

## 2021-04-22 RX ORDER — ASPIRIN 81 MG/1
81 TABLET ORAL DAILY
COMMUNITY
End: 2021-05-03 | Stop reason: SDUPTHER

## 2021-04-22 RX ORDER — TRAMADOL HYDROCHLORIDE 50 MG/1
50 TABLET ORAL EVERY 6 HOURS PRN
COMMUNITY
End: 2021-05-03

## 2021-04-22 RX ORDER — CELECOXIB 200 MG/1
200 CAPSULE ORAL DAILY
COMMUNITY
End: 2021-05-03 | Stop reason: SDUPTHER

## 2021-04-22 RX ORDER — PREGABALIN 75 MG/1
75 CAPSULE ORAL 2 TIMES DAILY
COMMUNITY
End: 2021-05-03

## 2021-04-22 NOTE — ED PROVIDER NOTES
EMERGENCY DEPARTMENT ENCOUNTER    Room Number:  08/08  Date seen:  4/22/2021  PCP: Graciela Cadena MD  Historian: Patient      HPI:  Chief Complaint: Vaginal bleeding  A complete HPI/ROS/PMH/PSH/SH/FH are unobtainable due to: Nothing  Context: Christine Villagomez is a 55 y.o. female who presents to the ED c/o noticing bright red blood on the toilet paper when she wiped today.  She noticed it initially after urinating and then again after having a bowel movement.  She is not exactly sure where the blood was coming from.  Patient had right total knee replacement on 4/16/2021.  She has been taking baby aspirin since her surgery.  She has been recovering well her knee surgery.  She denies fever or chills.  She denies dysuria.  She denies abdominal pain or flank pain.  She denies history of hemorrhoids.  She has been having regular bowel movements.  She is postmenopausal.  She denies history of abnormal uterine bleeding.  Her gynecologist is Dr. Trinity Clark.  She is concerned about possibility of infection.            PAST MEDICAL HISTORY  Active Ambulatory Problems     Diagnosis Date Noted   • Atopic rhinitis 07/18/2016   • Anxiety 07/18/2016   • Arthritis 07/18/2016   • Back pain 07/18/2016   • Contact dermatitis 07/18/2016   • Hypertriglyceridemia 07/18/2016   • Hypercholesterolemia 07/18/2016   • Menopausal flushing 07/18/2016   • Essential hypertension 07/18/2016   • Hypothyroidism 07/18/2016   • Overactive bladder 07/18/2016   • Rash 07/18/2016   • Obstructive sleep apnea syndrome 07/18/2016   • Skin tags, multiple acquired 09/23/2016   • Knee pain 04/10/2018   • Paresthesia of foot 04/10/2018   • Bipolar disorder (CMS/[facility]) 09/28/2018   • History of depression 09/28/2018   • Attention deficit disorder (ADD) in adult 12/28/2018     Resolved Ambulatory Problems     Diagnosis Date Noted   • Bipolar I disorder, single manic episode (CMS/[facility]) 07/18/2016   • Obesity, diabetes, and hypertension syndrome  (CMS/[facility]) 07/18/2016   • Insulin resistance 10/18/2017     Past Medical History:   Diagnosis Date   • Depression    • Hyperlipidemia    • Kidney stone          PAST SURGICAL HISTORY  Past Surgical History:   Procedure Laterality Date   • BLADDER SUSPENSION      TVT   • KIDNEY STONE SURGERY     • KNEE MENISCAL REPAIR Bilateral    • KNEE SURGERY           FAMILY HISTORY  Family History   Problem Relation Age of Onset   • Cancer Mother         lung   • Heart attack Father    • Colon cancer Maternal Grandmother    • Breast cancer Neg Hx    • Ovarian cancer Neg Hx    • Pulmonary embolism Neg Hx    • Deep vein thrombosis Neg Hx          SOCIAL HISTORY  Social History     Socioeconomic History   • Marital status:      Spouse name: Not on file   • Number of children: Not on file   • Years of education: Not on file   • Highest education level: Not on file   Tobacco Use   • Smoking status: Never Smoker   • Smokeless tobacco: Never Used   Substance and Sexual Activity   • Alcohol use: No   • Drug use: No   • Sexual activity: Yes     Partners: Male     Birth control/protection: Surgical, Post-menopausal     Comment: vas         ALLERGIES  Sulfa antibiotics        REVIEW OF SYSTEMS  Review of Systems   Review of all 14 systems is negative other than stated in the HPI above.      PHYSICAL EXAM  ED Triage Vitals [04/22/21 0902]   Temp Heart Rate Resp BP SpO2   98.8 °F (37.1 °C) 105 16 154/85 98 %      Temp src Heart Rate Source Patient Position BP Location FiO2 (%)   Oral Monitor Sitting Right arm --         GENERAL: Awake and alert, no acute distress  HENT: nares patent  EYES: no scleral icterus  CV: regular rhythm, normal rate  RESPIRATORY: normal effort, lungs clear to auscultation bilaterally  ABDOMEN: soft, nondistended, nontender throughout  : There is trace dark blood in vaginal vault.  ED tech chaperoned examination.  MUSCULOSKELETAL: no deformity.  Anterior right knee surgical incision is clean, dry, intact with  no surrounding erythema or warmth.  NEURO: alert, moves all extremities, follows commands  PSYCH:  calm, cooperative  SKIN: warm, dry    Vital signs and nursing notes reviewed.          LAB RESULTS  Recent Results (from the past 24 hour(s))   Urinalysis With Microscopic If Indicated (No Culture) - Urine, Clean Catch    Collection Time: 04/22/21  9:30 AM    Specimen: Urine, Clean Catch   Result Value Ref Range    Color, UA Yellow Yellow, Straw    Appearance, UA Cloudy (A) Clear    pH, UA 5.5 4.5 - 8.0    Specific Gravity, UA 1.025 1.003 - 1.030    Glucose, UA Negative Negative    Ketones, UA Negative Negative    Bilirubin, UA Negative Negative    Blood, UA Large (3+) (A) Negative    Protein, UA 30 mg/dL (1+) (A) Negative    Leuk Esterase, UA Small (1+) (A) Negative    Nitrite, UA Negative Negative    Urobilinogen, UA 0.2 E.U./dL 0.2 - 1.0 E.U./dL   Comprehensive Metabolic Panel    Collection Time: 04/22/21  9:30 AM    Specimen: Blood   Result Value Ref Range    Glucose 105 (H) 65 - 99 mg/dL    BUN 16 6 - 20 mg/dL    Creatinine 0.71 0.57 - 1.00 mg/dL    Sodium 137 136 - 145 mmol/L    Potassium 4.0 3.5 - 5.2 mmol/L    Chloride 104 98 - 107 mmol/L    CO2 24.1 22.0 - 29.0 mmol/L    Calcium 9.1 8.6 - 10.5 mg/dL    Total Protein 7.1 6.0 - 8.5 g/dL    Albumin 3.50 3.50 - 5.20 g/dL    ALT (SGPT) 12 1 - 33 U/L    AST (SGOT) 15 1 - 32 U/L    Alkaline Phosphatase 89 39 - 117 U/L    Total Bilirubin 0.4 0.0 - 1.2 mg/dL    eGFR Non African Amer 85 >60 mL/min/1.73    Globulin 3.6 gm/dL    A/G Ratio 1.0 g/dL    BUN/Creatinine Ratio 22.5 7.0 - 25.0    Anion Gap 8.9 5.0 - 15.0 mmol/L   Protime-INR    Collection Time: 04/22/21  9:30 AM    Specimen: Blood   Result Value Ref Range    Protime 14.0 12.1 - 15.0 Seconds    INR 1.11 (H) 0.90 - 1.10   aPTT    Collection Time: 04/22/21  9:30 AM    Specimen: Blood   Result Value Ref Range    PTT 31.8 24.3 - 38.1 seconds   CBC Auto Differential    Collection Time: 04/22/21  9:30 AM    Specimen:  Blood   Result Value Ref Range    WBC 7.01 3.40 - 10.80 10*3/mm3    RBC 4.25 3.77 - 5.28 10*6/mm3    Hemoglobin 11.7 (L) 12.0 - 15.9 g/dL    Hematocrit 37.5 34.0 - 46.6 %    MCV 88.2 79.0 - 97.0 fL    MCH 27.5 26.6 - 33.0 pg    MCHC 31.2 (L) 31.5 - 35.7 g/dL    RDW 12.3 12.3 - 15.4 %    RDW-SD 39.9 37.0 - 54.0 fl    MPV 8.7 6.0 - 12.0 fL    Platelets 298 140 - 450 10*3/mm3    Neutrophil % 74.0 42.7 - 76.0 %    Lymphocyte % 11.1 (L) 19.6 - 45.3 %    Monocyte % 9.3 5.0 - 12.0 %    Eosinophil % 4.9 0.3 - 6.2 %    Basophil % 0.4 0.0 - 1.5 %    Immature Grans % 0.3 0.0 - 0.5 %    Neutrophils, Absolute 5.19 1.70 - 7.00 10*3/mm3    Lymphocytes, Absolute 0.78 0.70 - 3.10 10*3/mm3    Monocytes, Absolute 0.65 0.10 - 0.90 10*3/mm3    Eosinophils, Absolute 0.34 0.00 - 0.40 10*3/mm3    Basophils, Absolute 0.03 0.00 - 0.20 10*3/mm3    Immature Grans, Absolute 0.02 0.00 - 0.05 10*3/mm3   Urinalysis, Microscopic Only - Urine, Clean Catch    Collection Time: 04/22/21  9:30 AM    Specimen: Urine, Clean Catch   Result Value Ref Range    RBC, UA Too Numerous to Count (A) None Seen /HPF    WBC, UA 6-12 (A) None Seen /HPF    Bacteria, UA Trace (A) None Seen /HPF    Squamous Epithelial Cells, UA 3-6 (A) None Seen, 0-2 /HPF    Hyaline Casts, UA None Seen None Seen /LPF    Methodology Manual Light Microscopy        Ordered the above labs and reviewed the results.        RADIOLOGY  US Pelvis Transvaginal Non OB    Result Date: 4/22/2021  ULTRASOUND PELVIS, 04/22/2021  INDICATION: 55-year-old female with postmenopausal bleeding. Symptoms 1 day.  TECHNIQUE: Initial pelvic survey was performed transabdominally. The remainder of the examination was performed endovaginally for adequate image detail.  FINDINGS:  The uterus measures approximately 8.3 cm long Axis. The right ovary is only seen transabdominally and measures 2.7 cm. Good flow at the time the study. Left ovary not identified transabdominally. On transabdominal imaging the endometrium  appears thickened up to 11 mm.  On transvaginal imaging the endometrial stripe measures about 9 mm, abnormally thickened for postmenopausal female. No uterine mass. The right ovary was not identified transvaginally. The left ovary was also not visualized transvaginally.       1. The endometrial stripe is abnormally thickened for postmenopausal female measuring at least 9 mm. Endometrial hyperplasia/malignancy could present similarly. OB/GYN referral and consideration of tissue sampling recommended. The technologist reports obvious blood on the transvaginal probe at the conclusion of the study. 2. Unremarkable right ovary. Left ovary not visualized or assessed, obscured by bowel gas.  This report was finalized on 4/22/2021 10:55 AM by Dr. Jaskaran Holcomb MD.        Ordered the above noted radiological studies. Reviewed by me in PACS.            PROCEDURES  Procedures            MEDICATIONS GIVEN IN ER  Medications - No data to display                MEDICAL DECISION MAKING, PROGRESS, and CONSULTS    All labs have been independently reviewed by me.  All radiology studies have been reviewed by me and discussed with radiologist dictating the report.   EKG's independently viewed and interpreted by me.  Discussion below represents my analysis of pertinent findings related to patient's condition, differential diagnosis, treatment plan and final disposition.    ED Course as of Apr 22 1135   Thu Apr 22, 2021   0857 Medical record review:  patient underwent R TKA with Dr. Foley at [facility] on 4/16/21.    [JR]   0927 55-year-old female presents with dysfunctional uterine bleeding.  Differential diagnosis includes bleeding diathesis, adverse effects of medication, uterine fibroids, malignancy of female reproductive tract.  I have ordered labs including platelet count, PT, PTT, urinalysis, and also pelvic ultrasound for further evaluation.    [JR]   0953 Platelets: 298 [JR]   0953 Hemoglobin(!): 11.7 [JR]   1005 Creatinine: 0.71  [JR]   1006 Sodium: 137 [JR]   1042 Discussed with Dr. Clark, OB/GYN, who will arrange for patient to be seen in her office next week to discuss further diagnostic work-up.    [JR]   1042 I have personally reviewed the ultrasound images in PACS.  The endometrium lining is thickened at 9 mm.    [JR]   1053 Patient updated on laboratory and imaging findings and plan for discharge home with OB/GYN follow-up.  Return precautions were discussed, specifically bleeding precautions.  She was advised to continue the baby aspirin.    [JR]      ED Course User Index  [JR] Adrian Garzon MD              I wore a mask, face shield, and gloves during this patient encounter.  Patient also wearing a surgical mask.  Hand hygeine performed before and after seeing the patient.    DIAGNOSIS  Final diagnoses:   Abnormal uterine bleeding   Endometrial thickening on ultrasound         DISPOSITION  DISCHARGE    Patient discharged in stable condition.    Reviewed implications of results, diagnosis, meds, responsibility to follow up, warning signs and symptoms of possible worsening, potential complications and reasons to return to ER, including soaking more than 1 pad per hour for 2 consecutive hours..    Patient/Family voiced understanding of above instructions.    Discussed plan for discharge, as there is no emergent indication for admission. Patient referred to primary care provider for BP management due to today's BP. Pt/family is agreeable and understands need for follow up and repeat testing.  Pt is aware that discharge does not mean that nothing is wrong but it indicates no emergency is present that requires admission and they must continue care with follow-up as given below or physician of their choice.     FOLLOW-UP  Trinity Clark MD  [address]  [place] [place] [facility] [address] [phone]    Schedule an appointment as soon as possible for a visit            Medication List      No changes were made to  your prescriptions during this visit.                   Latest Documented Vital Signs:  As of 11:35 EDT  BP- 148/74 HR- 100 Temp- 97.3 °F (36.3 °C) (Temporal) O2 sat- 96%        --    Please note that portions of this were completed with a voice recognition program.          Adrian Garzon MD  04/22/21 7915

## 2021-04-22 NOTE — ED NOTES
Call placed to [facility]. Dr Clark spoke with Dr. Garzon. Call complete     Letty Genao  04/22/21 3081

## 2021-04-22 NOTE — ED NOTES
Chaperoned Dr. Garzon during pelvic exam. Patient tolerated well.     Letty Genao  04/22/21 0919

## 2021-05-03 ENCOUNTER — OFFICE VISIT (OUTPATIENT)
Dept: OBSTETRICS AND GYNECOLOGY | Facility: CLINIC | Age: 56
End: 2021-05-03

## 2021-05-03 ENCOUNTER — ANESTHESIA EVENT (OUTPATIENT)
Dept: PERIOP | Facility: HOSPITAL | Age: 56
End: 2021-05-03

## 2021-05-03 ENCOUNTER — PREP FOR SURGERY (OUTPATIENT)
Dept: OTHER | Facility: HOSPITAL | Age: 56
End: 2021-05-03

## 2021-05-03 ENCOUNTER — LAB (OUTPATIENT)
Dept: LAB | Facility: HOSPITAL | Age: 56
End: 2021-05-03

## 2021-05-03 VITALS
BODY MASS INDEX: 32.34 KG/M2 | DIASTOLIC BLOOD PRESSURE: 80 MMHG | SYSTOLIC BLOOD PRESSURE: 126 MMHG | HEIGHT: 70 IN | WEIGHT: 225.9 LBS

## 2021-05-03 DIAGNOSIS — Z13.9 SCREENING FOR CONDITION: ICD-10-CM

## 2021-05-03 DIAGNOSIS — Z13.9 SCREENING FOR CONDITION: Primary | ICD-10-CM

## 2021-05-03 DIAGNOSIS — R93.89 THICKENED ENDOMETRIUM: ICD-10-CM

## 2021-05-03 DIAGNOSIS — N95.0 PMB (POSTMENOPAUSAL BLEEDING): Primary | ICD-10-CM

## 2021-05-03 DIAGNOSIS — N95.0 POSTMENOPAUSAL BLEEDING: ICD-10-CM

## 2021-05-03 LAB
BILIRUB BLD-MCNC: NEGATIVE MG/DL
CLARITY, POC: CLEAR
COLOR UR: ABNORMAL
GLUCOSE UR STRIP-MCNC: NEGATIVE MG/DL
KETONES UR QL: NEGATIVE
LEUKOCYTE EST, POC: NEGATIVE
NITRITE UR-MCNC: NEGATIVE MG/ML
PH UR: 7 [PH] (ref 5–8)
PROT UR STRIP-MCNC: NEGATIVE MG/DL
RBC # UR STRIP: ABNORMAL /UL
SARS-COV-2 RNA PNL SPEC NAA+PROBE: NOT DETECTED
SP GR UR: 1.02 (ref 1–1.03)
UROBILINOGEN UR QL: NORMAL

## 2021-05-03 PROCEDURE — S0260 H&P FOR SURGERY: HCPCS | Performed by: OBSTETRICS & GYNECOLOGY

## 2021-05-03 PROCEDURE — 99214 OFFICE O/P EST MOD 30 MIN: CPT | Performed by: OBSTETRICS & GYNECOLOGY

## 2021-05-03 PROCEDURE — 87635 SARS-COV-2 COVID-19 AMP PRB: CPT

## 2021-05-03 PROCEDURE — 81002 URINALYSIS NONAUTO W/O SCOPE: CPT | Performed by: OBSTETRICS & GYNECOLOGY

## 2021-05-03 RX ORDER — BUSPIRONE HYDROCHLORIDE 15 MG/1
15 TABLET ORAL NIGHTLY
COMMUNITY
End: 2022-07-02

## 2021-05-03 RX ORDER — CELECOXIB 200 MG/1
200 CAPSULE ORAL 2 TIMES DAILY
COMMUNITY
Start: 2021-04-16 | End: 2021-07-27 | Stop reason: SDUPTHER

## 2021-05-03 RX ORDER — MULTIPLE VITAMINS W/ MINERALS TAB 9MG-400MCG
1 TAB ORAL DAILY
COMMUNITY
Start: 2021-04-02 | End: 2023-02-22

## 2021-05-03 RX ORDER — ASPIRIN 81 MG/1
81 TABLET ORAL 2 TIMES DAILY
COMMUNITY
Start: 2021-04-16 | End: 2021-07-27 | Stop reason: SDUPTHER

## 2021-05-03 RX ORDER — CEFAZOLIN SODIUM 2 G/50ML
2 SOLUTION INTRAVENOUS ONCE
Status: CANCELLED | OUTPATIENT
Start: 2021-05-03 | End: 2021-05-03

## 2021-05-03 RX ORDER — BUSPIRONE HYDROCHLORIDE 30 MG/1
30 TABLET ORAL EVERY MORNING
COMMUNITY
End: 2021-07-06

## 2021-05-03 RX ORDER — SODIUM CHLORIDE 9 MG/ML
40 INJECTION, SOLUTION INTRAVENOUS AS NEEDED
Status: CANCELLED | OUTPATIENT
Start: 2021-05-03

## 2021-05-03 RX ORDER — CALCIUM POLYCARBOPHIL 625 MG
TABLET ORAL
COMMUNITY
End: 2021-05-03 | Stop reason: SDUPTHER

## 2021-05-03 RX ORDER — CHLORAL HYDRATE 500 MG
1000 CAPSULE ORAL
COMMUNITY
Start: 2021-04-02

## 2021-05-03 RX ORDER — SODIUM CHLORIDE 0.9 % (FLUSH) 0.9 %
3 SYRINGE (ML) INJECTION EVERY 12 HOURS SCHEDULED
Status: CANCELLED | OUTPATIENT
Start: 2021-05-03

## 2021-05-03 RX ORDER — SODIUM CHLORIDE 0.9 % (FLUSH) 0.9 %
1-10 SYRINGE (ML) INJECTION AS NEEDED
Status: CANCELLED | OUTPATIENT
Start: 2021-05-03

## 2021-05-03 NOTE — PROGRESS NOTES
"      Christine Villagomez is a 55 y.o. patient who presents for follow up of   Chief Complaint   Patient presents with   • Follow-up     E.R.  bleeding       56 yo est pt here for ER f/u for  VB. She had a TKA of her R knee on 4/16/2021 and on 4/22/2021 noticed bright red VB when she wiped. She had heavier VB and then went to the ER. She had been on blood thinners around the time of her surgery and is now on a ASA 81 mg. She had an US on 4/22/2021 that showed an 8.3 cm uterus with an EL that measured between 0.9 -1.1 cm. Her R ovary was normal and her L ovary was not seen. We discussed sampling of her uterine lining and given how thick it appears on US, recommend a HS D&C with MYOSURE to evaluate the entire cavity and she is agreeable. All ER records and imaging were reviewed.       The following portions of the patient's history were reviewed and updated as appropriate: allergies, current medications and problem list.    Review of Systems   Constitutional: Positive for activity change.   Genitourinary: Positive for vaginal bleeding.   Musculoskeletal: Positive for gait problem.   Psychiatric/Behavioral: The patient is nervous/anxious.    All other systems reviewed and are negative.      /80   Ht 177.8 cm (70\")   Wt 102 kg (225 lb 14.4 oz)   Breastfeeding No   BMI 32.41 kg/m²     Physical Exam  Vitals and nursing note reviewed.   Constitutional:       Appearance: Normal appearance. She is well-developed. She is obese.   HENT:      Head: Normocephalic and atraumatic.   Eyes:      General: No scleral icterus.     Conjunctiva/sclera: Conjunctivae normal.   Neck:      Thyroid: No thyromegaly.   Cardiovascular:      Rate and Rhythm: Normal rate and regular rhythm.   Pulmonary:      Effort: Pulmonary effort is normal.      Breath sounds: Normal breath sounds.   Abdominal:      General: There is no distension.      Palpations: Abdomen is soft. There is no mass.      Tenderness: There is no abdominal tenderness. " There is no guarding or rebound.      Hernia: No hernia is present.   Skin:     General: Skin is warm and dry.   Neurological:      Mental Status: She is alert and oriented to person, place, and time.   Psychiatric:         Mood and Affect: Mood normal.         Behavior: Behavior normal.         Thought Content: Thought content normal.         Judgment: Judgment normal.         A/P:  1.  VB with thickened EL- plan HS D&C with MYOSURE. Risks, benefits and alternatives of the procedure were discussed, including , but not limited to: infection, bleeding, transfusion, injury to adjacent structures, laparotomy, possible non diagnostic findings, possible findings of unexpected malignancy, reoperation, recurrent symptoms, thromboembolic events, aneasthesic complications and death. Pre/intra/postop course was reviewed and all questions answered. Patient was encouraged to call for any additional questions she might have in the future.         Assessment/Plan   Diagnoses and all orders for this visit:    1. Screening for condition (Primary)  -     POC Urinalysis Dipstick    2. Postmenopausal bleeding    3. Thickened endometrium                 No follow-ups on file.      Trinity Clark MD    5/3/2021  09:16 EDT

## 2021-05-04 ENCOUNTER — ANESTHESIA (OUTPATIENT)
Dept: PERIOP | Facility: HOSPITAL | Age: 56
End: 2021-05-04

## 2021-05-04 ENCOUNTER — HOSPITAL ENCOUNTER (OUTPATIENT)
Facility: HOSPITAL | Age: 56
Setting detail: HOSPITAL OUTPATIENT SURGERY
Discharge: HOME | End: 2021-05-04
Attending: OBSTETRICS & GYNECOLOGY | Admitting: OBSTETRICS & GYNECOLOGY

## 2021-05-04 VITALS
DIASTOLIC BLOOD PRESSURE: 59 MMHG | SYSTOLIC BLOOD PRESSURE: 133 MMHG | WEIGHT: 226.8 LBS | HEIGHT: 70 IN | HEART RATE: 90 BPM | RESPIRATION RATE: 15 BRPM | TEMPERATURE: 98 F | OXYGEN SATURATION: 95 % | BODY MASS INDEX: 32.47 KG/M2

## 2021-05-04 DIAGNOSIS — N95.0 PMB (POSTMENOPAUSAL BLEEDING): ICD-10-CM

## 2021-05-04 DIAGNOSIS — Z98.890 S/P D&C (STATUS POST DILATION AND CURETTAGE): Primary | ICD-10-CM

## 2021-05-04 DIAGNOSIS — R93.89 THICKENED ENDOMETRIUM: ICD-10-CM

## 2021-05-04 PROCEDURE — 25010000002 PROPOFOL 10 MG/ML EMULSION: Performed by: NURSE ANESTHETIST, CERTIFIED REGISTERED

## 2021-05-04 PROCEDURE — 25010000002 ONDANSETRON PER 1 MG: Performed by: NURSE ANESTHETIST, CERTIFIED REGISTERED

## 2021-05-04 PROCEDURE — C1782 MORCELLATOR: HCPCS | Performed by: OBSTETRICS & GYNECOLOGY

## 2021-05-04 PROCEDURE — 25010000002 KETOROLAC TROMETHAMINE PER 15 MG: Performed by: NURSE ANESTHETIST, CERTIFIED REGISTERED

## 2021-05-04 PROCEDURE — 58558 HYSTEROSCOPY BIOPSY: CPT | Performed by: OBSTETRICS & GYNECOLOGY

## 2021-05-04 PROCEDURE — 88305 TISSUE EXAM BY PATHOLOGIST: CPT | Performed by: OBSTETRICS & GYNECOLOGY

## 2021-05-04 PROCEDURE — 25010000003 CEFAZOLIN SODIUM-DEXTROSE 2-3 GM-%(50ML) RECONSTITUTED SOLUTION: Performed by: OBSTETRICS & GYNECOLOGY

## 2021-05-04 PROCEDURE — 25010000002 DEXAMETHASONE PER 1 MG: Performed by: NURSE ANESTHETIST, CERTIFIED REGISTERED

## 2021-05-04 RX ORDER — DEXAMETHASONE SODIUM PHOSPHATE 4 MG/ML
8 INJECTION, SOLUTION INTRA-ARTICULAR; INTRALESIONAL; INTRAMUSCULAR; INTRAVENOUS; SOFT TISSUE ONCE AS NEEDED
Status: COMPLETED | OUTPATIENT
Start: 2021-05-04 | End: 2021-05-04

## 2021-05-04 RX ORDER — LIDOCAINE HYDROCHLORIDE 10 MG/ML
0.5 INJECTION, SOLUTION EPIDURAL; INFILTRATION; INTRACAUDAL; PERINEURAL ONCE AS NEEDED
Status: DISCONTINUED | OUTPATIENT
Start: 2021-05-04 | End: 2021-05-04 | Stop reason: HOSPADM

## 2021-05-04 RX ORDER — KETOROLAC TROMETHAMINE 30 MG/ML
INJECTION, SOLUTION INTRAMUSCULAR; INTRAVENOUS AS NEEDED
Status: DISCONTINUED | OUTPATIENT
Start: 2021-05-04 | End: 2021-05-04 | Stop reason: SURG

## 2021-05-04 RX ORDER — SODIUM CHLORIDE 0.9 % (FLUSH) 0.9 %
3 SYRINGE (ML) INJECTION EVERY 12 HOURS SCHEDULED
Status: DISCONTINUED | OUTPATIENT
Start: 2021-05-04 | End: 2021-05-04 | Stop reason: HOSPADM

## 2021-05-04 RX ORDER — SODIUM CHLORIDE 0.9 % (FLUSH) 0.9 %
10 SYRINGE (ML) INJECTION EVERY 12 HOURS SCHEDULED
Status: DISCONTINUED | OUTPATIENT
Start: 2021-05-04 | End: 2021-05-04 | Stop reason: HOSPADM

## 2021-05-04 RX ORDER — KETAMINE HYDROCHLORIDE 10 MG/ML
INJECTION INTRAMUSCULAR; INTRAVENOUS AS NEEDED
Status: DISCONTINUED | OUTPATIENT
Start: 2021-05-04 | End: 2021-05-04 | Stop reason: SURG

## 2021-05-04 RX ORDER — SODIUM CHLORIDE 9 MG/ML
40 INJECTION, SOLUTION INTRAVENOUS AS NEEDED
Status: DISCONTINUED | OUTPATIENT
Start: 2021-05-04 | End: 2021-05-04 | Stop reason: HOSPADM

## 2021-05-04 RX ORDER — LIDOCAINE HYDROCHLORIDE 20 MG/ML
INJECTION, SOLUTION INFILTRATION; PERINEURAL AS NEEDED
Status: DISCONTINUED | OUTPATIENT
Start: 2021-05-04 | End: 2021-05-04 | Stop reason: SURG

## 2021-05-04 RX ORDER — CEFAZOLIN SODIUM 2 G/50ML
2 SOLUTION INTRAVENOUS ONCE
Status: COMPLETED | OUTPATIENT
Start: 2021-05-04 | End: 2021-05-04

## 2021-05-04 RX ORDER — ONDANSETRON 2 MG/ML
4 INJECTION INTRAMUSCULAR; INTRAVENOUS ONCE AS NEEDED
Status: COMPLETED | OUTPATIENT
Start: 2021-05-04 | End: 2021-05-04

## 2021-05-04 RX ORDER — FAMOTIDINE 10 MG/ML
20 INJECTION, SOLUTION INTRAVENOUS
Status: COMPLETED | OUTPATIENT
Start: 2021-05-04 | End: 2021-05-04

## 2021-05-04 RX ORDER — HYDROCODONE BITARTRATE AND ACETAMINOPHEN 5; 325 MG/1; MG/1
1-2 TABLET ORAL EVERY 4 HOURS PRN
Qty: 15 TABLET | Refills: 0 | Status: SHIPPED | OUTPATIENT
Start: 2021-05-04 | End: 2021-05-17

## 2021-05-04 RX ORDER — ONDANSETRON 2 MG/ML
4 INJECTION INTRAMUSCULAR; INTRAVENOUS ONCE AS NEEDED
Status: DISCONTINUED | OUTPATIENT
Start: 2021-05-04 | End: 2021-05-04 | Stop reason: HOSPADM

## 2021-05-04 RX ORDER — SODIUM CHLORIDE 0.9 % (FLUSH) 0.9 %
10 SYRINGE (ML) INJECTION AS NEEDED
Status: DISCONTINUED | OUTPATIENT
Start: 2021-05-04 | End: 2021-05-04 | Stop reason: HOSPADM

## 2021-05-04 RX ORDER — OXYCODONE HYDROCHLORIDE AND ACETAMINOPHEN 5; 325 MG/1; MG/1
1 TABLET ORAL ONCE AS NEEDED
Status: COMPLETED | OUTPATIENT
Start: 2021-05-04 | End: 2021-05-04

## 2021-05-04 RX ORDER — SODIUM CHLORIDE 9 MG/ML
INJECTION, SOLUTION INTRAVENOUS AS NEEDED
Status: DISCONTINUED | OUTPATIENT
Start: 2021-05-04 | End: 2021-05-04 | Stop reason: HOSPADM

## 2021-05-04 RX ORDER — FENTANYL CITRATE 50 UG/ML
50 INJECTION, SOLUTION INTRAMUSCULAR; INTRAVENOUS
Status: DISCONTINUED | OUTPATIENT
Start: 2021-05-04 | End: 2021-05-04 | Stop reason: HOSPADM

## 2021-05-04 RX ORDER — PROPOFOL 10 MG/ML
VIAL (ML) INTRAVENOUS AS NEEDED
Status: DISCONTINUED | OUTPATIENT
Start: 2021-05-04 | End: 2021-05-04 | Stop reason: SURG

## 2021-05-04 RX ORDER — HYDROCODONE BITARTRATE AND ACETAMINOPHEN 5; 325 MG/1; MG/1
1 TABLET ORAL EVERY 4 HOURS PRN
Qty: 15 TABLET | Refills: 0 | Status: SHIPPED | OUTPATIENT
Start: 2021-05-04 | End: 2021-05-17

## 2021-05-04 RX ORDER — SODIUM CHLORIDE, SODIUM LACTATE, POTASSIUM CHLORIDE, CALCIUM CHLORIDE 600; 310; 30; 20 MG/100ML; MG/100ML; MG/100ML; MG/100ML
9 INJECTION, SOLUTION INTRAVENOUS CONTINUOUS
Status: DISCONTINUED | OUTPATIENT
Start: 2021-05-04 | End: 2021-05-04 | Stop reason: HOSPADM

## 2021-05-04 RX ORDER — SODIUM CHLORIDE 0.9 % (FLUSH) 0.9 %
1-10 SYRINGE (ML) INJECTION AS NEEDED
Status: DISCONTINUED | OUTPATIENT
Start: 2021-05-04 | End: 2021-05-04 | Stop reason: HOSPADM

## 2021-05-04 RX ADMIN — KETAMINE HYDROCHLORIDE 25 MG: 10 INJECTION, SOLUTION INTRAMUSCULAR; INTRAVENOUS at 11:33

## 2021-05-04 RX ADMIN — SODIUM CHLORIDE, POTASSIUM CHLORIDE, SODIUM LACTATE AND CALCIUM CHLORIDE 9 ML/HR: 600; 310; 30; 20 INJECTION, SOLUTION INTRAVENOUS at 10:15

## 2021-05-04 RX ADMIN — DEXAMETHASONE SODIUM PHOSPHATE 8 MG: 4 INJECTION, SOLUTION INTRAMUSCULAR; INTRAVENOUS at 10:35

## 2021-05-04 RX ADMIN — PROPOFOL 50 MG: 10 INJECTION, EMULSION INTRAVENOUS at 11:49

## 2021-05-04 RX ADMIN — PROPOFOL 50 MG: 10 INJECTION, EMULSION INTRAVENOUS at 11:41

## 2021-05-04 RX ADMIN — OXYCODONE HYDROCHLORIDE AND ACETAMINOPHEN 1 TABLET: 5; 325 TABLET ORAL at 12:11

## 2021-05-04 RX ADMIN — KETOROLAC TROMETHAMINE 30 MG: 30 INJECTION, SOLUTION INTRAMUSCULAR; INTRAVENOUS at 11:51

## 2021-05-04 RX ADMIN — ONDANSETRON 4 MG: 2 INJECTION INTRAMUSCULAR; INTRAVENOUS at 10:36

## 2021-05-04 RX ADMIN — PROPOFOL 50 MG: 10 INJECTION, EMULSION INTRAVENOUS at 11:44

## 2021-05-04 RX ADMIN — LIDOCAINE HYDROCHLORIDE 100 MG: 20 INJECTION, SOLUTION INFILTRATION; PERINEURAL at 11:33

## 2021-05-04 RX ADMIN — PROPOFOL 50 MG: 10 INJECTION, EMULSION INTRAVENOUS at 11:33

## 2021-05-04 RX ADMIN — PROPOFOL 50 MG: 10 INJECTION, EMULSION INTRAVENOUS at 11:38

## 2021-05-04 RX ADMIN — PROPOFOL 50 MG: 10 INJECTION, EMULSION INTRAVENOUS at 11:35

## 2021-05-04 RX ADMIN — SODIUM CHLORIDE, POTASSIUM CHLORIDE, SODIUM LACTATE AND CALCIUM CHLORIDE 9 ML/HR: 600; 310; 30; 20 INJECTION, SOLUTION INTRAVENOUS at 10:37

## 2021-05-04 RX ADMIN — FAMOTIDINE 20 MG: 10 INJECTION, SOLUTION INTRAVENOUS at 10:36

## 2021-05-04 RX ADMIN — CEFAZOLIN SODIUM 2 G: 2 SOLUTION INTRAVENOUS at 11:33

## 2021-05-04 NOTE — ANESTHESIA POSTPROCEDURE EVALUATION
Patient: Christine Villagomez    Procedure Summary     Date: 05/04/21 Room / Location: [facility] OR 1 /  [facility]    Anesthesia Start: 1129 Anesthesia Stop: 1203    Procedure: DILATATION AND CURETTAGE HYSTEROSCOPY with MYOSURE (N/A Vagina) Diagnosis:       PMB (postmenopausal bleeding)      Thickened endometrium      (PMB (postmenopausal bleeding) [N95.0])      (Thickened endometrium [R93.89])    Surgeons: Trinity Clark MD Provider: Fazal Hebert CRNA    Anesthesia Type: MAC ASA Status: 2          Anesthesia Type: MAC    Vitals  Vitals Value Taken Time   /79 05/04/21 1235   Temp     Pulse 87 05/04/21 1235   Resp 18 05/04/21 1235   SpO2 95 % 05/04/21 1235           Post Anesthesia Care and Evaluation    Patient location during evaluation: bedside  Patient participation: complete - patient participated  Level of consciousness: awake and alert  Pain score: 0  Pain management: adequate  Airway patency: patent  Anesthetic complications: No anesthetic complications  PONV Status: none  Cardiovascular status: acceptable  Respiratory status: acceptable  Hydration status: acceptable  No anesthesia care post op

## 2021-05-04 NOTE — ANESTHESIA PREPROCEDURE EVALUATION
Anesthesia Evaluation     Patient summary reviewed and Nursing notes reviewed   no history of anesthetic complications:  NPO Solid Status: > 8 hours  NPO Liquid Status: > 8 hours           Airway   Mallampati: II  TM distance: >3 FB  Neck ROM: full  No difficulty expected  Dental      Pulmonary - normal exam   (+) sleep apnea on CPAP,   Cardiovascular - normal exam  Exercise tolerance: good (4-7 METS)    ECG reviewed    (+) hypertension well controlled, hyperlipidemia,       Neuro/Psych  GI/Hepatic/Renal/Endo    (+)   renal disease stones,     Musculoskeletal     Abdominal   (+) obese,    Substance History      OB/GYN          Other   arthritis,                      Anesthesia Plan    ASA 2     MAC   total IV anesthesia(Discussed MAC progressing to GA if necessary)  intravenous induction     Anesthetic plan, all risks, benefits, and alternatives have been provided, discussed and informed consent has been obtained with: patient.  Use of blood products discussed with patient  Consented to blood products.

## 2021-05-05 LAB
CYTO UR: NORMAL
LAB AP CASE REPORT: NORMAL
PATH REPORT.FINAL DX SPEC: NORMAL
PATH REPORT.GROSS SPEC: NORMAL

## 2021-05-07 ENCOUNTER — TRANSCRIBE ORDERS (OUTPATIENT)
Dept: PHYSICAL THERAPY | Facility: HOSPITAL | Age: 56
End: 2021-05-07

## 2021-05-07 DIAGNOSIS — Z98.890 S/P KNEE SURGERY: Primary | ICD-10-CM

## 2021-05-08 NOTE — CASE MANAGEMENT/SOCIAL WORK
Case Management Discharge Note      Final Note: D/C home         Selected Continued Care - Discharged on 5/4/2021 Admission date: 5/4/2021 - Discharge disposition: Home or Self Care    Destination    No services have been selected for the patient.              Durable Medical Equipment    No services have been selected for the patient.              Dialysis/Infusion    No services have been selected for the patient.              Home Medical Care    No services have been selected for the patient.              Therapy    No services have been selected for the patient.              Community Resources    No services have been selected for the patient.                       Final Discharge Disposition Code: 01 - home or self-care

## 2021-05-10 ENCOUNTER — APPOINTMENT (OUTPATIENT)
Dept: PHYSICAL THERAPY | Facility: HOSPITAL | Age: 56
End: 2021-05-10

## 2021-05-11 ENCOUNTER — HOSPITAL ENCOUNTER (OUTPATIENT)
Dept: PHYSICAL THERAPY | Facility: HOSPITAL | Age: 56
Setting detail: THERAPIES SERIES
Discharge: HOME | End: 2021-05-11

## 2021-05-11 DIAGNOSIS — Z96.651 S/P TOTAL KNEE REPLACEMENT, RIGHT: Primary | ICD-10-CM

## 2021-05-11 PROCEDURE — 97161 PT EVAL LOW COMPLEX 20 MIN: CPT | Performed by: PHYSICAL THERAPIST

## 2021-05-11 NOTE — THERAPY EVALUATION
Outpatient Physical Therapy Ortho Initial Evaluation   Susana Chacko     Patient Name: Christine Villagomez  : 1965  MRN: [id number]  Today's Date: 2021      Visit Date: 2021    Patient Active Problem List   Diagnosis   • Atopic rhinitis   • Anxiety   • Arthritis   • Back pain   • Contact dermatitis   • Hypertriglyceridemia   • Hypercholesterolemia   • Menopausal flushing   • Essential hypertension   • Hypothyroidism   • Overactive bladder   • Rash   • Obstructive sleep apnea syndrome   • Skin tags, multiple acquired   • Knee pain   • Paresthesia of foot   • Bipolar disorder (CMS/HCC)   • History of depression   • Attention deficit disorder (ADD) in adult   • PMB (postmenopausal bleeding)   • Thickened endometrium   • S/P D&C (status post dilation and curettage)        Past Medical History:   Diagnosis Date   • Bipolar disorder (CMS/HCC)    • Depression    • Hyperlipidemia    • Hypertension    • Hypothyroidism    • Kidney stone    • OA (osteoarthritis) of knee     left   • PONV (postoperative nausea and vomiting)    • Sleep apnea     uses cpap        Past Surgical History:   Procedure Laterality Date   • BLADDER SUSPENSION      TVT   • COLONOSCOPY     • D & C HYSTEROSCOPY MYOSURE N/A 2021    Procedure: DILATATION AND CURETTAGE HYSTEROSCOPY with MYOSURE;  Surgeon: Trinity Clark MD;  Location: [facility];  Service: Obstetrics/Gynecology;  Laterality: N/A;   • KIDNEY STONE SURGERY     • KNEE MENISCAL REPAIR Bilateral    • REPLACEMENT TOTAL KNEE Right        Visit Dx:     ICD-10-CM ICD-9-CM   1. S/P total knee replacement, right  Z96.651 V43.65         Patient History     Row Name 21 0700 05/10/21 0950          History    Chief Complaint  Joint stiffness;Joint swelling  -SP  Joint stiffness;Joint swelling  (Pended)   (Patient-Rptd)   -patient     Date Current Problem(s) Began  21  -SP  21  (Pended)   (Patient-Rptd)   -patient     Brief Description of Current Complaint   Patient with chronic history of knee pain that did not improve with conservative measures. Patient underwent (R) TKR 4/16/2021 without complication.  Patient has completed 6 sessions of home health PT.   Patient plans to have left total knee in approximately 3 months.    -SP  continued recovery from knee replacement  (Pended)   (Patient-Rptd)   -patient     Patient/Caregiver Goals  Improve mobility;Improve strength  -SP  Improve mobility;Improve strength  (Pended)   (Patient-Rptd)   -patient     Patient/Caregiver Goals Comment  Improve ROM; be active and work in [employer]  -SP  --     Hand Dominance  right-handed  -SP  right-handed  (Pended)   (Patient-Rptd)   -patient     Occupation/sports/leisure activities  gardening  -SP  gardening  (Pended)   (Patient-Rptd)   -patient     Patient seeing anyone else for problem(s)?  completed two weeks home health PT  -SP  completed two weeks home health PT  (Pended)   (Patient-Rptd)   -patient     What clinical tests have you had for this problem?  X-ray  -SP  X-ray  (Pended)   (Patient-Rptd)   -patient     Are you or can you be pregnant  No  -SP  No  (Pended)   (Patient-Rptd)   -patient        Pain     Pain Location  Knee  -SP  --     Pain at Present  0  -SP  --     Pain at Best  0  -SP  --     Pain Frequency  Intermittent  -SP  --     Pain Description  Aching  -SP  --     What Performance Factors Make the Current Problem(s) WORSE?  transfers out of car; prolonged sit, sleeping, unlevel surfaces  -SP  --     What Performance Factors Make the Current Problem(s) BETTER?  movement, meds as needed  -SP  --     Tolerance Time- Standing  limited  -SP  --     Tolerance Time- Sitting  limited  -SP  --     Tolerance Time- Walking  limited community distances  -SP  --     Is your sleep disturbed?  Yes  -SP  --     What position do you sleep in?  Supine  -SP  --     Difficulties at work?  currently unable to work  -SP  --     Difficulties with ADL's?  difficulty with car transfers,  prolonged weight bearing, ambulation on unlevel surfaces  -SP  --     Difficulties with recreational activities?  unable to garden  -SP  --        Fall Risk Assessment    Any falls in the past year:  Yes  -SP  Yes  (Pended)   (Patient-Rptd)   -patient     Factors that contributed to the fall:  Tripped;Uneven surface  -SP  Tripped;Uneven surface  (Pended)   (Patient-Rptd)   -patient        Services    Prior Rehab/Home Health Experiences  Yes  -SP  Yes  (Pended)   (Patient-Rptd)   -patient     Are you currently receiving Home Health services  No  -SP  No  (Pended)   (Patient-Rptd)   -patient     Do you plan to receive Home Health services in the near future  No  -SP  No  (Pended)   (Patient-Rptd)   -patient        Daily Activities    Primary Language  English  -SP  English  (Pended)   (Patient-Rptd)   -patient     Are you able to read  Yes  -SP  Yes  (Pended)   (Patient-Rptd)   -patient     Are you able to write  Yes  -SP  Yes  (Pended)   (Patient-Rptd)   -patient     How does patient learn best?  Demonstration;Pictures/Video  -SP  Demonstration;Pictures/Video  (Pended)   (Patient-Rptd)   -patient     Teaching needs identified  Home Exercise Program;Management of Condition;Falls Prevention  -SP  --     Patient is concerned about/has problems with  Standing;Transfers (getting out of a chair, bed);Walking  -SP  --     Does patient have problems with the following?  Anxiety  -SP  --     Barriers to learning  None  -SP  --     Pt Participated in POC and Goals  Yes  -SP  --        Safety    Are you being hurt, hit, or frightened by anyone at home or in your life?  No  -SP  No  (Pended)   (Patient-Rptd)   -patient     Are you being neglected by a caregiver  No  -SP  No  (Pended)   (Patient-Rptd)   -patient       User Key  (r) = Recorded By, (t) = Taken By, (c) = Cosigned By    Initials Name Provider Type    Pilar Ortiz, PT Physical Therapist    patient Christine Villagomez --          PT Ortho     Row Name  05/11/21 0700       Posture/Observations    Observations  Edema;Muscle atrophy;Incision healing  -SP    Posture/Observations Comments  moderate edema; incision healing well, skin in good condition with no signs or symptoms of infection  -SP       Knee Palpation    Patella  Right:;Tender  -SP    Patella Tendon  Right:;Tender;Swollen  -SP    Prepatellar Bursa  Right:;Tender;Swollen  -SP    Suprapatella Bursa  Right:;Tender;Swollen  -SP    Medial Joint Line  Right:;Bilateral:;Swollen  -SP    Lateral Joint Line  Right:;Tender;Swollen  -SP    Quads  Right:;Tender;Guarded/taut;Atrophied  -SP    Biceps Femoris  Right:;Guarded/taut  -SP    Semimembranosis  Right:;Guarded/taut  -SP    Semitendinosis  Right:;Guarded/taut  -SP       Patellar Accessory Motions    Superior glide  Right:;Hypomobile  -SP    Inferior glide  Right:;Hypomobile  -SP    Medial glide  Right:;Hypomobile  -SP    Lateral glide  Right:;Hypomobile  -SP       Knee Special Tests    Linda’s sign (DVT)  Right:;Negative  -SP       Right Lower Ext    Rt Hip ABduction AROM  WFL  -SP    Rt Hip ADduction AROM  WFL  -SP    Rt Hip Extension AROM  WFL  -SP    Rt Hip Flexion AROM  WFL  -SP    Rt Knee Extension/Flexion AROM  8 to 98 degrees   -SP    Rt Knee Extension/Flexion PROM  5 to 107 degrees  -SP    Rt Ankle Dorsiflexion AROM  WFL  -SP    Rt Ankle Plantarflexion AROM  WFL  -SP       MMT Right Lower Ext    Rt Hip Flexion MMT, Gross Movement  (4-/5) good minus  -SP    Rt Hip Extension MMT, Gross Movement  (4-/5) good minus  -SP    Rt Hip ABduction MMT, Gross Movement  (4-/5) good minus  -SP    Rt Hip ADduction MMT, Gross Movement  (4-/5) good minus  -SP    Rt Knee Extension MMT, Gross Movement  (3/5) fair  -SP    Rt Knee Flexion MMT, Gross Movement  (3-/5) fair minus  -SP    Rt Ankle Plantarflexion MMT, Gross Movement  (4-/5) good minus  -SP    Rt Ankle Dorsiflexion MMT, Gross Movement  (4-/5) good minus  -SP       Sensation    Additional Comments  mild numbness  reported at lateral as  -SP       Lower Extremity Flexibility    Hamstrings  Right:;Moderately limited  -SP    Hip Flexors  Right:;Moderately limited  -SP       Transfers    Bed-Chair [place] (Transfers)  independent  -SP    Chair-Bed [place] (Transfers)  independent  -SP    Sit-Stand [place] (Transfers)  independent  -SP    Stand-Sit [place] (Transfers)  independent  -SP       Gait/Stairs (Locomotion)    [place] Level (Gait)  independent  -SP    Pattern (Gait)  swing-through  -SP    Deviations/Abnormal Patterns (Gait)  stride length decreased mild  -SP    Bilateral Gait Deviations  forward flexed posture mild  -SP    Ascending Technique (Stairs)  step-over-step  -SP    Descending Technique (Stairs)  step-over-step  -SP    Comment (Gait/Stairs)  holds to rail with stairs  -SP      User Key  (r) = Recorded By, (t) = Taken By, (c) = Cosigned By    Initials Name Provider Type    Pilar Ortiz, PT Physical Therapist                      Therapy Education  Given: HEP  Program: New  How Provided: Verbal, Written  Provided to: Patient  Level of Understanding: Verbalized, Demonstrated     PT OP Goals     Row Name 05/11/21 1200          PT Short Term Goals    STG Date to Achieve  05/26/21  -SP     STG 1  Patient with minimal edema present at right knee  -SP     STG 2  Patient exhibits right knee PROM knee flexion to 120 degrees  -SP     STG 3  Patient exhibits right knee PROM knee extension to 0 degrees  -SP        Long Term Goals    LTG Date to Achieve  06/10/21  -SP     LTG 1  Patient exhibits AROM right knee 0 to 125 degrees  -SP     LTG 2  Patient demonstrates increased strength right knee by one muscle grade to better tolerate ADLs  -SP     LTG 3  Patient reports she is able to tolerate home and community mobility and ADLs with 0-1/10 pain  -SP     LTG 4  Patient independent with HEP  -SP        Time Calculation    PT Goal Re-Cert Due Date  06/10/21  -SP       User Key  (r) =  Recorded By, (t) = Taken By, (c) = Cosigned By    Initials Name Provider Type    Pilar Ortiz, PT Physical Therapist          PT Assessment/Plan     Row Name 05/11/21 1300          PT Assessment    Functional Limitations  Impaired gait;Limitation in home management;Limitations in community activities;Performance in work activities;Performance in self-care ADL;Performance in leisure activities  -SP     Assessment Comments  Patient with chronic history of bilateral knee pain.  She is s/p right TKR 4/16/21.  Patient presents with mild pain/stiffness, decreased ROM, decreased strength, altered gait, and impaired functional ability to perform home and community ADLs  -SP     Please refer to paper survey for additional self-reported information  Yes  -SP     Rehab Potential  Good  -SP     Patient/caregiver participated in establishment of treatment plan and goals  Yes  -SP     Patient would benefit from skilled therapy intervention  Yes  -SP        PT Plan    PT Frequency  2x/week;1x/week  -SP     Predicted Duration of Therapy Intervention (PT)  4-6 weeks  -SP     Planned CPT's?  PT EVAL LOW COMPLEXITY: 22389;PT THER PROC EA 15 MIN: 79500;PT MANUAL THERAPY EA 15 MIN: 24886;PT ELECTRICAL STIM UNATTEND: ;PT GAIT TRAINING EA 15 MIN: 79238  -SP       User Key  (r) = Recorded By, (t) = Taken By, (c) = Cosigned By    Initials Name Provider Type    Pilar Ortiz, PT Physical Therapist          Modalities     Row Name 05/11/21 0700             Ice    Patient denies application of Ice  Yes  -SP      Patient reports will apply ice at home to involved area  Yes  -SP        User Key  (r) = Recorded By, (t) = Taken By, (c) = Cosigned By    Initials Name Provider Type    Pilar Ortiz, PT Physical Therapist        OP Exercises     Row Name 05/11/21 0700             Exercise 1    Exercise Name 1  heel slides   -SP      Cueing 1  Verbal  -SP      Time 1  6 min  -SP         Exercise 2     "Exercise Name 2  wall slides  -SP      Cueing 2  Verbal  -SP      Time 2  6 min  -SP         Exercise 3    Exercise Name 3  recumbent bike (seat 7)  -SP      Cueing 3  Verbal  -SP      Reps 3  6 min  -SP         Exercise 4    Exercise Name 4  heel raises  -SP      Cueing 4  Verbal  -SP      Reps 4  20  -SP         Exercise 5    Exercise Name 5  step ups 6\"  -SP      Cueing 5  Verbal  -SP      Reps 5  15x each  -SP         Exercise 6    Exercise Name 6  SAQ  -SP      Cueing 6  Verbal  -SP      Reps 6  20  -SP      Time 6  5 sec  -SP         Exercise 7    Exercise Name 7  SLR  -SP      Cueing 7  Verbal  -SP      Reps 7  20  -SP         Exercise 8    Exercise Name 8  QS towel under knee/ankle  -SP      Cueing 8  Verbal  -SP      Reps 8  10/10  -SP      Time 8  5 sec  -SP         Exercise 9    Exercise Name 9  hamstring stretch  -SP      Cueing 9  Verbal  -SP      Reps 9  5  -SP      Time 9  15 sec  -SP         Exercise 10    Exercise Name 10  heel prop  -SP      Cueing 10  Verbal  -SP      Time 10  4 min  -SP        User Key  (r) = Recorded By, (t) = Taken By, (c) = Cosigned By    Initials Name Provider Type    Pilar Ortiz, PT Physical Therapist           Manual Rx (last 36 hours)      Manual Treatments     Row Name 05/11/21 0700             Manual Rx 1    Manual Rx 1 Location  right knee  -SP      Manual Rx 1 Type  patella mobilization; sup/inf and med lateral osc 20 x 3 each  -SP      Manual Rx 1 Duration  4 min  -SP         Manual Rx 2    Manual Rx 2 Location  right knee  -SP      Manual Rx 2 Type  PROM right knee flexion 10 x 10 sec  -SP      Manual Rx 2 Duration  10 min  -SP         Manual Rx 3    Manual Rx 3 Location  right knee   -SP      Manual Rx 3 Type  PROM knee extension stretch 15 sec x 4  -SP      Manual Rx 3 Duration  7 min  -SP        User Key  (r) = Recorded By, (t) = Taken By, (c) = Cosigned By    Initials Name Provider Type    Pilar Ortiz, GISELLE Physical Therapist       "                Outcome Measure Options: Lower Extremity Functional Scale (LEFS)  Lower Extremity Functional Index  Any of your usual work, housework or school activities: A little bit of difficulty  Your usual hobbies, recreational or sporting activities: Quite a bit of difficulty  Getting into or out of the bath: A little bit of difficulty  Walking between rooms: No difficulty  Putting on your shoes or socks: A little bit of difficulty  Squatting: A little bit of difficulty  Lifting an object, like a bag of groceries from the floor: No difficulty  Performing light activities around your home: No difficulty  Performing heavy activities around your home: Moderate difficulty  Getting into or out of a car: A little bit of difficulty  Walking 2 blocks: No difficulty  Walking a mile: Moderate difficulty  Going up or down 10 stairs (about 1 flight of stairs): No difficulty  Standing for 1 hour: A little bit of difficulty  Sitting for 1 hour: No difficulty  Running on even ground: Extreme difficulty or unable to perform activity  Running on uneven ground: Extreme difficulty or unable to perform activity  Making sharp turns while running fast: Extreme difficulty or unable to perform activity  Hopping: Extreme difficulty or unable to perform activity  Rolling over in bed: A little bit of difficulty  Total: 50      Time Calculation:     Start Time: 0748  Stop Time: 0900  Time Calculation (min): 72 min  Untimed Charges  PT Eval/Re-eval Minutes: 60  Total Minutes  Untimed Charges Total Minutes: 60   Total Minutes: 60     Therapy Charges for Today     Code Description Service Date Service Provider Modifiers Qty    59162941018 HC PT EVAL LOW COMPLEXITY 4 5/11/2021 Pilar Rock, PT GP 1          PT G-Codes  Outcome Measure Options: Lower Extremity Functional Scale (LEFS)  Total: 50         Pilar Rock, PT  5/11/2021

## 2021-05-13 ENCOUNTER — HOSPITAL ENCOUNTER (OUTPATIENT)
Dept: PHYSICAL THERAPY | Facility: HOSPITAL | Age: 56
Setting detail: THERAPIES SERIES
Discharge: HOME | End: 2021-05-13

## 2021-05-13 PROCEDURE — 97110 THERAPEUTIC EXERCISES: CPT | Performed by: PHYSICAL THERAPIST

## 2021-05-13 PROCEDURE — 97140 MANUAL THERAPY 1/> REGIONS: CPT | Performed by: PHYSICAL THERAPIST

## 2021-05-13 NOTE — THERAPY TREATMENT NOTE
Outpatient Physical Therapy Ortho Treatment Note   Susana Chacko     Patient Name: Christine Villagomez  : 1965  MRN: [id number]  Today's Date: 2021      Visit Date: 2021    Visit Dx:  No diagnosis found.    Patient Active Problem List   Diagnosis   • Atopic rhinitis   • Anxiety   • Arthritis   • Back pain   • Contact dermatitis   • Hypertriglyceridemia   • Hypercholesterolemia   • Menopausal flushing   • Essential hypertension   • Hypothyroidism   • Overactive bladder   • Rash   • Obstructive sleep apnea syndrome   • Skin tags, multiple acquired   • Knee pain   • Paresthesia of foot   • Bipolar disorder (CMS/HCC)   • History of depression   • Attention deficit disorder (ADD) in adult   • PMB (postmenopausal bleeding)   • Thickened endometrium   • S/P D&C (status post dilation and curettage)        Past Medical History:   Diagnosis Date   • Bipolar disorder (CMS/HCC)    • Depression    • Hyperlipidemia    • Hypertension    • Hypothyroidism    • Kidney stone    • OA (osteoarthritis) of knee     left   • PONV (postoperative nausea and vomiting)    • Sleep apnea     uses cpap        Past Surgical History:   Procedure Laterality Date   • BLADDER SUSPENSION      TVT   • COLONOSCOPY     • D & C HYSTEROSCOPY MYOSURE N/A 2021    Procedure: DILATATION AND CURETTAGE HYSTEROSCOPY with MYOSURE;  Surgeon: Trinity Clark MD;  Location: [facility];  Service: Obstetrics/Gynecology;  Laterality: N/A;   • KIDNEY STONE SURGERY     • KNEE MENISCAL REPAIR Bilateral    • REPLACEMENT TOTAL KNEE Right        PT Ortho     Row Name 21 0800       Subjective Comments    Subjective Comments  Patient reports that she felt good after last visit but later on had mild soreness throughout right LE.    -SP       Right Lower Ext    Rt Knee Extension/Flexion PROM  5 to 108 degrees  -SP      User Key  (r) = Recorded By, (t) = Taken By, (c) = Cosigned By    Initials Name Provider Type    Pilar Ortiz, PT  "Physical Therapist                      PT Assessment/Plan     Row Name 05/13/21 0910          PT Assessment    Assessment Comments  Patient with slow improvement in ROM.  She is able to tolerates progression of strengthening exercises well.  -SP        PT Plan    PT Plan Comments  Continue to progress right knee ROM and strengthening  -SP       User Key  (r) = Recorded By, (t) = Taken By, (c) = Cosigned By    Initials Name Provider Type    SP Pilar oRck, PT Physical Therapist            OP Exercises     Row Name 05/13/21 0934 05/13/21 0800          Subjective Comments    Subjective Comments  --  Patient reports that she felt good after last visit but later on had mild soreness throughout right LE.    -SP        Total Minutes    57260 - PT Therapeutic Exercise Minutes  30  -SP  --     89915 - PT Manual Therapy Minutes  (!) 150  -SP  --        Exercise 1    Exercise Name 1  --  heel slides   -SP     Cueing 1  --  Verbal  -SP     Time 1  --  7 min  -SP        Exercise 2    Exercise Name 2  --  wall slides  -SP     Cueing 2  --  Verbal  -SP     Time 2  --  7 min  -SP        Exercise 3    Exercise Name 3  --  recumbent bike (seat 7)  -SP     Cueing 3  --  Verbal  -SP     Reps 3  --  7 min  -SP        Exercise 4    Exercise Name 4  --  heel raises  -SP     Cueing 4  --  Verbal  -SP     Reps 4  --  20  -SP        Exercise 5    Exercise Name 5  --  step ups 6\"  -SP     Cueing 5  --  Verbal  -SP     Reps 5  --  15x each  -SP        Exercise 6    Exercise Name 6  --  SAQ  -SP     Cueing 6  --  Verbal  -SP     Reps 6  --  20  -SP     Time 6  --  5 sec  -SP        Exercise 7    Exercise Name 7  --  SLR  -SP     Cueing 7  --  Verbal  -SP     Reps 7  --  25  -SP        Exercise 8    Exercise Name 8  --  QS towel under knee/ankle  -SP     Cueing 8  --  Verbal  -SP     Reps 8  --  10/10  -SP     Time 8  --  5 sec  -SP        Exercise 9    Exercise Name 9  --  hamstring stretch  -SP     Cueing 9  --  Verbal  -SP     " Reps 9  --  5  -SP     Time 9  --  15 sec  -SP        Exercise 10    Exercise Name 10  --  heel prop  -SP     Cueing 10  --  Verbal  -SP     Time 10  --  5 min  -SP        Exercise 11    Exercise Name 11  --  partial squats  -SP     Cueing 11  --  Verbal;Demo  -SP     Reps 11  --  15  -SP       User Key  (r) = Recorded By, (t) = Taken By, (c) = Cosigned By    Initials Name Provider Type    Pilar Ortiz PT Physical Therapist                      Manual Rx (last 36 hours)      Manual Treatments     Row Name 05/13/21 0934 05/13/21 0800          Total Minutes    63715 - PT Manual Therapy Minutes  (!) 150  -SP  --        Manual Rx 1    Manual Rx 1 Location  --  right knee  -SP     Manual Rx 1 Type  --  patella mobilization; sup/inf and med lateral osc 20 x 3 each  -SP     Manual Rx 1 Duration  --  4 min  -SP        Manual Rx 2    Manual Rx 2 Location  --  right knee  -SP     Manual Rx 2 Type  --  PROM right knee flexion 10 x 10 sec  -SP     Manual Rx 2 Duration  --  10 min  -SP        Manual Rx 3    Manual Rx 3 Location  --  right knee   -SP     Manual Rx 3 Type  --  PROM knee extension stretch 15 sec x 4  -SP     Manual Rx 3 Duration  --  7 min  -SP       User Key  (r) = Recorded By, (t) = Taken By, (c) = Cosigned By    Initials Name Provider Type    Pilar Ortiz, GISELLE Physical Therapist              Therapy Education  Given: HEP  Program: Reinforced, Progressed  How Provided: Verbal  Provided to: Patient  Level of Understanding: Verbalized, Demonstrated              Time Calculation:   Start Time: 0750  Stop Time: 0900  Time Calculation (min): 70 min  Timed Charges  35976 - PT Therapeutic Exercise Minutes: 30  38084 - PT Manual Therapy Minutes: (!) 150  Total Minutes  Timed Charges Total Minutes: 180   Total Minutes: 180  Therapy Charges for Today     Code Description Service Date Service Provider Modifiers Qty    [id number]  PT THER PROC EA 15 MIN 5/13/2021 Pilar Rock, GISELLE  GP 2    [id number]  PT MANUAL THERAPY EA 15 MIN 5/13/2021 Pilar Rock, PT GP 1                    Pilar Rock, PT  5/13/2021

## 2021-05-17 ENCOUNTER — OFFICE VISIT (OUTPATIENT)
Dept: OBSTETRICS AND GYNECOLOGY | Facility: CLINIC | Age: 56
End: 2021-05-17

## 2021-05-17 VITALS
BODY MASS INDEX: 32.93 KG/M2 | HEIGHT: 70 IN | SYSTOLIC BLOOD PRESSURE: 122 MMHG | DIASTOLIC BLOOD PRESSURE: 72 MMHG | WEIGHT: 230 LBS

## 2021-05-17 DIAGNOSIS — Z09 POSTOP CHECK: Primary | ICD-10-CM

## 2021-05-17 PROCEDURE — 99024 POSTOP FOLLOW-UP VISIT: CPT | Performed by: OBSTETRICS & GYNECOLOGY

## 2021-05-17 NOTE — PROGRESS NOTES
"Surgical follow up visit     Christine Villagomez is a 55 y.o. female who presents to the clinic 2 weeks status post D&C and Hysteroscopy for  VB Eating a regular diet without difficulty. Bowel movements are normal. The patient is not having any pain..  Vaginal bleeding is none. She did well postop.     The following portions of the patient's history were reviewed and updated as appropriate: allergies, current medications, past family history, past medical history, past social history, past surgical history and problem list.    Review of Systems  Review of Systems   Constitutional: Negative for activity change.   Genitourinary: Negative for vaginal bleeding, vaginal discharge and vaginal pain.   All other systems reviewed and are negative.       Objective    /72   Ht 177.8 cm (70\")   Wt 104 kg (230 lb)   LMP 04/01/2018   Breastfeeding No   BMI 33.00 kg/m²     Physical Exam  Vitals and nursing note reviewed.   Constitutional:       Appearance: Normal appearance. She is well-developed.   HENT:      Head: Normocephalic and atraumatic.   Eyes:      General: No scleral icterus.     Conjunctiva/sclera: Conjunctivae normal.   Neck:      Thyroid: No thyromegaly.   Skin:     General: Skin is warm and dry.   Neurological:      Mental Status: She is alert and oriented to person, place, and time.   Psychiatric:         Mood and Affect: Mood normal.         Behavior: Behavior normal.         Thought Content: Thought content normal.         Judgment: Judgment normal.         Assessment     1) Post op D&C and Hysteroscopy- Doing well postoperatively. Path showed B9 fibroid.   2) Operative findings again reviewed. Pathology report discussed.  Intraoperative photos were reviewed.      Plan    1. Continue any current medications.  2. Wound care discussed.  3. Activity restrictions: none  4. Anticipated return to work: now.  5. Follow up:3/2022 annual or prn.     Trinity Clark MD     05/17/2021     09:03 EDT      "

## 2021-05-18 ENCOUNTER — HOSPITAL ENCOUNTER (OUTPATIENT)
Dept: PHYSICAL THERAPY | Facility: HOSPITAL | Age: 56
Setting detail: THERAPIES SERIES
Discharge: HOME | End: 2021-05-18

## 2021-05-18 DIAGNOSIS — Z96.651 S/P TOTAL KNEE REPLACEMENT, RIGHT: Primary | ICD-10-CM

## 2021-05-18 PROCEDURE — 97140 MANUAL THERAPY 1/> REGIONS: CPT | Performed by: PHYSICAL THERAPIST

## 2021-05-18 PROCEDURE — 97110 THERAPEUTIC EXERCISES: CPT | Performed by: PHYSICAL THERAPIST

## 2021-05-18 NOTE — THERAPY TREATMENT NOTE
Outpatient Physical Therapy Ortho Treatment Note   Susana Chacko     Patient Name: Christine Villagomez  : 1965  MRN: [id number]  Today's Date: 2021      Visit Date: 2021    Visit Dx:    ICD-10-CM ICD-9-CM   1. S/P total knee replacement, right  Z96.651 V43.65       Patient Active Problem List   Diagnosis   • Atopic rhinitis   • Anxiety   • Arthritis   • Back pain   • Contact dermatitis   • Hypertriglyceridemia   • Hypercholesterolemia   • Menopausal flushing   • Essential hypertension   • Hypothyroidism   • Overactive bladder   • Rash   • Obstructive sleep apnea syndrome   • Skin tags, multiple acquired   • Knee pain   • Paresthesia of foot   • Bipolar disorder (CMS/HCC)   • History of depression   • Attention deficit disorder (ADD) in adult   • PMB (postmenopausal bleeding)   • Thickened endometrium   • S/P D&C (status post dilation and curettage)        Past Medical History:   Diagnosis Date   • Bipolar disorder (CMS/HCC)    • Depression    • Hyperlipidemia    • Hypertension    • Hypothyroidism    • Kidney stone    • OA (osteoarthritis) of knee     left   • PONV (postoperative nausea and vomiting)    • Sleep apnea     uses cpap        Past Surgical History:   Procedure Laterality Date   • BLADDER SUSPENSION      TVT   • COLONOSCOPY     • D & C HYSTEROSCOPY MYOSURE N/A 2021    Procedure: DILATATION AND CURETTAGE HYSTEROSCOPY with MYOSURE;  Surgeon: Trinity Clark MD;  Location: [facility];  Service: Obstetrics/Gynecology;  Laterality: N/A;   • KIDNEY STONE SURGERY     • KNEE MENISCAL REPAIR Bilateral    • REPLACEMENT TOTAL KNEE Right        PT Ortho     Row Name 21 1000       Subjective Comments    Subjective Comments  Patient reports that she sat in a folding type lawn chair a couple of days ago and it hurt her knee.  She has had some increased soreness over the last couple days.    -SP       Right Lower Ext    Rt Knee Extension/Flexion PROM  5 to 108 degrees  -SP      User Key  " (r) = Recorded By, (t) = Taken By, (c) = Cosigned By    Initials Name Provider Type    Pilar Ortiz, PT Physical Therapist                      PT Assessment/Plan     Row Name 05/18/21 1125          PT Assessment    Assessment Comments  Patient tolerates progression of ther-ex well.  Slow progress with ROM  -SP       User Key  (r) = Recorded By, (t) = Taken By, (c) = Cosigned By    Initials Name Provider Type    Pilar Ortiz, PT Physical Therapist            OP Exercises     Row Name 05/18/21 1000             Subjective Comments    Subjective Comments  Patient reports that she sat in a folding type lawn chair a couple of days ago and it hurt her knee.  She has had some increased soreness over the last couple days.    -SP         Exercise 1    Exercise Name 1  heel slides   -SP      Cueing 1  Verbal  -SP      Time 1  7 min  -SP         Exercise 2    Exercise Name 2  wall slides  -SP      Cueing 2  Verbal  -SP      Time 2  7 min  -SP         Exercise 3    Exercise Name 3  recumbent bike (seat 7)  -SP      Cueing 3  Verbal  -SP      Reps 3  7 min  -SP         Exercise 4    Exercise Name 4  heel raises  -SP      Cueing 4  Verbal  -SP      Reps 4  25  -SP         Exercise 5    Exercise Name 5  step ups 6\"  -SP      Cueing 5  Verbal  -SP      Reps 5  15x each  -SP         Exercise 6    Exercise Name 6  SAQ  -SP      Cueing 6  Verbal  -SP      Reps 6  20  -SP      Time 6  5 sec  -SP         Exercise 7    Exercise Name 7  SLR  -SP      Cueing 7  Verbal  -SP      Reps 7  25  -SP         Exercise 8    Exercise Name 8  QS towel under knee/ankle  -SP      Cueing 8  Verbal  -SP      Reps 8  10/10  -SP      Time 8  5 sec  -SP         Exercise 9    Exercise Name 9  hamstring stretch  -SP      Cueing 9  Verbal  -SP      Reps 9  5  -SP      Time 9  15 sec  -SP         Exercise 10    Exercise Name 10  heel prop  -SP      Cueing 10  Verbal  -SP      Time 10  5 min  -SP         Exercise 11    Exercise " Name 11  partial squats  -SP      Cueing 11  Verbal;Demo  -SP      Reps 11  20  -SP        User Key  (r) = Recorded By, (t) = Taken By, (c) = Cosigned By    Initials Name Provider Type    Pilar Ortiz, PT Physical Therapist                      Manual Rx (last 36 hours)      Manual Treatments     Row Name 05/18/21 1000             Manual Rx 1    Manual Rx 1 Location  right knee  -SP      Manual Rx 1 Type  patella mobilization; sup/inf and med lateral osc 20 x 3 each  -SP      Manual Rx 1 Duration  4 min  -SP         Manual Rx 2    Manual Rx 2 Location  right knee  -SP      Manual Rx 2 Type  PROM right knee flexion 10 x 10 sec  -SP      Manual Rx 2 Duration  10 min  -SP         Manual Rx 3    Manual Rx 3 Location  right knee   -SP      Manual Rx 3 Type  PROM knee extension stretch 15 sec x 4  -SP      Manual Rx 3 Duration  7 min  -SP        User Key  (r) = Recorded By, (t) = Taken By, (c) = Cosigned By    Initials Name Provider Type    Pilar Ortiz, PT Physical Therapist              Therapy Education  Given: HEP  Program: Reinforced  How Provided: Verbal  Provided to: Patient  Level of Understanding: Verbalized, Demonstrated              Time Calculation:   Start Time: 1020  Stop Time: 1125  Time Calculation (min): 65 min  Therapy Charges for Today     Code Description Service Date Service Provider Modifiers Qty    [id number]  PT MANUAL THERAPY EA 15 MIN 5/18/2021 Pilar Rock, PT GP 1    [id number]  PT THER PROC EA 15 MIN 5/18/2021 Pilar Rock, PT GP 1                    Pilar Rock PT  5/18/2021

## 2021-05-20 ENCOUNTER — HOSPITAL ENCOUNTER (OUTPATIENT)
Dept: PHYSICAL THERAPY | Facility: HOSPITAL | Age: 56
Setting detail: THERAPIES SERIES
Discharge: HOME | End: 2021-05-20

## 2021-05-20 DIAGNOSIS — Z96.651 S/P TOTAL KNEE REPLACEMENT, RIGHT: Primary | ICD-10-CM

## 2021-05-20 PROCEDURE — 97140 MANUAL THERAPY 1/> REGIONS: CPT

## 2021-05-20 PROCEDURE — 97110 THERAPEUTIC EXERCISES: CPT

## 2021-05-20 NOTE — THERAPY TREATMENT NOTE
Outpatient Physical Therapy Ortho Treatment Note   Susana Chacko     Patient Name: Christine Villagomez  : 1965  MRN: [id number]  Today's Date: 2021      Visit Date: 2021    Visit Dx:    ICD-10-CM ICD-9-CM   1. S/P total knee replacement, right  Z96.651 V43.65       Patient Active Problem List   Diagnosis   • Atopic rhinitis   • Anxiety   • Arthritis   • Back pain   • Contact dermatitis   • Hypertriglyceridemia   • Hypercholesterolemia   • Menopausal flushing   • Essential hypertension   • Hypothyroidism   • Overactive bladder   • Rash   • Obstructive sleep apnea syndrome   • Skin tags, multiple acquired   • Knee pain   • Paresthesia of foot   • Bipolar disorder (CMS/HCC)   • History of depression   • Attention deficit disorder (ADD) in adult   • PMB (postmenopausal bleeding)   • Thickened endometrium   • S/P D&C (status post dilation and curettage)        Past Medical History:   Diagnosis Date   • Bipolar disorder (CMS/HCC)    • Depression    • Hyperlipidemia    • Hypertension    • Hypothyroidism    • Kidney stone    • OA (osteoarthritis) of knee     left   • PONV (postoperative nausea and vomiting)    • Sleep apnea     uses cpap        Past Surgical History:   Procedure Laterality Date   • BLADDER SUSPENSION      TVT   • COLONOSCOPY     • D & C HYSTEROSCOPY MYOSURE N/A 2021    Procedure: DILATATION AND CURETTAGE HYSTEROSCOPY with MYOSURE;  Surgeon: Trinity Clark MD;  Location[facility];  Service: Obstetrics/Gynecology;  Laterality: N/A;   • KIDNEY STONE SURGERY     • KNEE MENISCAL REPAIR Bilateral    • REPLACEMENT TOTAL KNEE Right        PT Ortho     Row Name 21 0900       Subjective Comments    Subjective Comments  pt denies pain currently but feels she still struggles to flex her knee  -      User Key  (r) = Recorded By, (t) = Taken By, (c) = Cosigned By    Initials Name Provider Type    Audie Gresham, HANS Physical Therapy Assistant                      PT  "Assessment/Plan     Row Name 05/20/21 1015          PT Assessment    Assessment Comments  pt tolerated progression of ex's well; no change in ROM this week - still at 108 degrees flexion after stretches  -        PT Plan    PT Plan Comments  Cont per POC  -       User Key  (r) = Recorded By, (t) = Taken By, (c) = Cosigned By    Initials Name Provider Type     [place] Audie Lee, PTA Physical Therapy Assistant            OP Exercises     Row Name 05/20/21 1019 05/20/21 0900          Subjective Comments    Subjective Comments  --  pt denies pain currently but feels she still struggles to flex her knee  -        Total Minutes    82313 - PT Therapeutic Exercise Minutes  35  -MH  --     73407 - PT Manual Therapy Minutes  15  -MH  --        Exercise 1    Exercise Name 1  --  heel slides   -     Cueing 1  --  Verbal  -     Time 1  --  7 min  -        Exercise 2    Exercise Name 2  --  wall slides  -     Cueing 2  --  Verbal  -     Time 2  --  7 min  -        Exercise 3    Exercise Name 3  --  recumbent bike (seat 7)  -     Cueing 3  --  Verbal  -     Reps 3  --  7 min  -        Exercise 4    Exercise Name 4  --  heel raises  -     Cueing 4  --  Verbal  -     Reps 4  --  25  -MH        Exercise 5    Exercise Name 5  --  step ups 6\"  -     Cueing 5  --  Verbal  -     Reps 5  --  20 each  -        Exercise 6    Exercise Name 6  --  SAQ  -     Cueing 6  --  Verbal  -     Reps 6  --  30  -     Time 6  --  5 sec  -        Exercise 7    Exercise Name 7  --  SLR  -     Cueing 7  --  Verbal  -     Reps 7  --  30  -        Exercise 8    Exercise Name 8  --  QS towel under knee  -     Cueing 8  --  Verbal  -     Reps 8  --  25  -     Time 8  --  5 sec  -MH        Exercise 9    Exercise Name 9  --  hamstring stretch  -     Cueing 9  --  Verbal  -     Reps 9  --  5  -MH     Time 9  --  15 sec  -        Exercise 10    Exercise Name 10  --  heel prop  -     Cueing 10  --  " "Verbal  -MH     Time 10  --  5 min  -MH        Exercise 11    Exercise Name 11  --  partial squats  -MH     Cueing 11  --  Verbal  -MH     Reps 11  --  25  -MH        Exercise 12    Exercise Name 12  --  6\" Lateral step over  -MH     Cueing 12  --  Verbal;Demo  -MH     Reps 12  --  15  -MH       User Key  (r) = Recorded By, (t) = Taken By, (c) = Cosigned By    Initials Name Provider Type     Audie Callahan PTA Physical Therapy Assistant                      Manual Rx (last 36 hours)      Manual Treatments     Row Name 05/20/21 1019 05/20/21 0900          Total Minutes    19991 - PT Manual Therapy Minutes  15  -MH  --        Manual Rx 1    Manual Rx 1 Location  --  right knee  -MH     Manual Rx 1 Type  --  ant/post tib glides  -     Manual Rx 1 Duration  --  throughout flexion stretches  -        Manual Rx 2    Manual Rx 2 Location  --  right knee  -MH     Manual Rx 2 Type  --  PROM right knee flexion   -MH     Manual Rx 2 Duration  --  10 reps  -        Manual Rx 3    Manual Rx 3 Location  --  right knee   -MH     Manual Rx 3 Type  --  PROM knee extension stretch   -MH     Manual Rx 3 Duration  --  7 reps  -MH       User Key  (r) = Recorded By, (t) = Taken By, (c) = Cosigned By    Initials Name Provider Type     Audie Callahan PTA Physical Therapy Assistant              Therapy Education  Given: HEP, Symptoms/condition management  Program: Reinforced  How Provided: Verbal  Provided to: Patient  Level of Understanding: Teach back education performed, Verbalized, Demonstrated              Time Calculation:   Start Time: 0900  Stop Time: 1006  Time Calculation (min): 66 min  Timed Charges  76516 - PT Therapeutic Exercise Minutes: 35  95716 - PT Manual Therapy Minutes: 15  Total Minutes  Timed Charges Total Minutes: 50   Total Minutes: 50  Therapy Charges for Today     Code Description Service Date Service Provider Modifiers Qty    99621346387 HC PT THER PROC EA 15 MIN 5/20/2021 Audie Callahan PTA GP 2 "    [id number]  PT MANUAL THERAPY EA 15 MIN 5/20/2021 Audie Callahan, HANS GP 1                    Audie Callahan PTA  5/20/2021

## 2021-05-25 ENCOUNTER — HOSPITAL ENCOUNTER (OUTPATIENT)
Dept: PHYSICAL THERAPY | Facility: HOSPITAL | Age: 56
Setting detail: THERAPIES SERIES
Discharge: HOME | End: 2021-05-25

## 2021-05-25 DIAGNOSIS — Z96.651 S/P TOTAL KNEE REPLACEMENT, RIGHT: Primary | ICD-10-CM

## 2021-05-25 PROCEDURE — 97140 MANUAL THERAPY 1/> REGIONS: CPT

## 2021-05-25 PROCEDURE — 97110 THERAPEUTIC EXERCISES: CPT

## 2021-05-25 NOTE — THERAPY TREATMENT NOTE
Outpatient Physical Therapy Ortho Treatment Note   Susana Chacko     Patient Name: Christine Villagomez  : 1965  MRN: [id number]  Today's Date: 2021      Visit Date: 2021    Visit Dx:    ICD-10-CM ICD-9-CM   1. S/P total knee replacement, right  Z96.651 V43.65       Patient Active Problem List   Diagnosis   • Atopic rhinitis   • Anxiety   • Arthritis   • Back pain   • Contact dermatitis   • Hypertriglyceridemia   • Hypercholesterolemia   • Menopausal flushing   • Essential hypertension   • Hypothyroidism   • Overactive bladder   • Rash   • Obstructive sleep apnea syndrome   • Skin tags, multiple acquired   • Knee pain   • Paresthesia of foot   • Bipolar disorder (CMS/HCC)   • History of depression   • Attention deficit disorder (ADD) in adult   • PMB (postmenopausal bleeding)   • Thickened endometrium   • S/P D&C (status post dilation and curettage)        Past Medical History:   Diagnosis Date   • Bipolar disorder (CMS/HCC)    • Depression    • Hyperlipidemia    • Hypertension    • Hypothyroidism    • Kidney stone    • OA (osteoarthritis) of knee     left   • PONV (postoperative nausea and vomiting)    • Sleep apnea     uses cpap        Past Surgical History:   Procedure Laterality Date   • BLADDER SUSPENSION      TVT   • COLONOSCOPY     • D & C HYSTEROSCOPY MYOSURE N/A 2021    Procedure: DILATATION AND CURETTAGE HYSTEROSCOPY with MYOSURE;  Surgeon: Trinity Clark MD;  Location: [facility];  Service: Obstetrics/Gynecology;  Laterality: N/A;   • KIDNEY STONE SURGERY     • KNEE MENISCAL REPAIR Bilateral    • REPLACEMENT TOTAL KNEE Right        PT Ortho     Row Name 21 1100       Subjective Comments    Subjective Comments  pt reports minimal to no pain but is concerned that she is not making enough progress in regards to her flexion ROM  -MH       Right Lower Ext    Rt Knee Extension/Flexion PROM  110 degrees A/AROM, supine post stretches  -MH      User Key  (r) = Recorded By, (t) =  "Taken By, (c) = Cosigned By    Initials Name Provider Type     Audie Callahan, HANS Physical Therapy Assistant                      PT Assessment/Plan     Row Name 05/25/21 1419          PT Assessment    Assessment Comments  pt tolerates ex's well - fatigue with progression of reps but no complaints of pain; slow progression with flexion ROM but showing improvement as well as pt reporting increased activity/walking with decreased pain  -        PT Plan    PT Plan Comments  Cont per POC  -       User Key  (r) = Recorded By, (t) = Taken By, (c) = Cosigned By    Initials Name Provider Type     Audie Callahan HANS Osorio Physical Therapy Assistant            OP Exercises     Row Name 05/25/21 1420 05/25/21 1100          Subjective Comments    Subjective Comments  --  pt reports minimal to no pain but is concerned that she is not making enough progress in regards to her flexion ROM  -        Total Minutes    86485 - PT Therapeutic Exercise Minutes  40  -MH  --     10082 - PT Manual Therapy Minutes  15  -MH  --        Exercise 1    Exercise Name 1  --  heel slides   -     Cueing 1  --  Verbal  -     Time 1  --  7 min  -        Exercise 2    Exercise Name 2  --  wall slides  -     Cueing 2  --  Verbal  -MH     Time 2  --  7 min  -        Exercise 3    Exercise Name 3  --  recumbent bike (seat 7)  -     Cueing 3  --  Verbal  -MH     Reps 3  --  7 min  -        Exercise 4    Exercise Name 4  --  heel raises  -     Cueing 4  --  Verbal  -MH     Reps 4  --  25  -MH        Exercise 5    Exercise Name 5  --  step ups 6\"  -     Cueing 5  --  Verbal  -     Reps 5  --  20 each  -        Exercise 6    Exercise Name 6  --  SAQ  -     Cueing 6  --  Verbal  -     Reps 6  --  40  -     Time 6  --  5 sec  -        Exercise 7    Exercise Name 7  --  SLR  -     Cueing 7  --  Verbal  -     Sets 7  --  2  -MH     Reps 7  --  20  -MH        Exercise 8    Exercise Name 8  --  QS - ankle roll  -     " "Cueing 8  --  Verbal  -MH     Reps 8  --  25  -MH     Time 8  --  5 sec  -MH        Exercise 9    Exercise Name 9  --  hamstring stretch  -MH     Cueing 9  --  Verbal  -MH     Reps 9  --  5  -MH     Time 9  --  15 sec  -MH        Exercise 10    Exercise Name 10  --  heel prop  -MH     Cueing 10  --  Verbal  -MH     Time 10  --  5 min  -MH        Exercise 11    Exercise Name 11  --  partial squats  -MH     Cueing 11  --  Verbal  -MH     Reps 11  --  25  -MH        Exercise 12    Exercise Name 12  --  6\" Lateral step over  -MH     Cueing 12  --  Verbal  -MH     Reps 12  --  20  -MH        Exercise 13    Exercise Name 13  --  sidelying ABD  -MH     Cueing 13  --  Verbal;Tactile  -MH     Reps 13  --  2  -MH     Time 13  --  40  -MH       User Key  (r) = Recorded By, (t) = Taken By, (c) = Cosigned By    Initials Name Provider Type    Audie Gresham PTA Physical Therapy Assistant                      Manual Rx (last 36 hours)      Manual Treatments     Row Name 05/25/21 1420 05/25/21 1100          Total Minutes    50754 - PT Manual Therapy Minutes  15  -MH  --        Manual Rx 1    Manual Rx 1 Location  --  right knee  -MH     Manual Rx 1 Type  --  post tib glides  -MH     Manual Rx 1 Duration  --  2 x 10 osc  -MH        Manual Rx 2    Manual Rx 2 Location  --  right knee  -MH     Manual Rx 2 Type  --  PROM right knee flexion   -MH     Manual Rx 2 Duration  --  10 reps  -MH        Manual Rx 3    Manual Rx 3 Location  --  right knee   -MH     Manual Rx 3 Type  --  PROM knee extension stretch   -MH     Manual Rx 3 Duration  --  7 reps  -MH        Manual Rx 4    Manual Rx 4 Location  --  right knee  -MH     Manual Rx 4 Type  --  patella mobs  -MH     Manual Rx 4 Duration  --  10 each  -MH       User Key  (r) = Recorded By, (t) = Taken By, (c) = Cosigned By    Initials Name Provider Type    Audie Gresham PTA Physical Therapy Assistant              Therapy Education  Given: HEP, Symptoms/condition " management  Program: Reinforced  How Provided: Verbal  Provided to: Patient  Level of Understanding: Teach back education performed, Verbalized, Demonstrated              Time Calculation:   Start Time: 1100  Stop Time: 1212  Time Calculation (min): 72 min  Timed Charges  51562 - PT Therapeutic Exercise Minutes: 40  80871 - PT Manual Therapy Minutes: 15  Total Minutes  Timed Charges Total Minutes: 55   Total Minutes: 55  Therapy Charges for Today     Code Description Service Date Service Provider Modifiers Qty    85598055554 HC PT THER PROC EA 15 MIN 5/25/2021 Audie Callahan PTA GP 3    44827489405 HC PT MANUAL THERAPY EA 15 MIN 5/25/2021 Audie Callahan PTA GP 1                    Audie Callahan PTA  5/25/2021

## 2021-05-28 ENCOUNTER — HOSPITAL ENCOUNTER (OUTPATIENT)
Dept: PHYSICAL THERAPY | Facility: HOSPITAL | Age: 56
Setting detail: THERAPIES SERIES
Discharge: HOME | End: 2021-05-28

## 2021-05-28 DIAGNOSIS — Z96.651 S/P TOTAL KNEE REPLACEMENT, RIGHT: Primary | ICD-10-CM

## 2021-05-28 PROCEDURE — 97110 THERAPEUTIC EXERCISES: CPT

## 2021-05-28 PROCEDURE — 97140 MANUAL THERAPY 1/> REGIONS: CPT

## 2021-05-28 NOTE — THERAPY TREATMENT NOTE
Outpatient Physical Therapy Ortho Treatment Note   Susana Chacko     Patient Name: Christine Villagomez  : 1965  MRN: [id number]  Today's Date: 2021      Visit Date: 2021    Visit Dx:    ICD-10-CM ICD-9-CM   1. S/P total knee replacement, right  Z96.651 V43.65       Patient Active Problem List   Diagnosis   • Atopic rhinitis   • Anxiety   • Arthritis   • Back pain   • Contact dermatitis   • Hypertriglyceridemia   • Hypercholesterolemia   • Menopausal flushing   • Essential hypertension   • Hypothyroidism   • Overactive bladder   • Rash   • Obstructive sleep apnea syndrome   • Skin tags, multiple acquired   • Knee pain   • Paresthesia of foot   • Bipolar disorder (CMS/HCC)   • History of depression   • Attention deficit disorder (ADD) in adult   • PMB (postmenopausal bleeding)   • Thickened endometrium   • S/P D&C (status post dilation and curettage)        Past Medical History:   Diagnosis Date   • Bipolar disorder (CMS/HCC)    • Depression    • Hyperlipidemia    • Hypertension    • Hypothyroidism    • Kidney stone    • OA (osteoarthritis) of knee     left   • PONV (postoperative nausea and vomiting)    • Sleep apnea     uses cpap        Past Surgical History:   Procedure Laterality Date   • BLADDER SUSPENSION      TVT   • COLONOSCOPY     • D & C HYSTEROSCOPY MYOSURE N/A 2021    Procedure: DILATATION AND CURETTAGE HYSTEROSCOPY with MYOSURE;  Surgeon: Trinity Clark MD;  Location: [facility];  Service: Obstetrics/Gynecology;  Laterality: N/A;   • KIDNEY STONE SURGERY     • KNEE MENISCAL REPAIR Bilateral    • REPLACEMENT TOTAL KNEE Right        PT Ortho     Row Name 21 1100       Subjective Comments    Subjective Comments  pt states she had follow up with ortho APRN and she was pleased but wants her to push extension  -MH       Right Lower Ext    Rt Knee Extension/Flexion PROM  3 - 112 degrees flexion A/AROM  -MH      User Key  (r) = Recorded By, (t) = Taken By, (c) = Cosigned By     "Initials Name Provider Type     Audie Callahan HANS Osorio Physical Therapy Assistant                      PT Assessment/Plan     Row Name 05/28/21 1307          PT Assessment    Assessment Comments  pt continues to make progress with increased A/AROM and strength as noted with progression of ex's  -MH        PT Plan    PT Plan Comments  Cont per POC  -MH       User Key  (r) = Recorded By, (t) = Taken By, (c) = Cosigned By    Initials Name Provider Type     Audie Callahan HANS Osorio Physical Therapy Assistant            OP Exercises     Row Name 05/28/21 1307 05/28/21 1100          Subjective Comments    Subjective Comments  --  pt states she had follow up with ortho APRN and she was pleased but wants her to push extension  -        Total Minutes    39693 - PT Therapeutic Exercise Minutes  38  -MH  --     15696 - PT Manual Therapy Minutes  15  -MH  --        Exercise 1    Exercise Name 1  --  heel slides   -MH     Cueing 1  --  Verbal  -MH     Time 1  --  7 min  -MH        Exercise 2    Exercise Name 2  --  wall slides  -MH     Cueing 2  --  Verbal  -MH     Time 2  --  7 min  -MH        Exercise 3    Exercise Name 3  --  recumbent bike (seat 7)  -MH     Cueing 3  --  Verbal  -MH     Reps 3  --  7 min  -MH        Exercise 4    Exercise Name 4  --  heel raises  -MH     Cueing 4  --  Verbal  -MH     Reps 4  --  25  -MH        Exercise 5    Exercise Name 5  --  step ups 6\"  -MH     Cueing 5  --  Verbal  -MH     Reps 5  --  20 each  -MH        Exercise 6    Exercise Name 6  --  SAQ  -MH     Cueing 6  --  Verbal  -MH     Reps 6  --  40  -MH     Time 6  --  1#  -MH        Exercise 7    Exercise Name 7  --  SLR  -MH     Cueing 7  --  Verbal  -MH     Sets 7  --  2  -MH     Reps 7  --  20  -MH        Exercise 8    Exercise Name 8  --  QS - ankle roll  -MH     Cueing 8  --  Verbal  -MH     Reps 8  --  25  -MH     Time 8  --  5 sec  -MH        Exercise 9    Exercise Name 9  --  hamstring stretch  -MH     Cueing 9  --  Verbal  -MH  " "   Reps 9  --  10  -MH     Time 9  --  10 sec  -MH        Exercise 10    Exercise Name 10  --  heel prop  -MH     Cueing 10  --  Verbal  -MH     Time 10  --  5 min  -MH        Exercise 11    Exercise Name 11  --  partial squats  -MH     Cueing 11  --  Verbal  -MH     Reps 11  --  25  -MH        Exercise 12    Exercise Name 12  --  6\" Lateral step over  -MH     Cueing 12  --  Verbal  -MH     Reps 12  --  20  -MH        Exercise 13    Exercise Name 13  --  sidelying ABD  -MH     Cueing 13  --  Verbal;Tactile  -MH     Reps 13  --  2  -MH     Time 13  --  20  -MH        Exercise 14    Exercise Name 14  --  LAQ  -MH     Cueing 14  --  Verbal;Demo  -MH     Reps 14  --  40  -MH     Time 14  --  1#  -MH       User Key  (r) = Recorded By, (t) = Taken By, (c) = Cosigned By    Initials Name Provider Type     Audie Callahan, HANS Physical Therapy Assistant                      Manual Rx (last 36 hours)      Manual Treatments     Row Name 05/28/21 1307 05/28/21 1100          Total Minutes    41150 - PT Manual Therapy Minutes  15  -MH  --        Manual Rx 1    Manual Rx 1 Location  --  right knee - supine  -     Manual Rx 1 Type  --  post tib glides  -     Manual Rx 1 Duration  --  2 x 10 osc  -MH        Manual Rx 2    Manual Rx 2 Location  --  right knee - pt supine  -     Manual Rx 2 Type  --  PROM right knee flexion   -     Manual Rx 2 Duration  --  10 reps  -        Manual Rx 3    Manual Rx 3 Location  --  right knee  - supine  -MH     Manual Rx 3 Type  --  PROM knee extension stretch   -     Manual Rx 3 Duration  --  7 reps  -MH       User Key  (r) = Recorded By, (t) = Taken By, (c) = Cosigned By    Initials Name Provider Type     Audie Callahan PTA Physical Therapy Assistant              Therapy Education  Education Details: pt to practice backwards walking (along counter for balance) to promote knee extension  Given: HEP, Symptoms/condition management  Program: Reinforced  How Provided: Verbal, " Demonstration  Provided to: Patient  Level of Understanding: Teach back education performed, Verbalized, Demonstrated              Time Calculation:   Start Time: 1100  Stop Time: 1205  Time Calculation (min): 65 min  Timed Charges  91706 - PT Therapeutic Exercise Minutes: 38  00856 - PT Manual Therapy Minutes: 15  Total Minutes  Timed Charges Total Minutes: 53   Total Minutes: 53  Therapy Charges for Today     Code Description Service Date Service Provider Modifiers Qty    86918034077 HC PT THER PROC EA 15 MIN 5/28/2021 Audie Callahan PTA GP 2    35322382124 HC PT MANUAL THERAPY EA 15 MIN 5/28/2021 Audie Callahan PTA GP 1                    Audie Callahan PTA  5/28/2021

## 2021-06-01 ENCOUNTER — HOSPITAL ENCOUNTER (OUTPATIENT)
Dept: PHYSICAL THERAPY | Facility: HOSPITAL | Age: 56
Setting detail: THERAPIES SERIES
Discharge: HOME | End: 2021-06-01

## 2021-06-01 DIAGNOSIS — Z96.651 S/P TOTAL KNEE REPLACEMENT, RIGHT: Primary | ICD-10-CM

## 2021-06-01 PROCEDURE — 97140 MANUAL THERAPY 1/> REGIONS: CPT

## 2021-06-01 PROCEDURE — 97110 THERAPEUTIC EXERCISES: CPT

## 2021-06-01 NOTE — THERAPY TREATMENT NOTE
"    Outpatient Physical Therapy Ortho Treatment Note   [place]     Patient Name: Christine Villagomez  : 1965  MRN: [id number]  Today's Date: 2021      Visit Date: 2021    Visit Dx:    ICD-10-CM ICD-9-CM   1. S/P total knee replacement, right  Z96.651 V43.65       Patient Active Problem List   Diagnosis   • Atopic rhinitis   • Anxiety   • Arthritis   • Back pain   • Contact dermatitis   • Hypertriglyceridemia   • Hypercholesterolemia   • Menopausal flushing   • Essential hypertension   • Hypothyroidism   • Overactive bladder   • Rash   • Obstructive sleep apnea syndrome   • Skin tags, multiple acquired   • Knee pain   • Paresthesia of foot   • Bipolar disorder (CMS/HCC)   • History of depression   • Attention deficit disorder (ADD) in adult   • PMB (postmenopausal bleeding)   • Thickened endometrium   • S/P D&C (status post dilation and curettage)        Past Medical History:   Diagnosis Date   • Bipolar disorder (CMS/HCC)    • Depression    • Hyperlipidemia    • Hypertension    • Hypothyroidism    • Kidney stone    • OA (osteoarthritis) of knee     left   • PONV (postoperative nausea and vomiting)    • Sleep apnea     uses cpap        Past Surgical History:   Procedure Laterality Date   • BLADDER SUSPENSION      TVT   • COLONOSCOPY     • D & C HYSTEROSCOPY MYOSURE N/A 2021    Procedure: DILATATION AND CURETTAGE HYSTEROSCOPY with MYOSURE;  Surgeon: Trinity Clark MD;  Location: [facility];  Service: Obstetrics/Gynecology;  Laterality: N/A;   • KIDNEY STONE SURGERY     • KNEE MENISCAL REPAIR Bilateral    • REPLACEMENT TOTAL KNEE Right        PT Ortho     Row Name 21 0900       Subjective Comments    Subjective Comments  pt states she has been doing well since last session, \"I have no complaints\"  -       Posture/Observations    Posture/Observations Comments Overall improved gait, but pt continues with decreased heel strike, tends to land on mid/ball of foot.  Corrects easily " "with cues.  -MH       Right Lower Ext    Rt Knee Extension/Flexion PROM  1 - 116 degrees, supine post stretching  -      User Key  (r) = Recorded By, (t) = Taken By, (c) = Cosigned By    Initials Name Provider Type    Rita Greshammontrell Osorio PTA Physical Therapy Assistant                      PT Assessment/Plan     Row Name 06/01/21 1528          PT Assessment    Assessment Comments  pt continues to show progress with gait and ROM; good tolerance to addition of TKE vs theraband  -        PT Plan    PT Plan Comments  Cont per POC  -       User Key  (r) = Recorded By, (t) = Taken By, (c) = Cosigned By    Initials Name Provider Type    Rita Greshammontrell Osroio PTA Physical Therapy Assistant            OP Exercises     Row Name 06/01/21 1529 06/01/21 0900          Subjective Comments    Subjective Comments  --  pt states she has been doing well since last session, \"I have no complaints\"  -        Total Minutes    52821 - PT Therapeutic Exercise Minutes  40  -MH  --     22908 - PT Manual Therapy Minutes  15  -MH  --        Exercise 1    Exercise Name 1  --  heel slides   -     Cueing 1  --  Verbal  -MH     Time 1  --  7 min  -MH        Exercise 2    Exercise Name 2  --  wall slides  -MH     Cueing 2  --  Verbal  -MH     Time 2  --  7 min  -MH        Exercise 3    Exercise Name 3  --  recumbent bike (seat 7)  -MH     Cueing 3  --  Verbal  -MH     Reps 3  --  7 min  -MH        Exercise 4    Exercise Name 4  --  heel raises  -MH     Cueing 4  --  Verbal  -MH     Reps 4  --  25  -MH        Exercise 5    Exercise Name 5  --  step ups 6\"  -MH     Cueing 5  --  Verbal  -MH     Reps 5  --  20 each  -MH        Exercise 6    Exercise Name 6  --  SAQ  -MH     Cueing 6  --  Verbal  -MH     Reps 6  --  40  -MH     Time 6  --  1#  -MH        Exercise 7    Exercise Name 7  --  SLR  -MH     Cueing 7  --  Verbal  -MH     Sets 7  --  2  -MH     Reps 7  --  20  -MH        Exercise 8    Exercise Name 8  --  QS - ankle roll  -MH     Cueing " "8  --  Verbal  -MH     Reps 8  --  25  -MH     Time 8  --  5 sec  -MH        Exercise 9    Exercise Name 9  --  hamstring stretch  -MH     Cueing 9  --  Verbal  -MH     Reps 9  --  10  -MH     Time 9  --  10 sec  -MH        Exercise 10    Exercise Name 10  --  heel prop  -MH     Cueing 10  --  Verbal  -MH     Time 10  --  5 min  -MH        Exercise 11    Exercise Name 11  --  partial squats  -MH     Cueing 11  --  Verbal  -MH     Reps 11  --  25  -MH        Exercise 12    Exercise Name 12  --  6\" Lateral step over  -MH     Cueing 12  --  Verbal  -MH     Reps 12  --  20  -MH        Exercise 13    Exercise Name 13  --  sidelying ABD  -MH     Cueing 13  --  Verbal;Tactile  -MH     Reps 13  --  2  -MH     Time 13  --  20  -MH        Exercise 14    Exercise Name 14  --  LAQ  -MH     Cueing 14  --  Verbal;Demo  -MH     Reps 14  --  40  -MH     Time 14  --  1#  -MH        Exercise 15    Exercise Name 15  --  TKE vs theraband  -MH     Cueing 15  --  Verbal;Tactile;Demo  -MH     Reps 15  --  20  -MH     Time 15  --  blue  -       User Key  (r) = Recorded By, (t) = Taken By, (c) = Cosigned By    Initials Name Provider Type    Audie Gresham PTA Physical Therapy Assistant                      Manual Rx (last 36 hours)      Manual Treatments     Row Name 06/01/21 1529 06/01/21 0900          Total Minutes    61109 - PT Manual Therapy Minutes  15  -MH  --        Manual Rx 1    Manual Rx 1 Location  --  right knee - pt supine  -     Manual Rx 1 Type  --  PROM right knee flexion   -     Manual Rx 1 Duration  --  10 reps  -        Manual Rx 2    Manual Rx 2 Location  --  right knee  - supine  -     Manual Rx 2 Type  --  PROM knee extension stretch   -     Manual Rx 2 Duration  --  10 reps  -MH       User Key  (r) = Recorded By, (t) = Taken By, (c) = Cosigned By    Initials Name Provider Type    Audie Gresham PTA Physical Therapy Assistant              Therapy Education  Education Details: Cues for gait in " order to increase heel strike; pt to add TKE to HEP - blue used in clinic but afterwards pt states she could have used a higher resistance, black band issued for home  Given: HEP, Mobility training  Program: New, Reinforced, Progressed  How Provided: Verbal, Demonstration  Provided to: Patient  Level of Understanding: Teach back education performed, Verbalized, Demonstrated              Time Calculation:   Start Time: 0850  Stop Time: 1004  Time Calculation (min): 74 min  Timed Charges  87233 - PT Therapeutic Exercise Minutes: 40  81791 - PT Manual Therapy Minutes: 15  Total Minutes  Timed Charges Total Minutes: 55   Total Minutes: 55  Therapy Charges for Today     Code Description Service Date Service Provider Modifiers Qty    96826895717 HC PT THER PROC EA 15 MIN 6/1/2021 Audie Callahan PTA GP 3    [id number] HC PT MANUAL THERAPY EA 15 MIN 6/1/2021 Audie Callahan PTA GP 1                    Audie Callahan PTA  6/1/2021

## 2021-06-02 DIAGNOSIS — F32.A DEPRESSION, UNSPECIFIED DEPRESSION TYPE: ICD-10-CM

## 2021-06-02 RX ORDER — TERAZOSIN 1 MG/1
1 CAPSULE ORAL DAILY
Qty: 90 CAPSULE | Refills: 3 | Status: SHIPPED | OUTPATIENT
Start: 2021-06-02 | End: 2022-07-15 | Stop reason: SDUPTHER

## 2021-06-03 ENCOUNTER — HOSPITAL ENCOUNTER (OUTPATIENT)
Dept: PHYSICAL THERAPY | Facility: HOSPITAL | Age: 56
Setting detail: THERAPIES SERIES
Discharge: HOME | End: 2021-06-03

## 2021-06-03 DIAGNOSIS — Z96.651 S/P TOTAL KNEE REPLACEMENT, RIGHT: Primary | ICD-10-CM

## 2021-06-03 DIAGNOSIS — M17.0 PRIMARY OSTEOARTHRITIS OF BOTH KNEES: ICD-10-CM

## 2021-06-03 PROCEDURE — 97140 MANUAL THERAPY 1/> REGIONS: CPT

## 2021-06-03 PROCEDURE — 97110 THERAPEUTIC EXERCISES: CPT

## 2021-06-03 NOTE — THERAPY TREATMENT NOTE
Outpatient Physical Therapy Ortho Treatment Note   Susana Chacko     Patient Name: Christine Villagomez  : 1965  MRN: [id number]  Today's Date: 6/3/2021      Visit Date: 2021    Visit Dx:    ICD-10-CM ICD-9-CM   1. S/P total knee replacement, right  Z96.651 V43.65   2. Primary osteoarthritis of both knees  M17.0 715.16       Patient Active Problem List   Diagnosis   • Atopic rhinitis   • Anxiety   • Arthritis   • Back pain   • Contact dermatitis   • Hypertriglyceridemia   • Hypercholesterolemia   • Menopausal flushing   • Essential hypertension   • Hypothyroidism   • Overactive bladder   • Rash   • Obstructive sleep apnea syndrome   • Skin tags, multiple acquired   • Knee pain   • Paresthesia of foot   • Bipolar disorder (CMS/HCC)   • History of depression   • Attention deficit disorder (ADD) in adult   • PMB (postmenopausal bleeding)   • Thickened endometrium   • S/P D&C (status post dilation and curettage)        Past Medical History:   Diagnosis Date   • Bipolar disorder (CMS/HCC)    • Depression    • Hyperlipidemia    • Hypertension    • Hypothyroidism    • Kidney stone    • OA (osteoarthritis) of knee     left   • PONV (postoperative nausea and vomiting)    • Sleep apnea     uses cpap        Past Surgical History:   Procedure Laterality Date   • BLADDER SUSPENSION      TVT   • COLONOSCOPY     • D & C HYSTEROSCOPY MYOSURE N/A 2021    Procedure: DILATATION AND CURETTAGE HYSTEROSCOPY with MYOSURE;  Surgeon: Trinity Clark MD;  Location: [facility];  Service: Obstetrics/Gynecology;  Laterality: N/A;   • KIDNEY STONE SURGERY     • KNEE MENISCAL REPAIR Bilateral    • REPLACEMENT TOTAL KNEE Right        PT Ortho     Row Name 21 0900       Subjective Comments    Subjective Comments  pt with no new complaints  -      User Key  (r) = Recorded By, (t) = Taken By, (c) = Cosigned By    Initials Name Provider Type    Audie Gresham, PTA Physical Therapy Assistant                      PT  "Assessment/Plan     Row Name 06/03/21 1419          PT Assessment    Assessment Comments  pt progressing with ROM and gait pattern; tolerated additional resistance well  -MH        PT Plan    PT Plan Comments  Cont per POC  -MH       User Key  (r) = Recorded By, (t) = Taken By, (c) = Cosigned By    Initials Name Provider Type    Audie Gresham, PTA Physical Therapy Assistant            OP Exercises     Row Name 06/03/21 1421 06/03/21 0900          Subjective Comments    Subjective Comments  --  pt with no new complaints  -MH        Total Minutes    08634 - PT Therapeutic Exercise Minutes  45  -MH  --     22743 - PT Manual Therapy Minutes  12  -MH  --        Exercise 1    Exercise Name 1  --  heel slides   -MH     Cueing 1  --  Verbal  -MH     Time 1  --  7 min  -MH        Exercise 2    Exercise Name 2  --  wall slides  -MH     Cueing 2  --  Verbal  -MH     Time 2  --  7 min  -MH        Exercise 3    Exercise Name 3  --  recumbent bike (seat 7)  -MH     Cueing 3  --  Verbal  -MH     Reps 3  --  7 min  -MH        Exercise 4    Exercise Name 4  --  heel raises  -MH     Cueing 4  --  Verbal  -MH     Reps 4  --  30  -MH        Exercise 5    Exercise Name 5  --  step ups 6\"  -MH     Cueing 5  --  Verbal  -MH     Reps 5  --  25 each  -MH        Exercise 6    Exercise Name 6  --  SAQ  -MH     Cueing 6  --  Verbal  -MH     Reps 6  --  40  -MH     Time 6  --  2#  -MH        Exercise 7    Exercise Name 7  --  SLR  -MH     Cueing 7  --  Verbal  -MH     Reps 7  --  35  -MH     Time 7  --  1#  -MH        Exercise 8    Exercise Name 8  --  QS - ankle roll  -MH     Cueing 8  --  Verbal  -MH     Reps 8  --  25  -MH     Time 8  --  5 sec  -MH        Exercise 9    Exercise Name 9  --  hamstring stretch  -MH     Cueing 9  --  Verbal  -MH     Reps 9  --  10  -MH     Time 9  --  10 sec  -MH        Exercise 10    Exercise Name 10  --  heel prop  -MH     Cueing 10  --  Verbal  -MH     Time 10  --  5 min  -MH        Exercise 11    " "Exercise Name 11  --  partial squats  -MH     Cueing 11  --  Verbal  -MH     Reps 11  --  25  -MH        Exercise 12    Exercise Name 12  --  6\" Lateral step over  -MH     Cueing 12  --  Verbal  -MH     Reps 12  --  20  -MH        Exercise 13    Exercise Name 13  --  sidelying ABD  -MH     Cueing 13  --  Verbal;Tactile  -MH     Reps 13  --  35  -MH     Time 13  --  1#  -MH        Exercise 14    Exercise Name 14  --  LAQ  -MH     Cueing 14  --  Verbal;Demo  -MH     Reps 14  --  40  -MH     Time 14  --  2#  -MH        Exercise 15    Exercise Name 15  --  TKE vs theraband  -MH     Cueing 15  --  Verbal;Tactile;Demo  -     Reps 15  --  30  -MH     Time 15  --  black  -       User Key  (r) = Recorded By, (t) = Taken By, (c) = Cosigned By    Initials Name Provider Type     Audie Callahan PTA Physical Therapy Assistant                      Manual Rx (last 36 hours)      Manual Treatments     Row Name 06/03/21 1421 06/03/21 0900          Total Minutes    38442 - PT Manual Therapy Minutes  12  -MH  --        Manual Rx 1    Manual Rx 1 Location  --  right knee - pt supine  -     Manual Rx 1 Type  --  PROM right knee flexion   -     Manual Rx 1 Duration  --  10 reps  -        Manual Rx 2    Manual Rx 2 Location  --  right knee  - supine  -MH     Manual Rx 2 Type  --  PROM knee extension stretch   -     Manual Rx 2 Duration  --  10 reps  -       User Key  (r) = Recorded By, (t) = Taken By, (c) = Cosigned By    Initials Name Provider Type     Audie Callahan PTA Physical Therapy Assistant              Therapy Education  Given: HEP  Program: Reinforced  How Provided: Verbal  Provided to: Patient  Level of Understanding: Teach back education performed, Verbalized, Demonstrated              Time Calculation:   Start Time: 0845  Stop Time: 1000  Time Calculation (min): 75 min  Timed Charges  54488 - PT Therapeutic Exercise Minutes: 45  38811 - PT Manual Therapy Minutes: 12  Total Minutes  Timed Charges Total " Minutes: 57   Total Minutes: 57  Therapy Charges for Today     Code Description Service Date Service Provider Modifiers Qty    47922334967 HC PT THER PROC EA 15 MIN 6/3/2021 Audie Callahan, HANS GP 3    64140363075 HC PT MANUAL THERAPY EA 15 MIN 6/3/2021 Audie Callahan, HANS GP 1                    Audie Callahan PTA  6/3/2021

## 2021-06-07 ENCOUNTER — HOSPITAL ENCOUNTER (OUTPATIENT)
Dept: PHYSICAL THERAPY | Facility: HOSPITAL | Age: 56
Setting detail: THERAPIES SERIES
Discharge: HOME | End: 2021-06-07

## 2021-06-07 DIAGNOSIS — Z96.651 S/P TOTAL KNEE REPLACEMENT, RIGHT: Primary | ICD-10-CM

## 2021-06-07 PROCEDURE — 97110 THERAPEUTIC EXERCISES: CPT

## 2021-06-07 PROCEDURE — 97140 MANUAL THERAPY 1/> REGIONS: CPT

## 2021-06-07 NOTE — THERAPY TREATMENT NOTE
Outpatient Physical Therapy Ortho Treatment Note   Susana Chacko     Patient Name: Christine Villagomez  : 1965  MRN: [id number]  Today's Date: 2021      Visit Date: 2021    Visit Dx:    ICD-10-CM ICD-9-CM   1. S/P total knee replacement, right  Z96.651 V43.65       Patient Active Problem List   Diagnosis   • Atopic rhinitis   • Anxiety   • Arthritis   • Back pain   • Contact dermatitis   • Hypertriglyceridemia   • Hypercholesterolemia   • Menopausal flushing   • Essential hypertension   • Hypothyroidism   • Overactive bladder   • Rash   • Obstructive sleep apnea syndrome   • Skin tags, multiple acquired   • Knee pain   • Paresthesia of foot   • Bipolar disorder (CMS/HCC)   • History of depression   • Attention deficit disorder (ADD) in adult   • PMB (postmenopausal bleeding)   • Thickened endometrium   • S/P D&C (status post dilation and curettage)        Past Medical History:   Diagnosis Date   • Bipolar disorder (CMS/HCC)    • Depression    • Hyperlipidemia    • Hypertension    • Hypothyroidism    • Kidney stone    • OA (osteoarthritis) of knee     left   • PONV (postoperative nausea and vomiting)    • Sleep apnea     uses cpap        Past Surgical History:   Procedure Laterality Date   • BLADDER SUSPENSION      TVT   • COLONOSCOPY     • D & C HYSTEROSCOPY MYOSURE N/A 2021    Procedure: DILATATION AND CURETTAGE HYSTEROSCOPY with MYOSURE;  Surgeon: Trinity Clark MD;  Location: [facility];  Service: Obstetrics/Gynecology;  Laterality: N/A;   • KIDNEY STONE SURGERY     • KNEE MENISCAL REPAIR Bilateral    • REPLACEMENT TOTAL KNEE Right        PT Ortho     Row Name 21 0945       Right Lower Ext    Rt Knee Extension/Flexion PROM  0-116 post stretch  -KM      User Key  (r) = Recorded By, (t) = Taken By, (c) = Cosigned By    Initials Name Provider Type    Jessi Toth PTA Physical Therapy Assistant                      PT Assessment/Plan     Row Name 21 4261          PT  "Assessment    Assessment Comments  Pt tolerated progression of ther ex well; increased AROM measured  -KM        PT Plan    PT Plan Comments  Continue to progress as tolerated  -KM       User Key  (r) = Recorded By, (t) = Taken By, (c) = Cosigned By    Initials Name Provider Type    Jessi Toth, PTA Physical Therapy Assistant            OP Exercises     Row Name 06/07/21 0945             Exercise 1    Exercise Name 1  heel slides   -KM      Cueing 1  Verbal  -KM      Time 1  7 min  -KM         Exercise 2    Exercise Name 2  wall slides  -KM      Cueing 2  Verbal  -KM      Time 2  7 min  -KM         Exercise 3    Exercise Name 3  recumbent bike (seat 7)  -KM      Cueing 3  Verbal  -KM      Reps 3  7 min  -KM         Exercise 4    Exercise Name 4  heel raises  -KM      Cueing 4  Verbal  -KM      Reps 4  30  -KM         Exercise 5    Exercise Name 5  step ups 6\"  -KM      Cueing 5  Verbal  -KM      Reps 5  25 each  -KM         Exercise 6    Exercise Name 6  SAQ  -KM      Cueing 6  Verbal  -KM      Reps 6  40  -KM      Time 6  2#  -KM         Exercise 7    Exercise Name 7  SLR  -KM      Cueing 7  Verbal  -KM      Reps 7  25  -KM      Time 7  2#  -KM         Exercise 8    Exercise Name 8  QS - ankle roll  -KM      Cueing 8  Verbal  -KM      Reps 8  25  -KM      Time 8  5 sec  -KM         Exercise 9    Exercise Name 9  hamstring stretch  -KM      Cueing 9  Verbal  -KM      Reps 9  10  -KM      Time 9  10 sec  -KM         Exercise 10    Exercise Name 10  heel prop  -KM      Cueing 10  Verbal  -KM      Time 10  5 min  -KM         Exercise 11    Exercise Name 11  partial squats  -KM      Cueing 11  Verbal  -KM      Reps 11  25  -KM         Exercise 12    Exercise Name 12  6\" Lateral step over  -KM      Cueing 12  Verbal  -KM      Reps 12  20  -KM         Exercise 13    Exercise Name 13  sidelying ABD  -KM      Cueing 13  Verbal;Tactile  -KM      Reps 13  25  -KM      Time 13  2#  -KM         Exercise 14    Exercise " Name 14  LAQ  -KM      Cueing 14  Verbal;Demo  -KM      Reps 14  40  -KM      Time 14  2#  -KM         Exercise 15    Exercise Name 15  TKE vs theraband  -KM      Cueing 15  Verbal;Tactile;Demo  -KM      Reps 15  30  -KM      Time 15  silver  -KM        User Key  (r) = Recorded By, (t) = Taken By, (c) = Cosigned By    Initials Name Provider Type    Jessi Toth PTA Physical Therapy Assistant                                          Time Calculation:   Start Time: 0945  Stop Time: 1055  Time Calculation (min): 70 min  Therapy Charges for Today     Code Description Service Date Service Provider Modifiers Qty    77179884277 HC PT MANUAL THERAPY EA 15 MIN 6/7/2021 Jessi Ravi PTA GP 1    42328535182 HC PT THER PROC EA 15 MIN 6/7/2021 Jessi Ravi PTA GP 1                    Jessi Ravi PTA  6/7/2021

## 2021-06-09 ENCOUNTER — HOSPITAL ENCOUNTER (OUTPATIENT)
Dept: PHYSICAL THERAPY | Facility: HOSPITAL | Age: 56
Setting detail: THERAPIES SERIES
Discharge: HOME | End: 2021-06-09

## 2021-06-09 DIAGNOSIS — Z96.651 S/P TOTAL KNEE REPLACEMENT, RIGHT: Primary | ICD-10-CM

## 2021-06-09 PROCEDURE — 97110 THERAPEUTIC EXERCISES: CPT

## 2021-06-09 PROCEDURE — 97140 MANUAL THERAPY 1/> REGIONS: CPT

## 2021-06-09 NOTE — THERAPY TREATMENT NOTE
Outpatient Physical Therapy Ortho Treatment Note   Susana Chacko     Patient Name: Christine Villagomez  : 1965  MRN: [id number]  Today's Date: 2021      Visit Date: 2021    Visit Dx:    ICD-10-CM ICD-9-CM   1. S/P total knee replacement, right  Z96.651 V43.65       Patient Active Problem List   Diagnosis   • Atopic rhinitis   • Anxiety   • Arthritis   • Back pain   • Contact dermatitis   • Hypertriglyceridemia   • Hypercholesterolemia   • Menopausal flushing   • Essential hypertension   • Hypothyroidism   • Overactive bladder   • Rash   • Obstructive sleep apnea syndrome   • Skin tags, multiple acquired   • Knee pain   • Paresthesia of foot   • Bipolar disorder (CMS/HCC)   • History of depression   • Attention deficit disorder (ADD) in adult   • PMB (postmenopausal bleeding)   • Thickened endometrium   • S/P D&C (status post dilation and curettage)        Past Medical History:   Diagnosis Date   • Bipolar disorder (CMS/HCC)    • Depression    • Hyperlipidemia    • Hypertension    • Hypothyroidism    • Kidney stone    • OA (osteoarthritis) of knee     left   • PONV (postoperative nausea and vomiting)    • Sleep apnea     uses cpap        Past Surgical History:   Procedure Laterality Date   • BLADDER SUSPENSION      TVT   • COLONOSCOPY     • D & C HYSTEROSCOPY MYOSURE N/A 2021    Procedure: DILATATION AND CURETTAGE HYSTEROSCOPY with MYOSURE;  Surgeon: Trinity Clark MD;  Location: [facility];  Service: Obstetrics/Gynecology;  Laterality: N/A;   • KIDNEY STONE SURGERY     • KNEE MENISCAL REPAIR Bilateral    • REPLACEMENT TOTAL KNEE Right        PT Ortho     Row Name 21 1000       Subjective Comments    Subjective Comments  Pt reports her knee is doing well  -      User Key  (r) = Recorded By, (t) = Taken By, (c) = Cosigned By    Initials Name Provider Type     Audie Callahan, PTA Physical Therapy Assistant                      PT Assessment/Plan     Row Name 21 1134        "   PT Assessment    Assessment Comments  pt continues to progress well with ex's; pt denies limitations in daily routine due to (R) TKA; continues with tendency to land on ball of (R) foot vs heel strike - easily corrects with verbal cue but does not maintain  -        PT Plan    PT Plan Comments  Trial of weekly therapy in clinic with continued daily completion of HEP; pt scheduled for re-eval next session  -       User Key  (r) = Recorded By, (t) = Taken By, (c) = Cosigned By    Initials Name Provider Type     Audie Callahan, PTA Physical Therapy Assistant            OP Exercises     Row Name 06/09/21 1134 06/09/21 1000          Subjective Comments    Subjective Comments  --  Pt reports her knee is doing well  -        Total Minutes    66181 - PT Therapeutic Exercise Minutes  45  -MH  --     08109 - PT Manual Therapy Minutes  10  -MH  --        Exercise 1    Exercise Name 1  --  heel slides   -     Cueing 1  --  Verbal  -     Time 1  --  7 min  -        Exercise 2    Exercise Name 2  --  wall slides  -     Cueing 2  --  Verbal  -     Time 2  --  7 min  -        Exercise 3    Exercise Name 3  --  recumbent bike (seat 7)  -     Cueing 3  --  Verbal  -     Reps 3  --  7 min  -        Exercise 4    Exercise Name 4  --  heel raises  -     Cueing 4  --  Verbal  -     Reps 4  --  25  -MH        Exercise 5    Exercise Name 5  --  step ups 6\"  -     Cueing 5  --  Verbal  -     Reps 5  --  25 each  -        Exercise 6    Exercise Name 6  --  SAQ  -     Cueing 6  --  Verbal  -     Reps 6  --  40  -MH     Time 6  --  3#  -MH        Exercise 7    Exercise Name 7  --  SLR  -MH     Cueing 7  --  Verbal  -     Reps 7  --  40  -MH     Time 7  --  2#  -MH        Exercise 8    Exercise Name 8  --  QS - ankle roll  -     Cueing 8  --  Verbal  -     Reps 8  --  25  -MH     Time 8  --  5 sec  -MH        Exercise 9    Exercise Name 9  --  hamstring stretch  -MH     Cueing 9  --  Verbal  -MH " "    Reps 9  --  10  -MH     Time 9  --  10 sec  -        Exercise 10    Exercise Name 10  --  heel prop  -MH     Cueing 10  --  Verbal  -MH     Time 10  --  5 min  -MH        Exercise 11    Exercise Name 11  --  partial squats  -MH     Cueing 11  --  Verbal  -MH     Reps 11  --  25  -MH        Exercise 12    Exercise Name 12  --  6\" Lateral step over  -MH     Cueing 12  --  Verbal  -MH     Reps 12  --  25  -MH        Exercise 13    Exercise Name 13  --  sidelying ABD  -MH     Cueing 13  --  Verbal;Tactile  -MH     Reps 13  --  40  -MH     Time 13  --  2#  -MH        Exercise 14    Exercise Name 14  --  LAQ  -MH     Cueing 14  --  Verbal;Demo  -MH     Reps 14  --  40  -MH     Time 14  --  3#  -MH        Exercise 15    Exercise Name 15  --  TKE vs theraband  -     Cueing 15  --  Verbal;Tactile;Demo  -     Reps 15  --  40  -MH     Time 15  --  silver  -       User Key  (r) = Recorded By, (t) = Taken By, (c) = Cosigned By    Initials Name Provider Type     Audie Callahan, HANS Physical Therapy Assistant                      Manual Rx (last 36 hours)      Manual Treatments     Row Name 06/09/21 1134 06/09/21 1000          Total Minutes    03081 - PT Manual Therapy Minutes  10  -MH  --        Manual Rx 1    Manual Rx 1 Location  --  right knee - pt supine  -     Manual Rx 1 Type  --  PROM right knee flexion   -     Manual Rx 1 Duration  --  10 reps  -       User Key  (r) = Recorded By, (t) = Taken By, (c) = Cosigned By    Initials Name Provider Type     Audie Callahan PTA Physical Therapy Assistant              Therapy Education  Education Details: Again cues given for increasing heel strike  Given: HEP, Mobility training  Program: Reinforced, Progressed  How Provided: Verbal, Demonstration  Provided to: Patient  Level of Understanding: Teach back education performed, Verbalized, Demonstrated              Time Calculation:   Start Time: 0950  Stop Time: 1104  Time Calculation (min): 74 min  Timed " Charges  36501 - PT Therapeutic Exercise Minutes: 45  94218 - PT Manual Therapy Minutes: 10  Total Minutes  Timed Charges Total Minutes: 55   Total Minutes: 55  Therapy Charges for Today     Code Description Service Date Service Provider Modifiers Qty    44822649805 HC PT THER PROC EA 15 MIN 6/9/2021 Audie Callahan, HANS GP 3    31322253594 HC PT MANUAL THERAPY EA 15 MIN 6/9/2021 Audie Callahan PTA GP 1                    Audie Callahan PTA  6/9/2021

## 2021-06-11 ENCOUNTER — HOSPITAL ENCOUNTER (OUTPATIENT)
Dept: MAMMOGRAPHY | Facility: HOSPITAL | Age: 56
Discharge: HOME | End: 2021-06-11
Admitting: OBSTETRICS & GYNECOLOGY

## 2021-06-11 DIAGNOSIS — Z13.9 SCREENING FOR CONDITION: ICD-10-CM

## 2021-06-11 PROCEDURE — 77067 SCR MAMMO BI INCL CAD: CPT

## 2021-06-11 PROCEDURE — 77063 BREAST TOMOSYNTHESIS BI: CPT

## 2021-06-14 DIAGNOSIS — R92.8 ABNORMAL MAMMOGRAM: Primary | ICD-10-CM

## 2021-06-17 ENCOUNTER — HOSPITAL ENCOUNTER (OUTPATIENT)
Dept: PHYSICAL THERAPY | Facility: HOSPITAL | Age: 56
Setting detail: THERAPIES SERIES
Discharge: HOME | End: 2021-06-17

## 2021-06-17 DIAGNOSIS — Z96.651 S/P TOTAL KNEE REPLACEMENT, RIGHT: Primary | ICD-10-CM

## 2021-06-17 PROCEDURE — 97110 THERAPEUTIC EXERCISES: CPT | Performed by: PHYSICAL THERAPIST

## 2021-06-17 NOTE — THERAPY TREATMENT NOTE
Outpatient Physical Therapy Ortho Treatment Note   Susana Chacko     Patient Name: Christine Villagomez  : 1965  MRN: [id number]  Today's Date: 2021      Visit Date: 2021    Visit Dx:    ICD-10-CM ICD-9-CM   1. S/P total knee replacement, right  Z96.651 V43.65       Patient Active Problem List   Diagnosis   • Atopic rhinitis   • Anxiety   • Arthritis   • Back pain   • Contact dermatitis   • Hypertriglyceridemia   • Hypercholesterolemia   • Menopausal flushing   • Essential hypertension   • Hypothyroidism   • Overactive bladder   • Rash   • Obstructive sleep apnea syndrome   • Skin tags, multiple acquired   • Knee pain   • Paresthesia of foot   • Bipolar disorder (CMS/HCC)   • History of depression   • Attention deficit disorder (ADD) in adult   • PMB (postmenopausal bleeding)   • Thickened endometrium   • S/P D&C (status post dilation and curettage)        Past Medical History:   Diagnosis Date   • Bipolar disorder (CMS/HCC)    • Depression    • Hyperlipidemia    • Hypertension    • Hypothyroidism    • Kidney stone    • OA (osteoarthritis) of knee     left   • PONV (postoperative nausea and vomiting)    • Sleep apnea     uses cpap        Past Surgical History:   Procedure Laterality Date   • BLADDER SUSPENSION      TVT   • COLONOSCOPY     • D & C HYSTEROSCOPY MYOSURE N/A 2021    Procedure: DILATATION AND CURETTAGE HYSTEROSCOPY with MYOSURE;  Surgeon: Trinity Clark MD;  Location: [facility];  Service: Obstetrics/Gynecology;  Laterality: N/A;   • KIDNEY STONE SURGERY     • KNEE MENISCAL REPAIR Bilateral    • REPLACEMENT TOTAL KNEE Right        PT Ortho     Row Name 21 0940       Subjective Comments    Subjective Comments  Patient reports that she fell a few days ago while walking on unlevel surface.  She states that this did not bother her right knee and she was able to get up without difficulty.  Patient reports that she is doing well.  Patient states that she feels like today  should be her last day of therapy so that she is able to save visits for her other knee replacement.      -SP       Right Lower Ext    Rt Knee Extension/Flexion AROM  3 to 115 degrees  -SP    Rt Knee Extension/Flexion PROM  0 to 120 degrees  -SP       MMT Right Lower Ext    Rt Hip Flexion MMT, Gross Movement  (4+/5) good plus  -SP    Rt Hip Extension MMT, Gross Movement  (4+/5) good plus  -SP    Rt Hip ABduction MMT, Gross Movement  (4/5) good  -SP    Rt Hip ADduction MMT, Gross Movement  (4/5) good  -SP    Rt Knee Extension MMT, Gross Movement  (4/5) good  -SP    Rt Knee Flexion MMT, Gross Movement  (4/5) good  -SP    Rt Ankle Plantarflexion MMT, Gross Movement  (5/5) normal  -SP    Rt Ankle Dorsiflexion MMT, Gross Movement  (5/5) normal  -SP       Transfers    Bed-Chair [place] (Transfers)  independent  -SP    Chair-Bed [place] (Transfers)  independent  -SP    Sit-Stand [place] (Transfers)  independent  -SP    Stand-Sit [place] (Transfers)  independent  -SP       Gait/Stairs (Locomotion)    [place] Level (Gait)  independent  -SP    Pattern (Gait)  swing-through  -SP    Ascending Technique (Stairs)  step-over-step  -SP    Descending Technique (Stairs)  step-over-step  -SP      User Key  (r) = Recorded By, (t) = Taken By, (c) = Cosigned By    Initials Name Provider Type    Pilar Ortiz, PT Physical Therapist                      PT Assessment/Plan     Row Name 06/17/21 9174          PT Assessment    Assessment Comments  Patient demonstrates improved right knee ROM and strength.  Patient is tolerating increased weight bearing activity. Patient has met or made progress toward all goals  -SP        PT Plan    PT Plan Comments  Continue with HEP  -SP       User Key  (r) = Recorded By, (t) = Taken By, (c) = Cosigned By    Initials Name Provider Type    Pilar Ortiz, PT Physical Therapist          Modalities     Row Name 06/17/21 8469             Ice    Patient denies  "application of Ice  Yes  -SP      Patient reports will apply ice at home to involved area  Yes  -SP        User Key  (r) = Recorded By, (t) = Taken By, (c) = Cosigned By    Initials Name Provider Type    Pilar Ortiz, PT Physical Therapist        OP Exercises     Row Name 06/17/21 0927             Subjective Comments    Subjective Comments  Patient reports that she fell a few days ago while walking on unlevel surface.  She states that this did not bother her right knee and she was able to get up without difficulty.  Patient reports that she is doing well.  Patient states that she feels like today should be her last day of therapy so that she is able to save visits for her other knee replacement.      -SP         Exercise 1    Exercise Name 1  heel slides   -SP      Cueing 1  Verbal  -SP      Time 1  7 min  -SP         Exercise 2    Exercise Name 2  wall slides  -SP      Cueing 2  Verbal  -SP      Time 2  7 min  -SP         Exercise 3    Exercise Name 3  recumbent bike (seat 7)  -SP      Cueing 3  Verbal  -SP      Reps 3  7 min  -SP         Exercise 4    Exercise Name 4  heel raises  -SP      Cueing 4  Verbal  -SP      Reps 4  25  -SP         Exercise 5    Exercise Name 5  step ups 6\"  -SP      Cueing 5  Verbal  -SP      Reps 5  25 each  -SP         Exercise 6    Exercise Name 6  SAQ  -SP      Cueing 6  Verbal  -SP      Reps 6  40  -SP      Time 6  3#  -SP         Exercise 7    Exercise Name 7  SLR  -SP      Cueing 7  Verbal  -SP      Reps 7  40  -SP      Time 7  2#  -SP         Exercise 8    Exercise Name 8  QS - ankle roll  -SP      Cueing 8  Verbal  -SP      Reps 8  25  -SP      Time 8  5 sec  -SP         Exercise 9    Exercise Name 9  hamstring stretch  -SP      Cueing 9  Verbal  -SP      Reps 9  10  -SP      Time 9  10 sec  -SP         Exercise 10    Exercise Name 10  heel prop  -SP      Cueing 10  Verbal  -SP      Time 10  5 min  -SP         Exercise 11    Exercise Name 11  partial squats  -SP   " "   Cueing 11  Verbal  -SP      Reps 11  25  -SP         Exercise 12    Exercise Name 12  6\" Lateral step over  -SP      Cueing 12  Verbal  -SP      Reps 12  25  -SP         Exercise 13    Exercise Name 13  sidelying ABD  -SP      Cueing 13  Verbal;Tactile  -SP      Reps 13  40  -SP      Time 13  2#  -SP         Exercise 14    Exercise Name 14  LAQ  -SP      Cueing 14  Verbal;Demo  -SP      Reps 14  40  -SP      Time 14  3#  -SP         Exercise 15    Exercise Name 15  TKE vs theraband  -SP      Cueing 15  Verbal;Tactile;Demo  -SP      Reps 15  40  -SP      Time 15  silver  -SP        User Key  (r) = Recorded By, (t) = Taken By, (c) = Cosigned By    Initials Name Provider Type    Pilar Ortiz, PT Physical Therapist                      Manual Rx (last 36 hours)      Manual Treatments     Row Name 06/17/21 0940             Manual Rx 1    Manual Rx 1 Location  right knee - pt supine  -SP      Manual Rx 1 Type  PROM right knee flexion   -SP      Manual Rx 1 Duration  10 reps  -SP         Manual Rx 3    Manual Rx 3 Location  right knee  - supine  -SP      Manual Rx 3 Type  PROM knee extension stretch   -SP      Manual Rx 3 Duration  7 reps  -SP        User Key  (r) = Recorded By, (t) = Taken By, (c) = Cosigned By    Initials Name Provider Type    Pilar Ortiz, PT Physical Therapist          PT OP Goals     Row Name 06/17/21 0940          PT Short Term Goals    STG Date to Achieve  05/26/21  -SP     STG 1  Patient with minimal edema present at right knee  -SP     STG 1 Progress  Met  -SP     STG 2  Patient exhibits right knee PROM knee flexion to 120 degrees  -SP     STG 2 Progress  Met  -SP     STG 3  Patient exhibits right knee PROM knee extension to 0 degrees  -SP     STG 3 Progress  Met  -SP        Long Term Goals    LTG Date to Achieve  06/10/21  -SP     LTG 1  Patient exhibits AROM right knee 0 to 125 degrees  -SP     LTG 1 Progress  Progressing  -SP     LTG 2  Patient demonstrates " increased strength right knee by one muscle grade to better tolerate ADLs  -SP     LTG 2 Progress  Met  -SP     LTG 3  Patient reports she is able to tolerate home and community mobility and ADLs with 0-1/10 pain  -SP     LTG 3 Progress  Met  -SP     LTG 4  Patient independent with HEP  -SP     LTG 4 Progress  Met  -SP       User Key  (r) = Recorded By, (t) = Taken By, (c) = Cosigned By    Initials Name Provider Type    Pilar Ortiz, PT Physical Therapist          Therapy Education  Given: HEP  Program: Reinforced  How Provided: Verbal  Provided to: Patient  Level of Understanding: Verbalized, Demonstrated              Time Calculation:   Start Time: 0940  Stop Time: 1045  Time Calculation (min): 65 min  Therapy Charges for Today     Code Description Service Date Service Provider Modifiers Qty    33454036905 HC PT THER PROC EA 15 MIN 6/17/2021 Pilar Rock, PT GP 1                    Pilar Rock, PT  6/17/2021

## 2021-06-29 ENCOUNTER — HOSPITAL ENCOUNTER (OUTPATIENT)
Dept: ULTRASOUND IMAGING | Facility: HOSPITAL | Age: 56
Discharge: HOME | End: 2021-06-29

## 2021-06-29 ENCOUNTER — HOSPITAL ENCOUNTER (OUTPATIENT)
Dept: MAMMOGRAPHY | Facility: HOSPITAL | Age: 56
Discharge: HOME | End: 2021-06-29

## 2021-06-29 DIAGNOSIS — R92.8 ABNORMAL MAMMOGRAM: ICD-10-CM

## 2021-06-29 PROCEDURE — G0279 TOMOSYNTHESIS, MAMMO: HCPCS

## 2021-06-29 PROCEDURE — 76641 ULTRASOUND BREAST COMPLETE: CPT

## 2021-06-29 PROCEDURE — 77065 DX MAMMO INCL CAD UNI: CPT

## 2021-06-29 PROCEDURE — 76642 ULTRASOUND BREAST LIMITED: CPT

## 2021-06-30 DIAGNOSIS — R92.8 ABNORMAL MAMMOGRAM: Primary | ICD-10-CM

## 2021-07-06 RX ORDER — BUSPIRONE HYDROCHLORIDE 30 MG/1
TABLET ORAL
Qty: 135 TABLET | Refills: 3 | Status: SHIPPED | OUTPATIENT
Start: 2021-07-06 | End: 2022-07-02

## 2021-08-20 RX ORDER — METOPROLOL SUCCINATE 25 MG/1
25 TABLET, EXTENDED RELEASE ORAL DAILY
Qty: 30 TABLET | Refills: 0 | Status: SHIPPED | OUTPATIENT
Start: 2021-08-20 | End: 2021-08-30

## 2021-08-23 ENCOUNTER — HOSPITAL ENCOUNTER (OUTPATIENT)
Dept: PHYSICAL THERAPY | Facility: HOSPITAL | Age: 56
Setting detail: THERAPIES SERIES
Discharge: HOME | End: 2021-08-23

## 2021-08-23 DIAGNOSIS — Z96.652 S/P TOTAL KNEE REPLACEMENT, LEFT: Primary | ICD-10-CM

## 2021-08-23 PROCEDURE — 97161 PT EVAL LOW COMPLEX 20 MIN: CPT | Performed by: PHYSICAL THERAPIST

## 2021-08-23 NOTE — THERAPY EVALUATION
Outpatient Physical Therapy Ortho Initial Evaluation   Susana Chacko     Patient Name: Christine Villagomez  : 1965  MRN: [id number]  Today's Date: 2021      Visit Date: 2021    Patient Active Problem List   Diagnosis   • Atopic rhinitis   • Anxiety   • Arthritis   • Back pain   • Contact dermatitis   • Hypertriglyceridemia   • Hypercholesterolemia   • Menopausal flushing   • Essential hypertension   • Hypothyroidism   • Overactive bladder   • Rash   • Obstructive sleep apnea syndrome   • Skin tags, multiple acquired   • Knee pain   • Paresthesia of foot   • Bipolar disorder (CMS/HCC)   • History of depression   • Attention deficit disorder (ADD) in adult   • PMB (postmenopausal bleeding)   • Thickened endometrium   • S/P D&C (status post dilation and curettage)        Past Medical History:   Diagnosis Date   • Bipolar disorder (CMS/HCC)    • Depression    • Hyperlipidemia    • Hypertension    • Hypothyroidism    • Kidney stone    • OA (osteoarthritis) of knee     left   • PONV (postoperative nausea and vomiting)    • Sleep apnea     uses cpap        Past Surgical History:   Procedure Laterality Date   • BLADDER SUSPENSION      TVT   • COLONOSCOPY     • D & C HYSTEROSCOPY MYOSURE N/A 2021    Procedure: DILATATION AND CURETTAGE HYSTEROSCOPY with MYOSURE;  Surgeon: Trinity Clark MD;  Location: [facility];  Service: Obstetrics/Gynecology;  Laterality: N/A;   • KIDNEY STONE SURGERY     • KNEE MENISCAL REPAIR Bilateral    • REPLACEMENT TOTAL KNEE Right        Visit Dx:     ICD-10-CM ICD-9-CM   1. S/P total knee replacement, left  Z96.652 V43.65         Patient History     Row Name 21 0700             History    Chief Complaint  Difficulty with daily activities;Difficulty Walking  -SP      Type of Pain  Knee pain  -SP      Brief Description of Current Complaint  Patient with chronic history of bilateral knee pain that did not improve with conservative measures.  Patient is s/p right  TKR 4-16-21.  Patient is now s/p left total knee replacement 8/10/21.  Patient had 1.5 weeks of HH PT, now presents for outpatient PT  -SP      Patient/Caregiver Goals  Return to prior level of function;Improve strength  -SP      Patient/Caregiver Goals Comment  improve ROM; work in garden  -SP      Hand Dominance  right-handed  -SP      Occupation/sports/leisure activities  gardening  -SP      What clinical tests have you had for this problem?  X-ray;CT scan  -SP         Pain     Pain Location  Knee  -SP      Pain at Present  3  -SP      Pain at Best  4  -SP      Pain Frequency  Intermittent  -SP      Pain Description  Aching  -SP      What Performance Factors Make the Current Problem(s) WORSE?  increased weight bearing; end ranges of motion  -SP      What Performance Factors Make the Current Problem(s) BETTER?  movement, avoid static positions  -SP      Tolerance Time- Standing  limited  -SP      Tolerance Time- Sitting  limited  -SP      Tolerance Time- Walking  limited community distances  -SP      Is your sleep disturbed?  Yes  -SP      Difficulties at work?  currently unable to work  -SP      Difficulties with ADL's?  difficutly with weight bearing ADls  -SP      Difficulties with recreational activities?  unable to garden  -SP         Fall Risk Assessment    Any falls in the past year:  Yes  -SP         Daily Activities    Primary Language  English  -SP      Are you able to read  Yes  -SP      Are you able to write  Yes  -SP      How does patient learn best?  Listening;Reading;Demonstration;Pictures/Video  -SP      Pt Participated in POC and Goals  Yes  -SP         Safety    Are you being hurt, hit, or frightened by anyone at home or in your life?  No  -SP      Are you being neglected by a caregiver  No  -SP        User Key  (r) = Recorded By, (t) = Taken By, (c) = Cosigned By    Initials Name Provider Type    SP Pilar Rock, PT Physical Therapist          PT Ortho     Row Name 08/23/21 0700        Posture/Observations    Observations  Edema;Incision healing;Muscle atrophy  -SP    Posture/Observations Comments  moderate edema: incision appears to be healing well.  transparent bandage in place  -SP       Knee Palpation    Patella  Left:;Tender  -SP    Patella Tendon  Left:;Tender  -SP    Prepatellar Bursa  Left:;Tender  -SP    Suprapatella Bursa  Left:;Tender  -SP    Medial Joint Line  Left:;Tender  -SP    Lateral Joint Line  Left:;Tender  -SP       Patellar Accessory Motions    Superior glide  Left:;Hypomobile  -SP    Inferior glide  Left:;Hypomobile  -SP    Medial glide  Left:;Hypomobile  -SP    Lateral glide  Left:;Hypomobile  -SP       Left Lower Ext    Lt Hip ABduction AROM  WFL  -SP    Lt Hip ADduction AROM  WFL  -SP    Lt Hip Extension AROM  WFL  -SP    Lt Hip Flexion AROM  WFL  -SP    Lt Knee Extension/Flexion AROM  2 to 100 degrees  -SP    Lt Knee Extension/Flexion PROM  120 degrees  -SP    Lt Ankle Dorsiflexion AROM  WFL  -SP    Lt Ankle Plantarflexion AROM  WFL  -SP       MMT Left Lower Ext    Lt Hip Flexion MMT, Gross Movement  (4/5) good  -SP    Lt Hip ABduction MMT, Gross Movement  (4/5) good  -SP    Lt Hip ADduction MMT, Gross Movement  (4/5) good  -SP    Lt Knee Extension MMT, Gross Movement  (3+/5) fair plus  -SP    Lt Knee Flexion MMT, Gross Movement  (3+/5) fair plus  -SP    Lt Ankle Plantarflexion MMT, Gross Movement  (4/5) good  -SP    Lt Ankle Dorsiflexion MMT, Gross Movement  (4/5) good  -SP       Sensation    Additional Comments  numbness reported lateral left knee and lower leg area  -SP       Lower Extremity Flexibility    Hamstrings  Left:;Moderately limited  -SP    Hip Flexors  Left:;Moderately limited  -SP    Quadriceps  Left:;Moderately limited  -SP       LLE Quick Girth (cm)    Tib tuberosity  44.4 cm  -SP    Mid patella  49.5 cm  -SP    Distal thigh  53.6 cm  -SP       Transfers    Bed-Chair [place] (Transfers)  independent  -SP    Chair-Bed [place] (Transfers)   independent  -SP    Sit-Stand [place] (Transfers)  independent  -SP    Stand-Sit [place] (Transfers)  independent  -SP       Gait/Stairs (Locomotion)    [place] Level (Gait)  independent  -SP    Pattern (Gait)  swing-through  -SP    Deviations/Abnormal Patterns (Gait)  stride length decreased;gait speed decreased  -SP    Comment (Gait/Stairs)  able to alternate stairs with mild discontinuity  -SP      User Key  (r) = Recorded By, (t) = Taken By, (c) = Cosigned By    Initials Name Provider Type    Pilar Ortiz, PT Physical Therapist                      Therapy Education  Given: HEP  Program: Reinforced  How Provided: Verbal  Provided to: Patient  Level of Understanding: Verbalized, Demonstrated     PT OP Goals     Row Name 08/23/21 0700          PT Short Term Goals    STG Date to Achieve  05/26/21  -SP     STG 1  Patient with minimal edema present at left knee  -SP     STG 2  Patient exhibits left knee PROM knee flexion to 125 degrees  -SP     STG 3  Patient exhibits left knee AROM knee extension to 0 degrees  -SP        Long Term Goals    LTG Date to Achieve  06/10/21  -SP     LTG 1  Patient exhibits AROM right knee 0 to 125 degrees without complaints of pain at end ranges  -SP     LTG 2  Patient demonstrates increased strength right knee by one muscle grade to better tolerate ADLs  -SP     LTG 3  Patient reports she is able to tolerate home and community mobility and ADLs with 0-1/10 pain  -SP     LTG 4  Patient independent with HEP  -SP       User Key  (r) = Recorded By, (t) = Taken By, (c) = Cosigned By    Initials Name Provider Type    Pilar Ortiz, PT Physical Therapist          PT Assessment/Plan     Row Name 08/23/21 0848 08/23/21 0847       PT Assessment    Functional Limitations  --  Impaired gait;Limitation in home management;Limitations in community activities;Performance in work activities;Performance in self-care ADL;Performance in leisure activities  -SP     "Assessment Comments  Patient with history of right TKR 4-16-21, now s/p left TKR 8-10-21.  Patient presents with mild left knee pain, edema, decreased ROM, decreased strength and altered gait that limits full participation in home and community ADLs  -SP  Patient s/p right TKR  -SP    Please refer to paper survey for additional self-reported information  Yes  -SP  --    Rehab Potential  Good  -SP  --    Patient/caregiver participated in establishment of treatment plan and goals  Yes  -SP  --    Patient would benefit from skilled therapy intervention  Yes  -SP  --       PT Plan    PT Frequency  2x/week  -SP  --    Predicted Duration of Therapy Intervention (PT)  4-6 weeks  -SP  --    Planned CPT's?  PT EVAL LOW COMPLEXITY: 85178;PT MANUAL THERAPY EA 15 MIN: 71076;PT GAIT TRAINING EA 15 MIN: 86590;PT HOT OR COLD PACK TREAT [facility];PT ELECTRICAL STIM UNATTEND:   -SP  --      User Key  (r) = Recorded By, (t) = Taken By, (c) = Cosigned By    Initials Name Provider Type    Pilar Ortiz, PT Physical Therapist            OP Exercises     Row Name 08/23/21 0700             Exercise 1    Exercise Name 1  heel slides   -SP      Cueing 1  Verbal  -SP      Time 1  6 min  -SP         Exercise 2    Exercise Name 2  wall slides  -SP      Cueing 2  Verbal  -SP      Time 2  7 min  -SP         Exercise 3    Exercise Name 3  recumbent bike (seat 7)  -SP      Cueing 3  Verbal  -SP      Reps 3  7 min  -SP         Exercise 4    Exercise Name 4  heel raises  -SP      Cueing 4  Verbal  -SP      Reps 4  15  -SP         Exercise 5    Exercise Name 5  step ups 4\"  -SP      Cueing 5  Verbal  -SP      Reps 5  10 x each  -SP         Exercise 6    Exercise Name 6  partial squat  -SP      Cueing 6  Demo  -SP      Reps 6  15  -SP         Exercise 7    Exercise Name 7  SAQ  -SP      Cueing 7  Verbal  -SP      Reps 7  20  -SP      Time 7  5 sec  -SP         Exercise 8    Exercise Name 8  SLR  -SP      Cueing 8  Verbal  -SP      Reps " 8  20  -SP      Time 8  --  -SP         Exercise 9    Exercise Name 9  QS towel ankle/knee  -SP      Cueing 9  Verbal  -SP      Reps 9  10  -SP      Time 9  5 sec  -SP         Exercise 10    Exercise Name 10  hamstring stretch  -SP      Cueing 10  Verbal  -SP      Reps 10  5  -SP      Time 10  15 sec  -SP         Exercise 11    Exercise Name 11  heel prop  -SP      Cueing 11  --  -SP      Reps 11  --  -SP         Exercise 12    Exercise Name 12  --  -SP      Cueing 12  --  -SP      Reps 12  --  -SP         Exercise 13    Exercise Name 13  --  -SP      Cueing 13  --  -SP      Reps 13  --  -SP      Time 13  --  -SP         Exercise 14    Exercise Name 14  --  -SP      Cueing 14  --  -SP      Reps 14  --  -SP      Time 14  --  -SP         Exercise 15    Exercise Name 15  --  -SP      Cueing 15  --  -SP      Reps 15  --  -SP      Time 15  --  -SP        User Key  (r) = Recorded By, (t) = Taken By, (c) = Cosigned By    Initials Name Provider Type    Pilar Ortiz, GISELLE Physical Therapist           Manual Rx (last 36 hours)      Manual Treatments     Row Name 08/23/21 0700             Manual Rx 1    Manual Rx 1 Location  left knee - pt in short sit  -SP      Manual Rx 1 Type  PROM left knee flexion 10 x 10reps  -SP      Manual Rx 1 Duration  10 min  -SP         Manual Rx 2    Manual Rx 2 Location  left knee  -SP      Manual Rx 2 Type  patella mobilization  -SP      Manual Rx 2 Duration  4 min  -SP         Manual Rx 3    Manual Rx 3 Location  left knee  - supine  -SP      Manual Rx 3 Type  PROM knee extension stretch   -SP      Manual Rx 3 Duration  5 min  -SP        User Key  (r) = Recorded By, (t) = Taken By, (c) = Cosigned By    Initials Name Provider Type    Pilar Ortiz, GISELLE Physical Therapist                      Outcome Measure Options: Lower Extremity Functional Scale (LEFS)  Lower Extremity Functional Index  Any of your usual work, housework or school activities: A little bit of  difficulty  Your usual hobbies, recreational or sporting activities: A little bit of difficulty  Getting into or out of the bath: No difficulty  Walking between rooms: No difficulty  Putting on your shoes or socks: A little bit of difficulty  Squatting: No difficulty  Lifting an object, like a bag of groceries from the floor: No difficulty  Performing light activities around your home: No difficulty  Performing heavy activities around your home: A little bit of difficulty  Getting into or out of a car: A little bit of difficulty  Walking 2 blocks: No difficulty  Walking a mile: No difficulty  Going up or down 10 stairs (about 1 flight of stairs): No difficulty  Standing for 1 hour: A little bit of difficulty  Sitting for 1 hour: No difficulty  Running on even ground: Extreme difficulty or unable to perform activity  Running on uneven ground: Extreme difficulty or unable to perform activity  Making sharp turns while running fast: Extreme difficulty or unable to perform activity  Hopping: Extreme difficulty or unable to perform activity  Rolling over in bed: A little bit of difficulty  Total: 57      Time Calculation:     Start Time: 0745  Stop Time: 0858  Time Calculation (min): 73 min  Untimed Charges  PT Eval/Re-eval Minutes: 60  Total Minutes  Untimed Charges Total Minutes: 60   Total Minutes: 60     Therapy Charges for Today     Code Description Service Date Service Provider Modifiers Qty    98831816277 HC PT EVAL LOW COMPLEXITY 4 8/23/2021 Pilar Rock, PT GP 1          PT G-Yvonne  Outcome Measure Options: Lower Extremity Functional Scale (LEFS)  Total: 57         Pilar Rock, PT  8/23/2021

## 2021-08-25 ENCOUNTER — TELEPHONE (OUTPATIENT)
Dept: INTERNAL MEDICINE | Facility: CLINIC | Age: 56
End: 2021-08-25

## 2021-08-26 ENCOUNTER — APPOINTMENT (OUTPATIENT)
Dept: PHYSICAL THERAPY | Facility: HOSPITAL | Age: 56
End: 2021-08-26

## 2021-08-30 ENCOUNTER — TELEMEDICINE (OUTPATIENT)
Dept: FAMILY MEDICINE CLINIC | Facility: TELEHEALTH | Age: 56
End: 2021-08-30

## 2021-08-30 DIAGNOSIS — R39.89 SUSPECTED UTI: Primary | ICD-10-CM

## 2021-08-30 PROCEDURE — 99213 OFFICE O/P EST LOW 20 MIN: CPT | Performed by: NURSE PRACTITIONER

## 2021-08-30 RX ORDER — CEFDINIR 300 MG/1
300 CAPSULE ORAL 2 TIMES DAILY
Qty: 14 CAPSULE | Refills: 0 | Status: SHIPPED | OUTPATIENT
Start: 2021-08-30 | End: 2021-09-06

## 2021-08-30 RX ORDER — PREGABALIN 75 MG/1
75 CAPSULE ORAL
COMMUNITY
Start: 2021-04-16 | End: 2022-06-02 | Stop reason: SDUPTHER

## 2021-08-30 RX ORDER — CELECOXIB 200 MG/1
200 CAPSULE ORAL
COMMUNITY
Start: 2021-07-30 | End: 2022-01-17

## 2021-08-30 NOTE — PROGRESS NOTES
Subjective   Chief Complaint   Patient presents with   • Urinary Tract Infection       Christine Villagomez is a 55 y.o. female.     Pt reports urinary burning, frequency, urgency, decreased urine, pelic pressure x 3-4 days. Denies fever, chills, body aches, flank pain, abdominal pain, hematuria. She has had UTI's in the past with similar symptoms, She was treated last for a UTI with Macrobid on 7/27/2021.     Urinary Tract Infection   This is a new problem. Episode onset: 3-4 days. The problem has been gradually worsening. The quality of the pain is described as burning. There has been no fever. There is no history of pyelonephritis. Associated symptoms include frequency, hesitancy and urgency. Pertinent negatives include no chills, discharge, flank pain, hematuria, nausea, possible pregnancy, sweats or vomiting. She has tried nothing for the symptoms. Her past medical history is significant for kidney stones.        Allergies   Allergen Reactions   • Sulfa Antibiotics Rash     fever  fever  fever       Past Medical History:   Diagnosis Date   • Bipolar disorder (CMS/HCC)    • Depression    • Hyperlipidemia    • Hypertension    • Hypothyroidism    • Kidney stone    • OA (osteoarthritis) of knee     left   • PONV (postoperative nausea and vomiting)    • Sleep apnea     uses cpap       Past Surgical History:   Procedure Laterality Date   • BLADDER SUSPENSION      TVT   • COLONOSCOPY     • D & C HYSTEROSCOPY MYOSURE N/A 5/4/2021    Procedure: DILATATION AND CURETTAGE HYSTEROSCOPY with MYOSURE;  Surgeon: Trinity Clark MD;  Location: [facility];  Service: Obstetrics/Gynecology;  Laterality: N/A;   • KIDNEY STONE SURGERY     • KNEE MENISCAL REPAIR Bilateral    • REPLACEMENT TOTAL KNEE Right        Social History     Socioeconomic History   • Marital status:      Spouse name: Not on file   • Number of children: Not on file   • Years of education: Not on file   • Highest education level: Not on file   Tobacco  Use   • Smoking status: Never Smoker   • Smokeless tobacco: Never Used   Vaping Use   • Vaping Use: Never used   Substance and Sexual Activity   • Alcohol use: No   • Drug use: No   • Sexual activity: Yes     Partners: Male     Birth control/protection: Surgical, Post-menopausal     Comment: vas       Family History   Problem Relation Age of Onset   • Cancer Mother         lung   • Heart attack Father    • Colon cancer Maternal Grandmother    • Breast cancer Neg Hx    • Ovarian cancer Neg Hx    • Pulmonary embolism Neg Hx    • Deep vein thrombosis Neg Hx    • Malig Hyperthermia Neg Hx          Current Outpatient Medications:   •  celecoxib (CeleBREX) 200 MG capsule, 200 mg., Disp: , Rfl:   •  pregabalin (LYRICA) 75 MG capsule, 75 mg., Disp: , Rfl:   •  atorvastatin (LIPITOR) 20 MG tablet, , Disp: , Rfl:   •  buPROPion XL (WELLBUTRIN XL) 300 MG 24 hr tablet, TAKE 1 TABLET EVERY MORNING (Patient taking differently: Take 300 mg by mouth Every Morning.), Disp: 90 tablet, Rfl: 3  •  busPIRone (BUSPAR) 15 MG tablet, Take 15 mg by mouth Every Night., Disp: , Rfl:   •  busPIRone (BUSPAR) 30 MG tablet, TAKE ONE AND ONE-HALF TABLETS DAILY, Disp: 135 tablet, Rfl: 3  •  cefdinir (OMNICEF) 300 MG capsule, Take 1 capsule by mouth 2 (Two) Times a Day for 7 days., Disp: 14 capsule, Rfl: 0  •  cetirizine (zyrTEC) 10 MG tablet, Take 10 mg by mouth Daily., Disp: , Rfl:   •  CHOLECALCIFEROL PO, Take 1 tablet by mouth Daily., Disp: , Rfl:   •  Coenzyme Q10 (CO Q-10) 100 MG capsule, Co Q-10, Disp: , Rfl:   •  FIBER PO, Take 2 capsules by mouth Daily., Disp: , Rfl:   •  levothyroxine (SYNTHROID, LEVOTHROID) 50 MCG tablet, Take 1 tablet by mouth Daily., Disp: 90 tablet, Rfl: 3  •  Multiple Vitamin (MULTI-VITAMIN DAILY PO), Take 1 tablet by mouth Daily., Disp: , Rfl:   •  multivitamin with minerals (HAIR/SKIN/NAILS PO), Take 1 tablet by mouth Daily., Disp: , Rfl:   •  Omega-3 Fatty Acids (fish oil) 1000 MG capsule capsule, Take 1,000 mg by  mouth Daily With Breakfast., Disp: , Rfl:   •  Potassium Gluconate 550 MG tablet, Take 1 tablet by mouth every night at bedtime., Disp: , Rfl:   •  Probiotic Product (PROBIOTIC DAILY PO), Take 1 capsule by mouth Daily., Disp: , Rfl:   •  terazosin (HYTRIN) 1 MG capsule, Take 1 capsule by mouth Daily., Disp: 90 capsule, Rfl: 3  •  Ubiquinol 100 MG capsule, , Disp: , Rfl:       Review of Systems   Constitutional: Negative for chills, diaphoresis, fatigue and fever.   Gastrointestinal: Negative for abdominal distention, abdominal pain, nausea and vomiting.   Genitourinary: Positive for decreased urine volume, dysuria, frequency, hesitancy, pelvic pressure and urgency. Negative for flank pain, genital sores, hematuria, menstrual problem, pelvic pain and urinary incontinence.   Musculoskeletal: Negative for back pain and myalgias.        There were no vitals filed for this visit.    Objective   Physical Exam  Constitutional:       General: She is not in acute distress.     Appearance: Normal appearance. She is not ill-appearing, toxic-appearing or diaphoretic.   HENT:      Head: Normocephalic and atraumatic.   Pulmonary:      Effort: Pulmonary effort is normal.   Abdominal:      Tenderness: There is no abdominal tenderness (per pt).   Neurological:      Mental Status: She is alert and oriented to person, place, and time.   Psychiatric:         Mood and Affect: Mood normal.         Speech: Speech normal.         Behavior: Behavior normal.          Procedures     Assessment/Plan   Diagnoses and all orders for this visit:    1. Suspected UTI (Primary)  -     cefdinir (OMNICEF) 300 MG capsule; Take 1 capsule by mouth 2 (Two) Times a Day for 7 days.  Dispense: 14 capsule; Refill: 0      Wipe front to back, increase water to flush the kidneys and avoid bladder irritants such as caffeine and alcohol.    -Warning signs: severe abdominal/pelvic/back pain, fever >101, blood in urine - seek medical attention as soon as possible for  a hands on/objective exam and possible labs.     If symptoms worsen or do not improve follow up with your PCP or visit your nearest Urgent Care Center or ER.        PLAN: Discussed dosing, side effects, recommended other symptomatic care.  Patient should follow up with primary care provider if symptoms worsen, fail to resolve or other symptoms need attention. Patient/family agree to the above.     I spent 20 minutes caring for Christine on this date of service. This time includes time spent by me in the following activities:preparing for the visit, obtaining and/or reviewing a separately obtained history, performing a medically appropriate examination and/or evaluation , counseling and educating the patient/family/caregiver, ordering medications, tests, or procedures and documenting information in the medical record    HODA Vazquez     This visit was performed via Telehealth.  This patient has been instructed to follow-up with their primary care provider if their symptoms worsen or the treatment provided does not resolve their illness.

## 2021-08-30 NOTE — PATIENT INSTRUCTIONS
Wipe front to back, increase water to flush the kidneys and avoid bladder irritants such as caffeine and alcohol.    -Warning signs: severe abdominal/pelvic/back pain, fever >101, blood in urine - seek medical attention as soon as possible for a hands on/objective exam and possible labs.     If symptoms worsen or do not improve follow up with your PCP or visit your nearest Urgent Care Center or ER.      Urinary Tract Infection, Adult  A urinary tract infection (UTI) is an infection of any part of the urinary tract. The urinary tract includes:  · The kidneys.  · The ureters.  · The bladder.  · The urethra.  These organs make, store, and get rid of pee (urine) in the body.  What are the causes?  This is caused by germs (bacteria) in your genital area. These germs grow and cause swelling (inflammation) of your urinary tract.  What increases the risk?  You are more likely to develop this condition if:  · You have a small, thin tube (catheter) to drain pee.  · You cannot control when you pee or poop (incontinence).  · You are female, and:  ? You use these methods to prevent pregnancy:  § A medicine that kills sperm (spermicide).  § A device that blocks sperm (diaphragm).  ? You have low levels of a female hormone (estrogen).  ? You are pregnant.  · You have genes that add to your risk.  · You are sexually active.  · You take antibiotic medicines.  · You have trouble peeing because of:  ? A prostate that is bigger than normal, if you are male.  ? A blockage in the part of your body that drains pee from the bladder (urethra).  ? A kidney stone.  ? A nerve condition that affects your bladder (neurogenic bladder).  ? Not getting enough to drink.  ? Not peeing often enough.  · You have other conditions, such as:  ? Diabetes.  ? A weak disease-fighting system (immune system).  ? Sickle cell disease.  ? Gout.  ? Injury of the spine.  What are the signs or symptoms?  Symptoms of this condition include:  · Needing to pee right  away (urgently).  · Peeing often.  · Peeing small amounts often.  · Pain or burning when peeing.  · Blood in the pee.  · Pee that smells bad or not like normal.  · Trouble peeing.  · Pee that is cloudy.  · Fluid coming from the vagina, if you are female.  · Pain in the belly or lower back.  Other symptoms include:  · Throwing up (vomiting).  · No urge to eat.  · Feeling mixed up (confused).  · Being tired and grouchy (irritable).  · A fever.  · Watery poop (diarrhea).  How is this treated?  This condition may be treated with:  · Antibiotic medicine.  · Other medicines.  · Drinking enough water.  Follow these instructions at home:    Medicines  · Take over-the-counter and prescription medicines only as told by your doctor.  · If you were prescribed an antibiotic medicine, take it as told by your doctor. Do not stop taking it even if you start to feel better.  General instructions  · Make sure you:  ? Pee until your bladder is empty.  ? Do not hold pee for a long time.  ? Empty your bladder after sex.  ? Wipe from front to back after pooping if you are a female. Use each tissue one time when you wipe.  · Drink enough fluid to keep your pee pale yellow.  · Keep all follow-up visits as told by your doctor. This is important.  Contact a doctor if:  · You do not get better after 1-2 days.  · Your symptoms go away and then come back.  Get help right away if:  · You have very bad back pain.  · You have very bad pain in your lower belly.  · You have a fever.  · You are sick to your stomach (nauseous).  · You are throwing up.  Summary  · A urinary tract infection (UTI) is an infection of any part of the urinary tract.  · This condition is caused by germs in your genital area.  · There are many risk factors for a UTI. These include having a small, thin tube to drain pee and not being able to control when you pee or poop.  · Treatment includes antibiotic medicines for germs.  · Drink enough fluid to keep your pee pale  yellow.  This information is not intended to replace advice given to you by your health care provider. Make sure you discuss any questions you have with your health care provider.  Document Revised: 12/05/2019 Document Reviewed: 06/27/2019  Elsevier Patient Education © 2021 [employer]

## 2021-10-05 ENCOUNTER — APPOINTMENT (OUTPATIENT)
Dept: SLEEP MEDICINE | Facility: HOSPITAL | Age: 56
End: 2021-10-05

## 2021-11-01 RX ORDER — LEVOTHYROXINE SODIUM 0.05 MG/1
TABLET ORAL
Qty: 90 TABLET | Refills: 3 | Status: SHIPPED | OUTPATIENT
Start: 2021-11-01 | End: 2022-07-15 | Stop reason: SDUPTHER

## 2021-11-29 RX ORDER — BUPROPION HYDROCHLORIDE 300 MG/1
TABLET ORAL
Qty: 90 TABLET | Refills: 3 | Status: SHIPPED | OUTPATIENT
Start: 2021-11-29 | End: 2022-07-15 | Stop reason: SDUPTHER

## 2021-11-29 RX ORDER — ATORVASTATIN CALCIUM 20 MG/1
TABLET, FILM COATED ORAL
Qty: 90 TABLET | Refills: 3 | Status: SHIPPED | OUTPATIENT
Start: 2021-11-29 | End: 2023-03-07 | Stop reason: SDUPTHER

## 2021-11-29 NOTE — TELEPHONE ENCOUNTER
Rx Refill Note  Requested Prescriptions     Pending Prescriptions Disp Refills    buPROPion XL (WELLBUTRIN XL) 300 MG 24 hr tablet [Pharmacy Med Name: BUPROPION HCL XL TABS 300MG] 90 tablet 3     Sig: TAKE 1 TABLET EVERY MORNING    atorvastatin (LIPITOR) 20 MG tablet [Pharmacy Med Name: ATORVASTATIN TABS 20MG] 90 tablet 3     Sig: TAKE 1 TABLET DAILY      Last office visit with prescribing clinician: 4/9/2021      Next office visit with prescribing clinician: Visit date not found            Marii Ravi MA  11/29/21, 09:12 EST

## 2021-11-30 DIAGNOSIS — R92.8 ABNORMAL MAMMOGRAM: Primary | ICD-10-CM

## 2021-12-14 ENCOUNTER — APPOINTMENT (OUTPATIENT)
Dept: SLEEP MEDICINE | Facility: HOSPITAL | Age: 56
End: 2021-12-14

## 2021-12-16 ENCOUNTER — HOSPITAL ENCOUNTER (OUTPATIENT)
Dept: MAMMOGRAPHY | Facility: HOSPITAL | Age: 56
Discharge: HOME | End: 2021-12-16
Admitting: OBSTETRICS & GYNECOLOGY

## 2021-12-16 DIAGNOSIS — R92.8 ABNORMAL MAMMOGRAM: ICD-10-CM

## 2021-12-16 PROCEDURE — 77065 DX MAMMO INCL CAD UNI: CPT

## 2021-12-16 PROCEDURE — G0279 TOMOSYNTHESIS, MAMMO: HCPCS

## 2021-12-19 DIAGNOSIS — R92.8 ABNORMAL MAMMOGRAM: Primary | ICD-10-CM

## 2021-12-20 NOTE — PROGRESS NOTES
PIP= breast imaging shows a probable benign lymph node in the area of concern in the R breast. It is stable since her last MMG. Recommend a repeat B dx MMG in 6 months

## 2022-01-13 RX ORDER — MELOXICAM 7.5 MG/1
TABLET ORAL
Qty: 30 TABLET | Refills: 11 | OUTPATIENT
Start: 2022-01-13

## 2022-01-17 RX ORDER — MELOXICAM 7.5 MG/1
7.5 TABLET ORAL DAILY
Qty: 30 TABLET | Refills: 1 | Status: SHIPPED | OUTPATIENT
Start: 2022-01-17 | End: 2022-06-02 | Stop reason: SDUPTHER

## 2022-03-02 ENCOUNTER — TRANSCRIBE ORDERS (OUTPATIENT)
Dept: ADMINISTRATIVE | Facility: HOSPITAL | Age: 57
End: 2022-03-02

## 2022-03-02 DIAGNOSIS — Z12.31 SCREENING MAMMOGRAM FOR BREAST CANCER: Primary | ICD-10-CM

## 2022-05-30 DIAGNOSIS — F32.A DEPRESSION, UNSPECIFIED DEPRESSION TYPE: ICD-10-CM

## 2022-06-02 ENCOUNTER — OFFICE VISIT (OUTPATIENT)
Dept: OBSTETRICS AND GYNECOLOGY | Facility: CLINIC | Age: 57
End: 2022-06-02

## 2022-06-02 VITALS
HEIGHT: 70 IN | BODY MASS INDEX: 37.65 KG/M2 | SYSTOLIC BLOOD PRESSURE: 140 MMHG | WEIGHT: 263 LBS | DIASTOLIC BLOOD PRESSURE: 86 MMHG

## 2022-06-02 DIAGNOSIS — Z11.51 SPECIAL SCREENING EXAMINATION FOR HUMAN PAPILLOMAVIRUS (HPV): ICD-10-CM

## 2022-06-02 DIAGNOSIS — B37.2 YEAST DERMATITIS: ICD-10-CM

## 2022-06-02 DIAGNOSIS — R31.9 HEMATURIA, UNSPECIFIED TYPE: Primary | ICD-10-CM

## 2022-06-02 DIAGNOSIS — Z01.419 PAP SMEAR, LOW-RISK: ICD-10-CM

## 2022-06-02 DIAGNOSIS — Z01.419 ROUTINE GYNECOLOGICAL EXAMINATION: ICD-10-CM

## 2022-06-02 LAB
BILIRUB BLD-MCNC: NEGATIVE MG/DL
CLARITY, POC: CLEAR
COLOR UR: YELLOW
GLUCOSE UR STRIP-MCNC: NEGATIVE MG/DL
KETONES UR QL: NEGATIVE
LEUKOCYTE EST, POC: NEGATIVE
NITRITE UR-MCNC: NEGATIVE MG/ML
PH UR: 5 [PH] (ref 5–8)
PROT UR STRIP-MCNC: NEGATIVE MG/DL
RBC # UR STRIP: ABNORMAL /UL
SP GR UR: 1.03 (ref 1–1.03)
UROBILINOGEN UR QL: NORMAL

## 2022-06-02 PROCEDURE — 81002 URINALYSIS NONAUTO W/O SCOPE: CPT | Performed by: OBSTETRICS & GYNECOLOGY

## 2022-06-02 PROCEDURE — 99213 OFFICE O/P EST LOW 20 MIN: CPT | Performed by: OBSTETRICS & GYNECOLOGY

## 2022-06-02 PROCEDURE — 99396 PREV VISIT EST AGE 40-64: CPT | Performed by: OBSTETRICS & GYNECOLOGY

## 2022-06-02 RX ORDER — BUSPIRONE HYDROCHLORIDE 15 MG/1
15 TABLET ORAL
COMMUNITY
Start: 2021-07-30 | End: 2022-06-02 | Stop reason: SDUPTHER

## 2022-06-02 RX ORDER — CLOTRIMAZOLE AND BETAMETHASONE DIPROPIONATE 10; .64 MG/G; MG/G
1 CREAM TOPICAL 2 TIMES DAILY
Qty: 45 G | Refills: 0 | Status: SHIPPED | OUTPATIENT
Start: 2022-06-02 | End: 2022-06-09

## 2022-06-02 RX ORDER — BUSPIRONE HYDROCHLORIDE 30 MG/1
30 TABLET ORAL
COMMUNITY
Start: 2021-04-02 | End: 2022-06-02 | Stop reason: SDUPTHER

## 2022-06-02 RX ORDER — ASPIRIN 81 MG/1
81 TABLET ORAL
COMMUNITY
Start: 2021-08-10 | End: 2022-07-15

## 2022-06-02 RX ORDER — PREGABALIN 75 MG/1
75 CAPSULE ORAL
COMMUNITY
Start: 2021-08-10 | End: 2022-06-02 | Stop reason: SDUPTHER

## 2022-06-02 RX ORDER — CELECOXIB 200 MG/1
200 CAPSULE ORAL
COMMUNITY
Start: 2021-08-10 | End: 2022-06-02 | Stop reason: SDUPTHER

## 2022-06-02 RX ORDER — GATIFLOXACIN 5 MG/ML
SOLUTION/ DROPS OPHTHALMIC
COMMUNITY
Start: 2021-09-21 | End: 2022-06-02 | Stop reason: SDUPTHER

## 2022-06-02 RX ORDER — TERAZOSIN 1 MG/1
CAPSULE ORAL
Qty: 90 CAPSULE | Refills: 3 | OUTPATIENT
Start: 2022-06-02

## 2022-06-02 NOTE — PROGRESS NOTES
GYN Annual Exam     CC- Here for annual exam.     Christine Villagomez is a 56 y.o. female established patient who presents for annual well woman exam. No  VB after her D&C. She had B knee replacements.   She had a B3 MMG and is nervous.     OB History        1    Para   1    Term   1            AB        Living   1       SAB        IAB        Ectopic        Molar        Multiple        Live Births              Obstetric Comments   1              Menarche:12  Menopause:52  HRT:none  Current contraception: vasectomy &   History of abnormal Pap smear: no  History of abnormal mammogram: no  Family history of uterine, colon or ovarian cancer: yes - MGM colon age 60  Family history of breast cancer: no  STD's: none  Last pap smear: 3/2020- normal pap/HPV  CAROLE; none  2021- HS D&C myomectomy- path B9 fibroid    Health Maintenance   Topic Date Due   • ANNUAL PHYSICAL  Never done   • HEPATITIS C SCREENING  Never done   • Annual Gynecologic Pelvic and Breast Exam  2022   • LIPID PANEL  2022   • INFLUENZA VACCINE  2022   • PAP SMEAR  2023   • MAMMOGRAM  2023   • COLORECTAL CANCER SCREENING  2026   • TDAP/TD VACCINES (3 - Td or Tdap) 10/18/2027   • COVID-19 Vaccine  Completed   • ZOSTER VACCINE  Completed   • Hepatitis B  Aged Out   • Pneumococcal Vaccine 0-64  Aged Out       Past Medical History:   Diagnosis Date   • Bipolar disorder (HCC)    • Depression    • Hyperlipidemia    • Hypertension    • Hypothyroidism    • Kidney stone    • OA (osteoarthritis) of knee     left   • PONV (postoperative nausea and vomiting)    • Sleep apnea     uses cpap       Past Surgical History:   Procedure Laterality Date   • BLADDER SUSPENSION      TVT   • CATARACT EXTRACTION     • COLONOSCOPY     • D & C HYSTEROSCOPY MYOSURE N/A 2021    Procedure: DILATATION AND CURETTAGE HYSTEROSCOPY with MYOSURE;  Surgeon: Trinity Clark MD;  Location: [facility];  Service:  Obstetrics/Gynecology;  Laterality: N/A;   • KIDNEY STONE SURGERY     • KNEE MENISCAL REPAIR Bilateral    • REPLACEMENT TOTAL KNEE Right          Current Outpatient Medications:   •  aspirin 81 MG EC tablet, 81 mg., Disp: , Rfl:   •  atorvastatin (LIPITOR) 20 MG tablet, TAKE 1 TABLET DAILY, Disp: 90 tablet, Rfl: 3  •  buPROPion XL (WELLBUTRIN XL) 300 MG 24 hr tablet, TAKE 1 TABLET EVERY MORNING, Disp: 90 tablet, Rfl: 3  •  busPIRone (BUSPAR) 15 MG tablet, Take 15 mg by mouth Every Night., Disp: , Rfl:   •  busPIRone (BUSPAR) 30 MG tablet, TAKE ONE AND ONE-HALF TABLETS DAILY, Disp: 135 tablet, Rfl: 3  •  Calcium Polycarbophil (FIBERCON PO), , Disp: , Rfl:   •  cetirizine (zyrTEC) 10 MG tablet, Take 10 mg by mouth Daily., Disp: , Rfl:   •  CHOLECALCIFEROL PO, Take 1 tablet by mouth Daily., Disp: , Rfl:   •  Coenzyme Q10 (CO Q-10) 100 MG capsule, Co Q-10, Disp: , Rfl:   •  Docusate Sodium (COLACE PO), , Disp: , Rfl:   •  FIBER PO, Take 2 capsules by mouth Daily., Disp: , Rfl:   •  levothyroxine (SYNTHROID, LEVOTHROID) 50 MCG tablet, TAKE 1 TABLET DAILY, Disp: 90 tablet, Rfl: 3  •  Multiple Vitamin (MULTI-VITAMIN DAILY PO), Take 1 tablet by mouth Daily., Disp: , Rfl:   •  multivitamin with minerals tablet tablet, Take 1 tablet by mouth Daily., Disp: , Rfl:   •  Omega-3 Fatty Acids (fish oil) 1000 MG capsule capsule, Take 1,000 mg by mouth Daily With Breakfast., Disp: , Rfl:   •  Potassium Gluconate 550 MG tablet, Take 1 tablet by mouth every night at bedtime., Disp: , Rfl:   •  Probiotic Product (PROBIOTIC DAILY PO), Take 1 capsule by mouth Daily., Disp: , Rfl:   •  terazosin (HYTRIN) 1 MG capsule, Take 1 capsule by mouth Daily., Disp: 90 capsule, Rfl: 3  •  Ubiquinol 100 MG capsule, , Disp: , Rfl:   •  clotrimazole-betamethasone (LOTRISONE) 1-0.05 % cream, Apply 1 application topically to the appropriate area as directed 2 (Two) Times a Day for 7 days., Disp: 45 g, Rfl: 0    Allergies   Allergen Reactions   • Sulfa  "Antibiotics Rash     fever  fever  fever       Social History     Tobacco Use   • Smoking status: Never Smoker   • Smokeless tobacco: Never Used   Vaping Use   • Vaping Use: Never used   Substance Use Topics   • Alcohol use: No   • Drug use: No       Family History   Problem Relation Age of Onset   • Cancer Mother         lung   • Heart attack Father    • Colon cancer Maternal Grandmother    • Breast cancer Neg Hx    • Ovarian cancer Neg Hx    • Pulmonary embolism Neg Hx    • Deep vein thrombosis Neg Hx    • Malig Hyperthermia Neg Hx        Review of Systems   Constitutional: Positive for activity change and unexpected weight change. Negative for appetite change, fatigue and fever.   Respiratory: Negative for cough and shortness of breath.    Cardiovascular: Negative for chest pain and palpitations.   Gastrointestinal: Negative for abdominal distention, abdominal pain, constipation, diarrhea and nausea.   Endocrine: Negative for cold intolerance and heat intolerance.   Genitourinary: Negative for decreased urine volume, dyspareunia, dysuria, menstrual problem, pelvic pain, vaginal bleeding and vaginal discharge.   Musculoskeletal: Positive for arthralgias. Negative for gait problem.   Skin: Positive for rash. Negative for color change.   Neurological: Negative for headaches.   Psychiatric/Behavioral: The patient is not nervous/anxious.        /86   Ht 177.8 cm (70\")   Wt 119 kg (263 lb)   LMP 04/01/2018   BMI 37.74 kg/m²     Physical Exam   Constitutional: She is oriented to person, place, and time. She appears well-developed.   HENT:   Head: Normocephalic and atraumatic.   Eyes: Conjunctivae are normal. No scleral icterus.   Neck: No thyromegaly present.   Cardiovascular: Normal rate and regular rhythm.   Pulmonary/Chest: Effort normal and breath sounds normal. Right breast exhibits no inverted nipple, no mass, no nipple discharge, no skin change and no tenderness. Left breast exhibits no inverted nipple, " no mass, no nipple discharge, no skin change and no tenderness.   Abdominal: Soft. Normal appearance and bowel sounds are normal. She exhibits no distension and no mass. There is no abdominal tenderness. There is no rebound and no guarding. No hernia.   Genitourinary:          Pelvic exam was performed with patient supine.   There is rash on the right labia. There is no tenderness or lesion on the right labia. There is no rash, tenderness or lesion on the left labia. Uterus is not deviated, not enlarged, not fixed and not tender. Cervix exhibits no motion tenderness, no discharge and no friability. Right adnexum displays no mass, no tenderness and no fullness. Left adnexum displays no mass, no tenderness and no fullness.    No vaginal discharge, erythema, tenderness or bleeding.   No erythema, tenderness or bleeding in the vagina.    No foreign body in the vagina.      No signs of injury in the vagina.      Genitourinary Comments: Cystocele noted     Neurological: She is alert and oriented to person, place, and time.   Skin: Skin is warm and dry. No rash noted.   Scattered rash w/excoriations over her extremities   Psychiatric: Her behavior is normal. Mood, judgment and thought content normal.   Nursing note and vitals reviewed.         Assessment/Plan    1) GYN HM: normal pap/HPV 3/2020, check pap/HPV  SBE demonstrated and encouraged.  2) STD screening: declines Condoms encouraged.  3) Bone health - Weight bearing exercise, dietary calcium recommendations and vitamin D reviewed.   4) Yeast dermatitis- Rx for Lotrisone BID x 7 days.  5) Smoking Status: No   6) Social: no issue  7)MMG:  UTD 12/2021- B3, scheduled for B dx  MMG 6/2022  8) DEXA- UTD 6/2020- normal, repeat 3-5 years  9)C scope- UTD 3/2016, repeat 10 years.   10) Parts of this document have been copied or forwarded from her previous visits and have been reviewed, updated and edited as indicated.    11)I saw the patient with a face mask, gloves and eye  protection  The patient herself was masked.  Social distancing was observed as appropriate.  12) Hematuria- check UA and CS  13)Follow up prn and 1 year       Diagnoses and all orders for this visit:    1. Pap smear, low-risk (Primary)  -     IgP, Aptima HPV    2. Routine gynecological examination  -     POC Urinalysis Dipstick  -     IgP, Aptima HPV    3. Special screening examination for human papillomavirus (HPV)  -     IgP, Aptima HPV    4. Yeast dermatitis    5. Hematuria, unspecified type    Other orders  -     clotrimazole-betamethasone (LOTRISONE) 1-0.05 % cream; Apply 1 application topically to the appropriate area as directed 2 (Two) Times a Day for 7 days.  Dispense: 45 g; Refill: 0        Trinity Clark MD  6/2/2022  10:32 EDT

## 2022-06-04 LAB
BACTERIA UR CULT: NORMAL
BACTERIA UR CULT: NORMAL

## 2022-06-04 NOTE — PROGRESS NOTES
PIP= pt had blood in urine but no UTI. She needs a repeat UA in 2 weeks and if she still has blood in her urine then she will need to see urology.

## 2022-06-06 ENCOUNTER — TELEPHONE (OUTPATIENT)
Dept: OBSTETRICS AND GYNECOLOGY | Facility: CLINIC | Age: 57
End: 2022-06-06

## 2022-06-06 LAB
CYTOLOGIST CVX/VAG CYTO: NORMAL
CYTOLOGY CVX/VAG DOC CYTO: NORMAL
CYTOLOGY CVX/VAG DOC THIN PREP: NORMAL
DX ICD CODE: NORMAL
HIV 1 & 2 AB SER-IMP: NORMAL
HPV I/H RISK 4 DNA CVX QL PROBE+SIG AMP: NEGATIVE
OTHER STN SPEC: NORMAL
STAT OF ADQ CVX/VAG CYTO-IMP: NORMAL

## 2022-06-06 NOTE — TELEPHONE ENCOUNTER
Caller: ALEXANDRU     Relationship to patient: SELF     Best call back number: [phone]    Patient is needing: PT WAS ON THE PHONE WITH ONE OF THE MEDICAL STAFF AND WAS GETTING RESULTS BUT WAS DISCONNECTED BEFORE SHE COULD GET HER PAP RESULTS. PLEASE GIVE HER A CALL BACK ASAP.

## 2022-06-20 NOTE — PROGRESS NOTES
PIP- R breast imaging shows a probable benign nodule, radiology is recommending a repeat R breast MMG in 6 months, please arrange
on the discharge service for the patient. I have reviewed and made amendments to the documentation where necessary.

## 2022-06-22 ENCOUNTER — HOSPITAL ENCOUNTER (OUTPATIENT)
Dept: MAMMOGRAPHY | Facility: HOSPITAL | Age: 57
End: 2022-06-22

## 2022-06-22 ENCOUNTER — HOSPITAL ENCOUNTER (OUTPATIENT)
Dept: MAMMOGRAPHY | Facility: HOSPITAL | Age: 57
Discharge: HOME | End: 2022-06-22
Admitting: OBSTETRICS & GYNECOLOGY

## 2022-06-22 DIAGNOSIS — Z09 FOLLOW-UP EXAM, 3-6 MONTHS SINCE PREVIOUS EXAM: ICD-10-CM

## 2022-06-22 PROCEDURE — G0279 TOMOSYNTHESIS, MAMMO: HCPCS

## 2022-06-22 PROCEDURE — 77066 DX MAMMO INCL CAD BI: CPT

## 2022-07-01 NOTE — TELEPHONE ENCOUNTER
Rx Refill Note  Requested Prescriptions     Pending Prescriptions Disp Refills   • busPIRone (BUSPAR) 30 MG tablet [Pharmacy Med Name: BUSPIRONE HCL TABS 30MG] 135 tablet 3     Sig: TAKE ONE AND ONE-HALF TABLETS DAILY      Last office visit with prescribing clinician: 4/9/2021      Next office visit with prescribing clinician: Visit date not found            Mercedes Porter MA  07/01/22, 08:19 EDT

## 2022-07-02 RX ORDER — BUSPIRONE HYDROCHLORIDE 30 MG/1
TABLET ORAL
Qty: 135 TABLET | Refills: 3 | Status: SHIPPED | OUTPATIENT
Start: 2022-07-02 | End: 2022-07-15 | Stop reason: SDUPTHER

## 2022-07-12 RX ORDER — BUSPIRONE HYDROCHLORIDE 30 MG/1
45 TABLET ORAL DAILY
Qty: 135 TABLET | Refills: 3 | Status: CANCELLED | OUTPATIENT
Start: 2022-07-12

## 2022-07-12 NOTE — TELEPHONE ENCOUNTER
Caller: Christine Villagomez SHAKILA    Relationship: Self    Best call back number: [phone]    Requested Prescriptions:   Requested Prescriptions     Pending Prescriptions Disp Refills   • busPIRone (BUSPAR) 30 MG tablet 135 tablet 3     Sig: Take 1.5 tablets by mouth Daily.        [facility] [place], [place] - [address] AT [address] TO [facility] - [phone] [facility] [phone]      Additional details provided by patient: STATES PHARMACY WILL NOT MAIL OUT 30 MG BUT THEY WILL MAIL OUT 15 MG     Does the patient have less than a 3 day supply:  [] Yes  [x] No    Ailyn Austin, Cecil Rep   07/12/22 13:13 EDT

## 2022-07-15 ENCOUNTER — OFFICE VISIT (OUTPATIENT)
Dept: INTERNAL MEDICINE | Facility: CLINIC | Age: 57
End: 2022-07-15

## 2022-07-15 VITALS
TEMPERATURE: 97.8 F | RESPIRATION RATE: 17 BRPM | HEART RATE: 84 BPM | BODY MASS INDEX: 37.02 KG/M2 | WEIGHT: 258.6 LBS | OXYGEN SATURATION: 97 % | HEIGHT: 70 IN | DIASTOLIC BLOOD PRESSURE: 94 MMHG | SYSTOLIC BLOOD PRESSURE: 142 MMHG

## 2022-07-15 DIAGNOSIS — F32.A DEPRESSION, UNSPECIFIED DEPRESSION TYPE: ICD-10-CM

## 2022-07-15 DIAGNOSIS — E66.01 MORBID (SEVERE) OBESITY DUE TO EXCESS CALORIES: ICD-10-CM

## 2022-07-15 DIAGNOSIS — F98.8 ATTENTION DEFICIT DISORDER (ADD) IN ADULT: ICD-10-CM

## 2022-07-15 DIAGNOSIS — E03.8 OTHER SPECIFIED HYPOTHYROIDISM: ICD-10-CM

## 2022-07-15 DIAGNOSIS — F41.8 ANXIETY WITH DEPRESSION: Primary | ICD-10-CM

## 2022-07-15 PROCEDURE — 99214 OFFICE O/P EST MOD 30 MIN: CPT | Performed by: INTERNAL MEDICINE

## 2022-07-15 RX ORDER — DEXTROAMPHETAMINE SACCHARATE, AMPHETAMINE ASPARTATE, DEXTROAMPHETAMINE SULFATE AND AMPHETAMINE SULFATE 1.25; 1.25; 1.25; 1.25 MG/1; MG/1; MG/1; MG/1
5 TABLET ORAL 3 TIMES DAILY
Qty: 90 TABLET | Refills: 0 | Status: SHIPPED | OUTPATIENT
Start: 2022-07-15 | End: 2022-11-15

## 2022-07-15 RX ORDER — BUSPIRONE HYDROCHLORIDE 15 MG/1
45 TABLET ORAL DAILY
Qty: 270 TABLET | Refills: 1 | Status: SHIPPED | OUTPATIENT
Start: 2022-07-15 | End: 2022-12-16

## 2022-07-15 RX ORDER — BUPROPION HYDROCHLORIDE 300 MG/1
300 TABLET ORAL EVERY MORNING
Qty: 90 TABLET | Refills: 3 | Status: SHIPPED | OUTPATIENT
Start: 2022-07-15

## 2022-07-15 RX ORDER — LEVOTHYROXINE SODIUM 0.05 MG/1
50 TABLET ORAL DAILY
Qty: 90 TABLET | Refills: 3 | Status: SHIPPED | OUTPATIENT
Start: 2022-07-15

## 2022-07-15 RX ORDER — SEMAGLUTIDE 0.25 MG/.5ML
INJECTION, SOLUTION SUBCUTANEOUS
Qty: 1.5 ML | Refills: 1 | Status: SHIPPED | OUTPATIENT
Start: 2022-07-15 | End: 2022-07-27

## 2022-07-15 RX ORDER — TERAZOSIN 1 MG/1
1 CAPSULE ORAL DAILY
Qty: 90 CAPSULE | Refills: 3 | Status: SHIPPED | OUTPATIENT
Start: 2022-07-15 | End: 2022-07-15 | Stop reason: SDUPTHER

## 2022-07-15 RX ORDER — TERAZOSIN 1 MG/1
1 CAPSULE ORAL DAILY
Qty: 30 CAPSULE | Refills: 1 | Status: SHIPPED | OUTPATIENT
Start: 2022-07-15

## 2022-07-15 NOTE — PROGRESS NOTES
Christine Villagomez is a 56 y.o. female, who presents with a chief complaint of   Chief Complaint   Patient presents with   • Hypertension     Says that she has been taking her BP at home and she has seen an increase in her numbers. Also wanting to discuss restarting her Adderall prescriptions            HPI     Pt here for follow up.  Since her last OV she has had both knee replacements and her knees are doing better    She had post menopausal bleeding.  Pt had a fibroid.    She had an abnl mammogram and the repeat was stable/normal.      Her bp's have been going up.  She monitors with a home wrist cuff.  She was on terazosin per psych in the past which also helped lower her bp.  She is out of the med.  At home earlier in the week her sbp was up to 160.      Her anxiety has been worse lately. She is unsure if her adhd is worse or just anxiety/depression flared.     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      The following portions of the patient's history were reviewed and updated as appropriate: allergies, current medications, past family history, past medical history, past social history, past surgical history and problem list.    Allergies: Sulfa antibiotics    Review of Systems   Constitutional: Negative for fatigue.   HENT: Negative.    Eyes: Negative.    Respiratory: Negative.  Negative for cough.    Cardiovascular: Negative for leg swelling.   Gastrointestinal: Negative.    Endocrine: Negative.    Genitourinary: Negative.    Musculoskeletal: Negative.    Skin: Negative.    Allergic/Immunologic: Negative.    Neurological: Negative for dizziness.   Hematological: Negative.    Psychiatric/Behavioral: Negative.    All other systems reviewed and are negative.            Wt Readings from Last 3 Encounters:   07/15/22 117 kg (258 lb 9.6 oz)   06/02/22 119 kg (263 lb)   07/27/21 104 kg (230 lb)     Temp Readings from Last 3 Encounters:   07/15/22 97.8 °F (36.6 °C) (Tympanic)   07/27/21 97.6 °F  (36.4 °C) (Tympanic)   05/04/21 98 °F (36.7 °C)     BP Readings from Last 3 Encounters:   07/15/22 142/94   06/02/22 140/86   07/27/21 154/96     Pulse Readings from Last 3 Encounters:   07/15/22 84   07/27/21 94   05/04/21 90     Body mass index is 37.11 kg/m².  SpO2 Readings from Last 3 Encounters:   07/15/22 97%   07/27/21 98%   05/04/21 95%          Physical Exam  Vitals and nursing note reviewed.   Constitutional:       General: She is not in acute distress.     Appearance: She is well-developed. She is obese.   HENT:      Head: Normocephalic and atraumatic.      Right Ear: External ear normal.      Left Ear: External ear normal.      Nose: Nose normal.   Eyes:      Conjunctiva/sclera: Conjunctivae normal.      Pupils: Pupils are equal, round, and reactive to light.   Cardiovascular:      Rate and Rhythm: Normal rate and regular rhythm.      Heart sounds: Normal heart sounds.   Pulmonary:      Effort: Pulmonary effort is normal. No respiratory distress.      Breath sounds: Normal breath sounds. No wheezing.   Musculoskeletal:         General: Normal range of motion.      Cervical back: Normal range of motion and neck supple.      Comments: Normal gait   Skin:     General: Skin is warm and dry.   Neurological:      Mental Status: She is alert and oriented to person, place, and time.   Psychiatric:         Behavior: Behavior normal.         Thought Content: Thought content normal.         Judgment: Judgment normal.         Results for orders placed or performed in visit on 06/02/22   Urine Culture - Urine, Urine, Random Void    Specimen: Urine, Random Void    UR   Result Value Ref Range    Urine Culture Final report     Result 1 Comment    POC Urinalysis Dipstick    Specimen: Urine   Result Value Ref Range    Color Yellow Yellow, Straw, Dark Yellow, Grace    Clarity, UA Clear Clear    Glucose, UA Negative Negative, 1000 mg/dL (3+) mg/dL    Bilirubin Negative Negative    Ketones, UA Negative Negative    Specific  Gravity  1.030 1.005 - 1.030    Blood, UA Large (A) Negative    pH, Urine 5.0 5.0 - 8.0    Protein, POC Negative Negative mg/dL    Urobilinogen, UA Normal Normal    Leukocytes Negative Negative    Nitrite, UA Negative Negative   IgP, Aptima HPV    Specimen: ThinPrep Vial   Result Value Ref Range    Diagnosis Comment     Specimen adequacy: Comment     Clinician Provided ICD-10: Comment     Performed by: Comment     . .     Note: Comment     Method: Comment     HPV Aptima Negative Negative     Result Review :                  Assessment and Plan    Diagnoses and all orders for this visit:    1. Anxiety with depression (Primary)  -     Discontinue: terazosin (HYTRIN) 1 MG capsule; Take 1 capsule by mouth Daily.  Dispense: 90 capsule; Refill: 3  -     Ambulatory Referral to Behavioral Health  -     busPIRone (BUSPAR) 15 MG tablet; Take 3 tablets by mouth Daily.  Dispense: 270 tablet; Refill: 1  -     terazosin (HYTRIN) 1 MG capsule; Take 1 capsule by mouth Daily.  Dispense: 30 capsule; Refill: 1  -     buPROPion XL (WELLBUTRIN XL) 300 MG 24 hr tablet; Take 1 tablet by mouth Every Morning.  Dispense: 90 tablet; Refill: 3    2. Depression, unspecified depression type  -     Discontinue: terazosin (HYTRIN) 1 MG capsule; Take 1 capsule by mouth Daily.  Dispense: 90 capsule; Refill: 3  -     terazosin (HYTRIN) 1 MG capsule; Take 1 capsule by mouth Daily.  Dispense: 30 capsule; Refill: 1    3. Morbid (severe) obesity due to excess calories (HCC)  -     Semaglutide-Weight Management (Wegovy) 0.25 MG/0.5ML solution auto-injector; Inject 0.25 mg under the skin into the appropriate area as directed Every 7 (Seven) Days for 28 days, THEN 0.5 mg Every 7 (Seven) Days for 28 days.  Dispense: 1.5 mL; Refill: 1    4. Attention deficit disorder (ADD) in adult  -     amphetamine-dextroamphetamine (Adderall) 5 MG tablet; Take 1 tablet by mouth 3 (Three) Times a Day.  Dispense: 90 tablet; Refill: 0    5. Other specified hypothyroidism  -      levothyroxine (SYNTHROID, LEVOTHROID) 50 MCG tablet; Take 1 tablet by mouth Daily.  Dispense: 90 tablet; Refill: 3                 Outpatient Medications Prior to Visit   Medication Sig Dispense Refill   • atorvastatin (LIPITOR) 20 MG tablet TAKE 1 TABLET DAILY 90 tablet 3   • Calcium Polycarbophil (FIBERCON PO)      • cetirizine (zyrTEC) 10 MG tablet Take 10 mg by mouth Daily.     • CHOLECALCIFEROL PO Take 1 tablet by mouth Daily.     • Coenzyme Q10 (CO Q-10) 100 MG capsule Co Q-10     • Docusate Sodium (COLACE PO)      • FIBER PO Take 2 capsules by mouth Daily.     • multivitamin with minerals tablet tablet Take 1 tablet by mouth Daily.     • Omega-3 Fatty Acids (fish oil) 1000 MG capsule capsule Take 1,000 mg by mouth Daily With Breakfast.     • Potassium Gluconate 550 MG tablet Take 1 tablet by mouth every night at bedtime.     • Probiotic Product (PROBIOTIC DAILY PO) Take 1 capsule by mouth Daily.     • Ubiquinol 100 MG capsule      • buPROPion XL (WELLBUTRIN XL) 300 MG 24 hr tablet TAKE 1 TABLET EVERY MORNING 90 tablet 3   • busPIRone (BUSPAR) 30 MG tablet TAKE ONE AND ONE-HALF TABLETS DAILY 135 tablet 3   • levothyroxine (SYNTHROID, LEVOTHROID) 50 MCG tablet TAKE 1 TABLET DAILY 90 tablet 3   • aspirin 81 MG EC tablet 81 mg.     • Multiple Vitamin (MULTI-VITAMIN DAILY PO) Take 1 tablet by mouth Daily.     • terazosin (HYTRIN) 1 MG capsule Take 1 capsule by mouth Daily. 90 capsule 3     No facility-administered medications prior to visit.     New Medications Ordered This Visit   Medications   • Semaglutide-Weight Management (Wegovy) 0.25 MG/0.5ML solution auto-injector     Sig: Inject 0.25 mg under the skin into the appropriate area as directed Every 7 (Seven) Days for 28 days, THEN 0.5 mg Every 7 (Seven) Days for 28 days.     Dispense:  1.5 mL     Refill:  1   • amphetamine-dextroamphetamine (Adderall) 5 MG tablet     Sig: Take 1 tablet by mouth 3 (Three) Times a Day.     Dispense:  90 tablet     Refill:  0    • busPIRone (BUSPAR) 15 MG tablet     Sig: Take 3 tablets by mouth Daily.     Dispense:  270 tablet     Refill:  1   • terazosin (HYTRIN) 1 MG capsule     Sig: Take 1 capsule by mouth Daily.     Dispense:  30 capsule     Refill:  1   • levothyroxine (SYNTHROID, LEVOTHROID) 50 MCG tablet     Sig: Take 1 tablet by mouth Daily.     Dispense:  90 tablet     Refill:  3   • buPROPion XL (WELLBUTRIN XL) 300 MG 24 hr tablet     Sig: Take 1 tablet by mouth Every Morning.     Dispense:  90 tablet     Refill:  3     [unfilled]  Medications Discontinued During This Encounter   Medication Reason   • Multiple Vitamin (MULTI-VITAMIN DAILY PO) *Therapy completed   • terazosin (HYTRIN) 1 MG capsule Reorder   • aspirin 81 MG EC tablet    • levothyroxine (SYNTHROID, LEVOTHROID) 50 MCG tablet Reorder   • buPROPion XL (WELLBUTRIN XL) 300 MG 24 hr tablet Reorder   • busPIRone (BUSPAR) 30 MG tablet Reorder   • terazosin (HYTRIN) 1 MG capsule Reorder         Return in about 4 weeks (around 8/12/2022) for Recheck.    Patient was given instructions and counseling regarding her condition or for health maintenance advice. Please see specific information pulled into the AVS if appropriate.

## 2022-07-27 DIAGNOSIS — R73.9 HYPERGLYCEMIA: Primary | ICD-10-CM

## 2022-08-16 ENCOUNTER — TELEMEDICINE (OUTPATIENT)
Dept: INTERNAL MEDICINE | Facility: CLINIC | Age: 57
End: 2022-08-16

## 2022-08-16 VITALS — BODY MASS INDEX: 35.72 KG/M2 | HEIGHT: 70 IN | TEMPERATURE: 98.6 F | WEIGHT: 249.5 LBS

## 2022-08-16 DIAGNOSIS — R73.9 HYPERGLYCEMIA: ICD-10-CM

## 2022-08-16 DIAGNOSIS — F98.8 ATTENTION DEFICIT DISORDER (ADD) IN ADULT: ICD-10-CM

## 2022-08-16 PROCEDURE — 99214 OFFICE O/P EST MOD 30 MIN: CPT | Performed by: INTERNAL MEDICINE

## 2022-08-16 RX ORDER — DEXTROAMPHETAMINE SACCHARATE, AMPHETAMINE ASPARTATE, DEXTROAMPHETAMINE SULFATE AND AMPHETAMINE SULFATE 3.75; 3.75; 3.75; 3.75 MG/1; MG/1; MG/1; MG/1
15 TABLET ORAL 2 TIMES DAILY
Qty: 60 TABLET | Refills: 0 | Status: SHIPPED | OUTPATIENT
Start: 2022-08-16 | End: 2022-10-10 | Stop reason: SDUPTHER

## 2022-08-16 RX ORDER — SEMAGLUTIDE 1.34 MG/ML
1 INJECTION, SOLUTION SUBCUTANEOUS
Qty: 9 ML | Refills: 4 | Status: SHIPPED | OUTPATIENT
Start: 2022-08-16 | End: 2023-02-22

## 2022-08-16 NOTE — PATIENT INSTRUCTIONS
Mental Health and Counseling Services:    [facility] or [facility] - [email or web address]    [facility]:  [phone]; [email or web address]    [facility]: [phone]    [facility]:  [phone]    Graciela Talib:  [phone]    Ledy Patrick:  [phone]    Libia Stroud:  [phone]    [facility]:  [phone]    [facility]:  [phone]    [facility], [phone]    [phone]    Hazel Santiago:  [phone]    [phone]    [phone]    Bridget Navarro:  [phone]    [facility]:  [phone]    [facility]:  [phone]    [phone]    [phone]    [phone]

## 2022-08-16 NOTE — PROGRESS NOTES
Christine Villagomez is a 56 y.o. female, who presents with a chief complaint of   Chief Complaint   Patient presents with   • Med Refill     1 month follow up on medications. Has concerns about dosage of adderall and concerns about ozempic           HPI   This visit has been scheduled as a telehealth visit to comply with patient safety concerns in accordance with CDC recommendations. This was an audio and video enabled telemedicine encounter.    You have chosen to receive care through a televisit visit. Do you consent to use a televisit visit for your medical care today? Yes    Pt here for follow up.    adhd - pt has started a new job and needs an increase in her adderall dose    Pt has done well with ozempic so far.  She has lost about 9 pounds so far.  No n/v/d.      She has been trying to walk more and this has helped her feel better as well.      The following portions of the patient's history were reviewed and updated as appropriate: allergies, current medications, past family history, past medical history, past social history, past surgical history and problem list.    Allergies: Sulfa antibiotics    Review of Systems   Constitutional: Negative.    HENT: Negative.    Eyes: Negative.    Respiratory: Negative.    Cardiovascular: Negative.    Gastrointestinal: Negative.    Endocrine: Negative.    Genitourinary: Negative.    Musculoskeletal: Negative.    Skin: Negative.    Allergic/Immunologic: Negative.    Neurological: Negative.    Hematological: Negative.    Psychiatric/Behavioral: Negative.    All other systems reviewed and are negative.            Wt Readings from Last 3 Encounters:   08/16/22 113 kg (249 lb 8 oz)   07/15/22 117 kg (258 lb 9.6 oz)   06/02/22 119 kg (263 lb)     Temp Readings from Last 3 Encounters:   08/16/22 98.6 °F (37 °C)   07/15/22 97.8 °F (36.6 °C) (Tympanic)   07/27/21 97.6 °F (36.4 °C) (Tympanic)     BP Readings from Last 3 Encounters:   07/15/22 142/94   06/02/22 140/86   07/27/21  154/96     Pulse Readings from Last 3 Encounters:   07/15/22 84   07/27/21 94   05/04/21 90     Body mass index is 35.8 kg/m².  SpO2 Readings from Last 3 Encounters:   07/15/22 97%   07/27/21 98%   05/04/21 95%          Physical Exam  Vitals and nursing note reviewed.   Constitutional:       Appearance: She is well-developed.   HENT:      Head: Normocephalic and atraumatic.      Nose: Nose normal.   Eyes:      General:         Right eye: No discharge.         Left eye: No discharge.      Conjunctiva/sclera: Conjunctivae normal.   Pulmonary:      Effort: Pulmonary effort is normal. No respiratory distress.   Musculoskeletal:      Cervical back: Normal range of motion and neck supple.   Skin:     Findings: No rash.   Neurological:      Mental Status: She is alert and oriented to person, place, and time.   Psychiatric:         Behavior: Behavior normal.         Thought Content: Thought content normal.         Judgment: Judgment normal.         Results for orders placed or performed in visit on 06/02/22   Urine Culture - Urine, Urine, Random Void    Specimen: Urine, Random Void    UR   Result Value Ref Range    Urine Culture Final report     Result 1 Comment    POC Urinalysis Dipstick    Specimen: Urine   Result Value Ref Range    Color Yellow Yellow, Straw, Dark Yellow, Grace    Clarity, UA Clear Clear    Glucose, UA Negative Negative, 1000 mg/dL (3+) mg/dL    Bilirubin Negative Negative    Ketones, UA Negative Negative    Specific Gravity  1.030 1.005 - 1.030    Blood, UA Large (A) Negative    pH, Urine 5.0 5.0 - 8.0    Protein, POC Negative Negative mg/dL    Urobilinogen, UA Normal Normal    Leukocytes Negative Negative    Nitrite, UA Negative Negative   IgP, Aptima HPV    Specimen: ThinPrep Vial   Result Value Ref Range    Diagnosis Comment     Specimen adequacy: Comment     Clinician Provided ICD-10: Comment     Performed by: Comment     . .     Note: Comment     Method: Comment     HPV Aptima Negative Negative      Result Review :                  Assessment and Plan    Diagnoses and all orders for this visit:    1. Attention deficit disorder (ADD) in adult - pt will monitor bp at home  -     amphetamine-dextroamphetamine (Adderall) 15 MG tablet; Take 1 tablet by mouth 2 (Two) Times a Day.  Dispense: 60 tablet; Refill: 0    2. Hyperglycemia  -     Semaglutide,0.25 or 0.5MG/DOS, (OZEMPIC) 2 MG/1.5ML solution pen-injector; Inject 0.5 mg under the skin into the appropriate area as directed 1 (One) Time Per Week.  Dispense: 1.5 mL; Refill: 0  -     Semaglutide, 1 MG/DOSE, (Ozempic, 1 MG/DOSE,) 4 MG/3ML solution pen-injector; Inject 1 mg under the skin into the appropriate area as directed Every 7 (Seven) Days.  Dispense: 9 mL; Refill: 4         Class 2 Severe Obesity (BMI >=35 and <=39.9). Obesity-related health conditions include the following: dyslipidemias, osteoarthritis and elevated glucose. Obesity is improving with treatment. BMI is is above average; no BMI management plan is appropriate. We discussed portion control, increasing exercise and pharmacologic options including ozempic/wegovy.             Outpatient Medications Prior to Visit   Medication Sig Dispense Refill   • amphetamine-dextroamphetamine (Adderall) 5 MG tablet Take 1 tablet by mouth 3 (Three) Times a Day. 90 tablet 0   • atorvastatin (LIPITOR) 20 MG tablet TAKE 1 TABLET DAILY 90 tablet 3   • buPROPion XL (WELLBUTRIN XL) 300 MG 24 hr tablet Take 1 tablet by mouth Every Morning. 90 tablet 3   • busPIRone (BUSPAR) 15 MG tablet Take 3 tablets by mouth Daily. 270 tablet 1   • Calcium Polycarbophil (FIBERCON PO)      • cetirizine (zyrTEC) 10 MG tablet Take 10 mg by mouth Daily.     • CHOLECALCIFEROL PO Take 1 tablet by mouth Daily.     • Coenzyme Q10 (CO Q-10) 100 MG capsule Co Q-10     • Docusate Sodium (COLACE PO)      • FIBER PO Take 2 capsules by mouth Daily.     • levothyroxine (SYNTHROID, LEVOTHROID) 50 MCG tablet Take 1 tablet by mouth Daily. 90 tablet  3   • Omega-3 Fatty Acids (fish oil) 1000 MG capsule capsule Take 1,000 mg by mouth Daily With Breakfast.     • Potassium Gluconate 550 MG tablet Take 1 tablet by mouth every night at bedtime.     • Probiotic Product (PROBIOTIC DAILY PO) Take 1 capsule by mouth Daily.     • terazosin (HYTRIN) 1 MG capsule Take 1 capsule by mouth Daily. 30 capsule 1   • Ubiquinol 100 MG capsule      • Semaglutide,0.25 or 0.5MG/DOS, (OZEMPIC) 2 MG/1.5ML solution pen-injector Inject 0.25 mg under the skin into the appropriate area as directed 1 (One) Time Per Week. 1.5 mL 0   • multivitamin with minerals tablet tablet Take 1 tablet by mouth Daily.       No facility-administered medications prior to visit.     New Medications Ordered This Visit   Medications   • amphetamine-dextroamphetamine (Adderall) 15 MG tablet     Sig: Take 1 tablet by mouth 2 (Two) Times a Day.     Dispense:  60 tablet     Refill:  0   • Semaglutide,0.25 or 0.5MG/DOS, (OZEMPIC) 2 MG/1.5ML solution pen-injector     Sig: Inject 0.5 mg under the skin into the appropriate area as directed 1 (One) Time Per Week.     Dispense:  1.5 mL     Refill:  0   • Semaglutide, 1 MG/DOSE, (Ozempic, 1 MG/DOSE,) 4 MG/3ML solution pen-injector     Sig: Inject 1 mg under the skin into the appropriate area as directed Every 7 (Seven) Days.     Dispense:  9 mL     Refill:  4     [unfilled]  Medications Discontinued During This Encounter   Medication Reason   • Semaglutide,0.25 or 0.5MG/DOS, (OZEMPIC) 2 MG/1.5ML solution pen-injector Reorder         No follow-ups on file.    Patient was given instructions and counseling regarding her condition or for health maintenance advice. Please see specific information pulled into the AVS if appropriate.

## 2022-10-10 DIAGNOSIS — F98.8 ATTENTION DEFICIT DISORDER (ADD) IN ADULT: ICD-10-CM

## 2022-10-10 RX ORDER — DEXTROAMPHETAMINE SACCHARATE, AMPHETAMINE ASPARTATE, DEXTROAMPHETAMINE SULFATE AND AMPHETAMINE SULFATE 3.75; 3.75; 3.75; 3.75 MG/1; MG/1; MG/1; MG/1
15 TABLET ORAL 2 TIMES DAILY
Qty: 60 TABLET | Refills: 0 | Status: SHIPPED | OUTPATIENT
Start: 2022-10-10 | End: 2022-12-08 | Stop reason: SDUPTHER

## 2022-10-10 NOTE — TELEPHONE ENCOUNTER
Rx Refill Note  Requested Prescriptions     Pending Prescriptions Disp Refills    amphetamine-dextroamphetamine (Adderall) 15 MG tablet 60 tablet 0     Sig: Take 1 tablet by mouth 2 (Two) Times a Day.      Last office visit with prescribing clinician: 7/15/2022      Next office visit with prescribing clinician: 11/15/2022            Padmaja Hall MA  10/10/22, 15:10 EDT

## 2022-11-15 ENCOUNTER — TELEMEDICINE (OUTPATIENT)
Dept: INTERNAL MEDICINE | Facility: CLINIC | Age: 57
End: 2022-11-15

## 2022-11-15 VITALS — HEIGHT: 70 IN | BODY MASS INDEX: 32.93 KG/M2 | WEIGHT: 230 LBS

## 2022-11-15 DIAGNOSIS — Z00.00 HEALTHCARE MAINTENANCE: ICD-10-CM

## 2022-11-15 DIAGNOSIS — F98.8 ATTENTION DEFICIT DISORDER (ADD) IN ADULT: ICD-10-CM

## 2022-11-15 DIAGNOSIS — E78.00 HYPERCHOLESTEROLEMIA: ICD-10-CM

## 2022-11-15 DIAGNOSIS — I10 ESSENTIAL HYPERTENSION: Primary | ICD-10-CM

## 2022-11-15 DIAGNOSIS — R73.9 HYPERGLYCEMIA: ICD-10-CM

## 2022-11-15 PROCEDURE — 99214 OFFICE O/P EST MOD 30 MIN: CPT | Performed by: INTERNAL MEDICINE

## 2022-11-15 NOTE — PROGRESS NOTES
Christine Villagomez is a 56 y.o. female, who presents with a chief complaint of   Chief Complaint   Patient presents with   • Depression     3 mo f/u           HPI   This visit has been scheduled as a telehealth visit to comply with patient safety concerns in accordance with CDC recommendations. This was an audio and video enabled telemedicine encounter.    You have chosen to receive care through a televisit visit. Do you consent to use a televisit visit for your medical care today? Yes    Pt is located at home and I am located in the office    Pt here for f/u on adult ADHD meds.  She has bharati on adderall and is doing well with the med.  She takes it daily and then a second dose if needed.  She hasn't checked her bp lately.  No ha/dizziness.      She is on ozempic and has lost about 30 pounds.  Her sugar cravings have been much less.  She is also eating smaller portions.  She has started senna-s to help with the constipation from the med.     Her knees are much better s/p tka    The following portions of the patient's history were reviewed and updated as appropriate: allergies, current medications, past family history, past medical history, past social history, past surgical history and problem list.    Allergies: Sulfa antibiotics    Review of Systems   Constitutional: Negative.    HENT: Negative.    Eyes: Negative.    Respiratory: Negative.    Cardiovascular: Negative.    Gastrointestinal: Negative.    Endocrine: Negative.    Genitourinary: Negative.    Musculoskeletal: Negative.    Skin: Negative.    Allergic/Immunologic: Negative.    Neurological: Negative.    Hematological: Negative.    Psychiatric/Behavioral: Negative.    All other systems reviewed and are negative.            Wt Readings from Last 3 Encounters:   11/15/22 104 kg (230 lb)   08/16/22 113 kg (249 lb 8 oz)   07/15/22 117 kg (258 lb 9.6 oz)     Temp Readings from Last 3 Encounters:   08/16/22 98.6 °F (37 °C)   07/15/22 97.8 °F (36.6 °C)  (Tympanic)   07/27/21 97.6 °F (36.4 °C) (Tympanic)     BP Readings from Last 3 Encounters:   07/15/22 142/94   06/02/22 140/86   07/27/21 154/96     Pulse Readings from Last 3 Encounters:   07/15/22 84   07/27/21 94   05/04/21 90     Body mass index is 33 kg/m².  SpO2 Readings from Last 3 Encounters:   07/15/22 97%   07/27/21 98%   05/04/21 95%          Physical Exam  Vitals and nursing note reviewed.   Constitutional:       Appearance: She is well-developed.   HENT:      Head: Normocephalic and atraumatic.      Nose: Nose normal.   Eyes:      General:         Right eye: No discharge.         Left eye: No discharge.      Conjunctiva/sclera: Conjunctivae normal.   Pulmonary:      Effort: Pulmonary effort is normal. No respiratory distress.   Musculoskeletal:      Cervical back: Normal range of motion and neck supple.   Skin:     Findings: No rash.   Neurological:      Mental Status: She is alert and oriented to person, place, and time.   Psychiatric:         Behavior: Behavior normal.         Thought Content: Thought content normal.         Judgment: Judgment normal.         Results for orders placed or performed in visit on 06/02/22   Urine Culture - Urine, Urine, Random Void    Specimen: Urine, Random Void    UR   Result Value Ref Range    Urine Culture Final report     Result 1 Comment    POC Urinalysis Dipstick    Specimen: Urine   Result Value Ref Range    Color Yellow Yellow, Straw, Dark Yellow, Grace    Clarity, UA Clear Clear    Glucose, UA Negative Negative, 1000 mg/dL (3+) mg/dL    Bilirubin Negative Negative    Ketones, UA Negative Negative    Specific Gravity  1.030 1.005 - 1.030    Blood, UA Large (A) Negative    pH, Urine 5.0 5.0 - 8.0    Protein, POC Negative Negative mg/dL    Urobilinogen, UA Normal Normal    Leukocytes Negative Negative    Nitrite, UA Negative Negative   IgP, Aptima HPV    Specimen: ThinPrep Vial   Result Value Ref Range    Diagnosis Comment     Specimen adequacy: Comment      Clinician Provided ICD-10: Comment     Performed by: Comment     . .     Note: Comment     Method: Comment     HPV Aptima Negative Negative     Result Review :                  Assessment and Plan    Diagnoses and all orders for this visit:    1. Essential hypertension (Primary)  -     CBC & Differential; Future  -     Comprehensive Metabolic Panel; Future  -     Lipid Panel With LDL / HDL Ratio; Future  -     T4, Free; Future  -     TSH; Future    2. Hyperglycemia  -     CBC & Differential; Future  -     Comprehensive Metabolic Panel; Future  -     Hemoglobin A1c; Future  -     Lipid Panel With LDL / HDL Ratio; Future  -     T4, Free; Future  -     TSH; Future    3. Attention deficit disorder (ADD) in adult - cont adderall    4. Hypercholesterolemia  -     CBC & Differential; Future  -     Comprehensive Metabolic Panel; Future  -     Lipid Panel With LDL / HDL Ratio; Future  -     T4, Free; Future  -     TSH; Future               Outpatient Medications Prior to Visit   Medication Sig Dispense Refill   • amphetamine-dextroamphetamine (Adderall) 15 MG tablet Take 1 tablet by mouth 2 (Two) Times a Day. 60 tablet 0   • atorvastatin (LIPITOR) 20 MG tablet TAKE 1 TABLET DAILY 90 tablet 3   • buPROPion XL (WELLBUTRIN XL) 300 MG 24 hr tablet Take 1 tablet by mouth Every Morning. 90 tablet 3   • busPIRone (BUSPAR) 15 MG tablet Take 3 tablets by mouth Daily. 270 tablet 1   • cetirizine (zyrTEC) 10 MG tablet Take 10 mg by mouth Daily.     • CHOLECALCIFEROL PO Take 1 tablet by mouth Daily.     • Coenzyme Q10 (CO Q-10) 100 MG capsule Co Q-10     • Docusate Sodium (COLACE PO)      • FIBER PO Take 2 capsules by mouth Daily.     • levothyroxine (SYNTHROID, LEVOTHROID) 50 MCG tablet Take 1 tablet by mouth Daily. 90 tablet 3   • Omega-3 Fatty Acids (fish oil) 1000 MG capsule capsule Take 1,000 mg by mouth Daily With Breakfast.     • Potassium Gluconate 550 MG tablet Take 1 tablet by mouth every night at bedtime.     • Probiotic  Product (PROBIOTIC DAILY PO) Take 1 capsule by mouth Daily.     • Semaglutide, 1 MG/DOSE, (Ozempic, 1 MG/DOSE,) 4 MG/3ML solution pen-injector Inject 1 mg under the skin into the appropriate area as directed Every 7 (Seven) Days. 9 mL 4   • terazosin (HYTRIN) 1 MG capsule Take 1 capsule by mouth Daily. 30 capsule 1   • Ubiquinol 100 MG capsule      • amphetamine-dextroamphetamine (Adderall) 5 MG tablet Take 1 tablet by mouth 3 (Three) Times a Day. 90 tablet 0   • Semaglutide,0.25 or 0.5MG/DOS, (OZEMPIC) 2 MG/1.5ML solution pen-injector Inject 0.5 mg under the skin into the appropriate area as directed 1 (One) Time Per Week. 1.5 mL 0   • Calcium Polycarbophil (FIBERCON PO)      • multivitamin with minerals tablet tablet Take 1 tablet by mouth Daily.       No facility-administered medications prior to visit.     No orders of the defined types were placed in this encounter.    [unfilled]  Medications Discontinued During This Encounter   Medication Reason   • Semaglutide,0.25 or 0.5MG/DOS, (OZEMPIC) 2 MG/1.5ML solution pen-injector    • amphetamine-dextroamphetamine (Adderall) 5 MG tablet *Therapy completed         No follow-ups on file.    Patient was given instructions and counseling regarding her condition or for health maintenance advice. Please see specific information pulled into the AVS if appropriate.

## 2022-11-22 ENCOUNTER — OFFICE VISIT (OUTPATIENT)
Dept: SLEEP MEDICINE | Facility: HOSPITAL | Age: 57
End: 2022-11-22

## 2022-11-22 VITALS
HEART RATE: 92 BPM | HEIGHT: 70 IN | BODY MASS INDEX: 32.35 KG/M2 | WEIGHT: 226 LBS | SYSTOLIC BLOOD PRESSURE: 136 MMHG | DIASTOLIC BLOOD PRESSURE: 76 MMHG

## 2022-11-22 DIAGNOSIS — Z86.59 HISTORY OF DEPRESSION: ICD-10-CM

## 2022-11-22 DIAGNOSIS — G47.33 OBSTRUCTIVE SLEEP APNEA SYNDROME: Primary | ICD-10-CM

## 2022-11-22 DIAGNOSIS — I10 ESSENTIAL HYPERTENSION: ICD-10-CM

## 2022-11-22 PROCEDURE — G0463 HOSPITAL OUTPT CLINIC VISIT: HCPCS

## 2022-11-22 NOTE — PROGRESS NOTES
SLEEP/PULMONARY  CLINIC NOTE      PATIENT IDENTIFICATION:  Name: Christine Villagomez  Age: 56 y.o.  Sex: female  :  1965  MRN: [id number]    DATE OF CONSULTATION:  2022                     CHIEF COMPLAINT: SANA    History of Present Illness:   Christine Villagomez is a 56 y.o. female Pt on CPAP feeling better more energy especially the night he use it more than 4 hours, no sleepiness no fatigue no tiredness, no mask irritation no dryness, compliance report reviewed with pt d discussed with the patient the download data and encouarged the patient to continue to use the CPAP. The residual AHI is acceptable.      Review of Systems:   Constitutional: negative   Eyes: negative   ENT/oropharynx: negative   Cardiovascular: negative   Respiratory: negative   Gastrointestinal: negative   Genitourinary: negative   Neurological: negative   Musculoskeletal: negative   Integument/breast: negative   Endocrine: negative   Allergic/Immunologic: negative     Past Medical History:  Past Medical History:   Diagnosis Date   • Bipolar disorder (HCC)    • Depression    • Hyperlipidemia    • Hypertension    • Hypothyroidism    • Kidney stone    • OA (osteoarthritis) of knee     left   • PONV (postoperative nausea and vomiting)    • Sleep apnea     uses cpap       Past Surgical History:  Past Surgical History:   Procedure Laterality Date   • BLADDER SUSPENSION      TVT   • CATARACT EXTRACTION     • COLONOSCOPY     • D & C HYSTEROSCOPY MYOSURE N/A 2021    Procedure: DILATATION AND CURETTAGE HYSTEROSCOPY with MYOSURE;  Surgeon: Trinity Clark MD;  Location: [facility];  Service: Obstetrics/Gynecology;  Laterality: N/A;   • KIDNEY STONE SURGERY     • KNEE MENISCAL REPAIR Bilateral    • REPLACEMENT TOTAL KNEE Right         Family History:  Family History   Problem Relation Age of Onset   • Cancer Mother         lung   • Heart attack Father    • Colon cancer Maternal Grandmother    • Breast cancer Neg Hx    • Ovarian  cancer Neg Hx    • Pulmonary embolism Neg Hx    • Deep vein thrombosis Neg Hx    • Malig Hyperthermia Neg Hx         Social History:   Social History     Tobacco Use   • Smoking status: Never   • Smokeless tobacco: Never   Substance Use Topics   • Alcohol use: No        Allergies:  Allergies   Allergen Reactions   • Sulfa Antibiotics Rash     fever  fever  fever       Home Meds:  (Not in a hospital admission)      Objective:    Vitals Ranges:   Heart Rate:  [92] 92  BP: (136)/(76) 136/76  Body mass index is 32.43 kg/m².     Exam:  General Appearance:  WDWN    HEENT:   without obvious abnormality,  Conjunctiva/corneas clear,  Normal external ear canals, no drainage    Clear orsalmucosa,  Mallampati score 3    Neck:  Supple, symmetrical, trachea midline. No JVD.  Lungs:   Bilateral basal rhonchi bilaterally, respirations unlabored symmetrical wall movement.    Chest wall:  No tenderness or deformity.    Heart:  Regular rate and rhythm, S1 and S2 normal.  Extremities: Trace edema no clubbing or Cyanosis        Data Review:  All labs (24hrs): No results found for this or any previous visit (from the past 24 hour(s)).     Imaging:  Mammo Diagnostic Digital Tomosynthesis Bilateral With CAD  Narrative: BILATERAL DIGITAL DIAGNOSTIC MAMMOGRAM 06/22/2022  BILATERAL BREAST TOMOSYNTHESIS     HISTORY:  56-year-old female presenting for a second follow-up of a probably  benign 6 mm nodule in the axillary tail of the right breast, favored to  represent a tiny intramammary lymph node. No new problems.     TECHNIQUE:  Bilateral digital diagnostic mammogram was performed with CAD review and  breast Tomosynthesis. Comparison studies 06/29/2021, 06/11/2021,  06/08/2020, 2/21/2019 and 2/19/2018     MAMMOGRAM FINDINGS:  Breast parenchyma is composed of scattered fibroglandular densities and  the pattern is unchanged. There is no new dominant nodule or mass in  either breast. No new suspicious cluster of microcalcifications.  Faint  benign-appearing nodule in the axillary tail right breast measuring  about 6 mm does not appear appreciably changed. Stability over the past  year is most characteristic of a benign etiology, likely an intramammary  lymph node. No new adenopathy skin thickening or architectural  distortion.     Absent new or worsening symptoms in either breast, return to an annual  screening regimen is recommended. FINDINGS and recommendations discussed  with the patient.     Impression: 1. Benign bilateral diagnostic mammogram. A screening mammogram in one  year is recommended.     BI-RADS CATEGORY 2: Benign Findings.     This report was finalized on 6/22/2022 4:25 PM by Dr. Jaskaran Holcomb MD.          ASSESSMENT:  Diagnoses and all orders for this visit:    Obstructive sleep apnea syndrome    History of depression    Essential hypertension        PLAN:       This patient with obstructive sleep apnea, compliance is improved. Encourage to use it more frequent, I re-emphasized on pt the long and short term benefit of treating SANA. The device is benefiting the patient.  The patient is also instructed to get the CPAP supplies from the [facility] and see me in 1 year for follow-up.    Treating SANA will improve BP control       Follow-up 1 year    Jone Seth MD. D, ABSM.  11/22/2022  14:32 EST

## 2022-12-08 DIAGNOSIS — F98.8 ATTENTION DEFICIT DISORDER (ADD) IN ADULT: ICD-10-CM

## 2022-12-08 NOTE — TELEPHONE ENCOUNTER
Rx Refill Note  Requested Prescriptions     Pending Prescriptions Disp Refills   • amphetamine-dextroamphetamine (Adderall) 15 MG tablet 60 tablet 0     Sig: Take 1 tablet by mouth 2 (Two) Times a Day.      Last office visit with prescribing clinician: 7/15/2022   Last telemedicine visit with prescribing clinician: 2/16/2023   Next office visit with prescribing clinician: 2/22/2023                         Would you like a call back once the refill request has been completed: [] Yes [] No    If the office needs to give you a call back, can they leave a voicemail: [] Yes [] No    Aleshia Shanks, PCT  12/08/22, 15:45 EST

## 2022-12-09 RX ORDER — DEXTROAMPHETAMINE SACCHARATE, AMPHETAMINE ASPARTATE, DEXTROAMPHETAMINE SULFATE AND AMPHETAMINE SULFATE 3.75; 3.75; 3.75; 3.75 MG/1; MG/1; MG/1; MG/1
15 TABLET ORAL 2 TIMES DAILY
Qty: 60 TABLET | Refills: 0 | Status: SHIPPED | OUTPATIENT
Start: 2022-12-09 | End: 2023-02-22 | Stop reason: SDUPTHER

## 2022-12-16 DIAGNOSIS — F41.8 ANXIETY WITH DEPRESSION: ICD-10-CM

## 2022-12-16 RX ORDER — BUSPIRONE HYDROCHLORIDE 15 MG/1
TABLET ORAL
Qty: 270 TABLET | Refills: 1 | Status: SHIPPED | OUTPATIENT
Start: 2022-12-16

## 2022-12-16 NOTE — TELEPHONE ENCOUNTER
Rx Refill Note  Requested Prescriptions     Pending Prescriptions Disp Refills    busPIRone (BUSPAR) 15 MG tablet [Pharmacy Med Name: BUSPIRONE TAB 15MG] 270 tablet 1     Sig: TAKE 3 TABLETS DAILY      Last office visit with prescribing clinician: 7/15/2022   Last telemedicine visit with prescribing clinician: 2/16/2023   Next office visit with prescribing clinician: 2/22/2023                         Would you like a call back once the refill request has been completed: [] Yes [] No    If the office needs to give you a call back, can they leave a voicemail: [] Yes [] No    Mercedes Porter MA  12/16/22, 11:51 EST

## 2023-02-01 ENCOUNTER — TELEPHONE (OUTPATIENT)
Dept: SLEEP MEDICINE | Facility: HOSPITAL | Age: 58
End: 2023-02-01
Payer: COMMERCIAL

## 2023-02-01 DIAGNOSIS — I10 ESSENTIAL HYPERTENSION: ICD-10-CM

## 2023-02-01 DIAGNOSIS — E78.00 HYPERCHOLESTEROLEMIA: ICD-10-CM

## 2023-02-01 DIAGNOSIS — R73.9 HYPERGLYCEMIA: ICD-10-CM

## 2023-02-01 DIAGNOSIS — Z00.00 HEALTHCARE MAINTENANCE: ICD-10-CM

## 2023-02-12 NOTE — TELEPHONE ENCOUNTER
Received voicemail from pt's daughter granddaughter Ralph Friend requesting call back on SNF locations. Placed return call to Ralph Friend. She stated their choices are    [facility] (no beds)  [facility] (awaiting return call for bed openings)  [facility] [phone]  [facility] [phone]. Confirmed with pt at bedside. Sonny PEREZ, LUIS E-S  Case Management Department  Phone: [phone] Fax: [phone]    Addendum at 1:03pm: Received voicemail from Dora at [facility] stating they have no beds. Called and spoke with Jacqui at [facility] who states their admissions don't work on the weekend. Provided CM contact number for Brina Link, their liaison to call on Monday. Regarding: No message from patient  ----- Message from Sivan Guzmán MA sent at 8/25/2021  9:22 AM EDT -----  Thank you for the prescription.  Since I wrote you last, HR and BP have come down, without the metoprolol.  My PT took HR and BP on 8/20 and got 86 and 128/72.  When I went to [facility], I checked it on their machine and found BP in the 120/80 range.  I'm not sure what is going on, but I will continue to watch and record.  This encounter does not contain a message from the patient.

## 2023-02-17 LAB
ALBUMIN SERPL-MCNC: 4.6 G/DL (ref 3.5–5.2)
ALBUMIN/GLOB SERPL: 2 G/DL
ALP SERPL-CCNC: 85 U/L (ref 39–117)
ALT SERPL-CCNC: 17 U/L (ref 1–33)
AST SERPL-CCNC: 14 U/L (ref 1–32)
BASOPHILS # BLD AUTO: 0.05 10*3/MM3 (ref 0–0.2)
BASOPHILS NFR BLD AUTO: 1 % (ref 0–1.5)
BILIRUB SERPL-MCNC: 0.5 MG/DL (ref 0–1.2)
BUN SERPL-MCNC: 15 MG/DL (ref 6–20)
BUN/CREAT SERPL: 14.4 (ref 7–25)
CALCIUM SERPL-MCNC: 9.4 MG/DL (ref 8.6–10.5)
CHLORIDE SERPL-SCNC: 106 MMOL/L (ref 98–107)
CHOLEST SERPL-MCNC: 142 MG/DL (ref 0–200)
CO2 SERPL-SCNC: 22.8 MMOL/L (ref 22–29)
CREAT SERPL-MCNC: 1.04 MG/DL (ref 0.57–1)
EGFRCR SERPLBLD CKD-EPI 2021: 62.4 ML/MIN/1.73
EOSINOPHIL # BLD AUTO: 0.28 10*3/MM3 (ref 0–0.4)
EOSINOPHIL NFR BLD AUTO: 5.3 % (ref 0.3–6.2)
ERYTHROCYTE [DISTWIDTH] IN BLOOD BY AUTOMATED COUNT: 12.6 % (ref 12.3–15.4)
GLOBULIN SER CALC-MCNC: 2.3 GM/DL
GLUCOSE SERPL-MCNC: 77 MG/DL (ref 65–99)
HBA1C MFR BLD: 5.1 % (ref 4.8–5.6)
HCT VFR BLD AUTO: 39.8 % (ref 34–46.6)
HCV IGG SERPL QL IA: NON REACTIVE
HDLC SERPL-MCNC: 59 MG/DL (ref 40–60)
HGB BLD-MCNC: 13.2 G/DL (ref 12–15.9)
IMM GRANULOCYTES # BLD AUTO: 0.01 10*3/MM3 (ref 0–0.05)
IMM GRANULOCYTES NFR BLD AUTO: 0.2 % (ref 0–0.5)
LDLC SERPL CALC-MCNC: 66 MG/DL (ref 0–100)
LDLC/HDLC SERPL: 1.11 {RATIO}
LYMPHOCYTES # BLD AUTO: 1.3 10*3/MM3 (ref 0.7–3.1)
LYMPHOCYTES NFR BLD AUTO: 24.8 % (ref 19.6–45.3)
MCH RBC QN AUTO: 28.5 PG (ref 26.6–33)
MCHC RBC AUTO-ENTMCNC: 33.2 G/DL (ref 31.5–35.7)
MCV RBC AUTO: 86 FL (ref 79–97)
MONOCYTES # BLD AUTO: 0.53 10*3/MM3 (ref 0.1–0.9)
MONOCYTES NFR BLD AUTO: 10.1 % (ref 5–12)
NEUTROPHILS # BLD AUTO: 3.07 10*3/MM3 (ref 1.7–7)
NEUTROPHILS NFR BLD AUTO: 58.6 % (ref 42.7–76)
NRBC BLD AUTO-RTO: 0 /100 WBC (ref 0–0.2)
PLATELET # BLD AUTO: 235 10*3/MM3 (ref 140–450)
POTASSIUM SERPL-SCNC: 4.5 MMOL/L (ref 3.5–5.2)
PROT SERPL-MCNC: 6.9 G/DL (ref 6–8.5)
RBC # BLD AUTO: 4.63 10*6/MM3 (ref 3.77–5.28)
SODIUM SERPL-SCNC: 142 MMOL/L (ref 136–145)
T4 FREE SERPL-MCNC: 1.62 NG/DL (ref 0.93–1.7)
TRIGL SERPL-MCNC: 89 MG/DL (ref 0–150)
TSH SERPL DL<=0.005 MIU/L-ACNC: 1.78 UIU/ML (ref 0.27–4.2)
VLDLC SERPL CALC-MCNC: 17 MG/DL (ref 5–40)
WBC # BLD AUTO: 5.24 10*3/MM3 (ref 3.4–10.8)

## 2023-02-22 ENCOUNTER — OFFICE VISIT (OUTPATIENT)
Dept: INTERNAL MEDICINE | Facility: CLINIC | Age: 58
End: 2023-02-22
Payer: COMMERCIAL

## 2023-02-22 VITALS
TEMPERATURE: 98.2 F | DIASTOLIC BLOOD PRESSURE: 88 MMHG | WEIGHT: 216.4 LBS | BODY MASS INDEX: 30.98 KG/M2 | OXYGEN SATURATION: 100 % | HEIGHT: 70 IN | HEART RATE: 82 BPM | SYSTOLIC BLOOD PRESSURE: 124 MMHG

## 2023-02-22 DIAGNOSIS — E78.1 HYPERTRIGLYCERIDEMIA: Primary | ICD-10-CM

## 2023-02-22 DIAGNOSIS — F41.8 ANXIETY WITH DEPRESSION: ICD-10-CM

## 2023-02-22 DIAGNOSIS — F98.8 ATTENTION DEFICIT DISORDER (ADD) IN ADULT: ICD-10-CM

## 2023-02-22 DIAGNOSIS — I10 ESSENTIAL HYPERTENSION: ICD-10-CM

## 2023-02-22 DIAGNOSIS — E78.00 HYPERCHOLESTEROLEMIA: ICD-10-CM

## 2023-02-22 DIAGNOSIS — R73.9 HYPERGLYCEMIA: ICD-10-CM

## 2023-02-22 PROCEDURE — 99214 OFFICE O/P EST MOD 30 MIN: CPT | Performed by: INTERNAL MEDICINE

## 2023-02-22 RX ORDER — AMOXICILLIN 250 MG
1 CAPSULE ORAL DAILY
COMMUNITY
Start: 2023-01-01

## 2023-02-22 RX ORDER — SEMAGLUTIDE 2.68 MG/ML
2 INJECTION, SOLUTION SUBCUTANEOUS
Qty: 9 ML | Refills: 3 | Status: SHIPPED | OUTPATIENT
Start: 2023-02-22 | End: 2023-02-22 | Stop reason: SDUPTHER

## 2023-02-22 RX ORDER — SEMAGLUTIDE 2.68 MG/ML
2 INJECTION, SOLUTION SUBCUTANEOUS
Qty: 9 ML | Refills: 3 | Status: SHIPPED | OUTPATIENT
Start: 2023-02-22

## 2023-02-22 RX ORDER — DEXTROAMPHETAMINE SACCHARATE, AMPHETAMINE ASPARTATE, DEXTROAMPHETAMINE SULFATE AND AMPHETAMINE SULFATE 3.75; 3.75; 3.75; 3.75 MG/1; MG/1; MG/1; MG/1
15 TABLET ORAL 2 TIMES DAILY
Qty: 60 TABLET | Refills: 0 | Status: SHIPPED | OUTPATIENT
Start: 2023-02-22

## 2023-02-22 NOTE — PROGRESS NOTES
Christine Villagomez is a 57 y.o. female, who presents with a chief complaint of   Chief Complaint   Patient presents with   • Hypertension     Follow up   • ADD           HPI   Pt here for f/u on adult ADHD meds.  She has bharati on adderall and is doing well with the med.  She takes it daily and then a second dose if needed.  She hasn't checked her bp lately.  No ha/dizziness.       She is on ozempic and has lost about 43 pounds.  Her sugar cravings have been much less.  She is also eating smaller portions.   Her glucoses have improved on the medication.  No real reflux issues.  She has occ nausea.  Constipation better with senna and magnesium.     Her knees are much better s/p tka     Hypothyroidism - labs normal.  No hair/skin changes.     HLD - on statin  No cramps or myalgias.     The following portions of the patient's history were reviewed and updated as appropriate: allergies, current medications, past family history, past medical history, past social history, past surgical history and problem list.    Allergies: Sulfa antibiotics    Review of Systems   Constitutional: Negative for fatigue.   HENT: Negative.    Eyes: Negative.    Respiratory: Negative.  Negative for cough.    Cardiovascular: Negative for leg swelling.   Gastrointestinal: Negative.    Endocrine: Negative.    Genitourinary: Negative.    Musculoskeletal: Negative.    Skin: Negative.    Allergic/Immunologic: Negative.    Neurological: Negative for dizziness.   Hematological: Negative.    Psychiatric/Behavioral: Negative.    All other systems reviewed and are negative.            Wt Readings from Last 3 Encounters:   02/22/23 98.2 kg (216 lb 6.4 oz)   11/22/22 103 kg (226 lb)   11/15/22 104 kg (230 lb)     Temp Readings from Last 3 Encounters:   02/22/23 98.2 °F (36.8 °C)   08/16/22 98.6 °F (37 °C)   07/15/22 97.8 °F (36.6 °C) (Tympanic)     BP Readings from Last 3 Encounters:   02/22/23 124/88   11/22/22 136/76   07/15/22 142/94     Pulse Readings  from Last 3 Encounters:   02/22/23 82   11/22/22 92   07/15/22 84     Body mass index is 31.05 kg/m².  SpO2 Readings from Last 3 Encounters:   02/22/23 100%   07/15/22 97%   07/27/21 98%          Physical Exam  Vitals and nursing note reviewed.   Constitutional:       General: She is not in acute distress.     Appearance: She is well-developed.   HENT:      Head: Normocephalic and atraumatic.      Right Ear: External ear normal.      Left Ear: External ear normal.      Nose: Nose normal.   Eyes:      Conjunctiva/sclera: Conjunctivae normal.      Pupils: Pupils are equal, round, and reactive to light.   Cardiovascular:      Rate and Rhythm: Normal rate and regular rhythm.      Heart sounds: Normal heart sounds.   Pulmonary:      Effort: Pulmonary effort is normal. No respiratory distress.      Breath sounds: Normal breath sounds. No wheezing.   Musculoskeletal:         General: Normal range of motion.      Cervical back: Normal range of motion and neck supple.      Comments: Normal gait   Skin:     General: Skin is warm and dry.   Neurological:      General: No focal deficit present.      Mental Status: She is alert and oriented to person, place, and time.   Psychiatric:         Mood and Affect: Mood normal.         Behavior: Behavior normal.         Thought Content: Thought content normal.         Judgment: Judgment normal.         Results for orders placed or performed in visit on 02/01/23   Hepatitis C Antibody    Specimen: Blood   Result Value Ref Range    Hep C Virus Ab Non Reactive Non Reactive   TSH    Specimen: Blood   Result Value Ref Range    TSH 1.780 0.270 - 4.200 uIU/mL   T4, Free    Specimen: Blood   Result Value Ref Range    Free T4 1.62 0.93 - 1.70 ng/dL   Lipid Panel With LDL / HDL Ratio    Specimen: Blood   Result Value Ref Range    Total Cholesterol 142 0 - 200 mg/dL    Triglycerides 89 0 - 150 mg/dL    HDL Cholesterol 59 40 - 60 mg/dL    VLDL Cholesterol Oscar 17 5 - 40 mg/dL    LDL Chol Calc (NIH)  66 0 - 100 mg/dL    LDL/HDL RATIO 1.11    Hemoglobin A1c    Specimen: Blood   Result Value Ref Range    Hemoglobin A1C 5.10 4.80 - 5.60 %   Comprehensive Metabolic Panel    Specimen: Blood   Result Value Ref Range    Glucose 77 65 - 99 mg/dL    BUN 15 6 - 20 mg/dL    Creatinine 1.04 (H) 0.57 - 1.00 mg/dL    EGFR Result 62.4 >60.0 mL/min/1.73    BUN/Creatinine Ratio 14.4 7.0 - 25.0    Sodium 142 136 - 145 mmol/L    Potassium 4.5 3.5 - 5.2 mmol/L    Chloride 106 98 - 107 mmol/L    Total CO2 22.8 22.0 - 29.0 mmol/L    Calcium 9.4 8.6 - 10.5 mg/dL    Total Protein 6.9 6.0 - 8.5 g/dL    Albumin 4.6 3.5 - 5.2 g/dL    Globulin 2.3 gm/dL    A/G Ratio 2.0 g/dL    Total Bilirubin 0.5 0.0 - 1.2 mg/dL    Alkaline Phosphatase 85 39 - 117 U/L    AST (SGOT) 14 1 - 32 U/L    ALT (SGPT) 17 1 - 33 U/L   CBC & Differential    Specimen: Blood   Result Value Ref Range    WBC 5.24 3.40 - 10.80 10*3/mm3    RBC 4.63 3.77 - 5.28 10*6/mm3    Hemoglobin 13.2 12.0 - 15.9 g/dL    Hematocrit 39.8 34.0 - 46.6 %    MCV 86.0 79.0 - 97.0 fL    MCH 28.5 26.6 - 33.0 pg    MCHC 33.2 31.5 - 35.7 g/dL    RDW 12.6 12.3 - 15.4 %    Platelets 235 140 - 450 10*3/mm3    Neutrophil Rel % 58.6 42.7 - 76.0 %    Lymphocyte Rel % 24.8 19.6 - 45.3 %    Monocyte Rel % 10.1 5.0 - 12.0 %    Eosinophil Rel % 5.3 0.3 - 6.2 %    Basophil Rel % 1.0 0.0 - 1.5 %    Neutrophils Absolute 3.07 1.70 - 7.00 10*3/mm3    Lymphocytes Absolute 1.30 0.70 - 3.10 10*3/mm3    Monocytes Absolute 0.53 0.10 - 0.90 10*3/mm3    Eosinophils Absolute 0.28 0.00 - 0.40 10*3/mm3    Basophils Absolute 0.05 0.00 - 0.20 10*3/mm3    Immature Granulocyte Rel % 0.2 0.0 - 0.5 %    Immature Grans Absolute 0.01 0.00 - 0.05 10*3/mm3    nRBC 0.0 0.0 - 0.2 /100 WBC     Result Review :                  Assessment and Plan    Diagnoses and all orders for this visit:    1. Hypertriglyceridemia (Primary)    2. Hyperglycemia  -     Semaglutide, 2 MG/DOSE, (Ozempic, 2 MG/DOSE,) 8 MG/3ML solution pen-injector;  Inject 2 mg under the skin into the appropriate area as directed Every 7 (Seven) Days.  Dispense: 9 mL; Refill: 3    3. Hypercholesterolemia    4. Essential hypertension    5. Attention deficit disorder (ADD) in adult  -     amphetamine-dextroamphetamine (Adderall) 15 MG tablet; Take 1 tablet by mouth 2 (Two) Times a Day.  Dispense: 60 tablet; Refill: 0    6. Anxiety with depression    Other orders  -     Discontinue: Semaglutide, 2 MG/DOSE, (Ozempic, 2 MG/DOSE,) 8 MG/3ML solution pen-injector; Inject 2 mg under the skin into the appropriate area as directed Every 7 (Seven) Days.  Dispense: 9 mL; Refill: 3         BMI is >= 30 and <35. (Class 1 Obesity). The following options were offered after discussion;: exercise counseling/recommendations, nutrition counseling/recommendations and pharmacological intervention options             Outpatient Medications Prior to Visit   Medication Sig Dispense Refill   • atorvastatin (LIPITOR) 20 MG tablet TAKE 1 TABLET DAILY 90 tablet 3   • buPROPion XL (WELLBUTRIN XL) 300 MG 24 hr tablet Take 1 tablet by mouth Every Morning. 90 tablet 3   • busPIRone (BUSPAR) 15 MG tablet TAKE 3 TABLETS DAILY 270 tablet 1   • cetirizine (zyrTEC) 10 MG tablet Take 10 mg by mouth Daily.     • CHOLECALCIFEROL PO Take 1 tablet by mouth Daily.     • Coenzyme Q10 (CO Q-10) 100 MG capsule Co Q-10     • FIBER PO Take 2 capsules by mouth Daily.     • levothyroxine (SYNTHROID, LEVOTHROID) 50 MCG tablet Take 1 tablet by mouth Daily. 90 tablet 3   • Omega-3 Fatty Acids (fish oil) 1000 MG capsule capsule Take 1,000 mg by mouth Daily With Breakfast.     • Potassium Gluconate 550 MG tablet Take 1 tablet by mouth every night at bedtime.     • Probiotic Product (PROBIOTIC DAILY PO) Take 1 capsule by mouth Daily.     • sennosides-docusate (PERICOLACE) 8.6-50 MG per tablet Take 1 tablet by mouth Daily. 1-2 tablets by mouth daily     • terazosin (HYTRIN) 1 MG capsule Take 1 capsule by mouth Daily. 30 capsule 1   •  Ubiquinol 100 MG capsule      • amphetamine-dextroamphetamine (Adderall) 15 MG tablet Take 1 tablet by mouth 2 (Two) Times a Day. 60 tablet 0   • Semaglutide, 1 MG/DOSE, (Ozempic, 1 MG/DOSE,) 4 MG/3ML solution pen-injector Inject 1 mg under the skin into the appropriate area as directed Every 7 (Seven) Days. 9 mL 4   • Calcium Polycarbophil (FIBERCON PO)      • Docusate Sodium (COLACE PO)      • multivitamin with minerals tablet tablet Take 1 tablet by mouth Daily.       No facility-administered medications prior to visit.     New Medications Ordered This Visit   Medications   • Semaglutide, 2 MG/DOSE, (Ozempic, 2 MG/DOSE,) 8 MG/3ML solution pen-injector     Sig: Inject 2 mg under the skin into the appropriate area as directed Every 7 (Seven) Days.     Dispense:  9 mL     Refill:  3   • amphetamine-dextroamphetamine (Adderall) 15 MG tablet     Sig: Take 1 tablet by mouth 2 (Two) Times a Day.     Dispense:  60 tablet     Refill:  0     [unfilled]  Medications Discontinued During This Encounter   Medication Reason   • Docusate Sodium (COLACE PO) *Therapy completed   • multivitamin with minerals tablet tablet *Therapy completed   • Calcium Polycarbophil (FIBERCON PO) *Therapy completed   • Semaglutide, 1 MG/DOSE, (Ozempic, 1 MG/DOSE,) 4 MG/3ML solution pen-injector *Therapy completed   • Semaglutide, 2 MG/DOSE, (Ozempic, 2 MG/DOSE,) 8 MG/3ML solution pen-injector Reorder   • amphetamine-dextroamphetamine (Adderall) 15 MG tablet Reorder         Return in about 3 months (around 5/22/2023) for Recheck.    Patient was given instructions and counseling regarding her condition or for health maintenance advice. Please see specific information pulled into the AVS if appropriate.

## 2023-03-07 RX ORDER — ATORVASTATIN CALCIUM 20 MG/1
20 TABLET, FILM COATED ORAL DAILY
Qty: 90 TABLET | Refills: 3 | Status: SHIPPED | OUTPATIENT
Start: 2023-03-07

## 2023-05-10 ENCOUNTER — TRANSCRIBE ORDERS (OUTPATIENT)
Dept: ADMINISTRATIVE | Facility: HOSPITAL | Age: 58
End: 2023-05-10
Payer: COMMERCIAL

## 2023-05-10 DIAGNOSIS — Z12.31 SCREENING MAMMOGRAM, ENCOUNTER FOR: Primary | ICD-10-CM

## 2023-05-22 ENCOUNTER — TELEMEDICINE (OUTPATIENT)
Dept: INTERNAL MEDICINE | Facility: CLINIC | Age: 58
End: 2023-05-22
Payer: COMMERCIAL

## 2023-05-22 VITALS — WEIGHT: 202 LBS | BODY MASS INDEX: 28.98 KG/M2

## 2023-05-22 DIAGNOSIS — R73.9 HYPERGLYCEMIA: ICD-10-CM

## 2023-05-22 DIAGNOSIS — E66.01 MORBID (SEVERE) OBESITY DUE TO EXCESS CALORIES: ICD-10-CM

## 2023-05-22 DIAGNOSIS — F98.8 ATTENTION DEFICIT DISORDER (ADD) IN ADULT: Primary | ICD-10-CM

## 2023-05-22 RX ORDER — DEXTROAMPHETAMINE SACCHARATE, AMPHETAMINE ASPARTATE, DEXTROAMPHETAMINE SULFATE AND AMPHETAMINE SULFATE 3.75; 3.75; 3.75; 3.75 MG/1; MG/1; MG/1; MG/1
15 TABLET ORAL 2 TIMES DAILY
Qty: 60 TABLET | Refills: 0 | Status: SHIPPED | OUTPATIENT
Start: 2023-05-22

## 2023-05-22 NOTE — PROGRESS NOTES
Christine Villagomez is a 57 y.o. female, who presents with a chief complaint of   Chief Complaint   Patient presents with   • ADD           HPI   This visit has been scheduled as a telehealth visit to comply with patient safety concerns in accordance with CDC recommendations. This was an audio and video enabled telemedicine encounter.    You have chosen to receive care through a televisit visit. Do you consent to use a televisit visit for your medical care today? Yes    Pt is at home and I am in the office.     Insulin resistance - Pt on ozempic.  pt has lost another another 14-15 pounds since her last OV.  At one point she was about 80 pounds heavier.    ADD - doing well on adderall.      The following portions of the patient's history were reviewed and updated as appropriate: allergies, current medications, past family history, past medical history, past social history, past surgical history and problem list.    Allergies: Sulfa antibiotics    Review of Systems   Constitutional: Negative.    HENT: Negative.    Eyes: Negative.    Respiratory: Negative.    Cardiovascular: Negative.    Gastrointestinal: Negative.    Endocrine: Negative.    Genitourinary: Negative.    Musculoskeletal: Negative.    Skin: Negative.    Allergic/Immunologic: Negative.    Neurological: Negative.    Hematological: Negative.    Psychiatric/Behavioral: Negative.    All other systems reviewed and are negative.            Wt Readings from Last 3 Encounters:   05/22/23 91.6 kg (202 lb)   02/22/23 98.2 kg (216 lb 6.4 oz)   11/22/22 103 kg (226 lb)     Temp Readings from Last 3 Encounters:   02/22/23 98.2 °F (36.8 °C)   08/16/22 98.6 °F (37 °C)   07/15/22 97.8 °F (36.6 °C) (Tympanic)     BP Readings from Last 3 Encounters:   02/22/23 124/88   11/22/22 136/76   07/15/22 142/94     Pulse Readings from Last 3 Encounters:   02/22/23 82   11/22/22 92   07/15/22 84     Body mass index is 28.98 kg/m².  SpO2 Readings from Last 3 Encounters:   02/22/23  100%   07/15/22 97%   07/27/21 98%          Physical Exam  Vitals and nursing note reviewed.   Constitutional:       Appearance: She is well-developed.   HENT:      Head: Normocephalic and atraumatic.      Nose: Nose normal.   Eyes:      General:         Right eye: No discharge.         Left eye: No discharge.      Conjunctiva/sclera: Conjunctivae normal.   Pulmonary:      Effort: Pulmonary effort is normal. No respiratory distress.   Musculoskeletal:      Cervical back: Normal range of motion and neck supple.   Skin:     Findings: No rash.   Neurological:      Mental Status: She is alert and oriented to person, place, and time.   Psychiatric:         Behavior: Behavior normal.         Thought Content: Thought content normal.         Judgment: Judgment normal.         Results for orders placed or performed in visit on 02/01/23   Hepatitis C Antibody    Specimen: Blood   Result Value Ref Range    Hep C Virus Ab Non Reactive Non Reactive   TSH    Specimen: Blood   Result Value Ref Range    TSH 1.780 0.270 - 4.200 uIU/mL   T4, Free    Specimen: Blood   Result Value Ref Range    Free T4 1.62 0.93 - 1.70 ng/dL   Lipid Panel With LDL / HDL Ratio    Specimen: Blood   Result Value Ref Range    Total Cholesterol 142 0 - 200 mg/dL    Triglycerides 89 0 - 150 mg/dL    HDL Cholesterol 59 40 - 60 mg/dL    VLDL Cholesterol Oscar 17 5 - 40 mg/dL    LDL Chol Calc (NIH) 66 0 - 100 mg/dL    LDL/HDL RATIO 1.11    Hemoglobin A1c    Specimen: Blood   Result Value Ref Range    Hemoglobin A1C 5.10 4.80 - 5.60 %   Comprehensive Metabolic Panel    Specimen: Blood   Result Value Ref Range    Glucose 77 65 - 99 mg/dL    BUN 15 6 - 20 mg/dL    Creatinine 1.04 (H) 0.57 - 1.00 mg/dL    EGFR Result 62.4 >60.0 mL/min/1.73    BUN/Creatinine Ratio 14.4 7.0 - 25.0    Sodium 142 136 - 145 mmol/L    Potassium 4.5 3.5 - 5.2 mmol/L    Chloride 106 98 - 107 mmol/L    Total CO2 22.8 22.0 - 29.0 mmol/L    Calcium 9.4 8.6 - 10.5 mg/dL    Total Protein 6.9 6.0  - 8.5 g/dL    Albumin 4.6 3.5 - 5.2 g/dL    Globulin 2.3 gm/dL    A/G Ratio 2.0 g/dL    Total Bilirubin 0.5 0.0 - 1.2 mg/dL    Alkaline Phosphatase 85 39 - 117 U/L    AST (SGOT) 14 1 - 32 U/L    ALT (SGPT) 17 1 - 33 U/L   CBC & Differential    Specimen: Blood   Result Value Ref Range    WBC 5.24 3.40 - 10.80 10*3/mm3    RBC 4.63 3.77 - 5.28 10*6/mm3    Hemoglobin 13.2 12.0 - 15.9 g/dL    Hematocrit 39.8 34.0 - 46.6 %    MCV 86.0 79.0 - 97.0 fL    MCH 28.5 26.6 - 33.0 pg    MCHC 33.2 31.5 - 35.7 g/dL    RDW 12.6 12.3 - 15.4 %    Platelets 235 140 - 450 10*3/mm3    Neutrophil Rel % 58.6 42.7 - 76.0 %    Lymphocyte Rel % 24.8 19.6 - 45.3 %    Monocyte Rel % 10.1 5.0 - 12.0 %    Eosinophil Rel % 5.3 0.3 - 6.2 %    Basophil Rel % 1.0 0.0 - 1.5 %    Neutrophils Absolute 3.07 1.70 - 7.00 10*3/mm3    Lymphocytes Absolute 1.30 0.70 - 3.10 10*3/mm3    Monocytes Absolute 0.53 0.10 - 0.90 10*3/mm3    Eosinophils Absolute 0.28 0.00 - 0.40 10*3/mm3    Basophils Absolute 0.05 0.00 - 0.20 10*3/mm3    Immature Granulocyte Rel % 0.2 0.0 - 0.5 %    Immature Grans Absolute 0.01 0.00 - 0.05 10*3/mm3    nRBC 0.0 0.0 - 0.2 /100 WBC     Result Review :                  Assessment and Plan    Diagnoses and all orders for this visit:    1. Attention deficit disorder (ADD) in adult (Primary)  -     amphetamine-dextroamphetamine (Adderall) 15 MG tablet; Take 1 tablet by mouth 2 (Two) Times a Day.  Dispense: 60 tablet; Refill: 0    2. Hyperglycemia    3. Morbid (severe) obesity due to excess calories     glucoses improved with ozempic and bmi improved to 28 with weight loss    BMI is >= 30 and <35. (Class 1 Obesity). The following options were offered after discussion;: exercise counseling/recommendations and nutrition counseling/recommendations             Outpatient Medications Prior to Visit   Medication Sig Dispense Refill   • atorvastatin (LIPITOR) 20 MG tablet Take 1 tablet by mouth Daily. 90 tablet 3   • buPROPion XL (WELLBUTRIN XL) 300  MG 24 hr tablet Take 1 tablet by mouth Every Morning. 90 tablet 3   • busPIRone (BUSPAR) 15 MG tablet TAKE 3 TABLETS DAILY 270 tablet 1   • cetirizine (zyrTEC) 10 MG tablet Take 10 mg by mouth Daily.     • CHOLECALCIFEROL PO Take 1 tablet by mouth Daily.     • Coenzyme Q10 (CO Q-10) 100 MG capsule Co Q-10     • FIBER PO Take 2 capsules by mouth Daily.     • levothyroxine (SYNTHROID, LEVOTHROID) 50 MCG tablet Take 1 tablet by mouth Daily. 90 tablet 3   • Omega-3 Fatty Acids (fish oil) 1000 MG capsule capsule Take 1,000 mg by mouth Daily With Breakfast.     • Potassium Gluconate 550 MG tablet Take 1 tablet by mouth every night at bedtime.     • Probiotic Product (PROBIOTIC DAILY PO) Take 1 capsule by mouth Daily.     • Semaglutide, 2 MG/DOSE, (Ozempic, 2 MG/DOSE,) 8 MG/3ML solution pen-injector Inject 2 mg under the skin into the appropriate area as directed Every 7 (Seven) Days. 9 mL 3   • sennosides-docusate (PERICOLACE) 8.6-50 MG per tablet Take 1 tablet by mouth Daily. 1-2 tablets by mouth daily     • terazosin (HYTRIN) 1 MG capsule Take 1 capsule by mouth Daily. 30 capsule 1   • Ubiquinol 100 MG capsule      • amphetamine-dextroamphetamine (Adderall) 15 MG tablet Take 1 tablet by mouth 2 (Two) Times a Day. 60 tablet 0     No facility-administered medications prior to visit.     New Medications Ordered This Visit   Medications   • amphetamine-dextroamphetamine (Adderall) 15 MG tablet     Sig: Take 1 tablet by mouth 2 (Two) Times a Day.     Dispense:  60 tablet     Refill:  0     [unfilled]  Medications Discontinued During This Encounter   Medication Reason   • amphetamine-dextroamphetamine (Adderall) 15 MG tablet Reorder         No follow-ups on file.    Patient was given instructions and counseling regarding her condition or for health maintenance advice. Please see specific information pulled into the AVS if appropriate.

## 2023-06-08 ENCOUNTER — OFFICE VISIT (OUTPATIENT)
Dept: OBSTETRICS AND GYNECOLOGY | Facility: CLINIC | Age: 58
End: 2023-06-08
Payer: COMMERCIAL

## 2023-06-08 VITALS
DIASTOLIC BLOOD PRESSURE: 84 MMHG | HEIGHT: 70 IN | WEIGHT: 200.6 LBS | BODY MASS INDEX: 28.72 KG/M2 | SYSTOLIC BLOOD PRESSURE: 132 MMHG

## 2023-06-08 DIAGNOSIS — R21 VULVAR RASH: ICD-10-CM

## 2023-06-08 DIAGNOSIS — Z12.31 ENCOUNTER FOR SCREENING MAMMOGRAM FOR MALIGNANT NEOPLASM OF BREAST: ICD-10-CM

## 2023-06-08 DIAGNOSIS — Z01.419 ROUTINE GYNECOLOGICAL EXAMINATION: ICD-10-CM

## 2023-06-08 DIAGNOSIS — R31.9 HEMATURIA, UNSPECIFIED TYPE: Primary | ICD-10-CM

## 2023-06-08 LAB
BILIRUB BLD-MCNC: NEGATIVE MG/DL
CLARITY, POC: ABNORMAL
COLOR UR: ABNORMAL
GLUCOSE UR STRIP-MCNC: NEGATIVE MG/DL
KETONES UR QL: NEGATIVE
LEUKOCYTE EST, POC: ABNORMAL
NITRITE UR-MCNC: POSITIVE MG/ML
PH UR: 5 [PH] (ref 5–8)
PROT UR STRIP-MCNC: NEGATIVE MG/DL
RBC # UR STRIP: ABNORMAL /UL
SP GR UR: 1 (ref 1–1.03)
UROBILINOGEN UR QL: NORMAL

## 2023-06-08 RX ORDER — AMOXICILLIN 500 MG/1
CAPSULE ORAL
COMMUNITY
Start: 2023-06-01

## 2023-06-08 NOTE — PROGRESS NOTES
GYN Annual Exam     CC- Here for annual exam.     Christine Villagomez is a 57 y.o. female established patient who presents for annual well woman exam. No  VB . She has been losing weight and feels good. She has blood in her urine and is nitrite positive but is asymptomatic. She has a rash on her vulva again this year. She says it is not itchy.     OB History          1    Para   1    Term   1            AB        Living   1         SAB        IAB        Ectopic        Molar        Multiple        Live Births              Obstetric Comments   1                Menarche:12  Menopause:52  HRT:none  Current contraception: vasectomy &   History of abnormal Pap smear: no  History of abnormal mammogram: no  Family history of uterine, colon or ovarian cancer: yes - MGM colon age 60  Family history of breast cancer: no  STD's: none  Last pap smear: 2022- normal pap/HPV  CAROLE; none  2021- HS D&C myomectomy- path B9 fibroid    Health Maintenance   Topic Date Due    Hepatitis B (1 of 3 - 3-dose series) Never done    ANNUAL PHYSICAL  Never done    Annual Gynecologic Pelvic and Breast Exam  2023    INFLUENZA VACCINE  2023    LIPID PANEL  2024    MAMMOGRAM  2024    PAP SMEAR  2025    COLORECTAL CANCER SCREENING  2026    TDAP/TD VACCINES (3 - Td or Tdap) 10/18/2027    HEPATITIS C SCREENING  Completed    COVID-19 Vaccine  Completed    ZOSTER VACCINE  Completed    Pneumococcal Vaccine 0-64  Aged Out       Past Medical History:   Diagnosis Date    Bipolar disorder     Depression     Hyperlipidemia     Hypertension     Hypothyroidism     Kidney stone     OA (osteoarthritis) of knee     left    PONV (postoperative nausea and vomiting)     Sleep apnea     uses cpap       Past Surgical History:   Procedure Laterality Date    BLADDER SUSPENSION      TVT    CATARACT EXTRACTION      COLONOSCOPY      D & C HYSTEROSCOPY MYOSURE N/A 2021    Procedure: DILATATION AND CURETTAGE  HYSTEROSCOPY with MYOSURE;  Surgeon: Trinity Clark MD;  Location: [facility];  Service: Obstetrics/Gynecology;  Laterality: N/A;    KIDNEY STONE SURGERY      KNEE MENISCAL REPAIR Bilateral     REPLACEMENT TOTAL KNEE Right          Current Outpatient Medications:     amoxicillin (AMOXIL) 500 MG capsule, TAKE FOUR CAPSULES BY MOUTH ONE HOUR BEFORE APPOINTMENT, Disp: , Rfl:     Black Pepper-Turmeric (TURMERIC COMPLEX/BLACK PEPPER PO), , Disp: , Rfl:     Collagen-Vitamin C (COLLAGEN PLUS VITAMIN C PO), , Disp: , Rfl:     Green Tea, Luba sinensis, (GREEN TEA EXTRACT PO), , Disp: , Rfl:     Misc Natural Products (OSTEO BI-FLEX ADV TRIPLE ST PO), , Disp: , Rfl:     amphetamine-dextroamphetamine (Adderall) 15 MG tablet, Take 1 tablet by mouth 2 (Two) Times a Day., Disp: 60 tablet, Rfl: 0    atorvastatin (LIPITOR) 20 MG tablet, Take 1 tablet by mouth Daily., Disp: 90 tablet, Rfl: 3    buPROPion XL (WELLBUTRIN XL) 300 MG 24 hr tablet, Take 1 tablet by mouth Every Morning., Disp: 90 tablet, Rfl: 3    busPIRone (BUSPAR) 15 MG tablet, TAKE 3 TABLETS DAILY, Disp: 270 tablet, Rfl: 1    cetirizine (zyrTEC) 10 MG tablet, Take 10 mg by mouth Daily., Disp: , Rfl:     CHOLECALCIFEROL PO, Take 1 tablet by mouth Daily., Disp: , Rfl:     Coenzyme Q10 (CO Q-10) 100 MG capsule, Co Q-10, Disp: , Rfl:     FIBER PO, Take 2 capsules by mouth Daily., Disp: , Rfl:     levothyroxine (SYNTHROID, LEVOTHROID) 50 MCG tablet, Take 1 tablet by mouth Daily., Disp: 90 tablet, Rfl: 3    Omega-3 Fatty Acids (fish oil) 1000 MG capsule capsule, Take 1,000 mg by mouth Daily With Breakfast., Disp: , Rfl:     Potassium Gluconate 550 MG tablet, Take 1 tablet by mouth every night at bedtime., Disp: , Rfl:     Probiotic Product (PROBIOTIC DAILY PO), Take 1 capsule by mouth Daily., Disp: , Rfl:     Semaglutide, 2 MG/DOSE, (Ozempic, 2 MG/DOSE,) 8 MG/3ML solution pen-injector, Inject 2 mg under the skin into the appropriate area as directed Every 7  "(Seven) Days., Disp: 9 mL, Rfl: 3    sennosides-docusate (PERICOLACE) 8.6-50 MG per tablet, Take 1 tablet by mouth Daily. 1-2 tablets by mouth daily, Disp: , Rfl:     terazosin (HYTRIN) 1 MG capsule, Take 1 capsule by mouth Daily., Disp: 30 capsule, Rfl: 1    Ubiquinol 100 MG capsule, , Disp: , Rfl:     Allergies   Allergen Reactions    Sulfa Antibiotics Rash     fever  fever  fever       Social History     Tobacco Use    Smoking status: Never     Passive exposure: Never    Smokeless tobacco: Never   Vaping Use    Vaping Use: Never used   Substance Use Topics    Alcohol use: No    Drug use: No       Family History   Problem Relation Age of Onset    Cancer Mother         lung    Heart attack Father     Colon cancer Maternal Grandmother     Breast cancer Neg Hx     Ovarian cancer Neg Hx     Pulmonary embolism Neg Hx     Deep vein thrombosis Neg Hx     Malig Hyperthermia Neg Hx        Review of Systems   Constitutional:  Positive for activity change and unexpected weight change (loss). Negative for appetite change, fatigue and fever.   Respiratory:  Negative for cough and shortness of breath.    Cardiovascular:  Negative for chest pain and palpitations.   Gastrointestinal:  Negative for abdominal distention, abdominal pain, constipation, diarrhea and nausea.   Endocrine: Negative for cold intolerance and heat intolerance.   Genitourinary:  Negative for decreased urine volume, dyspareunia, dysuria, menstrual problem, pelvic pain, vaginal bleeding and vaginal discharge.   Musculoskeletal:  Negative for arthralgias and gait problem.   Skin:  Positive for rash. Negative for color change.   Neurological:  Negative for headaches.   Psychiatric/Behavioral:  The patient is not nervous/anxious.      /84   Ht 177.8 cm (70\")   Wt 91 kg (200 lb 9.6 oz)   LMP 04/01/2018   BMI 28.78 kg/m²     Physical Exam   Constitutional: She is oriented to person, place, and time. She appears well-developed.   HENT:   Head: " Normocephalic and atraumatic.   Eyes: Conjunctivae are normal. No scleral icterus.   Neck: No thyromegaly present.   Cardiovascular: Normal rate and regular rhythm.   Pulmonary/Chest: Effort normal and breath sounds normal. Right breast exhibits no inverted nipple, no mass, no nipple discharge, no skin change and no tenderness. Left breast exhibits no inverted nipple, no mass, no nipple discharge, no skin change and no tenderness.   Abdominal: Soft. Normal appearance and bowel sounds are normal. She exhibits no distension and no mass. There is no abdominal tenderness. There is no rebound and no guarding. No hernia.   Genitourinary:    Rectum normal.            Pelvic exam was performed with patient supine.   There is rash on the right labia. There is no tenderness or lesion on the right labia. There is rash on the left labia. There is no tenderness or lesion on the left labia. Uterus is not deviated, not enlarged, not fixed and not tender. Cervix exhibits no motion tenderness, no discharge and no friability. Right adnexum displays no mass, no tenderness and no fullness. Left adnexum displays no mass, no tenderness and no fullness.    No vaginal discharge, erythema, tenderness or bleeding.   No erythema, tenderness or bleeding in the vagina.    No foreign body in the vagina.      No signs of injury in the vagina.      Genitourinary Comments: Cystocele noted     Neurological: She is alert and oriented to person, place, and time.   Skin: Skin is warm and dry. No rash noted.   Scattered rash w/excoriations over her extremities   Psychiatric: Her behavior is normal. Mood, judgment and thought content normal.   Nursing note and vitals reviewed.       Assessment/Plan    1) GYN HM: normal pap/HPV 6/2022 SBE demonstrated and encouraged.  2) STD screening: declines Condoms encouraged.  3) Bone health - Weight bearing exercise, dietary calcium recommendations and vitamin D reviewed.   4) Vulvar rash- refer to derm for  evaluation.  5) Smoking Status: No   6) Social: no issue  7)MMG:  UTD 6/2022- B2, scheduled MMG 6/2023  8) DEXA- UTD 6/2020- normal, repeat 3-5 years ( will do 6/2024)  9)C scope- UTD 3/2016, repeat 10 years.   10) Parts of this document have been copied or forwarded from her previous visits and have been reviewed, updated and edited as indicated.    11) Hematuria- check UA and CS. If pt still has blood in urine, will need to see urology.  12)Follow up prn and 1 year  13)  I spent 20 minutes on the separately reported service of hematuria and vulvar rash.. This time is not included in the time used to support the annual E/M service also reported today.         Diagnoses and all orders for this visit:    1. Hematuria, unspecified type (Primary)  -     Urine Culture - Urine, Urine, Random Void  -     Urinalysis With Microscopic - Urine, Random Void    2. Routine gynecological examination  -     POC Urinalysis Dipstick    3. Vulvar rash  -     Ambulatory Referral to Dermatology    4. Encounter for screening mammogram for malignant neoplasm of breast  -     Mammo Screening Digital Tomosynthesis Bilateral With CAD; Future        Trinity Bridget Clark MD  6/8/2023  19:08 EDT

## 2023-06-15 LAB
APPEARANCE UR: ABNORMAL
BACTERIA #/AREA URNS HPF: ABNORMAL /[HPF]
BACTERIA UR CULT: ABNORMAL
BACTERIA UR CULT: ABNORMAL
BILIRUB UR QL STRIP: NEGATIVE
CASTS URNS QL MICRO: ABNORMAL /LPF
COLOR UR: YELLOW
EPI CELLS #/AREA URNS HPF: ABNORMAL /HPF (ref 0–10)
GLUCOSE UR QL STRIP: NEGATIVE
HGB UR QL STRIP: ABNORMAL
KETONES UR QL STRIP: NEGATIVE
LEUKOCYTE ESTERASE UR QL STRIP: ABNORMAL
MICRO URNS: ABNORMAL
NITRITE UR QL STRIP: POSITIVE
OTHER ANTIBIOTIC SUSC ISLT: ABNORMAL
PH UR STRIP: 6 [PH] (ref 5–7.5)
PROT UR QL STRIP: ABNORMAL
RBC #/AREA URNS HPF: >30 /HPF (ref 0–2)
SP GR UR STRIP: 1.02 (ref 1–1.03)
UROBILINOGEN UR STRIP-MCNC: 0.2 MG/DL (ref 0.2–1)
WBC #/AREA URNS HPF: >30 /HPF (ref 0–5)

## 2023-06-19 RX ORDER — NITROFURANTOIN 25; 75 MG/1; MG/1
100 CAPSULE ORAL 2 TIMES DAILY
Qty: 14 CAPSULE | Refills: 0 | Status: SHIPPED | OUTPATIENT
Start: 2023-06-19 | End: 2023-06-26

## 2023-07-25 DIAGNOSIS — F98.8 ATTENTION DEFICIT DISORDER (ADD) IN ADULT: ICD-10-CM

## 2023-07-25 RX ORDER — DEXTROAMPHETAMINE SACCHARATE, AMPHETAMINE ASPARTATE, DEXTROAMPHETAMINE SULFATE AND AMPHETAMINE SULFATE 3.75; 3.75; 3.75; 3.75 MG/1; MG/1; MG/1; MG/1
15 TABLET ORAL 2 TIMES DAILY
Qty: 60 TABLET | Refills: 0 | Status: SHIPPED | OUTPATIENT
Start: 2023-07-25

## 2023-07-25 NOTE — TELEPHONE ENCOUNTER
Rx Refill Note  Requested Prescriptions     Pending Prescriptions Disp Refills    amphetamine-dextroamphetamine (Adderall) 15 MG tablet 60 tablet 0     Sig: Take 1 tablet by mouth 2 (Two) Times a Day.      Last office visit with prescribing clinician: 2/22/2023   Last telemedicine visit with prescribing clinician: 5/22/2023   Next office visit with prescribing clinician: Visit date not found                         Would you like a call back once the refill request has been completed: [] Yes [] No    If the office needs to give you a call back, can they leave a voicemail: [] Yes [] No    Mercedes Porter MA  07/25/23, 08:15 EDT

## 2023-08-08 ENCOUNTER — TELEPHONE (OUTPATIENT)
Dept: INTERNAL MEDICINE | Facility: CLINIC | Age: 58
End: 2023-08-08

## 2023-08-08 DIAGNOSIS — E66.01 MORBID (SEVERE) OBESITY DUE TO EXCESS CALORIES: Primary | ICD-10-CM

## 2023-08-08 NOTE — TELEPHONE ENCOUNTER
"    Caller: Christine Villagomez \"Ashly\"    Relationship to patient: Self    Best call back number:   (Self) [phone]        Patient is needing: PATIENT IS CALLING TO CHECK THE STATUS OF A RESPONSE TO A LETTER FROM HER INSURANCE THAT SHE FAXED TOP DR HATFIELD ON 07/30/2023, REGARDING MEDICATION/OZEMPIC.  SHE STATES SHE IS CONCERNED THAT SHE WILL NOT BE ABLE TO GET THE MEDICATION.  THE LETTER IS DATED 08/03/    PLEASE ADVISE.            "

## 2023-08-29 ENCOUNTER — TELEPHONE (OUTPATIENT)
Dept: INTERNAL MEDICINE | Facility: CLINIC | Age: 58
End: 2023-08-29
Payer: COMMERCIAL

## 2023-08-29 DIAGNOSIS — E66.01 MORBID (SEVERE) OBESITY DUE TO EXCESS CALORIES: ICD-10-CM

## 2023-09-22 DIAGNOSIS — F41.8 ANXIETY WITH DEPRESSION: ICD-10-CM

## 2023-09-22 RX ORDER — BUSPIRONE HYDROCHLORIDE 15 MG/1
45 TABLET ORAL DAILY
Qty: 270 TABLET | Refills: 0 | Status: SHIPPED | OUTPATIENT
Start: 2023-09-22

## 2023-10-06 DIAGNOSIS — F32.A DEPRESSION, UNSPECIFIED DEPRESSION TYPE: ICD-10-CM

## 2023-10-06 DIAGNOSIS — F41.8 ANXIETY WITH DEPRESSION: ICD-10-CM

## 2023-10-06 NOTE — TELEPHONE ENCOUNTER
Rx Refill Note  Requested Prescriptions     Pending Prescriptions Disp Refills    terazosin (HYTRIN) 1 MG capsule 30 capsule 1     Sig: Take 1 capsule by mouth Daily.      Last office visit with prescribing clinician: 2/22/2023   Last telemedicine visit with prescribing clinician: 5/22/2023   Next office visit with prescribing clinician: Visit date not found                         Would you like a call back once the refill request has been completed: [] Yes [] No    If the office needs to give you a call back, can they leave a voicemail: [] Yes [] No    Aleshia Shanks, PCT  10/06/23, 16:10 EDT

## 2023-10-09 RX ORDER — TERAZOSIN 1 MG/1
1 CAPSULE ORAL DAILY
Qty: 30 CAPSULE | Refills: 1 | Status: SHIPPED | OUTPATIENT
Start: 2023-10-09

## 2023-10-23 ENCOUNTER — TRANSCRIBE ORDERS (OUTPATIENT)
Dept: ADMINISTRATIVE | Facility: HOSPITAL | Age: 58
End: 2023-10-23
Payer: COMMERCIAL

## 2023-10-23 DIAGNOSIS — R31.9 HEMATURIA SYNDROME: Primary | ICD-10-CM

## 2023-11-10 ENCOUNTER — OFFICE VISIT (OUTPATIENT)
Dept: INTERNAL MEDICINE | Facility: CLINIC | Age: 58
End: 2023-11-10
Payer: COMMERCIAL

## 2023-11-10 VITALS
HEART RATE: 82 BPM | TEMPERATURE: 97.8 F | HEIGHT: 70 IN | BODY MASS INDEX: 27.77 KG/M2 | DIASTOLIC BLOOD PRESSURE: 60 MMHG | SYSTOLIC BLOOD PRESSURE: 98 MMHG | WEIGHT: 194 LBS | OXYGEN SATURATION: 98 %

## 2023-11-10 DIAGNOSIS — E78.00 HYPERCHOLESTEROLEMIA: ICD-10-CM

## 2023-11-10 DIAGNOSIS — I10 ESSENTIAL HYPERTENSION: ICD-10-CM

## 2023-11-10 DIAGNOSIS — F98.8 ATTENTION DEFICIT DISORDER (ADD) IN ADULT: ICD-10-CM

## 2023-11-10 DIAGNOSIS — F41.8 ANXIETY WITH DEPRESSION: ICD-10-CM

## 2023-11-10 DIAGNOSIS — R73.9 HYPERGLYCEMIA: ICD-10-CM

## 2023-11-10 DIAGNOSIS — E78.1 HYPERTRIGLYCERIDEMIA: ICD-10-CM

## 2023-11-10 DIAGNOSIS — E66.01 MORBID (SEVERE) OBESITY DUE TO EXCESS CALORIES: Primary | ICD-10-CM

## 2023-11-10 RX ORDER — CEPHALEXIN 250 MG/1
250 CAPSULE ORAL NIGHTLY
COMMUNITY
Start: 2023-11-08

## 2023-11-10 NOTE — PROGRESS NOTES
Christine Villagomez is a 57 y.o. female, who presents with a chief complaint of   Chief Complaint   Patient presents with    Insomnia     Trouble sleeping           Insomnia       Pt here for f/u.     Insulin resistance - Pt on ozempic.  pt has lost another another 6 pounds since her last OV.  At one point she was about 80 pounds heavier.      ADD - doing well on adderall.    The following portions of the patient's history were reviewed and updated as appropriate: allergies, current medications, past family history, past medical history, past social history, past surgical history and problem list.    Allergies: Sulfa antibiotics    Review of Systems   Constitutional: Negative.    HENT: Negative.     Eyes: Negative.    Respiratory: Negative.     Cardiovascular: Negative.    Gastrointestinal: Negative.    Endocrine: Negative.    Genitourinary: Negative.    Musculoskeletal: Negative.    Skin: Negative.    Allergic/Immunologic: Negative.    Neurological: Negative.    Hematological: Negative.    Psychiatric/Behavioral:  Positive for sleep disturbance. The patient has insomnia.    All other systems reviewed and are negative.            Wt Readings from Last 3 Encounters:   11/10/23 88 kg (194 lb)   06/08/23 91 kg (200 lb 9.6 oz)   05/22/23 91.6 kg (202 lb)     Temp Readings from Last 3 Encounters:   11/10/23 97.8 °F (36.6 °C) (Infrared)   02/22/23 98.2 °F (36.8 °C)   08/16/22 98.6 °F (37 °C)     BP Readings from Last 3 Encounters:   11/10/23 98/60   06/08/23 132/84   02/22/23 124/88     Pulse Readings from Last 3 Encounters:   11/10/23 82   02/22/23 82   11/22/22 92     Body mass index is 27.84 kg/m².  SpO2 Readings from Last 3 Encounters:   11/10/23 98%   02/22/23 100%   07/15/22 97%          Physical Exam  Vitals and nursing note reviewed.   Constitutional:       General: She is not in acute distress.     Appearance: She is well-developed.   HENT:      Head: Normocephalic and atraumatic.      Right Ear: External ear  normal.      Left Ear: External ear normal.      Nose: Nose normal.   Eyes:      Conjunctiva/sclera: Conjunctivae normal.      Pupils: Pupils are equal, round, and reactive to light.   Cardiovascular:      Rate and Rhythm: Normal rate and regular rhythm.      Heart sounds: Normal heart sounds.   Pulmonary:      Effort: Pulmonary effort is normal. No respiratory distress.      Breath sounds: Normal breath sounds. No wheezing.   Musculoskeletal:         General: Normal range of motion.      Cervical back: Normal range of motion and neck supple.      Comments: Normal gait   Skin:     General: Skin is warm and dry.   Neurological:      Mental Status: She is alert and oriented to person, place, and time.   Psychiatric:         Behavior: Behavior normal.         Thought Content: Thought content normal.         Judgment: Judgment normal.         Results for orders placed or performed in visit on 07/18/23   Microscopic Examination -   Result Value Ref Range    WBC, UA 0-2 /HPF    RBC, UA 6-12 (A) /HPF    Epithelial Cells (non renal) 0-2 /HPF    Cast Type Comment     Bacteria, UA Comment None Seen /HPF   Urinalysis With Microscopic - Urine, Clean Catch    Specimen: Urine, Clean Catch   Result Value Ref Range    Specific Gravity, UA 1.013 1.005 - 1.030    pH, UA 6.5 5.0 - 8.0    Color, UA Yellow     Appearance, UA Clear Clear    Leukocytes, UA Trace (A) Negative    Protein Negative Negative    Glucose, UA Negative Negative    Ketones Negative Negative    Blood, UA See below: (A) Negative    Bilirubin, UA Negative Negative    Urobilinogen, UA Comment     Nitrite, UA Negative Negative     Result Review :                  Assessment and Plan    Diagnoses and all orders for this visit:    1. Morbid (severe) obesity due to excess calories (Primary)  -     Semaglutide-Weight Management 2.4 MG/0.75ML solution auto-injector; Inject 2.4 mg under the skin into the appropriate area as directed Every 7 (Seven) Days.  Dispense: 9 mL;  Refill: 3  -     CBC & Differential; Future  -     Comprehensive Metabolic Panel; Future  -     Hemoglobin A1c; Future  -     Lipid Panel With LDL / HDL Ratio; Future  -     T4, Free; Future  -     TSH; Future    2. Anxiety with depression    3. Attention deficit disorder (ADD) in adult   - cont adderall PRN    4. Hyperglycemia  -     CBC & Differential; Future  -     Comprehensive Metabolic Panel; Future  -     Hemoglobin A1c; Future  -     Lipid Panel With LDL / HDL Ratio; Future  -     T4, Free; Future  -     TSH; Future    5. Hypercholesterolemia  -     Comprehensive Metabolic Panel; Future  -     Lipid Panel With LDL / HDL Ratio; Future    6. Hypertriglyceridemia  -     Comprehensive Metabolic Panel; Future  -     Lipid Panel With LDL / HDL Ratio; Future    7. Essential hypertension  -     CBC & Differential; Future  -     Comprehensive Metabolic Panel; Future  -     Hemoglobin A1c; Future  -     Lipid Panel With LDL / HDL Ratio; Future  -     T4, Free; Future  -     TSH; Future                       Outpatient Medications Prior to Visit   Medication Sig Dispense Refill    amphetamine-dextroamphetamine (Adderall) 15 MG tablet Take 1 tablet by mouth 2 (Two) Times a Day. 60 tablet 0    atorvastatin (LIPITOR) 20 MG tablet Take 1 tablet by mouth Daily. 90 tablet 3    Black Pepper-Turmeric (TURMERIC COMPLEX/BLACK PEPPER PO)       buPROPion XL (WELLBUTRIN XL) 300 MG 24 hr tablet Take 1 tablet by mouth Every Morning. 90 tablet 3    busPIRone (BUSPAR) 15 MG tablet Take 3 tablets by mouth once daily 270 tablet 0    cephalexin (KEFLEX) 250 MG capsule Take 1 capsule by mouth Every Night.      cetirizine (zyrTEC) 10 MG tablet Take 1 tablet by mouth Daily.      CHOLECALCIFEROL PO Take 1 tablet by mouth Daily.      Coenzyme Q10 (CO Q-10) 100 MG capsule Co Q-10      Collagen-Vitamin C (COLLAGEN PLUS VITAMIN C PO)       FIBER PO Take 2 capsules by mouth Daily.      Green Tea, Luba sinensis, (GREEN TEA EXTRACT PO)        levothyroxine (SYNTHROID, LEVOTHROID) 50 MCG tablet Take 1 tablet by mouth Daily. 90 tablet 3    Misc Natural Products (OSTEO BI-FLEX ADV TRIPLE ST PO)       Omega-3 Fatty Acids (fish oil) 1000 MG capsule capsule Take 1 capsule by mouth Daily With Breakfast.      Potassium Gluconate 550 MG tablet Take 550 mg by mouth every night at bedtime.      Probiotic Product (PROBIOTIC DAILY PO) Take 1 capsule by mouth Daily.      sennosides-docusate (PERICOLACE) 8.6-50 MG per tablet Take 1 tablet by mouth Daily. 1-2 tablets by mouth daily      terazosin (HYTRIN) 1 MG capsule Take 1 capsule by mouth Daily. 30 capsule 1    Ubiquinol 100 MG capsule       Semaglutide-Weight Management 1.7 MG/0.75ML solution auto-injector Inject 0.75 mL under the skin into the appropriate area as directed Every 7 (Seven) Days. 3 mL 5    amoxicillin (AMOXIL) 500 MG capsule TAKE FOUR CAPSULES BY MOUTH ONE HOUR BEFORE APPOINTMENT (Patient not taking: Reported on 11/10/2023)       No facility-administered medications prior to visit.     New Medications Ordered This Visit   Medications    Semaglutide-Weight Management 2.4 MG/0.75ML solution auto-injector     Sig: Inject 2.4 mg under the skin into the appropriate area as directed Every 7 (Seven) Days.     Dispense:  9 mL     Refill:  3     [unfilled]  Medications Discontinued During This Encounter   Medication Reason    Semaglutide-Weight Management 1.7 MG/0.75ML solution auto-injector *Therapy completed         Return in about 3 months (around 2/10/2024) for Recheck, labs.    Patient was given instructions and counseling regarding her condition or for health maintenance advice. Please see specific information pulled into the AVS if appropriate.

## 2023-11-16 DIAGNOSIS — E66.01 MORBID (SEVERE) OBESITY DUE TO EXCESS CALORIES: ICD-10-CM

## 2023-11-28 ENCOUNTER — OFFICE VISIT (OUTPATIENT)
Dept: SLEEP MEDICINE | Facility: HOSPITAL | Age: 58
End: 2023-11-28
Payer: COMMERCIAL

## 2023-11-28 VITALS
DIASTOLIC BLOOD PRESSURE: 78 MMHG | HEART RATE: 86 BPM | BODY MASS INDEX: 27.49 KG/M2 | HEIGHT: 70 IN | WEIGHT: 192 LBS | OXYGEN SATURATION: 100 % | SYSTOLIC BLOOD PRESSURE: 120 MMHG

## 2023-11-28 DIAGNOSIS — F41.8 ANXIETY WITH DEPRESSION: ICD-10-CM

## 2023-11-28 DIAGNOSIS — F32.A DEPRESSION, UNSPECIFIED DEPRESSION TYPE: ICD-10-CM

## 2023-11-28 DIAGNOSIS — G47.33 OBSTRUCTIVE SLEEP APNEA SYNDROME: Primary | ICD-10-CM

## 2023-11-28 DIAGNOSIS — E03.8 OTHER SPECIFIED HYPOTHYROIDISM: ICD-10-CM

## 2023-11-28 DIAGNOSIS — I10 ESSENTIAL HYPERTENSION: ICD-10-CM

## 2023-11-28 PROCEDURE — G0463 HOSPITAL OUTPT CLINIC VISIT: HCPCS

## 2023-11-28 RX ORDER — TERAZOSIN 1 MG/1
1 CAPSULE ORAL DAILY
Qty: 90 CAPSULE | Refills: 1 | Status: SHIPPED | OUTPATIENT
Start: 2023-11-28

## 2023-11-28 NOTE — PROGRESS NOTES
SLEEP/PULMONARY  CLINIC NOTE      PATIENT IDENTIFICATION:  Name: Christine Villagomez  Age: 57 y.o.  Sex: female  :  1965  MRN: [id number]    DATE OF CONSULTATION:  2023                     CHIEF COMPLAINT: SANA    History of Present Illness:   Christine Villagomez is a 57 y.o. female Pt on CPAP feeling better more energy especially the night he use it more than 4 hours, no sleepiness no fatigue no tiredness, no mask irritation no dryness, compliance report reviewed with pt d discussed with the patient the download data and encouarged the patient to continue to use the CPAP. The residual AHI is acceptable.         Review of Systems:   Constitutional: negative   Eyes: negative   ENT/oropharynx: negative   Cardiovascular: negative   Respiratory: negative   Gastrointestinal: negative   Genitourinary: negative   Neurological: negative   Musculoskeletal: negative   Integument/breast: negative   Endocrine: negative   Allergic/Immunologic: negative     Past Medical History:  Past Medical History:   Diagnosis Date    Bipolar disorder     Depression     Hyperlipidemia     Hypertension     Hypothyroidism     Kidney stone     OA (osteoarthritis) of knee     left    PONV (postoperative nausea and vomiting)     Sleep apnea     uses cpap       Past Surgical History:  Past Surgical History:   Procedure Laterality Date    BLADDER SUSPENSION      TVT    CATARACT EXTRACTION      COLONOSCOPY      D & C HYSTEROSCOPY MYOSURE N/A 2021    Procedure: DILATATION AND CURETTAGE HYSTEROSCOPY with MYOSURE;  Surgeon: Trinity Clark MD;  Location: [facility];  Service: Obstetrics/Gynecology;  Laterality: N/A;    KIDNEY STONE SURGERY      KNEE MENISCAL REPAIR Bilateral     REPLACEMENT TOTAL KNEE Right         Family History:  Family History   Problem Relation Age of Onset    Cancer Mother         lung    Heart attack Father     Colon cancer Maternal Grandmother     Breast cancer Neg Hx     Ovarian cancer Neg Hx     Pulmonary  embolism Neg Hx     Deep vein thrombosis Neg Hx     Malig Hyperthermia Neg Hx         Social History:   Social History     Tobacco Use    Smoking status: Never     Passive exposure: Never    Smokeless tobacco: Never   Substance Use Topics    Alcohol use: No        Allergies:  Allergies   Allergen Reactions    Sulfa Antibiotics Rash     fever  fever  fever       Home Meds:  (Not in a hospital admission)      Objective:    Vitals Ranges:   Heart Rate:  [86] 86  BP: (120)/(78) 120/78  Body mass index is 27.55 kg/m².     Exam:  General Appearance:  WDWN    HEENT:   without obvious abnormality,  Conjunctiva/corneas clear,  Normal external ear canals, no drainage    Clear orsalmucosa,  Mallampati score 3    Neck:  Supple, symmetrical, trachea midline. No JVD.  Lungs:   Bilateral basal rhonchi bilaterally, respirations unlabored symmetrical wall movement.    Chest wall:  No tenderness or deformity.    Heart:  Regular rate and rhythm, S1 and S2 normal.  Extremities: Trace edema no clubbing or Cyanosis        Data Review:  All labs (24hrs): No results found for this or any previous visit (from the past 24 hour(s)).     Imaging:  Mammo Screening Digital Tomosynthesis Bilateral With CAD  Narrative: EXAM, 06/26/2023:  1. Bilateral digital screening mammogram with CAD.  2. Bilateral digital screening breast tomosynthesis.     INDICATION:  Routine screening.     TECHNIQUE:  Bilateral digital screening mammography including breast tomosynthesis  and CAD review.     COMPARISON:  *  Screening mammogram, 06/22/2022, 12/16/2021, 6/29/2021 and 6/11/2021.     FINDINGS:  There are scattered areas of fibroglandular density. Tiny benign nodule  in the deep axillary portion of the right breast remains unchanged. No  new or enlarging breast nodule. No significant change when compared with  prior images. No mammographic evidence of malignancy. Recommend repeat  screening mammogram in one year.     Impression: Benign screening mammogram.      BI-RADS CATEGORY 2: Benign Findings.        Women over the age of 40 undergoing screening mammography are entered  into a reminder system with target due date for the next mammogram.     This report was finalized on 6/26/2023 7:44 AM by Dr. Vance Adan MD.          ASSESSMENT:  Diagnoses and all orders for this visit:    Obstructive sleep apnea syndrome    Other specified hypothyroidism    Essential hypertension        PLAN:    This patient with obstructive sleep apnea, compliance is improved. Encourage to use it more frequent, I re-emphasized on pt the long and short term benefit of treating SANA. The device is benefiting the patient.  The patient is also instructed to get the CPAP supplies from the [employer] and see me in 1 year for follow-up.    Treating SANA will improve BP control    It is recommended to keep thyroid function optimized to improve quality of sleep    Follow-up 1 year    Jone Seth MD. D, ABSM.  11/28/2023  11:02 EST

## 2023-11-28 NOTE — TELEPHONE ENCOUNTER
Pt stopped by office to request refill.    Rx Refill Note  Requested Prescriptions     Pending Prescriptions Disp Refills    terazosin (HYTRIN) 1 MG capsule 30 capsule 1     Sig: Take 1 capsule by mouth Daily.      Last office visit with prescribing clinician: 11/10/2023   Last telemedicine visit with prescribing clinician: Visit date not found   Next office visit with prescribing clinician: 2/16/2024                         Would you like a call back once the refill request has been completed: [] Yes [] No    If the office needs to give you a call back, can they leave a voicemail: [] Yes [] No    Aleshia Shanks, PCT  11/28/23, 11:23 EST

## 2023-12-04 DIAGNOSIS — F98.8 ATTENTION DEFICIT DISORDER (ADD) IN ADULT: ICD-10-CM

## 2023-12-06 RX ORDER — DEXTROAMPHETAMINE SACCHARATE, AMPHETAMINE ASPARTATE, DEXTROAMPHETAMINE SULFATE AND AMPHETAMINE SULFATE 3.75; 3.75; 3.75; 3.75 MG/1; MG/1; MG/1; MG/1
15 TABLET ORAL 2 TIMES DAILY
Qty: 60 TABLET | Refills: 0 | Status: SHIPPED | OUTPATIENT
Start: 2023-12-06

## 2023-12-06 NOTE — TELEPHONE ENCOUNTER
Rx Refill Note  Requested Prescriptions     Pending Prescriptions Disp Refills    amphetamine-dextroamphetamine (Adderall) 15 MG tablet 60 tablet 0     Sig: Take 1 tablet by mouth 2 (Two) Times a Day.      Last office visit with prescribing clinician: 11/10/2023   Last telemedicine visit with prescribing clinician: Visit date not found   Next office visit with prescribing clinician: 2/16/2024                         Would you like a call back once the refill request has been completed: [] Yes [] No    If the office needs to give you a call back, can they leave a voicemail: [] Yes [] No    Joy Ballard MA  12/06/23, 08:33 EST

## 2023-12-15 ENCOUNTER — TELEPHONE (OUTPATIENT)
Dept: INTERNAL MEDICINE | Facility: CLINIC | Age: 58
End: 2023-12-15
Payer: COMMERCIAL

## 2023-12-15 DIAGNOSIS — F98.8 ATTENTION DEFICIT DISORDER (ADD) IN ADULT: ICD-10-CM

## 2023-12-15 RX ORDER — DEXTROAMPHETAMINE SACCHARATE, AMPHETAMINE ASPARTATE, DEXTROAMPHETAMINE SULFATE AND AMPHETAMINE SULFATE 3.75; 3.75; 3.75; 3.75 MG/1; MG/1; MG/1; MG/1
15 TABLET ORAL 2 TIMES DAILY
Qty: 60 TABLET | Refills: 0 | Status: SHIPPED | OUTPATIENT
Start: 2023-12-15

## 2023-12-15 NOTE — TELEPHONE ENCOUNTER
"----- Message from Joy Ballard MA sent at 12/13/2023  8:18 AM EST -----  Regarding: FW: Adderal- [facility] is out of stock   Contact: [phone]        ----- Message -----  From: Christine Villagomez \"Ashly\"  Sent: 12/13/2023   7:23 AM EST  To: Wilman Ureña [address]  Subject: Adderal- [facility] is out of stock                  Hello, last week you scheduled a refill for my Adderal at [facility] mail order   They called Monday and said they are out of the medication.      Can that be called in to the [facility]?  [phone].   I apologize for the repeated requests, I had no idea a mail order service would be out of a medication like Adderal        Kind regards, Ashly Villagomez   "

## 2023-12-20 DIAGNOSIS — F41.8 ANXIETY WITH DEPRESSION: ICD-10-CM

## 2023-12-20 RX ORDER — BUSPIRONE HYDROCHLORIDE 15 MG/1
15 TABLET ORAL 3 TIMES DAILY
Qty: 270 TABLET | Refills: 0 | Status: SHIPPED | OUTPATIENT
Start: 2023-12-20

## 2023-12-26 RX ORDER — CEPHALEXIN 250 MG/1
250 CAPSULE ORAL NIGHTLY
OUTPATIENT
Start: 2023-12-26

## 2024-01-26 DIAGNOSIS — E66.01 MORBID (SEVERE) OBESITY DUE TO EXCESS CALORIES: ICD-10-CM

## 2024-01-26 NOTE — TELEPHONE ENCOUNTER
Rx Refill Note  Requested Prescriptions     Pending Prescriptions Disp Refills    Semaglutide-Weight Management 2.4 MG/0.75ML solution auto-injector 9 mL 3     Sig: Inject 2.4 mg under the skin into the appropriate area as directed Every 7 (Seven) Days.      Last office visit with prescribing clinician: 11/10/2023   Last telemedicine visit with prescribing clinician: Visit date not found   Next office visit with prescribing clinician: 2/16/2024                         Would you like a call back once the refill request has been completed: [] Yes [] No    If the office needs to give you a call back, can they leave a voicemail: [] Yes [] No    Mercedes Porter MA  01/26/24, 09:24 EST

## 2024-02-06 DIAGNOSIS — F32.A DEPRESSION, UNSPECIFIED DEPRESSION TYPE: ICD-10-CM

## 2024-02-06 DIAGNOSIS — F41.8 ANXIETY WITH DEPRESSION: ICD-10-CM

## 2024-02-06 RX ORDER — TERAZOSIN 1 MG/1
1 CAPSULE ORAL DAILY
Qty: 90 CAPSULE | Refills: 1 | Status: SHIPPED | OUTPATIENT
Start: 2024-02-06

## 2024-02-06 NOTE — TELEPHONE ENCOUNTER
Normal creatinine in past 12 months     Rx Refill Note  Requested Prescriptions     Pending Prescriptions Disp Refills    terazosin (HYTRIN) 1 MG capsule 90 capsule 1     Sig: Take 1 capsule by mouth Daily.      Last office visit with prescribing clinician: 11/10/2023   Last telemedicine visit with prescribing clinician: Visit date not found   Next office visit with prescribing clinician: 2/16/2024                         Would you like a call back once the refill request has been completed: [] Yes [] No    If the office needs to give you a call back, can they leave a voicemail: [] Yes [] No    Aleshia Shanks, PCT  02/06/24, 11:52 EST

## 2024-02-07 ENCOUNTER — TELEPHONE (OUTPATIENT)
Dept: INTERNAL MEDICINE | Facility: CLINIC | Age: 59
End: 2024-02-07

## 2024-02-07 NOTE — TELEPHONE ENCOUNTER
Caller: LORENA    Relationship: Other    Best call back number: [phone]     What is the best time to reach you: ANYTIME    Who are you requesting to speak with (clinical staff, provider,  specific staff member): CLINICAL STAFF    Do you know the name of the person who called: TONY    What was the call regarding: LORENA STATED THE PRIOR AUTHORIZATION FOR [employer] HAS BEEN APPROVED.

## 2024-02-21 ENCOUNTER — OFFICE VISIT (OUTPATIENT)
Dept: INTERNAL MEDICINE | Facility: CLINIC | Age: 59
End: 2024-02-21
Payer: COMMERCIAL

## 2024-02-21 VITALS
TEMPERATURE: 98 F | OXYGEN SATURATION: 99 % | WEIGHT: 196.6 LBS | SYSTOLIC BLOOD PRESSURE: 112 MMHG | BODY MASS INDEX: 28.15 KG/M2 | DIASTOLIC BLOOD PRESSURE: 68 MMHG | HEART RATE: 92 BPM | HEIGHT: 70 IN

## 2024-02-21 DIAGNOSIS — R73.9 HYPERGLYCEMIA: ICD-10-CM

## 2024-02-21 DIAGNOSIS — E66.01 MORBID (SEVERE) OBESITY DUE TO EXCESS CALORIES: ICD-10-CM

## 2024-02-21 DIAGNOSIS — M25.50 ARTHRALGIA, UNSPECIFIED JOINT: Primary | ICD-10-CM

## 2024-02-21 DIAGNOSIS — I10 ESSENTIAL HYPERTENSION: ICD-10-CM

## 2024-02-21 DIAGNOSIS — E78.00 HYPERCHOLESTEROLEMIA: ICD-10-CM

## 2024-02-21 DIAGNOSIS — F98.8 ATTENTION DEFICIT DISORDER (ADD) IN ADULT: ICD-10-CM

## 2024-02-21 RX ORDER — MELOXICAM 15 MG/1
15 TABLET ORAL DAILY
Qty: 90 TABLET | Refills: 0 | Status: SHIPPED | OUTPATIENT
Start: 2024-02-21

## 2024-02-21 NOTE — PROGRESS NOTES
Christine Villagomez is a 58 y.o. female, who presents with a chief complaint of   Chief Complaint   Patient presents with   • Hyperlipidemia     3 Month follow up           Hyperlipidemia     Pt here for follow up    Pt has been on wegovy.  She has lost about 75 pounds on the med.  Her weight loss has reached a plateau.      Her shoulders have been sore.  She leans over her computer a lot.  She has also had some soreness in her back.  She is not exercising regularly.  Meloxicam helps.    Hematuria - pt had work up with urology.  She had spots of irritation on her cystoscopy.     Adhd - pt doing well with adderall.      The following portions of the patient's history were reviewed and updated as appropriate: allergies, current medications, past family history, past medical history, past social history, past surgical history and problem list.    Allergies: Sulfa antibiotics    Review of Systems   Constitutional: Negative.    HENT: Negative.     Eyes: Negative.    Respiratory: Negative.     Cardiovascular: Negative.    Gastrointestinal: Negative.    Endocrine: Negative.    Genitourinary: Negative.    Musculoskeletal: Negative.    Skin: Negative.    Allergic/Immunologic: Negative.    Neurological: Negative.    Hematological: Negative.    Psychiatric/Behavioral: Negative.     All other systems reviewed and are negative.            Wt Readings from Last 3 Encounters:   02/21/24 89.2 kg (196 lb 9.6 oz)   11/28/23 87.1 kg (192 lb)   11/10/23 88 kg (194 lb)     Temp Readings from Last 3 Encounters:   02/21/24 98 °F (36.7 °C) (Infrared)   11/10/23 97.8 °F (36.6 °C) (Infrared)   02/22/23 98.2 °F (36.8 °C)     BP Readings from Last 3 Encounters:   02/21/24 112/68   11/28/23 120/78   11/10/23 98/60     Pulse Readings from Last 3 Encounters:   02/21/24 92   11/28/23 86   11/10/23 82     Body mass index is 28.21 kg/m².  SpO2 Readings from Last 3 Encounters:   02/21/24 99%   11/28/23 100%   11/10/23 98%          Physical  Exam  Vitals and nursing note reviewed.   Constitutional:       General: She is not in acute distress.     Appearance: She is well-developed.   HENT:      Head: Normocephalic and atraumatic.      Right Ear: External ear normal.      Left Ear: External ear normal.      Nose: Nose normal.   Eyes:      Conjunctiva/sclera: Conjunctivae normal.      Pupils: Pupils are equal, round, and reactive to light.   Cardiovascular:      Rate and Rhythm: Normal rate and regular rhythm.      Heart sounds: Normal heart sounds.   Pulmonary:      Effort: Pulmonary effort is normal. No respiratory distress.      Breath sounds: Normal breath sounds. No wheezing.   Musculoskeletal:         General: Normal range of motion.      Cervical back: Normal range of motion and neck supple.      Comments: Normal gait   Skin:     General: Skin is warm and dry.   Neurological:      General: No focal deficit present.      Mental Status: She is alert and oriented to person, place, and time.   Psychiatric:         Behavior: Behavior normal.         Thought Content: Thought content normal.         Judgment: Judgment normal.       Results for orders placed or performed in visit on 07/18/23   Microscopic Examination -   Result Value Ref Range    WBC, UA 0-2 /HPF    RBC, UA 6-12 (A) /HPF    Epithelial Cells (non renal) 0-2 /HPF    Cast Type Comment     Bacteria, UA Comment None Seen /HPF   Urinalysis With Microscopic - Urine, Clean Catch    Specimen: Urine, Clean Catch   Result Value Ref Range    Specific Gravity, UA 1.013 1.005 - 1.030    pH, UA 6.5 5.0 - 8.0    Color, UA Yellow     Appearance, UA Clear Clear    Leukocytes, UA Trace (A) Negative    Protein Negative Negative    Glucose, UA Negative Negative    Ketones Negative Negative    Blood, UA See below: (A) Negative    Bilirubin, UA Negative Negative    Urobilinogen, UA Comment     Nitrite, UA Negative Negative     Result Review :                  Assessment and Plan    Diagnoses and all orders for  this visit:    1. Arthralgia, unspecified joint (Primary)    2. Morbid (severe) obesity due to excess calories  -     meloxicam (Mobic) 15 MG tablet; Take 1 tablet by mouth Daily.  Dispense: 90 tablet; Refill: 0    3. Hyperglycemia  -     meloxicam (Mobic) 15 MG tablet; Take 1 tablet by mouth Daily.  Dispense: 90 tablet; Refill: 0    4. Essential hypertension  -     meloxicam (Mobic) 15 MG tablet; Take 1 tablet by mouth Daily.  Dispense: 90 tablet; Refill: 0    5. Hypercholesterolemia    6. Attention deficit disorder (ADD) in adult     Cont stimulant therapy                  Outpatient Medications Prior to Visit   Medication Sig Dispense Refill   • amphetamine-dextroamphetamine (Adderall) 15 MG tablet Take 1 tablet by mouth 2 (Two) Times a Day. 60 tablet 0   • atorvastatin (LIPITOR) 20 MG tablet Take 1 tablet by mouth Daily. 90 tablet 3   • Black Pepper-Turmeric (TURMERIC COMPLEX/BLACK PEPPER PO)      • buPROPion XL (WELLBUTRIN XL) 300 MG 24 hr tablet Take 1 tablet by mouth Every Morning. 90 tablet 3   • busPIRone (BUSPAR) 15 MG tablet Take 1 tablet by mouth 3 (Three) Times a Day. 270 tablet 0   • cephalexin (KEFLEX) 250 MG capsule Take 1 capsule by mouth Every Night.     • cetirizine (zyrTEC) 10 MG tablet Take 1 tablet by mouth Daily.     • CHOLECALCIFEROL PO Take 1 tablet by mouth Daily.     • Coenzyme Q10 (CO Q-10) 100 MG capsule Co Q-10     • FIBER PO Take 2 capsules by mouth Daily.     • Green Tea, Luba sinensis, (GREEN TEA EXTRACT PO)      • levothyroxine (SYNTHROID, LEVOTHROID) 50 MCG tablet Take 1 tablet by mouth Daily. 90 tablet 3   • Omega-3 Fatty Acids (fish oil) 1000 MG capsule capsule Take 1 capsule by mouth Daily With Breakfast.     • Potassium Gluconate 550 MG tablet Take 550 mg by mouth every night at bedtime.     • Probiotic Product (PROBIOTIC DAILY PO) Take 1 capsule by mouth Daily.     • Semaglutide-Weight Management 2.4 MG/0.75ML solution auto-injector Inject 2.4 mg under the skin into the  appropriate area as directed Every 7 (Seven) Days. 9 mL 3   • sennosides-docusate (PERICOLACE) 8.6-50 MG per tablet Take 1 tablet by mouth Daily. 1-2 tablets by mouth daily     • terazosin (HYTRIN) 1 MG capsule Take 1 capsule by mouth Daily. 90 capsule 1   • Ubiquinol 100 MG capsule      • amoxicillin (AMOXIL) 500 MG capsule TAKE FOUR CAPSULES BY MOUTH ONE HOUR BEFORE APPOINTMENT (Patient not taking: Reported on 11/10/2023)     • Collagen-Vitamin C (COLLAGEN PLUS VITAMIN C PO)      • Misc Natural Products (OSTEO BI-FLEX ADV TRIPLE ST PO)        No facility-administered medications prior to visit.     New Medications Ordered This Visit   Medications   • meloxicam (Mobic) 15 MG tablet     Sig: Take 1 tablet by mouth Daily.     Dispense:  90 tablet     Refill:  0     [unfilled]  Medications Discontinued During This Encounter   Medication Reason   • amoxicillin (AMOXIL) 500 MG capsule *Therapy completed   • Collagen-Vitamin C (COLLAGEN PLUS VITAMIN C PO) *Therapy completed   • Misc Natural Products (OSTEO BI-FLEX ADV TRIPLE ST PO) *Therapy completed         Return in about 6 months (around 8/21/2024) for Recheck, labs.    Patient was given instructions and counseling regarding her condition or for health maintenance advice. Please see specific information pulled into the AVS if appropriate.

## 2024-02-21 NOTE — PROGRESS NOTES
Christine Villagomez is a 58 y.o. female, who presents with a chief complaint of   Chief Complaint   Patient presents with   • Hyperlipidemia     3 Month follow up        {Problem List  Visit Diagnosis   Encounters  Notes  Medications  Labs  Result Review Imaging  Media :23}   HPI     The following portions of the patient's history were reviewed and updated as appropriate: allergies, current medications, past family history, past medical history, past social history, past surgical history and problem list.    Allergies: Sulfa antibiotics    Review of Systems          Wt Readings from Last 3 Encounters:   02/21/24 89.2 kg (196 lb 9.6 oz)   11/28/23 87.1 kg (192 lb)   11/10/23 88 kg (194 lb)     Temp Readings from Last 3 Encounters:   02/21/24 98 °F (36.7 °C) (Infrared)   11/10/23 97.8 °F (36.6 °C) (Infrared)   02/22/23 98.2 °F (36.8 °C)     BP Readings from Last 3 Encounters:   02/21/24 112/68   11/28/23 120/78   11/10/23 98/60     Pulse Readings from Last 3 Encounters:   02/21/24 92   11/28/23 86   11/10/23 82     Body mass index is 28.21 kg/m².  SpO2 Readings from Last 3 Encounters:   02/21/24 99%   11/28/23 100%   11/10/23 98%          Physical Exam    Results for orders placed or performed in visit on 07/18/23   Microscopic Examination -   Result Value Ref Range    WBC, UA 0-2 /HPF    RBC, UA 6-12 (A) /HPF    Epithelial Cells (non renal) 0-2 /HPF    Cast Type Comment     Bacteria, UA Comment None Seen /HPF   Urinalysis With Microscopic - Urine, Clean Catch    Specimen: Urine, Clean Catch   Result Value Ref Range    Specific Gravity, UA 1.013 1.005 - 1.030    pH, UA 6.5 5.0 - 8.0    Color, UA Yellow     Appearance, UA Clear Clear    Leukocytes, UA Trace (A) Negative    Protein Negative Negative    Glucose, UA Negative Negative    Ketones Negative Negative    Blood, UA See below: (A) Negative    Bilirubin, UA Negative Negative    Urobilinogen, UA Comment     Nitrite, UA Negative Negative     Result Review  :{Labs  Result Review  Imaging  Med Tab  Media :23}   {The following data was reviewed by (Optional):54925}  {Ambulatory Labs (Optional):55403}  {Data reviewed (Optional):16971:::1}           Assessment and Plan {CC Problem List  Visit Diagnosis  ROS  Review (Popup)  [facility]  BestPractice  Medications  SmartSets  SnapShot Encounters  Media :23}   There are no diagnoses linked to this encounter.             {Time Spent (Optional):15903}      Outpatient Medications Prior to Visit   Medication Sig Dispense Refill   • amphetamine-dextroamphetamine (Adderall) 15 MG tablet Take 1 tablet by mouth 2 (Two) Times a Day. 60 tablet 0   • atorvastatin (LIPITOR) 20 MG tablet Take 1 tablet by mouth Daily. 90 tablet 3   • Black Pepper-Turmeric (TURMERIC COMPLEX/BLACK PEPPER PO)      • buPROPion XL (WELLBUTRIN XL) 300 MG 24 hr tablet Take 1 tablet by mouth Every Morning. 90 tablet 3   • busPIRone (BUSPAR) 15 MG tablet Take 1 tablet by mouth 3 (Three) Times a Day. 270 tablet 0   • cephalexin (KEFLEX) 250 MG capsule Take 1 capsule by mouth Every Night.     • cetirizine (zyrTEC) 10 MG tablet Take 1 tablet by mouth Daily.     • CHOLECALCIFEROL PO Take 1 tablet by mouth Daily.     • Coenzyme Q10 (CO Q-10) 100 MG capsule Co Q-10     • FIBER PO Take 2 capsules by mouth Daily.     • Green Tea, Luba sinensis, (GREEN TEA EXTRACT PO)      • levothyroxine (SYNTHROID, LEVOTHROID) 50 MCG tablet Take 1 tablet by mouth Daily. 90 tablet 3   • Omega-3 Fatty Acids (fish oil) 1000 MG capsule capsule Take 1 capsule by mouth Daily With Breakfast.     • Potassium Gluconate 550 MG tablet Take 550 mg by mouth every night at bedtime.     • Probiotic Product (PROBIOTIC DAILY PO) Take 1 capsule by mouth Daily.     • Semaglutide-Weight Management 2.4 MG/0.75ML solution auto-injector Inject 2.4 mg under the skin into the appropriate area as directed Every 7 (Seven) Days. 9 mL 3   • sennosides-docusate (PERICOLACE) 8.6-50  MG per tablet Take 1 tablet by mouth Daily. 1-2 tablets by mouth daily     • terazosin (HYTRIN) 1 MG capsule Take 1 capsule by mouth Daily. 90 capsule 1   • Ubiquinol 100 MG capsule      • Collagen-Vitamin C (COLLAGEN PLUS VITAMIN C PO)      • Misc Natural Products (OSTEO BI-FLEX ADV TRIPLE ST PO)      • amoxicillin (AMOXIL) 500 MG capsule TAKE FOUR CAPSULES BY MOUTH ONE HOUR BEFORE APPOINTMENT (Patient not taking: Reported on 11/10/2023)       No facility-administered medications prior to visit.     No orders of the defined types were placed in this encounter.    [unfilled]  Medications Discontinued During This Encounter   Medication Reason   • amoxicillin (AMOXIL) 500 MG capsule *Therapy completed         No follow-ups on file.    Patient was given instructions and counseling regarding her condition or for health maintenance advice. Please see specific information pulled into the AVS if appropriate.   25-Mar-2021 12:59

## 2024-02-22 DIAGNOSIS — E66.01 MORBID (SEVERE) OBESITY DUE TO EXCESS CALORIES: ICD-10-CM

## 2024-03-05 DIAGNOSIS — E03.8 OTHER SPECIFIED HYPOTHYROIDISM: ICD-10-CM

## 2024-03-05 RX ORDER — ATORVASTATIN CALCIUM 20 MG/1
20 TABLET, FILM COATED ORAL DAILY
Qty: 90 TABLET | Refills: 3 | Status: SHIPPED | OUTPATIENT
Start: 2024-03-05

## 2024-03-05 RX ORDER — LEVOTHYROXINE SODIUM 0.05 MG/1
50 TABLET ORAL DAILY
Qty: 90 TABLET | Refills: 3 | Status: SHIPPED | OUTPATIENT
Start: 2024-03-05

## 2024-03-05 NOTE — TELEPHONE ENCOUNTER
Lipid panel in past 12 months    ALK [facility]os in past 12 months    ALT in past 12 months    AST in past 12 months   Rx Refill Note  Requested Prescriptions     Pending Prescriptions Disp Refills    levothyroxine (SYNTHROID, LEVOTHROID) 50 MCG tablet 90 tablet 3     Sig: Take 1 tablet by mouth Daily.    atorvastatin (LIPITOR) 20 MG tablet 90 tablet 3     Sig: Take 1 tablet by mouth Daily.      Last office visit with prescribing clinician: 2/21/2024   Last telemedicine visit with prescribing clinician: Visit date not found   Next office visit with prescribing clinician: Visit date not found                         Would you like a call back once the refill request has been completed: [] Yes [] No    If the office needs to give you a call back, can they leave a voicemail: [] Yes [] No    Aleshia Shanks, PCT  03/05/24, 15:53 EST

## 2024-03-26 DIAGNOSIS — F98.8 ATTENTION DEFICIT DISORDER (ADD) IN ADULT: ICD-10-CM

## 2024-03-26 RX ORDER — DEXTROAMPHETAMINE SACCHARATE, AMPHETAMINE ASPARTATE, DEXTROAMPHETAMINE SULFATE AND AMPHETAMINE SULFATE 3.75; 3.75; 3.75; 3.75 MG/1; MG/1; MG/1; MG/1
15 TABLET ORAL 2 TIMES DAILY
Qty: 60 TABLET | Refills: 0 | Status: SHIPPED | OUTPATIENT
Start: 2024-03-26

## 2024-03-26 NOTE — TELEPHONE ENCOUNTER
Rx Refill Note  Requested Prescriptions     Pending Prescriptions Disp Refills    amphetamine-dextroamphetamine (Adderall) 15 MG tablet 60 tablet 0     Sig: Take 1 tablet by mouth 2 (Two) Times a Day.      Last office visit with prescribing clinician: 2/21/2024   Last telemedicine visit with prescribing clinician: Visit date not found   Next office visit with prescribing clinician: Visit date not found                         Would you like a call back once the refill request has been completed: [] Yes [] No    If the office needs to give you a call back, can they leave a voicemail: [] Yes [] No    Aleshia Reed MA  03/26/24, 08:57 EDT

## 2024-04-15 DIAGNOSIS — F41.8 ANXIETY WITH DEPRESSION: ICD-10-CM

## 2024-04-16 RX ORDER — BUPROPION HYDROCHLORIDE 300 MG/1
300 TABLET ORAL EVERY MORNING
Qty: 90 TABLET | Refills: 3 | Status: SHIPPED | OUTPATIENT
Start: 2024-04-16

## 2024-05-03 DIAGNOSIS — F98.8 ATTENTION DEFICIT DISORDER (ADD) IN ADULT: ICD-10-CM

## 2024-05-03 RX ORDER — DEXTROAMPHETAMINE SACCHARATE, AMPHETAMINE ASPARTATE, DEXTROAMPHETAMINE SULFATE AND AMPHETAMINE SULFATE 3.75; 3.75; 3.75; 3.75 MG/1; MG/1; MG/1; MG/1
15 TABLET ORAL 2 TIMES DAILY
Qty: 60 TABLET | Refills: 0 | Status: SHIPPED | OUTPATIENT
Start: 2024-05-03

## 2024-05-03 NOTE — TELEPHONE ENCOUNTER
Rx Refill Note  Requested Prescriptions     Pending Prescriptions Disp Refills    amphetamine-dextroamphetamine (Adderall) 15 MG tablet 60 tablet 0     Sig: Take 1 tablet by mouth 2 (Two) Times a Day.      Last office visit with prescribing clinician: 2/21/2024   Last telemedicine visit with prescribing clinician: Visit date not found   Next office visit with prescribing clinician: Visit date not found                         Would you like a call back once the refill request has been completed: [] Yes [] No    If the office needs to give you a call back, can they leave a voicemail: [] Yes [] No    Joy Ballard MA  05/03/24, 08:08 EDT

## 2024-05-13 DIAGNOSIS — E66.01 MORBID (SEVERE) OBESITY DUE TO EXCESS CALORIES: ICD-10-CM

## 2024-05-13 DIAGNOSIS — I10 ESSENTIAL HYPERTENSION: ICD-10-CM

## 2024-05-13 DIAGNOSIS — R73.9 HYPERGLYCEMIA: ICD-10-CM

## 2024-05-14 RX ORDER — MELOXICAM 15 MG/1
15 TABLET ORAL DAILY
Qty: 90 TABLET | Refills: 3 | Status: SHIPPED | OUTPATIENT
Start: 2024-05-14

## 2024-05-14 NOTE — TELEPHONE ENCOUNTER
Rx Refill Note  Requested Prescriptions     Pending Prescriptions Disp Refills    meloxicam (MOBIC) 15 MG tablet [Pharmacy Med Name: MELOXICAM TABS 15MG] 90 tablet 3     Sig: TAKE 1 TABLET DAILY      Last office visit with prescribing clinician: 2/21/2024   Last telemedicine visit with prescribing clinician: Visit date not found   Next office visit with prescribing clinician: Visit date not found                         Would you like a call back once the refill request has been completed: [] Yes [] No    If the office needs to give you a call back, can they leave a voicemail: [] Yes [] No    Aleshia Reed MA  05/14/24, 14:40 EDT

## 2024-05-16 ENCOUNTER — TRANSCRIBE ORDERS (OUTPATIENT)
Dept: ADMINISTRATIVE | Facility: HOSPITAL | Age: 59
End: 2024-05-16
Payer: COMMERCIAL

## 2024-05-16 DIAGNOSIS — Z12.31 BREAST CANCER SCREENING BY MAMMOGRAM: Primary | ICD-10-CM

## 2024-05-26 DIAGNOSIS — F41.8 ANXIETY WITH DEPRESSION: ICD-10-CM

## 2024-05-28 RX ORDER — BUSPIRONE HYDROCHLORIDE 15 MG/1
15 TABLET ORAL 3 TIMES DAILY
Qty: 270 TABLET | Refills: 0 | Status: SHIPPED | OUTPATIENT
Start: 2024-05-28

## 2024-05-28 NOTE — TELEPHONE ENCOUNTER
No checkmarks available    Rx Refill Note  Requested Prescriptions     Pending Prescriptions Disp Refills    busPIRone (BUSPAR) 15 MG tablet 270 tablet 0     Sig: Take 1 tablet by mouth 3 (Three) Times a Day.      Last office visit with prescribing clinician: Visit date not found   Last telemedicine visit with prescribing clinician: Visit date not found   Next office visit with prescribing clinician: Visit date not found                         Would you like a call back once the refill request has been completed: [] Yes [] No    If the office needs to give you a call back, can they leave a voicemail: [] Yes [] No    Aleshia Reed MA  05/28/24, 11:38 EDT

## 2024-06-18 ENCOUNTER — TELEMEDICINE (OUTPATIENT)
Dept: INTERNAL MEDICINE | Facility: CLINIC | Age: 59
End: 2024-06-18
Payer: COMMERCIAL

## 2024-06-18 DIAGNOSIS — Z11.1 TUBERCULOSIS SCREENING: Primary | ICD-10-CM

## 2024-06-18 PROCEDURE — 99212 OFFICE O/P EST SF 10 MIN: CPT | Performed by: INTERNAL MEDICINE

## 2024-06-18 NOTE — PROGRESS NOTES
Christine Villagomez is a 58 y.o. female, who presents with a chief complaint of   Chief Complaint   Patient presents with    TB Test           HPI   This visit has been scheduled as a telehealth visit to comply with patient safety concerns in accordance with CDC recommendations. This was an audio and video enabled telemedicine encounter.    You have chosen to receive care through a televisit visit. Do you consent to use a televisit visit for your medical care today? Yes    Pt is at home and I am in the office    Pt needs TB risk assessment done for her son's lanny velasco application.  She is not currently sick and has had no history of TB.  She is not immunocompromised.     The following portions of the patient's history were reviewed and updated as appropriate: allergies, current medications, past family history, past medical history, past social history, past surgical history and problem list.    Allergies: Sulfa antibiotics    Review of Systems   Constitutional: Negative.    HENT: Negative.     Eyes: Negative.    Respiratory: Negative.     Cardiovascular: Negative.    Gastrointestinal: Negative.    Endocrine: Negative.    Genitourinary: Negative.    Musculoskeletal: Negative.    Skin: Negative.    Allergic/Immunologic: Negative.    Neurological: Negative.    Hematological: Negative.    Psychiatric/Behavioral: Negative.     All other systems reviewed and are negative.            Wt Readings from Last 3 Encounters:   02/21/24 89.2 kg (196 lb 9.6 oz)   11/28/23 87.1 kg (192 lb)   11/10/23 88 kg (194 lb)     Temp Readings from Last 3 Encounters:   02/21/24 98 °F (36.7 °C) (Infrared)   11/10/23 97.8 °F (36.6 °C) (Infrared)   02/22/23 98.2 °F (36.8 °C)     BP Readings from Last 3 Encounters:   02/21/24 112/68   11/28/23 120/78   11/10/23 98/60     Pulse Readings from Last 3 Encounters:   02/21/24 92   11/28/23 86   11/10/23 82     There is no height or weight on file to calculate BMI.  SpO2 Readings from Last 3  Encounters:   02/21/24 99%   11/28/23 100%   11/10/23 98%          Physical Exam  Vitals and nursing note reviewed.   Constitutional:       Appearance: She is well-developed.   HENT:      Head: Normocephalic and atraumatic.      Nose: Nose normal.   Eyes:      General:         Right eye: No discharge.         Left eye: No discharge.      Conjunctiva/sclera: Conjunctivae normal.   Pulmonary:      Effort: Pulmonary effort is normal. No respiratory distress.   Musculoskeletal:      Cervical back: Normal range of motion and neck supple.   Skin:     Findings: No rash.   Neurological:      Mental Status: She is alert and oriented to person, place, and time.   Psychiatric:         Behavior: Behavior normal.         Thought Content: Thought content normal.         Judgment: Judgment normal.         Results for orders placed or performed in visit on 02/08/24   Comprehensive Metabolic Panel    Specimen: Blood   Result Value Ref Range    Glucose 84 65 - 99 mg/dL    BUN 14 6 - 20 mg/dL    Creatinine 0.92 0.57 - 1.00 mg/dL    EGFR Result 71.9 >60.0 mL/min/1.73    BUN/Creatinine Ratio 15.2 7.0 - 25.0    Sodium 141 136 - 145 mmol/L    Potassium 4.3 3.5 - 5.2 mmol/L    Chloride 107 98 - 107 mmol/L    Total CO2 23.7 22.0 - 29.0 mmol/L    Calcium 9.3 8.6 - 10.5 mg/dL    Total Protein 6.8 6.0 - 8.5 g/dL    Albumin 4.2 3.5 - 5.2 g/dL    Globulin 2.6 gm/dL    A/G Ratio 1.6 g/dL    Total Bilirubin 0.6 0.0 - 1.2 mg/dL    Alkaline Phosphatase 80 39 - 117 U/L    AST (SGOT) 17 1 - 32 U/L    ALT (SGPT) 16 1 - 33 U/L   Hemoglobin A1c    Specimen: Blood   Result Value Ref Range    Hemoglobin A1C 4.70 (L) 4.80 - 5.60 %   Lipid Panel With LDL / HDL Ratio    Specimen: Blood   Result Value Ref Range    Total Cholesterol 131 0 - 200 mg/dL    Triglycerides 81 0 - 150 mg/dL    HDL Cholesterol 59 40 - 60 mg/dL    VLDL Cholesterol Oscar 16 5 - 40 mg/dL    LDL Chol Calc (NIH) 56 0 - 100 mg/dL    LDL/HDL RATIO 0.95    T4, Free    Specimen: Blood   Result  Value Ref Range    Free T4 1.51 0.93 - 1.70 ng/dL   TSH    Specimen: Blood   Result Value Ref Range    TSH 1.770 0.270 - 4.200 uIU/mL   CBC & Differential    Specimen: Blood   Result Value Ref Range    WBC 3.79 3.40 - 10.80 10*3/mm3    RBC 4.53 3.77 - 5.28 10*6/mm3    Hemoglobin 12.8 12.0 - 15.9 g/dL    Hematocrit 39.0 34.0 - 46.6 %    MCV 86.1 79.0 - 97.0 fL    MCH 28.3 26.6 - 33.0 pg    MCHC 32.8 31.5 - 35.7 g/dL    RDW 12.3 12.3 - 15.4 %    Platelets 207 140 - 450 10*3/mm3    Neutrophil Rel % 53.5 42.7 - 76.0 %    Lymphocyte Rel % 21.4 19.6 - 45.3 %    Monocyte Rel % 10.3 5.0 - 12.0 %    Eosinophil Rel % 12.9 (H) 0.3 - 6.2 %    Basophil Rel % 1.6 (H) 0.0 - 1.5 %    Neutrophils Absolute 2.03 1.70 - 7.00 10*3/mm3    Lymphocytes Absolute 0.81 0.70 - 3.10 10*3/mm3    Monocytes Absolute 0.39 0.10 - 0.90 10*3/mm3    Eosinophils Absolute 0.49 (H) 0.00 - 0.40 10*3/mm3    Basophils Absolute 0.06 0.00 - 0.20 10*3/mm3    Immature Granulocyte Rel % 0.3 0.0 - 0.5 %    Immature Grans Absolute 0.01 0.00 - 0.05 10*3/mm3    nRBC 0.0 0.0 - 0.2 /100 WBC     Result Review :                  Assessment and Plan    Diagnoses and all orders for this visit:    1. Tuberculosis screening (Primary)     [facility] tuberculosis screening form reviewed and filled out for patient.  She is at low risk for TB and no further testing is indicated.  Please see scanned form in epic.  Cont current meds as below                  Outpatient Medications Prior to Visit   Medication Sig Dispense Refill    amphetamine-dextroamphetamine (Adderall) 15 MG tablet Take 1 tablet by mouth 2 (Two) Times a Day. 60 tablet 0    atorvastatin (LIPITOR) 20 MG tablet Take 1 tablet by mouth Daily. 90 tablet 3    Black Pepper-Turmeric (TURMERIC COMPLEX/BLACK PEPPER PO)       buPROPion XL (WELLBUTRIN XL) 300 MG 24 hr tablet Take 1 tablet by mouth Every Morning. 90 tablet 3    busPIRone (BUSPAR) 15 MG tablet Take 1 tablet by mouth 3 (Three) Times a Day. 270  tablet 0    cephalexin (KEFLEX) 250 MG capsule Take 1 capsule by mouth Every Night.      cetirizine (zyrTEC) 10 MG tablet Take 1 tablet by mouth Daily.      CHOLECALCIFEROL PO Take 1 tablet by mouth Daily.      Coenzyme Q10 (CO Q-10) 100 MG capsule Co Q-10      FIBER PO Take 2 capsules by mouth Daily.      Green Tea, Luba sinensis, (GREEN TEA EXTRACT PO)       levothyroxine (SYNTHROID, LEVOTHROID) 50 MCG tablet Take 1 tablet by mouth Daily. 90 tablet 3    meloxicam (MOBIC) 15 MG tablet TAKE 1 TABLET DAILY 90 tablet 3    Omega-3 Fatty Acids (fish oil) 1000 MG capsule capsule Take 1 capsule by mouth Daily With Breakfast.      Potassium Gluconate 550 MG tablet Take 550 mg by mouth every night at bedtime.      Probiotic Product (PROBIOTIC DAILY PO) Take 1 capsule by mouth Daily.      Semaglutide-Weight Management 2.4 MG/0.75ML solution auto-injector Inject 2.4 mg under the skin into the appropriate area as directed Every 7 (Seven) Days. 9 mL 3    sennosides-docusate (PERICOLACE) 8.6-50 MG per tablet Take 1 tablet by mouth Daily. 1-2 tablets by mouth daily      terazosin (HYTRIN) 1 MG capsule Take 1 capsule by mouth Daily. 90 capsule 1    Ubiquinol 100 MG capsule        No facility-administered medications prior to visit.     No orders of the defined types were placed in this encounter.    [unfilled]  There are no discontinued medications.      Return if symptoms worsen or fail to improve.    Patient was given instructions and counseling regarding her condition or for health maintenance advice. Please see specific information pulled into the AVS if appropriate.

## 2024-06-20 ENCOUNTER — OFFICE VISIT (OUTPATIENT)
Dept: OBSTETRICS AND GYNECOLOGY | Facility: CLINIC | Age: 59
End: 2024-06-20
Payer: COMMERCIAL

## 2024-06-20 VITALS
BODY MASS INDEX: 28.77 KG/M2 | WEIGHT: 201 LBS | SYSTOLIC BLOOD PRESSURE: 116 MMHG | DIASTOLIC BLOOD PRESSURE: 74 MMHG | HEIGHT: 70 IN

## 2024-06-20 DIAGNOSIS — Z12.31 ENCOUNTER FOR SCREENING MAMMOGRAM FOR MALIGNANT NEOPLASM OF BREAST: ICD-10-CM

## 2024-06-20 DIAGNOSIS — Z01.419 ROUTINE GYNECOLOGICAL EXAMINATION: Primary | ICD-10-CM

## 2024-06-20 DIAGNOSIS — Z11.51 SCREENING FOR HUMAN PAPILLOMAVIRUS (HPV): ICD-10-CM

## 2024-06-20 LAB
BILIRUB BLD-MCNC: NEGATIVE MG/DL
CLARITY, POC: CLEAR
COLOR UR: YELLOW
GLUCOSE UR STRIP-MCNC: NEGATIVE MG/DL
KETONES UR QL: NEGATIVE
LEUKOCYTE EST, POC: NEGATIVE
NITRITE UR-MCNC: NEGATIVE MG/ML
PH UR: 5 [PH] (ref 5–8)
PROT UR STRIP-MCNC: NEGATIVE MG/DL
RBC # UR STRIP: ABNORMAL /UL
SP GR UR: 1 (ref 1–1.03)
UROBILINOGEN UR QL: NORMAL

## 2024-06-20 NOTE — PROGRESS NOTES
GYN Annual Exam     CC- Here for annual exam.     Christine Villagomez is a 58 y.o. female established patient who presents for annual well woman exam. No  VB . She saw urology for hematuria and is now on ABX nightly.     OB History          1    Para   1    Term   1            AB        Living   1         SAB        IAB        Ectopic        Molar        Multiple        Live Births              Obstetric Comments   1                Menarche:12  Menopause:52  HRT:none  Current contraception: vasectomy &   History of abnormal Pap smear: no  History of abnormal mammogram: no  Family history of uterine, colon or ovarian cancer: yes - MGM colon age 60  Family history of breast cancer: no  STD's: none  Last pap smear: 2022- normal pap/HPV  CAROLE; none  2021- HS D&C myomectomy- path B9 fibroid  Hematuria      Health Maintenance   Topic Date Due    ANNUAL PHYSICAL  Never done    Annual Gynecologic Pelvic and Breast Exam  2024    INFLUENZA VACCINE  2024    BMI FOLLOWUP  2025    LIPID PANEL  2025    PAP SMEAR  2025    MAMMOGRAM  2025    COLORECTAL CANCER SCREENING  2026    TDAP/TD VACCINES (3 - Td or Tdap) 10/18/2027    HEPATITIS C SCREENING  Completed    COVID-19 Vaccine  Completed    ZOSTER VACCINE  Completed    Pneumococcal Vaccine 0-64  Aged Out       Past Medical History:   Diagnosis Date    Bipolar disorder     Depression     Hematuria of unknown cause     Hyperlipidemia     Hypertension     Hypothyroidism     Kidney stone     OA (osteoarthritis) of knee     left    PONV (postoperative nausea and vomiting)     Sleep apnea     uses cpap       Past Surgical History:   Procedure Laterality Date    BLADDER SUSPENSION      TVT    CATARACT EXTRACTION      COLONOSCOPY      D & C HYSTEROSCOPY MYOSURE N/A 2021    Procedure: DILATATION AND CURETTAGE HYSTEROSCOPY with MYOSURE;  Surgeon: Trinity Clark MD;  Location: [facility] OR;  Service:  [facility] BARIATRIC [facility] BARIATRIC SURGERY  [address]  [facility] [address] [phone]  [address]  [facility] [address] [phone]  Dept: [phone]  4/26/2019      Yfn Clinton.  [id number]  [id number]  1969  female      Chief Complaint   Patient presents with   • Nutrition Counseling     diet 6/6       The patient is here for month 6/6 of their pre-operative physician supervised diet. She had a loss of 7 lbs. The patient states that she is following the recommendations given by our office and dietician including a high lean protein, low carb and low fat diet. We recommended adequate fruits and vegetable intake along with limited portion sizes. Patient is working on eliminating fast foods, fried foods, sweets and soda. Yfn Clinton has been increasing her daily water intake. She has been exercising: walking.    Patient states they have made positive changes including recent DVT 3/19 limited exercise  The patient admits to be struggling with limited exercise    Review of Systems   Constitutional: Positive for fatigue.   Cardiovascular: Positive for leg swelling.   Musculoskeletal: Positive for arthralgias.   All other systems reviewed and are negative.    Vitals:    04/26/19 1201   BP: 133/81   Pulse: 96   Resp: 18   Temp: 97.8 °F (36.6 °C)     Patient Active Problem List   Diagnosis   • Body mass index (BMI) of 50-59.9 in adult (CMS/HCC)   • Essential hypertension   • Chronic fatigue   • Hyperlipemia   • MARIEL (obstructive sleep apnea)   • Asthma   • Multiple joint pain   • Depression with anxiety   • Edema   • Tachycardia   • History of pulmonary embolism   • Kidney stones   • Vertigo   • Anemia   • Dyspepsia   • Dietary counseling   • Vitamin D deficiency   • Lymphedema   • Bipolar 2 disorder (CMS/HCC)   • Exercise counseling   • Deep vein thrombosis (DVT) of right lower extremity (CMS/HCC)     Body mass index is 53.88 kg/m².    The  Obstetrics/Gynecology;  Laterality: N/A;    KIDNEY STONE SURGERY      KNEE MENISCAL REPAIR Bilateral     REPLACEMENT TOTAL KNEE Right          Current Outpatient Medications:     amphetamine-dextroamphetamine (Adderall) 15 MG tablet, Take 1 tablet by mouth 2 (Two) Times a Day., Disp: 60 tablet, Rfl: 0    atorvastatin (LIPITOR) 20 MG tablet, Take 1 tablet by mouth Daily., Disp: 90 tablet, Rfl: 3    buPROPion XL (WELLBUTRIN XL) 300 MG 24 hr tablet, Take 1 tablet by mouth Every Morning., Disp: 90 tablet, Rfl: 3    busPIRone (BUSPAR) 15 MG tablet, Take 1 tablet by mouth 3 (Three) Times a Day., Disp: 270 tablet, Rfl: 0    cephalexin (KEFLEX) 250 MG capsule, Take 1 capsule by mouth Every Night., Disp: , Rfl:     cetirizine (zyrTEC) 10 MG tablet, Take 1 tablet by mouth Daily., Disp: , Rfl:     CHOLECALCIFEROL PO, Take 1 tablet by mouth Daily., Disp: , Rfl:     Coenzyme Q10 (CO Q-10) 100 MG capsule, Co Q-10, Disp: , Rfl:     FIBER PO, Take 2 capsules by mouth Daily., Disp: , Rfl:     Green Tea, Luba sinensis, (GREEN TEA EXTRACT PO), , Disp: , Rfl:     levothyroxine (SYNTHROID, LEVOTHROID) 50 MCG tablet, Take 1 tablet by mouth Daily., Disp: 90 tablet, Rfl: 3    meloxicam (MOBIC) 15 MG tablet, TAKE 1 TABLET DAILY, Disp: 90 tablet, Rfl: 3    Omega-3 Fatty Acids (fish oil) 1000 MG capsule capsule, Take 1 capsule by mouth Daily With Breakfast., Disp: , Rfl:     Potassium Gluconate 550 MG tablet, Take 550 mg by mouth every night at bedtime., Disp: , Rfl:     Probiotic Product (PROBIOTIC DAILY PO), Take 1 capsule by mouth Daily., Disp: , Rfl:     Semaglutide-Weight Management 2.4 MG/0.75ML solution auto-injector, Inject 2.4 mg under the skin into the appropriate area as directed Every 7 (Seven) Days., Disp: 9 mL, Rfl: 3    sennosides-docusate (PERICOLACE) 8.6-50 MG per tablet, Take 1 tablet by mouth Daily. 1-2 tablets by mouth daily, Disp: , Rfl:     terazosin (HYTRIN) 1 MG capsule, Take 1 capsule by mouth Daily., Disp: 90  following portions of the patient's history were reviewed and updated as appropriate: active problem list, medication list, notes from last encounter, lab results, endoscopy procedure notes    Physical Exam   Constitutional: She is oriented to person, place, and time. She appears well-nourished.   HENT:   Head: Normocephalic and atraumatic.   Mouth/Throat: Oropharynx is clear and moist.   Eyes: Conjunctivae and EOM are normal. Pupils are equal, round, and reactive to light. No scleral icterus.   Neck: Normal range of motion. Neck supple. No thyromegaly present.   Cardiovascular: Normal rate and regular rhythm.   Pulmonary/Chest: Effort normal and breath sounds normal.   Abdominal: Soft. Bowel sounds are normal. She exhibits no distension. There is no tenderness.   Musculoskeletal: Normal range of motion. She exhibits edema.   Lymphadenopathy:     She has no cervical adenopathy.   Neurological: She is alert and oriented to person, place, and time. No cranial nerve deficit. Coordination normal.   Skin: Skin is warm and dry. No erythema.   Psychiatric: She has a normal mood and affect. Her behavior is normal.   Vitals reviewed.      Discussion/Plan:  Obesity/Morbid Obesity: Currently the patient's weight is decreasing. There are no medications prescribed.Treatment plan includes prescribed diet, prescribed exercise regimen and behavior modification.    I reviewed the appropriate dietary choices with the patient and encouraged the necessary changes. Recommended at least 70 grams of protein per day, around 35 grams of fats and less than 100 grams of carbohydrates. Reviewed calorie intake if patient wanted to calorie count and/or had BMR. Instructed patient to drink half of body weight in ounces per day and exercise a minimum of 150 minutes per week including both cardio and strength training. Discussed the option of keeping a food journal which will help patient become more aware of the nutritional value of foods so they  "capsule, Rfl: 1    Ubiquinol 100 MG capsule, , Disp: , Rfl:     Allergies   Allergen Reactions    Sulfa Antibiotics Rash     fever  fever  fever       Social History     Tobacco Use    Smoking status: Never     Passive exposure: Never    Smokeless tobacco: Never   Vaping Use    Vaping status: Never Used   Substance Use Topics    Alcohol use: No    Drug use: No       Family History   Problem Relation Age of Onset    Cancer Mother         lung    Heart attack Father     Colon cancer Maternal Grandmother     Breast cancer Neg Hx     Ovarian cancer Neg Hx     Pulmonary embolism Neg Hx     Deep vein thrombosis Neg Hx     Malig Hyperthermia Neg Hx        Review of Systems   Constitutional:  Positive for activity change and unexpected weight change (loss). Negative for appetite change, fatigue and fever.   Respiratory:  Negative for cough and shortness of breath.    Cardiovascular:  Negative for chest pain and palpitations.   Gastrointestinal:  Negative for abdominal distention, abdominal pain, constipation, diarrhea and nausea.   Endocrine: Negative for cold intolerance and heat intolerance.   Genitourinary:  Negative for decreased urine volume, dyspareunia, dysuria, menstrual problem, pelvic pain, vaginal bleeding and vaginal discharge.   Musculoskeletal:  Negative for arthralgias and gait problem.   Skin:  Positive for rash. Negative for color change.   Neurological:  Negative for headaches.   Psychiatric/Behavioral:  The patient is not nervous/anxious.        /74   Ht 177.8 cm (70\")   Wt 91.2 kg (201 lb)   LMP  (LMP Unknown)   BMI 28.84 kg/m²     Physical Exam   Constitutional: She is oriented to person, place, and time. She appears well-developed.   HENT:   Head: Normocephalic and atraumatic.   Eyes: Conjunctivae are normal. No scleral icterus.   Neck: No thyromegaly present.   Cardiovascular: Normal rate and regular rhythm.   Pulmonary/Chest: Effort normal and breath sounds normal. Right breast exhibits no " are more prepared after surgery.    The patient was given written materials from our office for education.   I answered all of the patients questions regarding dietary changes, exercise or surgical options.  The patient will follow up in 1 month. The total time spent face to face was 20 minutes with 15 minutes spent counseling.    Patient with recent deep venous thrombosis right lower extremity after having right upper extremity surgery.  She is followed by hematology and is seeing them today.  I did instruct patient that we will need to hold the surgery until cleared from hematology secondary to her recent DVT.  She will contact her office to let us know what hematology recommends for timing of the gastric sleeve.    Encounter Diagnoses   Name Primary?   • Dietary counseling Yes   • Acute deep vein thrombosis (DVT) of distal vein of right lower extremity (CMS/HCC)    • Dyspepsia    • Chronic fatigue    • Essential hypertension       inverted nipple, no mass, no nipple discharge, no skin change and no tenderness. Left breast exhibits no inverted nipple, no mass, no nipple discharge, no skin change and no tenderness.   Abdominal: Soft. Normal appearance and bowel sounds are normal. She exhibits no distension and no mass. There is no abdominal tenderness. There is no rebound and no guarding. No hernia.   Genitourinary:    Rectum normal.      Pelvic exam was performed with patient supine.   There is no rash, tenderness, lesion or injury on the right labia. There is no rash, tenderness, lesion or injury on the left labia. Uterus is not deviated, not enlarged, not fixed and not tender. Cervix exhibits no motion tenderness, no discharge and no friability. Right adnexum displays no mass, no tenderness and no fullness. Left adnexum displays no mass, no tenderness and no fullness.    No vaginal discharge, erythema, tenderness or bleeding.   No erythema, tenderness or bleeding in the vagina.    No foreign body in the vagina.      No signs of injury in the vagina.      Genitourinary Comments: Cystocele noted     Neurological: She is alert and oriented to person, place, and time.   Skin: Skin is warm and dry. No rash noted.   Scattered rash w/excoriations over her extremities   Psychiatric: Her behavior is normal. Mood, judgment and thought content normal.   Nursing note and vitals reviewed.         Assessment/Plan    1) GYN HM: normal pap/HPV 6/2022, check pap/HPVSBE demonstrated and encouraged.  2) STD screening: declines Condoms encouraged.  3) Bone health - Weight bearing exercise, dietary calcium recommendations and vitamin D reviewed.   4) Vulvar rash- refer to derm for evaluation.  5) Smoking Status: No   6) Social: no issue  7)MMG:  UTD 6/2023 B2, scheduled MMG 6/2024  8) DEXA- UTD 6/2020- normal, repeat 3-5 years , wants to do next year.  9)C scope- UTD 3/2016, repeat 10 years.   10) Parts of this document have been copied or forwarded from her  previous visits and have been reviewed, updated and edited as indicated.   11) Hematuria-  seeing urology.  12)Follow up prn and 1 year           Diagnoses and all orders for this visit:    1. Routine gynecological examination (Primary)  -     POC Urinalysis Dipstick  -     IGP, Apt HPV,rfx 16 / 18,45    2. Encounter for screening mammogram for malignant neoplasm of breast  -     Mammo Screening Digital Tomosynthesis Bilateral With CAD; Future    3. Screening for human papillomavirus (HPV)  -     IGP, Apt HPV,rfx 16 / 18,45        Trinity Clark MD  6/20/2024  20:42 EDT

## 2024-06-23 LAB
CYTOLOGIST CVX/VAG CYTO: NORMAL
CYTOLOGY CVX/VAG DOC CYTO: NORMAL
CYTOLOGY CVX/VAG DOC THIN PREP: NORMAL
DX ICD CODE: NORMAL
HPV I/H RISK 4 DNA CVX QL PROBE+SIG AMP: NEGATIVE
Lab: NORMAL
OTHER STN SPEC: NORMAL
STAT OF ADQ CVX/VAG CYTO-IMP: NORMAL

## 2024-07-01 ENCOUNTER — HOSPITAL ENCOUNTER (OUTPATIENT)
Dept: MAMMOGRAPHY | Facility: HOSPITAL | Age: 59
Discharge: HOME | End: 2024-07-01
Admitting: OBSTETRICS & GYNECOLOGY
Payer: COMMERCIAL

## 2024-07-01 DIAGNOSIS — Z12.31 BREAST CANCER SCREENING BY MAMMOGRAM: ICD-10-CM

## 2024-07-01 PROCEDURE — 77063 BREAST TOMOSYNTHESIS BI: CPT | Performed by: RADIOLOGY

## 2024-07-01 PROCEDURE — 77067 SCR MAMMO BI INCL CAD: CPT | Performed by: RADIOLOGY

## 2024-07-01 PROCEDURE — 77063 BREAST TOMOSYNTHESIS BI: CPT

## 2024-07-01 PROCEDURE — 77067 SCR MAMMO BI INCL CAD: CPT

## 2024-07-03 DIAGNOSIS — F98.8 ATTENTION DEFICIT DISORDER (ADD) IN ADULT: ICD-10-CM

## 2024-07-03 RX ORDER — DEXTROAMPHETAMINE SACCHARATE, AMPHETAMINE ASPARTATE, DEXTROAMPHETAMINE SULFATE AND AMPHETAMINE SULFATE 3.75; 3.75; 3.75; 3.75 MG/1; MG/1; MG/1; MG/1
15 TABLET ORAL 2 TIMES DAILY
Qty: 60 TABLET | Refills: 0 | Status: SHIPPED | OUTPATIENT
Start: 2024-07-03

## 2024-07-03 NOTE — TELEPHONE ENCOUNTER
Rx Refill Note  Requested Prescriptions     Pending Prescriptions Disp Refills    amphetamine-dextroamphetamine (Adderall) 15 MG tablet 60 tablet 0     Sig: Take 1 tablet by mouth 2 (Two) Times a Day.      Last office visit with prescribing clinician: 2/21/2024   Last telemedicine visit with prescribing clinician: 6/18/2024   Next office visit with prescribing clinician: Visit date not found                         Would you like a call back once the refill request has been completed: [] Yes [] No    If the office needs to give you a call back, can they leave a voicemail: [] Yes [] No    Mercedes Porter MA  07/03/24, 12:50 EDT

## 2024-07-08 DIAGNOSIS — R92.8 ABNORMAL MAMMOGRAM: Primary | ICD-10-CM

## 2024-07-30 ENCOUNTER — HOSPITAL ENCOUNTER (OUTPATIENT)
Dept: MAMMOGRAPHY | Facility: HOSPITAL | Age: 59
Discharge: HOME | End: 2024-07-30
Payer: COMMERCIAL

## 2024-07-30 ENCOUNTER — HOSPITAL ENCOUNTER (OUTPATIENT)
Dept: ULTRASOUND IMAGING | Facility: HOSPITAL | Age: 59
Discharge: HOME | End: 2024-07-30
Payer: COMMERCIAL

## 2024-07-30 DIAGNOSIS — R92.8 ABNORMAL MAMMOGRAM: ICD-10-CM

## 2024-07-30 PROCEDURE — 76642 ULTRASOUND BREAST LIMITED: CPT

## 2024-07-30 PROCEDURE — 77065 DX MAMMO INCL CAD UNI: CPT

## 2024-07-30 PROCEDURE — G0279 TOMOSYNTHESIS, MAMMO: HCPCS

## 2024-07-31 DIAGNOSIS — F98.8 ATTENTION DEFICIT DISORDER (ADD) IN ADULT: ICD-10-CM

## 2024-07-31 RX ORDER — DEXTROAMPHETAMINE SACCHARATE, AMPHETAMINE ASPARTATE, DEXTROAMPHETAMINE SULFATE AND AMPHETAMINE SULFATE 3.75; 3.75; 3.75; 3.75 MG/1; MG/1; MG/1; MG/1
15 TABLET ORAL 2 TIMES DAILY
Qty: 60 TABLET | Refills: 0 | Status: SHIPPED | OUTPATIENT
Start: 2024-07-31

## 2024-07-31 NOTE — TELEPHONE ENCOUNTER
Rx Refill Note  Requested Prescriptions     Pending Prescriptions Disp Refills    amphetamine-dextroamphetamine (Adderall) 15 MG tablet 60 tablet 0     Sig: Take 1 tablet by mouth 2 (Two) Times a Day.      Last office visit with prescribing clinician: Visit date not found   Last telemedicine visit with prescribing clinician: 6/18/2024   Next office visit with prescribing clinician: Visit date not found                         Would you like a call back once the refill request has been completed: [] Yes [] No    If the office needs to give you a call back, can they leave a voicemail: [] Yes [] No    Aleshia Reed MA  07/31/24, 13:49 EDT

## 2024-08-12 DIAGNOSIS — F41.8 ANXIETY WITH DEPRESSION: ICD-10-CM

## 2024-08-12 DIAGNOSIS — F32.A DEPRESSION, UNSPECIFIED DEPRESSION TYPE: ICD-10-CM

## 2024-08-12 RX ORDER — TERAZOSIN 1 MG/1
1 CAPSULE ORAL DAILY
Qty: 90 CAPSULE | Refills: 3 | Status: SHIPPED | OUTPATIENT
Start: 2024-08-12

## 2024-08-12 NOTE — TELEPHONE ENCOUNTER
No checkmarks available    Rx Refill Note  Requested Prescriptions     Pending Prescriptions Disp Refills    busPIRone (BUSPAR) 15 MG tablet [Pharmacy Med Name: BUSPIRONE HCL TABS 15MG] 270 tablet 3     Sig: TAKE 1 TABLET THREE TIMES A DAY     Signed Prescriptions Disp Refills    terazosin (HYTRIN) 1 MG capsule 90 capsule 3     Sig: TAKE 1 CAPSULE DAILY     Authorizing Provider: JOSE HATFIELD     Ordering User: CAROL REED      Last office visit with prescribing clinician: 2/21/2024   Last telemedicine visit with prescribing clinician: 6/18/2024   Next office visit with prescribing clinician: Visit date not found                         Would you like a call back once the refill request has been completed: [] Yes [] No    If the office needs to give you a call back, can they leave a voicemail: [] Yes [] No    Carol Reed MA  08/12/24, 15:11 EDT

## 2024-08-13 RX ORDER — BUSPIRONE HYDROCHLORIDE 15 MG/1
15 TABLET ORAL 3 TIMES DAILY
Qty: 270 TABLET | Refills: 3 | Status: SHIPPED | OUTPATIENT
Start: 2024-08-13

## 2024-10-22 ENCOUNTER — TELEPHONE (OUTPATIENT)
Dept: OBSTETRICS AND GYNECOLOGY | Facility: CLINIC | Age: 59
End: 2024-10-22
Payer: COMMERCIAL

## 2024-10-22 NOTE — TELEPHONE ENCOUNTER
I called and s/w pt. She is not due for her screening MMG until 7/2025 and those orders have been placed.  Trinity Clark MD

## 2024-10-24 ENCOUNTER — TELEPHONE (OUTPATIENT)
Dept: INTERNAL MEDICINE | Facility: CLINIC | Age: 59
End: 2024-10-24
Payer: COMMERCIAL

## 2024-10-24 NOTE — TELEPHONE ENCOUNTER
Insurance called to let us know that the Bill for Zepbound is approved through 10/23/25.    PA #: [id number]

## 2024-12-10 ENCOUNTER — OFFICE VISIT (OUTPATIENT)
Dept: SLEEP MEDICINE | Facility: HOSPITAL | Age: 59
End: 2024-12-10
Payer: COMMERCIAL

## 2024-12-10 VITALS
SYSTOLIC BLOOD PRESSURE: 121 MMHG | HEART RATE: 91 BPM | DIASTOLIC BLOOD PRESSURE: 70 MMHG | OXYGEN SATURATION: 98 % | WEIGHT: 205 LBS | HEIGHT: 70 IN | BODY MASS INDEX: 29.35 KG/M2

## 2024-12-10 DIAGNOSIS — E03.8 OTHER SPECIFIED HYPOTHYROIDISM: ICD-10-CM

## 2024-12-10 DIAGNOSIS — G47.33 OBSTRUCTIVE SLEEP APNEA SYNDROME: Primary | ICD-10-CM

## 2024-12-10 DIAGNOSIS — E03.9 ACQUIRED HYPOTHYROIDISM: ICD-10-CM

## 2024-12-10 DIAGNOSIS — I10 ESSENTIAL HYPERTENSION: ICD-10-CM

## 2024-12-10 PROCEDURE — G0463 HOSPITAL OUTPT CLINIC VISIT: HCPCS

## 2024-12-10 NOTE — PROGRESS NOTES
SLEEP/PULMONARY  CLINIC NOTE      PATIENT IDENTIFICATION:  Name: Christine Villagomez  Age: 59 y.o.  Sex: female  :  1965  MRN: [id number]    DATE OF CONSULTATION:  12/10/2024     Referring physician:    Graciela Cadena MD            CHIEF COMPLAINT: Damian     History of Present Illness:   Christine Villagomez is a 59 y.o. female Pt on CPAP feeling better more energy especially the night he use it more than 4 hours, no sleepiness no fatigue no tiredness, no mask irritation no dryness, compliance report reviewed with pt d discussed with the patient the download data and encouarged the patient to continue to use the CPAP. The residual AHI is acceptable.         Review of Systems:   Constitutional: negative   Eyes: negative   ENT/oropharynx: negative   Cardiovascular: negative   Respiratory: negative   Gastrointestinal: negative   Genitourinary: negative   Neurological: negative   Musculoskeletal: negative   Integument/breast: negative   Endocrine: negative   Allergic/Immunologic: negative     Past Medical History:  Past Medical History:   Diagnosis Date    Bipolar disorder     Depression     Hematuria of unknown cause     Hyperlipidemia     Hypertension     Hypothyroidism     Kidney stone     OA (osteoarthritis) of knee     left    PONV (postoperative nausea and vomiting)     Sleep apnea     uses cpap       Past Surgical History:  Past Surgical History:   Procedure Laterality Date    BLADDER SUSPENSION      TVT    CATARACT EXTRACTION      COLONOSCOPY      D & C HYSTEROSCOPY MYOSURE N/A 2021    Procedure: DILATATION AND CURETTAGE HYSTEROSCOPY with MYOSURE;  Surgeon: Trinity Clark MD;  Location: [facility];  Service: Obstetrics/Gynecology;  Laterality: N/A;    KIDNEY STONE SURGERY      KNEE MENISCAL REPAIR Bilateral     REPLACEMENT TOTAL KNEE Right         Family History:  Family History   Problem Relation Age of Onset    Cancer Mother         lung    Heart attack Father     Colon cancer  Maternal Grandmother     Breast cancer Neg Hx     Ovarian cancer Neg Hx     Pulmonary embolism Neg Hx     Deep vein thrombosis Neg Hx     Malig Hyperthermia Neg Hx         Social History:   Social History     Tobacco Use    Smoking status: Never     Passive exposure: Never    Smokeless tobacco: Never   Substance Use Topics    Alcohol use: No        Allergies:  Allergies   Allergen Reactions    Sulfa Antibiotics Rash     fever  fever  fever       Home Meds:  (Not in a hospital admission)      Objective:    Vitals Ranges:   Heart Rate:  [91] 91  BP: (121)/(70) 121/70  Body mass index is 29.41 kg/m².     Exam:  General Appearance:  WDWN    HEENT:   without obvious abnormality,  Conjunctiva/corneas clear,  Normal external ear canals, no drainage    Clear orsalmucosa,  Mallampati score 3    Neck:  Supple, symmetrical, trachea midline. No JVD.  Lungs:   Bilateral basal rhonchi bilaterally, respirations unlabored symmetrical wall movement.    Chest wall:  No tenderness or deformity.    Heart:  Regular rate and rhythm, S1 and S2 normal.  Extremities: Trace edema no clubbing or Cyanosis        Data Review:  All labs (24hrs): No results found for this or any previous visit (from the past 24 hours).     Imaging:  Mammo Diagnostic Digital Tomosynthesis Right With CAD, US Breast Right Limited  DIAGNOSTIC RIGHT-SIDED MAMMOGRAM WITH R2 COMPUTER ASSISTED DETECTION AND  DIGITAL TOMOSYNTHESIS AND LIMITED DIRECTED RIGHT BREAST ULTRASOUND     HISTORY: Focal asymmetric density in middle third of upper outer right  breast noted on screening mammogram dated 07/01/2024.     The patient returned for diagnostic evaluation with coned compression  views in both projections and R2 computer assisted detection and digital  tomosynthesis combined with limited directed right breast ultrasound.  There is a persistent asymmetric density with coned compression and  digital tomosynthesis and having a vaguely nodular contour. There is no  architectural  distortion. The directed right breast ultrasound  demonstrates a small ovoid cyst located at 9:30, 6 cm from the nipple  and measuring 5 x 3 x 6 mm that likely corresponds to the abnormality on  mammogram. No other abnormality is seen.     CONCLUSION: Benign right-sided mammogram and directed right breast  ultrasound. Continued annual mammography is recommended.      BI-RADS CATEGORY 2: Benign findings.     This report was finalized on 7/30/2024 10:09 AM by Dr. Shekhar Regan M.D on Workstation: [id number]          ASSESSMENT:  Diagnoses and all orders for this visit:    Obstructive sleep apnea syndrome    Acquired hypothyroidism    Essential hypertension        PLAN:       This patient with obstructive sleep apnea, compliance is improved. Encourage to use it more frequent, I re-emphasized on pt the long and short term benefit of treating SANA. The device is benefiting the patient.  The patient is also instructed to get the CPAP supplies from the [employer] and see me in 1 year for follow-up.    Treating SANA will improve BP control    It is recommended to keep thyroid function optimized to improve quality of sleep    Follow-up 1 year    Jone Seth MD. D, ABSM.  12/10/2024  12:17 EST

## 2024-12-13 RX ORDER — LEVOTHYROXINE SODIUM 50 UG/1
50 TABLET ORAL DAILY
Qty: 90 TABLET | Refills: 3 | Status: SHIPPED | OUTPATIENT
Start: 2024-12-13

## 2024-12-13 RX ORDER — ATORVASTATIN CALCIUM 20 MG/1
20 TABLET, FILM COATED ORAL DAILY
Qty: 90 TABLET | Refills: 3 | Status: SHIPPED | OUTPATIENT
Start: 2024-12-13

## 2024-12-13 NOTE — TELEPHONE ENCOUNTER
Switching from [facility] to [facility]   Rx Refill Note  Requested Prescriptions     Pending Prescriptions Disp Refills    levothyroxine (SYNTHROID, LEVOTHROID) 50 MCG tablet 90 tablet 3     Sig: Take 1 tablet by mouth Daily.    atorvastatin (LIPITOR) 20 MG tablet 90 tablet 3     Sig: Take 1 tablet by mouth Daily.      Last office visit with prescribing clinician: 2/21/2024   Last telemedicine visit with prescribing clinician: 6/18/2024   Next office visit with prescribing clinician: Visit date not found                         Would you like a call back once the refill request has been completed: [] Yes [] No    If the office needs to give you a call back, can they leave a voicemail: [] Yes [] No    My Fitzpatrick MA  12/13/24, 11:37 EST

## 2024-12-17 NOTE — Clinical Note
.    [facility]  Infectious Diseases Progress Note      Francisco Javier Espino Patient Status:  Inpatient    1979 MRN [id number]   Location [facility] [address] 43 Attending Sherwin Freire MD   Hosp Day # 6 PCP Pcp, No       ANTIMICROBIAL THERAPY:   Piperacillin/Tazobactam 12/10/11  Vancomycin (PO) -  metronidazole     Subjective   History of Present Illness:  Francisco Javier Espino is a a(n) 45 year old male.   Subjective:   Thinks that his abdominal discomfort has improved  Still feeling distended  Had a bowel movement that was initially solid but was followed by very thin yellow stool  He has had no fevers or chills  No abdominal pain. He has been eating. Not really feeling hungry but ordering food because he thinks he needs to.       Allergies:  ALLERGIES:  No Known Allergies    Objective   Vital signs: Blood pressure 101/70, pulse 94, temperature 99.3 °F (37.4 °C), temperature source Oral, resp. rate 16, height 6' (1.829 m), weight 76.2 kg (167 lb 15.9 oz), SpO2 98%.  Temp (24hrs), Av.4 °F (36.9 °C), Min:97.6 °F (36.4 °C), Max:99.3 °F (37.4 °C)       Appears improved. Better groomed. More alert and engaging  PERRL. Mild jaundice  OP clear, moist membranes  Neck supple. No LAD  Heart s1 s2 RRR  Lungs clear on anterior auscultation  Abdomen distended, tympanic. Bowel sounds present.  Bilateral lower extremity swelling     Laboratory Data:  129 102 7 98   4.0 21 0.59      9.6 8.8 167    25.6      Lab Results   Component Value Date    LEUKA 1 to 5 12/10/2024    UNITR Negative 12/10/2024       Lab Results   Component Value Date    BLC No Growth 5 Days. 2024    BLC No Growth 5 Days. 2024     No results found for: \"URC\"     Imaging:  No results found for any visits on 12/10/24 (from the past 48 hour(s)).      Assessment & Plan   ASSESSMENT:    45 year old man with past medical hx of alcohol use disorder who was admitted 12/10/2024 with constipation followed by watery diarrhea. He had been having  Refer Kelli nIx derm-vulvar rash ongoing weakness for several weeks. Also had weight loss according to his partner but no according to him. Also complained of fatigue along with shortness of breath. Pt had fever, mild leukocytosis, increased biliT and increased LFTs that at the time were attributed to his alcohol use.  Stool was tested for cdiff and came back PCR positive but EIA negative.     Workup so far included neg HIV. + Monospot with EBV serologies c/w prior infection. CMV IgM and IgG positive with a significantly increased viral load. Acute hepatitis panel was negative.      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 Since he reports feeling better and is having less diarrhea, would hold from starting valganciclovir for now; though may reconsider depending on how he continues to do.  Labs continue to improve, with decreasing total bili     # Cdiff  + PCR  - EIA  Most likely colonization  Diarrhea has improved   ? Due to Vancomycin   ? Due to expected progression of CMV colitis on immunocompetent host.        PLAN:  - continue po Vancomycin 125 mg every 6 hrs. Plan 10 day treatment course. EOT 12/20/24  - continue to follow fever curve, wbc, malaise  - continue to follow LFTS      We will continue to follow.    Mehran Aguirre MD  [facility], [facility]  [phone]  12/17/2024  1:22 PM

## 2024-12-21 ENCOUNTER — APPOINTMENT (OUTPATIENT)
Dept: CT IMAGING | Facility: HOSPITAL | Age: 59
End: 2024-12-21
Payer: COMMERCIAL

## 2024-12-21 ENCOUNTER — HOSPITAL ENCOUNTER (EMERGENCY)
Facility: HOSPITAL | Age: 59
Discharge: HOME | End: 2024-12-22
Attending: STUDENT IN AN ORGANIZED HEALTH CARE EDUCATION/TRAINING PROGRAM
Payer: COMMERCIAL

## 2024-12-21 DIAGNOSIS — R74.8 SERUM LIPASE ELEVATION: ICD-10-CM

## 2024-12-21 DIAGNOSIS — R33.9 URINARY RETENTION: Primary | ICD-10-CM

## 2024-12-21 DIAGNOSIS — N30.01 ACUTE CYSTITIS WITH HEMATURIA: ICD-10-CM

## 2024-12-21 LAB
ALBUMIN SERPL-MCNC: 4.2 G/DL (ref 3.5–5.2)
ALBUMIN/GLOB SERPL: 1.4 G/DL
ALP SERPL-CCNC: 85 U/L (ref 39–117)
ALT SERPL W P-5'-P-CCNC: 14 U/L (ref 1–33)
ANION GAP SERPL CALCULATED.3IONS-SCNC: 11.6 MMOL/L (ref 5–15)
AST SERPL-CCNC: 19 U/L (ref 1–32)
BACTERIA UR QL AUTO: ABNORMAL /HPF
BASOPHILS # BLD AUTO: 0.04 10*3/MM3 (ref 0–0.2)
BASOPHILS NFR BLD AUTO: 0.5 % (ref 0–1.5)
BILIRUB SERPL-MCNC: 0.4 MG/DL (ref 0–1.2)
BILIRUB UR QL STRIP: ABNORMAL
BUN SERPL-MCNC: 19 MG/DL (ref 8–23)
BUN/CREAT SERPL: 20 (ref 7–25)
CALCIUM SPEC-SCNC: 9.1 MG/DL (ref 8.6–10.5)
CHLORIDE SERPL-SCNC: 104 MMOL/L (ref 98–107)
CLARITY UR: ABNORMAL
CO2 SERPL-SCNC: 21.4 MMOL/L (ref 22–29)
COLOR UR: ABNORMAL
CREAT SERPL-MCNC: 0.95 MG/DL (ref 0.57–1)
DEPRECATED RDW RBC AUTO: 36.3 FL (ref 37–54)
EGFRCR SERPLBLD CKD-EPI 2021: 68.7 ML/MIN/1.73
EOSINOPHIL # BLD AUTO: 0.22 10*3/MM3 (ref 0–0.4)
EOSINOPHIL NFR BLD AUTO: 2.6 % (ref 0.3–6.2)
ERYTHROCYTE [DISTWIDTH] IN BLOOD BY AUTOMATED COUNT: 11.9 % (ref 12.3–15.4)
GLOBULIN UR ELPH-MCNC: 2.9 GM/DL
GLUCOSE SERPL-MCNC: 105 MG/DL (ref 65–99)
GLUCOSE UR STRIP-MCNC: ABNORMAL MG/DL
HCT VFR BLD AUTO: 37.4 % (ref 34–46.6)
HGB BLD-MCNC: 12.6 G/DL (ref 12–15.9)
HGB UR QL STRIP.AUTO: ABNORMAL
HYALINE CASTS UR QL AUTO: ABNORMAL /LPF
IMM GRANULOCYTES # BLD AUTO: 0.01 10*3/MM3 (ref 0–0.05)
IMM GRANULOCYTES NFR BLD AUTO: 0.1 % (ref 0–0.5)
KETONES UR QL STRIP: ABNORMAL
LEUKOCYTE ESTERASE UR QL STRIP.AUTO: ABNORMAL
LIPASE SERPL-CCNC: 295 U/L (ref 13–60)
LYMPHOCYTES # BLD AUTO: 0.93 10*3/MM3 (ref 0.7–3.1)
LYMPHOCYTES NFR BLD AUTO: 11.2 % (ref 19.6–45.3)
MCH RBC QN AUTO: 28.7 PG (ref 26.6–33)
MCHC RBC AUTO-ENTMCNC: 33.7 G/DL (ref 31.5–35.7)
MCV RBC AUTO: 85.2 FL (ref 79–97)
MONOCYTES # BLD AUTO: 0.99 10*3/MM3 (ref 0.1–0.9)
MONOCYTES NFR BLD AUTO: 11.9 % (ref 5–12)
NEUTROPHILS NFR BLD AUTO: 6.14 10*3/MM3 (ref 1.7–7)
NEUTROPHILS NFR BLD AUTO: 73.7 % (ref 42.7–76)
NITRITE UR QL STRIP: POSITIVE
NRBC BLD AUTO-RTO: 0 /100 WBC (ref 0–0.2)
PH UR STRIP.AUTO: 8.5 [PH] (ref 4.5–8)
PLATELET # BLD AUTO: 213 10*3/MM3 (ref 140–450)
PMV BLD AUTO: 8.8 FL (ref 6–12)
POTASSIUM SERPL-SCNC: 4 MMOL/L (ref 3.5–5.2)
PROT SERPL-MCNC: 7.1 G/DL (ref 6–8.5)
PROT UR QL STRIP: ABNORMAL
RBC # BLD AUTO: 4.39 10*6/MM3 (ref 3.77–5.28)
RBC # UR STRIP: ABNORMAL /HPF
REF LAB TEST METHOD: ABNORMAL
SODIUM SERPL-SCNC: 137 MMOL/L (ref 136–145)
SP GR UR STRIP: 1.02 (ref 1–1.03)
SQUAMOUS #/AREA URNS HPF: ABNORMAL /HPF
TRI-PHOS CRY URNS QL MICRO: ABNORMAL /HPF
UROBILINOGEN UR QL STRIP: ABNORMAL
WBC # UR STRIP: ABNORMAL /HPF
WBC NRBC COR # BLD AUTO: 8.33 10*3/MM3 (ref 3.4–10.8)

## 2024-12-21 PROCEDURE — 25510000001 IOPAMIDOL PER 1 ML: Performed by: STUDENT IN AN ORGANIZED HEALTH CARE EDUCATION/TRAINING PROGRAM

## 2024-12-21 PROCEDURE — 25010000002 ONDANSETRON PER 1 MG: Performed by: STUDENT IN AN ORGANIZED HEALTH CARE EDUCATION/TRAINING PROGRAM

## 2024-12-21 PROCEDURE — 96365 THER/PROPH/DIAG IV INF INIT: CPT

## 2024-12-21 PROCEDURE — 25010000002 CEFTRIAXONE SODIUM 1 G RECONSTITUTED SOLUTION 1 EACH VIAL: Performed by: STUDENT IN AN ORGANIZED HEALTH CARE EDUCATION/TRAINING PROGRAM

## 2024-12-21 PROCEDURE — 83690 ASSAY OF LIPASE: CPT | Performed by: STUDENT IN AN ORGANIZED HEALTH CARE EDUCATION/TRAINING PROGRAM

## 2024-12-21 PROCEDURE — 99285 EMERGENCY DEPT VISIT HI MDM: CPT | Performed by: STUDENT IN AN ORGANIZED HEALTH CARE EDUCATION/TRAINING PROGRAM

## 2024-12-21 PROCEDURE — 81001 URINALYSIS AUTO W/SCOPE: CPT | Performed by: STUDENT IN AN ORGANIZED HEALTH CARE EDUCATION/TRAINING PROGRAM

## 2024-12-21 PROCEDURE — 87086 URINE CULTURE/COLONY COUNT: CPT | Performed by: STUDENT IN AN ORGANIZED HEALTH CARE EDUCATION/TRAINING PROGRAM

## 2024-12-21 PROCEDURE — 51798 US URINE CAPACITY MEASURE: CPT

## 2024-12-21 PROCEDURE — 96375 TX/PRO/DX INJ NEW DRUG ADDON: CPT

## 2024-12-21 PROCEDURE — 74177 CT ABD & PELVIS W/CONTRAST: CPT

## 2024-12-21 PROCEDURE — 85025 COMPLETE CBC W/AUTO DIFF WBC: CPT | Performed by: STUDENT IN AN ORGANIZED HEALTH CARE EDUCATION/TRAINING PROGRAM

## 2024-12-21 PROCEDURE — 80053 COMPREHEN METABOLIC PANEL: CPT | Performed by: STUDENT IN AN ORGANIZED HEALTH CARE EDUCATION/TRAINING PROGRAM

## 2024-12-21 PROCEDURE — 51702 INSERT TEMP BLADDER CATH: CPT

## 2024-12-21 RX ORDER — ACETAMINOPHEN 500 MG
1000 TABLET ORAL ONCE
Status: COMPLETED | OUTPATIENT
Start: 2024-12-21 | End: 2024-12-21

## 2024-12-21 RX ORDER — MELOXICAM 7.5 MG/1
15 TABLET ORAL DAILY
Qty: 14 TABLET | Refills: 0 | Status: SHIPPED | OUTPATIENT
Start: 2024-12-21 | End: 2024-12-28

## 2024-12-21 RX ORDER — IOPAMIDOL 755 MG/ML
100 INJECTION, SOLUTION INTRAVASCULAR
Status: COMPLETED | OUTPATIENT
Start: 2024-12-21 | End: 2024-12-21

## 2024-12-21 RX ORDER — ONDANSETRON 2 MG/ML
4 INJECTION INTRAMUSCULAR; INTRAVENOUS ONCE
Status: COMPLETED | OUTPATIENT
Start: 2024-12-21 | End: 2024-12-21

## 2024-12-21 RX ORDER — ACETAMINOPHEN 500 MG
1000 TABLET ORAL EVERY 6 HOURS PRN
Qty: 30 TABLET | Refills: 0 | Status: SHIPPED | OUTPATIENT
Start: 2024-12-21 | End: 2024-12-28

## 2024-12-21 RX ORDER — CEPHALEXIN 500 MG/1
500 CAPSULE ORAL 4 TIMES DAILY
Qty: 28 CAPSULE | Refills: 0 | Status: SHIPPED | OUTPATIENT
Start: 2024-12-21

## 2024-12-21 RX ADMIN — IOPAMIDOL 100 ML: 755 INJECTION, SOLUTION INTRAVENOUS at 22:52

## 2024-12-21 RX ADMIN — CEFTRIAXONE SODIUM 1000 MG: 1 INJECTION, POWDER, FOR SOLUTION INTRAVENOUS at 21:40

## 2024-12-21 RX ADMIN — ACETAMINOPHEN 1000 MG: 500 TABLET ORAL at 21:40

## 2024-12-21 RX ADMIN — ONDANSETRON 4 MG: 2 INJECTION INTRAMUSCULAR; INTRAVENOUS at 21:38

## 2024-12-22 VITALS
OXYGEN SATURATION: 97 % | BODY MASS INDEX: 29.35 KG/M2 | RESPIRATION RATE: 16 BRPM | WEIGHT: 205 LBS | TEMPERATURE: 97.4 F | HEART RATE: 99 BPM | HEIGHT: 70 IN | DIASTOLIC BLOOD PRESSURE: 80 MMHG | SYSTOLIC BLOOD PRESSURE: 146 MMHG

## 2024-12-22 NOTE — ED PROVIDER NOTES
Subjective   History of Present Illness    Review of Systems    Past Medical History:   Diagnosis Date    Bipolar disorder     Depression     Hematuria of unknown cause     Hyperlipidemia     Hypertension     Hypothyroidism     Kidney stone     OA (osteoarthritis) of knee     left    PONV (postoperative nausea and vomiting)     Sleep apnea     uses cpap       Allergies   Allergen Reactions    Sulfa Antibiotics Rash     fever  fever  fever       Past Surgical History:   Procedure Laterality Date    BLADDER SUSPENSION      TVT    CATARACT EXTRACTION      COLONOSCOPY      D & C HYSTEROSCOPY MYOSURE N/A 05/04/2021    Procedure: DILATATION AND CURETTAGE HYSTEROSCOPY with MYOSURE;  Surgeon: Trinity Clark MD;  Location: [facility];  Service: Obstetrics/Gynecology;  Laterality: N/A;    KIDNEY STONE SURGERY      KNEE MENISCAL REPAIR Bilateral     REPLACEMENT TOTAL KNEE Right        Family History   Problem Relation Age of Onset    Cancer Mother         lung    Heart attack Father     Colon cancer Maternal Grandmother     Breast cancer Neg Hx     Ovarian cancer Neg Hx     Pulmonary embolism Neg Hx     Deep vein thrombosis Neg Hx     Malig Hyperthermia Neg Hx        Social History     Socioeconomic History    Marital status:    Tobacco Use    Smoking status: Never     Passive exposure: Never    Smokeless tobacco: Never   Vaping Use    Vaping status: Never Used   Substance and Sexual Activity    Alcohol use: No    Drug use: No    Sexual activity: Yes     Partners: Male     Birth control/protection: Post-menopausal, Vasectomy     Comment: vas           Objective   Physical Exam    Procedures           ED Course  ED Course as of 12/21/24 2345   Sat Dec 21, 2024   2310 Layton JIMENEZ, accept care of patient.  Patient currently has CT scan pending.  Otherwise stable.  Patient will likely go home and lessers acute abnormality on CT.  No current complaints. [AW]      ED Course User Index  [AW] Layton Rod,  MD                                                       Medical Decision Making  CT Abdomen Pelvis With Contrast    Result Date: 12/21/2024  Impression: 1.Multiple urinary bladder stones and evidence of cystitis. 2.Mild bilateral hydronephrosis. 3.Punctate nonobstructing right-sided kidney stone. 4.Small hiatal hernia. Electronically Signed: Rei Coon MD  12/21/2024 11:32 PM EST  Workstation ID: [id number]    Labs Reviewed  URINALYSIS W/ CULTURE IF INDICATED - Abnormal; Notable for the following components:     Color, UA                     Orange (*)               Appearance, UA                  (*)                  pH, UA                        8.5 (*)                Glucose, UA                     (*)                  Ketones, UA                     (*)                  Bilirubin, UA                 Small (1+) (*)               Blood, UA                     Large (3+) (*)               Protein, UA                     (*)                  Leuk Esterase, UA             Large (3+) (*)               Nitrite, UA                   Positive (*)               Urobilinogen, UA              4.0 E.U./dL (*)            All other components within normal limits         Narrative: In absence of clinical symptoms, the presence of pyuria, bacteria, and/or nitrites on the urinalysis result does not correlate with infection.  COMPREHENSIVE METABOLIC PANEL - Abnormal; Notable for the following components:     Glucose                       105 (*)                CO2                           21.4 (*)            All other components within normal limits         Narrative: GFR Categories in Chronic Kidney Disease (CKD)                                      GFR Category          GFR (mL/min/1.73)    Interpretation                  G1                     90 or greater         Normal or high (1)                  G2                      60-89                Mild decrease (1)                  G3a                   45-59                 Mild to moderate decrease                  G3b                   30-44                Moderate to severe decrease                  G4                    15-29                Severe decrease                  G5                    14 or less           Kidney failure                                          (1)In the absence of evidence of kidney disease, neither GFR category G1 or G2 fulfill the criteria for CKD.                                    eGFR calculation 2021 CKD-EPI creatinine equation, which does not include race as a factor  LIPASE - Abnormal; Notable for the following components:     Lipase                        295 (*)             All other components within normal limits  CBC WITH AUTO DIFFERENTIAL - Abnormal; Notable for the following components:     RDW                           11.9 (*)               RDW-SD                        36.3 (*)               Lymphocyte %                  11.2 (*)               Monocytes, Absolute           0.99 (*)            All other components within normal limits  URINALYSIS, MICROSCOPIC ONLY - Abnormal; Notable for the following components:     RBC, UA                         (*)                  WBC, UA                       6-10 (*)            All other components within normal limits  URINE CULTURE  CBC AND DIFFERENTIAL    2344 Pt seen again prior to d/c.  Imaging reviewed and are unremarkable.  Labs show elevated levels of l lipase but no evidence of any abnormalities in CT.  Patient also has elevated levels of leuk esterase and positive nitrates so we will cover for additional UTI with Keflex.  Patient did have some cystitis findings on CT as well.  Symptoms improved and pt feels better, vitals stable and pt. in NAD. Non-toxic. Comfortable.  Tolerating po.  Relaxed breathing.  All questions personally answered at the bedside and all d/c instructions personally reviewed with pt.  Discussed the importance of close outpt. f/u and pt. understands this and agrees to  do so.  Pt agrees to return to ED immediately for any new, persistent, or worsening symptoms.    EMR Dragon/Transcription disclaimer:  Much of this encounter note is an electronic transcription/translation of spoken language to printed text, aka voice recognition.  The electronic translation of spoken language may permit erroneous or at times nonsensical words or phrases to be inadvertently transcribed; although I have reviewed the note for such errors, some may still exist so please interpret based on surrounding text content.      Problems Addressed:  Acute cystitis with hematuria: complicated acute illness or injury  Serum lipase elevation: complicated acute illness or injury  Urinary retention: complicated acute illness or injury    Amount and/or Complexity of Data Reviewed  Labs: ordered.  Radiology: ordered.    Risk  OTC drugs.  Prescription drug management.        Final diagnoses:   Urinary retention   Serum lipase elevation   Acute cystitis with hematuria       ED Disposition  ED Disposition       ED Disposition   Discharge    Condition   Stable    Comment   --               Graciela Cadean MD  [address] [facility] [address]  [facility] [id number] [phone]    In 2 days      -  Felix Godinez MD - Urologist  [facility]  [address] [place] [address]  [phone]             Medication List        New Prescriptions      acetaminophen 500 MG tablet  Commonly known as: TYLENOL  Take 2 tablets by mouth Every 6 (Six) Hours As Needed for Mild Pain for up to 7 days.            Changed      busPIRone 15 MG tablet  Commonly known as: BUSPAR  TAKE 1 TABLET THREE TIMES A DAY  What changed:   how much to take  additional instructions     cephalexin 500 MG capsule  Commonly known as: KEFLEX  Take 1 capsule by mouth 4 (Four) Times a Day.  What changed:   medication strength  how much to take  when to take this     meloxicam 7.5 MG tablet  Commonly known as: MOBIC  Take 2  tablets by mouth Daily for 7 days.  What changed: medication strength               Where to Get Your Medications        These medications were sent to [facility] - [place] - [address] - [phone]  - [phone]   [address], [place] [address]      Phone: [phone]   acetaminophen 500 MG tablet  cephalexin 500 MG capsule  meloxicam 7.5 MG tablet            Layton Rod MD  12/21/24 4158

## 2024-12-22 NOTE — ED PROVIDER NOTES
Subjective   History of Present Illness  59 female history of bipolar disorder, kidney stones presents to the emergency room chief complaint of 1 wk intermittent dysuria associated symptoms urinary urgency, nausea w/o emesis occurring at under the context of 1 day of 2 doses of Macrobid p.o. States she hasn't had a large urination since Thursday.  Denies flank pain, syncope. ROS otherwise neg. Vitals are wnl. On exam pt well-appearing, ctab. Non-ttp abd no flank ttp. 400 ccs of urine PVR on bladder scan.  Review of Systems    Past Medical History:   Diagnosis Date    Bipolar disorder     Depression     Hematuria of unknown cause     Hyperlipidemia     Hypertension     Hypothyroidism     Kidney stone     OA (osteoarthritis) of knee     left    PONV (postoperative nausea and vomiting)     Sleep apnea     uses cpap       Allergies   Allergen Reactions    Sulfa Antibiotics Rash     fever  fever  fever       Past Surgical History:   Procedure Laterality Date    BLADDER SUSPENSION      TVT    CATARACT EXTRACTION      COLONOSCOPY      D & C HYSTEROSCOPY MYOSURE N/A 05/04/2021    Procedure: DILATATION AND CURETTAGE HYSTEROSCOPY with MYOSURE;  Surgeon: Trinity Clark MD;  Location: [facility];  Service: Obstetrics/Gynecology;  Laterality: N/A;    KIDNEY STONE SURGERY      KNEE MENISCAL REPAIR Bilateral     REPLACEMENT TOTAL KNEE Right        Family History   Problem Relation Age of Onset    Cancer Mother         lung    Heart attack Father     Colon cancer Maternal Grandmother     Breast cancer Neg Hx     Ovarian cancer Neg Hx     Pulmonary embolism Neg Hx     Deep vein thrombosis Neg Hx     Malig Hyperthermia Neg Hx        Social History     Socioeconomic History    Marital status:    Tobacco Use    Smoking status: Never     Passive exposure: Never    Smokeless tobacco: Never   Vaping Use    Vaping status: Never Used   Substance and Sexual Activity    Alcohol use: No    Drug use: No    Sexual activity: Yes      Partners: Male     Birth control/protection: Post-menopausal, Vasectomy     Comment: vas           Objective   Physical Exam    Procedures           ED Course  ED Course as of 12/22/24 0710   Sat Dec 21, 2024   2310 I, Layton Rod, accept care of patient.  Patient currently has CT scan pending.  Otherwise stable.  Patient will likely go home and lessers acute abnormality on CT.  No current complaints. [AW]      ED Course User Index  [AW] Layton Rod MD                                                       Medical Decision Making  59 female history of bipolar disorder, kidney stones presents to the emergency room chief complaint of 1 wk intermittent dysuria associated symptoms urinary urgency, nausea w/o emesis occurring at under the context of 1 day of 2 doses of Macrobid p.o. States she hasn't had a large urination since Thursday.  Hx frequent uti that usually resolve w/ keflex po. Denies flank pain, syncope. ROS otherwise neg. Vitals are wnl. On exam pt well-appearing, ctab. Non-ttp abd no flank ttp. 250 ccs of urine PVR on bladder scan.    Concern for uti, urinary retention; consideration decreased renal function eval with abd labs, ua, tx w/ ceftriaxone IV, UA + Ucs, indwelling mccurdy catheter placement urology fu. Dr. Godinez is pt urologist for frequent UTIs.    UA unable to interpret given use of phenazopyridine.    No rbcs or bacteria seen.    10 pm sx improved. Non-ttp epigastrium, Lipase level 295, no transaminitis; we don't have US at this time; eval with ct abd/pelv with contrast.    S/o oncoming er team Dr. Rod pending CT scan  Hst pt  Dsp. home    Final diagnoses:   Urinary retention   Serum lipase elevation   Acute cystitis with hematuria       ED Disposition  ED Disposition       ED Disposition   Discharge    Condition   Stable    Comment   --               Graciela Cadena MD  [address]  [address]  [place] [id number] [phone]    In 2 days      -  Felix  Mj Godinez MD - Urologist  [facility]  [address]  [place] [id number] [phone]             Medication List        New Prescriptions      acetaminophen 500 MG tablet  Commonly known as: TYLENOL  Take 2 tablets by mouth Every 6 (Six) Hours As Needed for Mild Pain for up to 7 days.            Changed      busPIRone 15 MG tablet  Commonly known as: BUSPAR  TAKE 1 TABLET THREE TIMES A DAY  What changed:   how much to take  additional instructions     cephalexin 500 MG capsule  Commonly known as: KEFLEX  Take 1 capsule by mouth 4 (Four) Times a Day.  What changed:   medication strength  how much to take  when to take this     meloxicam 7.5 MG tablet  Commonly known as: MOBIC  Take 2 tablets by mouth Daily for 7 days.  What changed: medication strength               Where to Get Your Medications        These medications were sent to [facility] - [place], [place] - [address] - [phone]  - [phone]  [address] [facility] [place] [address]      Phone: [phone]   acetaminophen 500 MG tablet  cephalexin 500 MG capsule  meloxicam 7.5 MG tablet            Cb Marcos MD  12/21/24 2240       Cb Marcos MD  12/21/24 2317       Cb Marcos MD  12/22/24 2866

## 2024-12-23 LAB — BACTERIA SPEC AEROBE CULT: NO GROWTH

## 2024-12-26 NOTE — TELEPHONE ENCOUNTER
Rx Refill Note  Requested Prescriptions     Pending Prescriptions Disp Refills    Tirzepatide-Weight Management (ZEPBOUND) 7.5 MG/0.5ML solution auto-injector 6 mL 1     Sig: Inject 0.5 mL under the skin into the appropriate area as directed 1 (One) Time Per Week.      Last office visit with prescribing clinician: 2/21/2024   Last telemedicine visit with prescribing clinician: Visit date not found   Next office visit with prescribing clinician: Visit date not found                         Would you like a call back once the refill request has been completed: [] Yes [] No    If the office needs to give you a call back, can they leave a voicemail: [] Yes [] No    Dayana Villagomez MA  12/26/24, 14:39 EST

## 2025-01-03 RX ORDER — AMOXICILLIN 500 MG/1
500 CAPSULE ORAL TAKE AS DIRECTED
COMMUNITY
Start: 2024-12-26

## 2025-01-03 NOTE — PAT
PAT call complete. Education provided to the patient on the following:    - Nothing to eat after midnight the night before your procedure, water and black coffee okay up to 2 hours before arrival time.  - You will need to have someone drive you home after your procedure and remain with you for 24 hours after. The  will need to remain on site during your visit.  - Please remove all jewelry, including body piercing's, and leave any valuables at home. Only bring your drivers license and insurance card on day of procedure.  - Wash with antibacterial soap (such as Dial) the night before and morning of procedure.  - Be prepared to provide your last dose of all home medications.  - Coffee and vending available on the 1st and 5th floors; no cafeteria on site.  - You will need to arrive at 1300 on 1/8/25 at [facility] located at [address]. We are located on the 5th floor.  -Please be aware that arrival times may be subject to change up until the day of surgery.   - Feel free to contact us at: [phone] with any additional questions/concerns.    Pt stated her last dose of Zepbound was on 12/28/24-pt aware not to take this Saturday's dose. Instructed pt to hold adderall 48 hours prior to surgery, stop vitamins/supplements starting today, and to take synthroid, wellbutrin, buspar the morning of surgery with a sip of water. Pt verbalized understanding.

## 2025-01-08 ENCOUNTER — ANESTHESIA EVENT (OUTPATIENT)
Age: 60
End: 2025-01-08
Payer: COMMERCIAL

## 2025-01-08 ENCOUNTER — ANESTHESIA (OUTPATIENT)
Age: 60
End: 2025-01-08
Payer: COMMERCIAL

## 2025-01-08 ENCOUNTER — HOSPITAL ENCOUNTER (OUTPATIENT)
Age: 60
Setting detail: HOSPITAL OUTPATIENT SURGERY
Discharge: HOME | End: 2025-01-08
Attending: UROLOGY | Admitting: UROLOGY
Payer: COMMERCIAL

## 2025-01-08 VITALS
RESPIRATION RATE: 18 BRPM | OXYGEN SATURATION: 98 % | TEMPERATURE: 97.7 F | WEIGHT: 212.6 LBS | DIASTOLIC BLOOD PRESSURE: 69 MMHG | HEIGHT: 71 IN | SYSTOLIC BLOOD PRESSURE: 136 MMHG | HEART RATE: 74 BPM | BODY MASS INDEX: 29.76 KG/M2

## 2025-01-08 DIAGNOSIS — N32.9 LESION OF BLADDER: ICD-10-CM

## 2025-01-08 DIAGNOSIS — N21.0 BLADDER CALCULUS: Primary | ICD-10-CM

## 2025-01-08 DIAGNOSIS — N20.1 URETERAL CALCULI: ICD-10-CM

## 2025-01-08 PROCEDURE — 25010000002 CEFAZOLIN PER 500 MG: Performed by: UROLOGY

## 2025-01-08 PROCEDURE — 25010000002 FENTANYL CITRATE (PF) 100 MCG/2ML SOLUTION: Performed by: ANESTHESIOLOGY

## 2025-01-08 PROCEDURE — 52317 REMOVE BLADDER STONE: CPT | Performed by: UROLOGY

## 2025-01-08 PROCEDURE — 25010000002 PROPOFOL 10 MG/ML EMULSION: Performed by: ANESTHESIOLOGY

## 2025-01-08 PROCEDURE — 88305 TISSUE EXAM BY PATHOLOGIST: CPT | Performed by: UROLOGY

## 2025-01-08 PROCEDURE — 52204 CYSTOSCOPY W/BIOPSY(S): CPT | Performed by: UROLOGY

## 2025-01-08 PROCEDURE — 82365 CALCULUS SPECTROSCOPY: CPT | Performed by: UROLOGY

## 2025-01-08 PROCEDURE — 25010000002 ONDANSETRON PER 1 MG: Performed by: ANESTHESIOLOGY

## 2025-01-08 PROCEDURE — 25010000002 LIDOCAINE 2% SOLUTION: Performed by: ANESTHESIOLOGY

## 2025-01-08 PROCEDURE — 25810000003 LACTATED RINGERS PER 1000 ML: Performed by: ANESTHESIOLOGY

## 2025-01-08 PROCEDURE — 25010000002 DEXAMETHASONE SODIUM PHOSPHATE 20 MG/5ML SOLUTION: Performed by: ANESTHESIOLOGY

## 2025-01-08 RX ORDER — LABETALOL HYDROCHLORIDE 5 MG/ML
5 INJECTION, SOLUTION INTRAVENOUS
Status: DISCONTINUED | OUTPATIENT
Start: 2025-01-08 | End: 2025-01-08 | Stop reason: HOSPADM

## 2025-01-08 RX ORDER — PROMETHAZINE HYDROCHLORIDE 12.5 MG/1
25 TABLET ORAL ONCE AS NEEDED
Status: DISCONTINUED | OUTPATIENT
Start: 2025-01-08 | End: 2025-01-08 | Stop reason: HOSPADM

## 2025-01-08 RX ORDER — PROPOFOL 10 MG/ML
VIAL (ML) INTRAVENOUS AS NEEDED
Status: DISCONTINUED | OUTPATIENT
Start: 2025-01-08 | End: 2025-01-08 | Stop reason: SURG

## 2025-01-08 RX ORDER — MELOXICAM 15 MG/1
15 TABLET ORAL DAILY
COMMUNITY
Start: 2024-12-31

## 2025-01-08 RX ORDER — SODIUM CHLORIDE, SODIUM LACTATE, POTASSIUM CHLORIDE, CALCIUM CHLORIDE 600; 310; 30; 20 MG/100ML; MG/100ML; MG/100ML; MG/100ML
INJECTION, SOLUTION INTRAVENOUS CONTINUOUS PRN
Status: DISCONTINUED | OUTPATIENT
Start: 2025-01-08 | End: 2025-01-08 | Stop reason: SURG

## 2025-01-08 RX ORDER — OXYCODONE AND ACETAMINOPHEN 5; 325 MG/1; MG/1
1 TABLET ORAL EVERY 6 HOURS PRN
Qty: 20 TABLET | Refills: 0 | Status: SHIPPED | OUTPATIENT
Start: 2025-01-08

## 2025-01-08 RX ORDER — FLUMAZENIL 0.1 MG/ML
0.2 INJECTION INTRAVENOUS AS NEEDED
Status: DISCONTINUED | OUTPATIENT
Start: 2025-01-08 | End: 2025-01-08 | Stop reason: HOSPADM

## 2025-01-08 RX ORDER — PROMETHAZINE HYDROCHLORIDE 25 MG/1
25 SUPPOSITORY RECTAL ONCE AS NEEDED
Status: DISCONTINUED | OUTPATIENT
Start: 2025-01-08 | End: 2025-01-08 | Stop reason: HOSPADM

## 2025-01-08 RX ORDER — NALOXONE HCL 0.4 MG/ML
0.2 VIAL (ML) INJECTION AS NEEDED
Status: DISCONTINUED | OUTPATIENT
Start: 2025-01-08 | End: 2025-01-08 | Stop reason: HOSPADM

## 2025-01-08 RX ORDER — LIDOCAINE HYDROCHLORIDE 20 MG/ML
INJECTION, SOLUTION INFILTRATION; PERINEURAL AS NEEDED
Status: DISCONTINUED | OUTPATIENT
Start: 2025-01-08 | End: 2025-01-08 | Stop reason: SURG

## 2025-01-08 RX ORDER — FENTANYL CITRATE 50 UG/ML
50 INJECTION, SOLUTION INTRAMUSCULAR; INTRAVENOUS
Status: DISCONTINUED | OUTPATIENT
Start: 2025-01-08 | End: 2025-01-08 | Stop reason: HOSPADM

## 2025-01-08 RX ORDER — DROPERIDOL 2.5 MG/ML
0.62 INJECTION, SOLUTION INTRAMUSCULAR; INTRAVENOUS
Status: DISCONTINUED | OUTPATIENT
Start: 2025-01-08 | End: 2025-01-08 | Stop reason: HOSPADM

## 2025-01-08 RX ORDER — HYDRALAZINE HYDROCHLORIDE 20 MG/ML
5 INJECTION INTRAMUSCULAR; INTRAVENOUS
Status: DISCONTINUED | OUTPATIENT
Start: 2025-01-08 | End: 2025-01-08 | Stop reason: HOSPADM

## 2025-01-08 RX ORDER — ONDANSETRON 2 MG/ML
INJECTION INTRAMUSCULAR; INTRAVENOUS AS NEEDED
Status: DISCONTINUED | OUTPATIENT
Start: 2025-01-08 | End: 2025-01-08 | Stop reason: SURG

## 2025-01-08 RX ORDER — OXYCODONE AND ACETAMINOPHEN 7.5; 325 MG/1; MG/1
1 TABLET ORAL EVERY 4 HOURS PRN
Status: DISCONTINUED | OUTPATIENT
Start: 2025-01-08 | End: 2025-01-08 | Stop reason: HOSPADM

## 2025-01-08 RX ORDER — HYDROMORPHONE HYDROCHLORIDE 1 MG/ML
0.5 INJECTION, SOLUTION INTRAMUSCULAR; INTRAVENOUS; SUBCUTANEOUS
Status: DISCONTINUED | OUTPATIENT
Start: 2025-01-08 | End: 2025-01-08 | Stop reason: HOSPADM

## 2025-01-08 RX ORDER — SCOLOPAMINE TRANSDERMAL SYSTEM 1 MG/1
1 PATCH, EXTENDED RELEASE TRANSDERMAL
Status: DISCONTINUED | OUTPATIENT
Start: 2025-01-08 | End: 2025-01-08 | Stop reason: HOSPADM

## 2025-01-08 RX ORDER — LEVOFLOXACIN 500 MG/1
500 TABLET, FILM COATED ORAL DAILY
Qty: 7 TABLET | Refills: 0 | Status: SHIPPED | OUTPATIENT
Start: 2025-01-08 | End: 2025-01-15

## 2025-01-08 RX ORDER — FENTANYL CITRATE 50 UG/ML
INJECTION, SOLUTION INTRAMUSCULAR; INTRAVENOUS AS NEEDED
Status: DISCONTINUED | OUTPATIENT
Start: 2025-01-08 | End: 2025-01-08 | Stop reason: SURG

## 2025-01-08 RX ORDER — DEXAMETHASONE SODIUM PHOSPHATE 4 MG/ML
INJECTION, SOLUTION INTRA-ARTICULAR; INTRALESIONAL; INTRAMUSCULAR; INTRAVENOUS; SOFT TISSUE AS NEEDED
Status: DISCONTINUED | OUTPATIENT
Start: 2025-01-08 | End: 2025-01-08 | Stop reason: SURG

## 2025-01-08 RX ORDER — MAGNESIUM HYDROXIDE 1200 MG/15ML
LIQUID ORAL AS NEEDED
Status: DISCONTINUED | OUTPATIENT
Start: 2025-01-08 | End: 2025-01-08 | Stop reason: HOSPADM

## 2025-01-08 RX ORDER — OXYCODONE AND ACETAMINOPHEN 5; 325 MG/1; MG/1
1 TABLET ORAL ONCE AS NEEDED
Status: COMPLETED | OUTPATIENT
Start: 2025-01-08 | End: 2025-01-08

## 2025-01-08 RX ORDER — DIPHENHYDRAMINE HYDROCHLORIDE 50 MG/ML
12.5 INJECTION INTRAMUSCULAR; INTRAVENOUS
Status: DISCONTINUED | OUTPATIENT
Start: 2025-01-08 | End: 2025-01-08 | Stop reason: HOSPADM

## 2025-01-08 RX ORDER — HYDROCODONE BITARTRATE AND ACETAMINOPHEN 5; 325 MG/1; MG/1
1 TABLET ORAL ONCE AS NEEDED
Status: DISCONTINUED | OUTPATIENT
Start: 2025-01-08 | End: 2025-01-08 | Stop reason: HOSPADM

## 2025-01-08 RX ORDER — ATROPINE SULFATE 0.4 MG/ML
0.4 INJECTION, SOLUTION INTRAMUSCULAR; INTRAVENOUS; SUBCUTANEOUS ONCE AS NEEDED
Status: DISCONTINUED | OUTPATIENT
Start: 2025-01-08 | End: 2025-01-08 | Stop reason: HOSPADM

## 2025-01-08 RX ORDER — ONDANSETRON 2 MG/ML
4 INJECTION INTRAMUSCULAR; INTRAVENOUS ONCE AS NEEDED
Status: DISCONTINUED | OUTPATIENT
Start: 2025-01-08 | End: 2025-01-08 | Stop reason: HOSPADM

## 2025-01-08 RX ADMIN — SODIUM CHLORIDE, SODIUM LACTATE, POTASSIUM CHLORIDE, AND CALCIUM CHLORIDE: .6; .31; .03; .02 INJECTION, SOLUTION INTRAVENOUS at 13:48

## 2025-01-08 RX ADMIN — OXYCODONE HYDROCHLORIDE AND ACETAMINOPHEN 1 TABLET: 5; 325 TABLET ORAL at 14:55

## 2025-01-08 RX ADMIN — ONDANSETRON 4 MG: 2 INJECTION INTRAMUSCULAR; INTRAVENOUS at 13:55

## 2025-01-08 RX ADMIN — SCOPOLAMINE 1 PATCH: 1.5 PATCH, EXTENDED RELEASE TRANSDERMAL at 13:26

## 2025-01-08 RX ADMIN — SODIUM CHLORIDE 2000 MG: 900 INJECTION INTRAVENOUS at 13:42

## 2025-01-08 RX ADMIN — FENTANYL CITRATE 50 MCG: 50 INJECTION, SOLUTION INTRAMUSCULAR; INTRAVENOUS at 13:55

## 2025-01-08 RX ADMIN — PROPOFOL 150 MG: 10 INJECTION, EMULSION INTRAVENOUS at 13:51

## 2025-01-08 RX ADMIN — DEXAMETHASONE SODIUM PHOSPHATE 8 MG: 4 INJECTION, SOLUTION INTRAMUSCULAR; INTRAVENOUS at 13:55

## 2025-01-08 RX ADMIN — LIDOCAINE HYDROCHLORIDE 100 MG: 20 INJECTION, SOLUTION INFILTRATION; PERINEURAL at 13:51

## 2025-01-08 NOTE — H&P
[facility] Surgical History and Physical    Patient Care Team:  Graciela Cadena MD as PCP - General  Graciela Cadena MD as PCP - Family Medicine  Trinity Clark MD as Consulting Physician (Obstetrics and Gynecology)    Chief complaint bladder stones    Subjective     Patient is a 59 y.o. female presents with multiple bladder stones.  History of hematuria.  History of recurrent urinary tract infections.  Prior TVT procedure.  Cytology and cultures have been negative.  CT scan shows multiple stones in the bladder.  She now presents for extraction.  Having mild irritable urinary symptoms.     Review of Systems   Pertinent items are noted in HPI    Past Medical History:   Diagnosis Date    Bipolar disorder     Bladder stone 12/2024    Depression     Hematuria of unknown cause     Hyperlipidemia     Hypertension     Hypothyroidism     Kidney stone     OA (osteoarthritis) of knee     left    PONV (postoperative nausea and vomiting)     Sleep apnea     uses cpap     Past Surgical History:   Procedure Laterality Date    BLADDER SUSPENSION      TVT    CATARACT EXTRACTION      COLONOSCOPY      D & C HYSTEROSCOPY MYOSURE N/A 05/04/2021    Procedure: DILATATION AND CURETTAGE HYSTEROSCOPY with MYOSURE;  Surgeon: Trinity Clark MD;  Location: [facility];  Service: Obstetrics/Gynecology;  Laterality: N/A;    KIDNEY STONE SURGERY      KNEE MENISCAL REPAIR Bilateral     REPLACEMENT TOTAL KNEE Right     REPLACEMENT TOTAL KNEE Left      Family History   Problem Relation Age of Onset    Cancer Mother         lung    Heart attack Father     Colon cancer Maternal Grandmother     Breast cancer Neg Hx     Ovarian cancer Neg Hx     Pulmonary embolism Neg Hx     Deep vein thrombosis Neg Hx     Malig Hyperthermia Neg Hx      Social History     Tobacco Use    Smoking status: Never     Passive exposure: Never    Smokeless tobacco: Never   Vaping Use    Vaping status: Never Used   Substance Use  Topics    Alcohol use: No    Drug use: No       Meds:  Medications Prior to Admission   Medication Sig Dispense Refill Last Dose/Taking    amphetamine-dextroamphetamine (Adderall) 15 MG tablet Take 1 tablet by mouth 2 (Two) Times a Day. (Patient taking differently: Take 1 tablet by mouth 2 (Two) Times a Day As Needed.) 60 tablet 0 Past Month    atorvastatin (LIPITOR) 20 MG tablet Take 1 tablet by mouth Daily. (Patient taking differently: Take 1 tablet by mouth Every Night.) 90 tablet 3 1/7/2025 Bedtime    buPROPion XL (WELLBUTRIN XL) 300 MG 24 hr tablet Take 1 tablet by mouth Every Morning. 90 tablet 3 1/8/2025 at  9:00 AM    busPIRone (BUSPAR) 15 MG tablet TAKE 1 TABLET THREE TIMES A DAY (Patient taking differently: Take 1-2 tablets by mouth Take As Directed. 30 mg in the morning and 15 mg at bedtime) 270 tablet 3 1/8/2025 at  9:00 AM    cephalexin (KEFLEX) 500 MG capsule Take 1 capsule by mouth 4 (Four) Times a Day. (Patient taking differently: Take 250 mg by mouth Every Night.) 28 capsule 0 1/7/2025 Bedtime    FIBER PO Take 2 capsules by mouth Daily.   1/7/2025 Bedtime    levothyroxine (SYNTHROID, LEVOTHROID) 50 MCG tablet Take 1 tablet by mouth Daily. 90 tablet 3 1/8/2025    meloxicam (MOBIC) 15 MG tablet Take 1 tablet by mouth Daily.   1/8/2025    terazosin (HYTRIN) 1 MG capsule TAKE 1 CAPSULE DAILY 90 capsule 3 1/8/2025    amoxicillin (AMOXIL) 500 MG capsule Take 1 capsule by mouth Take As Directed. PRIOR TO DENTAL PROCEDURES   More than a month    cetirizine (zyrTEC) 10 MG tablet Take 1 tablet by mouth Daily.   1/3/2025    CHOLECALCIFEROL PO Take 1 tablet by mouth Daily.   1/3/2025    Coenzyme Q10 (CO Q-10) 100 MG capsule Take 100 mg by mouth Daily.   1/3/2025    Green Tea, Luba sinensis, (GREEN TEA EXTRACT PO) Take 1 tablet by mouth Daily.   1/3/2025    Omega-3 Fatty Acids (fish oil) 1000 MG capsule capsule Take 1 capsule by mouth Daily With Breakfast.   1/3/2025    Potassium Gluconate 550 MG tablet Take  "550 mg by mouth every night at bedtime.   1/3/2025    Probiotic Product (PROBIOTIC DAILY PO) Take 1 capsule by mouth Daily.   1/3/2025    sennosides-docusate (PERICOLACE) 8.6-50 MG per tablet Take 1 tablet by mouth Daily. 1-2 tablets by mouth daily   1/3/2025    Tirzepatide-Weight Management (ZEPBOUND) 7.5 MG/0.5ML solution auto-injector Inject 0.5 mL under the skin into the appropriate area as directed 1 (One) Time Per Week. (Patient taking differently: Inject 0.5 mL under the skin into the appropriate area as directed 1 (One) Time Per Week. SATURDAY) 6 mL 1 12/28/2024       Allergies:  Sulfa antibiotics    Debilities:  None    Objective     Vital Signs  Temp:  [98 °F (36.7 °C)] 98 °F (36.7 °C)  Heart Rate:  [98] 98  Resp:  [16] 16  BP: (130)/(66) 130/66  No intake or output data in the 24 hours ending 01/08/25 1331  Flowsheet Rows      Flowsheet Row First Filed Value   Admission Height 177.8 cm (70\") Documented at 01/03/2025 1237   Admission Weight 95.3 kg (210 lb) Documented at 01/03/2025 1237             Physical Exam:     General Appearance:     Alert, cooperative, NAD   HEENT:     No trauma, pupils reactive, hearing intact   Back:      No CVA tenderness   Lungs:      Respirations unlabored, no wheezing    Heart:     RRR, intact peripheral pulses   Abdomen:      Soft, NDNT, no masses, no guarding   :     External genitalia normal   Extremities:    No edema, no deformity   Lymphatic:    No neck or groin LAD   Skin:    No bleeding, bruising or rashes   Neuro/Psych:    Orientation intact, mood/affect pleasant, no focal findings     Results Review:     Results for orders placed or performed during the hospital encounter of 12/21/24   Urine Culture - Urine, Urine, Clean Catch    Collection Time: 12/21/24  9:24 PM    Specimen: Urine, Clean Catch   Result Value Ref Range    Urine Culture No growth    Urinalysis With Culture If Indicated - Urine, Clean Catch    Collection Time: 12/21/24  9:24 PM    Specimen: Urine, " Clean Catch   Result Value Ref Range    Color, UA Orange (A) Yellow, Straw    Appearance, UA Slightly Cloudy (A) Clear    pH, UA 8.5 (H) 4.5 - 8.0    Specific Gravity, UA 1.020 1.003 - 1.030    Glucose,  mg/dL (Trace) (A) Negative    Ketones, UA 15 mg/dL (1+) (A) Negative    Bilirubin, UA Small (1+) (A) Negative    Blood, UA Large (3+) (A) Negative    Protein, UA >=300 mg/dL (3+) (A) Negative    Leuk Esterase, UA Large (3+) (A) Negative    Nitrite, UA Positive (A) Negative    Urobilinogen, UA 4.0 E.U./dL (A) 0.2 - 1.0 E.U./dL   Urinalysis, Microscopic Only - Urine, Clean Catch    Collection Time: 12/21/24  9:24 PM    Specimen: Urine, Clean Catch   Result Value Ref Range    RBC, UA Too Numerous to Count (A) None Seen, 0-2 /HPF    WBC, UA 6-10 (A) None Seen, 0-2 /HPF    Bacteria, UA None Seen None Seen /HPF    Squamous Epithelial Cells, UA None Seen None Seen, 0-2 /HPF    Hyaline Casts, UA None Seen None Seen /LPF    Triple Phosphate Crystals, UA Moderate/2+ None Seen /HPF    Methodology Manual Light Microscopy    Comprehensive Metabolic Panel    Collection Time: 12/21/24  9:25 PM    Specimen: Blood   Result Value Ref Range    Glucose 105 (H) 65 - 99 mg/dL    BUN 19 8 - 23 mg/dL    Creatinine 0.95 0.57 - 1.00 mg/dL    Sodium 137 136 - 145 mmol/L    Potassium 4.0 3.5 - 5.2 mmol/L    Chloride 104 98 - 107 mmol/L    CO2 21.4 (L) 22.0 - 29.0 mmol/L    Calcium 9.1 8.6 - 10.5 mg/dL    Total Protein 7.1 6.0 - 8.5 g/dL    Albumin 4.2 3.5 - 5.2 g/dL    ALT (SGPT) 14 1 - 33 U/L    AST (SGOT) 19 1 - 32 U/L    Alkaline Phosphatase 85 39 - 117 U/L    Total Bilirubin 0.4 0.0 - 1.2 mg/dL    Globulin 2.9 gm/dL    A/G Ratio 1.4 g/dL    BUN/Creatinine Ratio 20.0 7.0 - 25.0    Anion Gap 11.6 5.0 - 15.0 mmol/L    eGFR 68.7 >60.0 mL/min/1.73   Lipase    Collection Time: 12/21/24  9:25 PM    Specimen: Blood   Result Value Ref Range    Lipase 295 (H) 13 - 60 U/L   CBC Auto Differential    Collection Time: 12/21/24  9:25 PM     Specimen: Blood   Result Value Ref Range    WBC 8.33 3.40 - 10.80 10*3/mm3    RBC 4.39 3.77 - 5.28 10*6/mm3    Hemoglobin 12.6 12.0 - 15.9 g/dL    Hematocrit 37.4 34.0 - 46.6 %    MCV 85.2 79.0 - 97.0 fL    MCH 28.7 26.6 - 33.0 pg    MCHC 33.7 31.5 - 35.7 g/dL    RDW 11.9 (L) 12.3 - 15.4 %    RDW-SD 36.3 (L) 37.0 - 54.0 fl    MPV 8.8 6.0 - 12.0 fL    Platelets 213 140 - 450 10*3/mm3    Neutrophil % 73.7 42.7 - 76.0 %    Lymphocyte % 11.2 (L) 19.6 - 45.3 %    Monocyte % 11.9 5.0 - 12.0 %    Eosinophil % 2.6 0.3 - 6.2 %    Basophil % 0.5 0.0 - 1.5 %    Immature Grans % 0.1 0.0 - 0.5 %    Neutrophils, Absolute 6.14 1.70 - 7.00 10*3/mm3    Lymphocytes, Absolute 0.93 0.70 - 3.10 10*3/mm3    Monocytes, Absolute 0.99 (H) 0.10 - 0.90 10*3/mm3    Eosinophils, Absolute 0.22 0.00 - 0.40 10*3/mm3    Basophils, Absolute 0.04 0.00 - 0.20 10*3/mm3    Immature Grans, Absolute 0.01 0.00 - 0.05 10*3/mm3    nRBC 0.0 0.0 - 0.2 /100 WBC       I reviewed the patient's prior history and old records to the best of my ability.   I have personally reviewed all pertinent imaging myself and their accompanying reports.   I have reviewed all labs.     Assessment:  Bladder calculi    Plan:    Otoscopy with evacuation of bladder stones possible laser lithotripsy of larger calculus measuring just under 2.0 cm    I discussed the patient's findings and my recommendations with patient.   Risks, complications, outcomes and alternatives discussed with the patient at the bedside and office.    Felix Godinez MD  01/08/25  13:31 EST

## 2025-01-08 NOTE — OP NOTE
CYSTOSCOPY BLADDER STONE LITHOTRIPSY  Procedure Note    Christine Villagomez  1/8/2025    Pre-op Diagnosis:   Bladder calculi    Post-op Diagnosis:     Post-Op Diagnosis Codes:     * Bladder calculi [N21.0]     * Bladder mass [N32.89]    Procedure(s):  CYSTOSCOPY BLADDER CALCULUS REMOVAL WITH LASER AND BLADDER BIOPSY    Surgeon(s):  Felix Godinez MD    Anesthesia: General    Staff:   Circulator: Leatha Barrett RN; Elle Fletcher RN  Laser Staff: Adele Moralez RN  Scrub Person: Roz Vivas RN    Estimated Blood Loss: minimal    Specimens:                Order Name Source Comment Collection Info Order Time   STONE ANALYSIS Urinary Bladder  Collected By: Felix Godinez MD 1/8/2025  2:05 PM     Release to patient   Routine Release        TISSUE PATHOLOGY EXAM Urinary Bladder  Collected By: Felix Godinez MD 1/8/2025  2:02 PM     Release to patient   Routine Release              Drains:   [REMOVED] Urethral Catheter 16 Fr. (Removed)   Site Assessment Clean;Skin intact 12/21/24 2130   Collection Container Standard drainage bag 12/21/24 2130   Securement Method Securing device 12/21/24 2130   Catheter care complete Yes 12/21/24 2130   Irrigation Ease no resistance met 12/21/24 2130   Output (mL) 400 mL 12/21/24 2130       Findings: Erythematous mass in the left lateral wall suspect inflammatory reaction from bladder stone biopsies taken.  Bladder stones measuring just under 2 cm broken up and removed.  Trigone was otherwise normal.    Complications: None apparent    Indications: This is a 59-year-old female with a history of recurrent urinary tract infections and hematuria found to have bladder stones now presents for cystoscopy and evacuation of bladder calculus.  She is got 1 large stone measured by 50 mm in a couple smaller ones probably 4 to 5 mm.    Procedure: Patient was taken the operative suite after informed sent was obtained.  Given general endotracheal anesthesia.  Placed in a  comfortable supine then lithotomy position.  The cystoscope was inserted after surgical timeout.  Bladder was thoroughly visualized and showed diffuse cystitis.  A friable stone was seen at the base the bladder and this was the one that measured about 15 mm and then a secondary stone was seen in the adjacency measured about 5 to 6 mm and then on the left lateral wall there was some adherent calcifications that could have been a stone adherent to the left lateral wall and just submitted underlying inflammation.  No true mass was seen.  Using the 500 µm holmium fiber and a Ethan laser began breaking up the 2 larger stones and then these were evacuated clear.  They were somewhat laminar.  The trigone was normal.  I can see the orifice on each side.  The left lateral wall still showed this area of concern I used the laser to take off any remaining calcifications on the wall and I took several biopsies of this and then used the laser to cauterize it.  She will she will probably have a bit of oozing and bleeding from this.  I did not place a catheter.  No bladder perforation was noted.  She was awoken and taken to recovery in stable condition.      Felix Godinez MD     Date: 1/8/2025  Time: 14:26 EST

## 2025-01-08 NOTE — ANESTHESIA PREPROCEDURE EVALUATION
Anesthesia Evaluation     Patient summary reviewed and Nursing notes reviewed   no history of anesthetic complications:   NPO Solid Status: > 8 hours  NPO Liquid Status: > 8 hours           Airway   Mallampati: II  TM distance: >3 FB  Neck ROM: full  No difficulty expected  Dental      Pulmonary - normal exam   (+) ,sleep apnea on CPAP  Cardiovascular - normal exam  Exercise tolerance: good (4-7 METS)    ECG reviewed    (+) hypertension well controlled, hyperlipidemia      Neuro/Psych  GI/Hepatic/Renal/Endo    (+) renal disease- stones    Musculoskeletal     Abdominal   (+) obese   Substance History      OB/GYN          Other   arthritis,                   Anesthesia Plan    ASA 2     general     intravenous induction     Anesthetic plan, risks, benefits, and alternatives have been provided, discussed and informed consent has been obtained with: patient.    Use of blood products discussed with patient  Consented to blood products.

## 2025-01-08 NOTE — ANESTHESIA PROCEDURE NOTES
Airway  Urgency: elective    Airway not difficult    General Information and Staff    Patient location during procedure: OR  Anesthesiologist: Henry Arreaga MD    Indications and Patient Condition  Indications for airway management: airway protection    Preoxygenated: yes  Mask difficulty assessment: 1 - vent by mask    Final Airway Details  Final airway type: supraglottic airway      Successful airway: Size 4     Number of attempts at approach: 1  Assessment: lips, teeth, and gum same as pre-op

## 2025-01-08 NOTE — ANESTHESIA POSTPROCEDURE EVALUATION
"Patient: Christine Villagomez    Procedure Summary       Date: 01/08/25 Room / Location: [facility] OR 01 / [place]  MAIN OR    Anesthesia Start: 1346 Anesthesia Stop: 1438    Procedure: CYSTOSCOPY BLADDER CALCULUS REMOVAL WITH LASER AND BLADDER BIOPSY Diagnosis:       Bladder calculi      Bladder mass      (Bladder calculi)    Surgeons: Felix Godinez MD Provider: Henry Arreaga MD    Anesthesia Type: general ASA Status: 2            Anesthesia Type: general    Vitals  Vitals Value Taken Time   /64 01/08/25 1515   Temp 36.5 °C (97.7 °F) 01/08/25 1432   Pulse 77 01/08/25 1515   Resp 18 01/08/25 1515   SpO2 98 % 01/08/25 1515           Post Anesthesia Care and Evaluation    Patient location during evaluation: bedside  Patient participation: complete - patient participated  Level of consciousness: awake and alert  Pain management: adequate    Airway patency: patent  Anesthetic complications: No anesthetic complications    Cardiovascular status: acceptable  Respiratory status: acceptable  Hydration status: acceptable    Comments: /64   Pulse 77   Temp 36.5 °C (97.7 °F) (Temporal)   Resp 18   Ht 179.1 cm (70.5\")   Wt 96.4 kg (212 lb 9.6 oz)   LMP  (LMP Unknown)   SpO2 98%   BMI 30.07 kg/m²     "

## 2025-01-13 LAB
AMM URATE MFR STONE: 10 %
CA CARBONATE CRY STONE QL IR: 20 %
COLOR STONE: NORMAL
COMPN STONE: NORMAL
LABORATORY COMMENT REPORT: NORMAL
LABORATORY COMMENT REPORT: NORMAL
Lab: NORMAL
Lab: NORMAL
PHOTO: NORMAL
SIZE STONE: NORMAL MM
SPEC SOURCE SUBJ: NORMAL
STONE ANALYSIS-IMP: NORMAL
TRI-PHOS MFR STONE: 70 %
WT STONE: 660 MG

## 2025-02-07 ENCOUNTER — OFFICE VISIT (OUTPATIENT)
Dept: INTERNAL MEDICINE | Facility: CLINIC | Age: 60
End: 2025-02-07
Payer: COMMERCIAL

## 2025-02-07 VITALS
TEMPERATURE: 98 F | DIASTOLIC BLOOD PRESSURE: 66 MMHG | OXYGEN SATURATION: 98 % | HEIGHT: 71 IN | WEIGHT: 217 LBS | SYSTOLIC BLOOD PRESSURE: 122 MMHG | BODY MASS INDEX: 30.38 KG/M2 | HEART RATE: 88 BPM

## 2025-02-07 DIAGNOSIS — S46.211A STRAIN OF RIGHT BICEPS TENDON: Primary | ICD-10-CM

## 2025-02-07 PROCEDURE — 99213 OFFICE O/P EST LOW 20 MIN: CPT | Performed by: NURSE PRACTITIONER

## 2025-02-07 RX ORDER — MELOXICAM 15 MG/1
15 TABLET ORAL
Qty: 30 TABLET | Refills: 0 | Status: SHIPPED | OUTPATIENT
Start: 2025-02-07

## 2025-02-07 NOTE — PROGRESS NOTES
"Chief Complaint   Patient presents with    Arm Pain     Having some right arm pain. Believes that she may have a tear in her bicep. Happened 3 weeks ago tomorrow.        Subjective     Christine Villagomez is a 59 y.o. female being seen for an acute visit for right bicep pain for 4 weeks. She was hammering a plant stand apart with the palm of her hand. Later that night, she felt pain. Pain described as positional in nature like a pulling pain, rated 3 of 10,  \"uncomfortable.\" Pain occurs with bending and over head lifting such as taking her shirt. She denies redness or swelling. She has tried Tylenol PM, resting her arm, and Kelvin ventura.     Allergies   Allergen Reactions    Sulfa Antibiotics Rash     fever           Current Outpatient Medications:     amphetamine-dextroamphetamine (Adderall) 15 MG tablet, Take 1 tablet by mouth 2 (Two) Times a Day. (Patient taking differently: Take 1 tablet by mouth 2 (Two) Times a Day As Needed.), Disp: 60 tablet, Rfl: 0    atorvastatin (LIPITOR) 20 MG tablet, Take 1 tablet by mouth Daily. (Patient taking differently: Take 1 tablet by mouth Every Night.), Disp: 90 tablet, Rfl: 3    buPROPion XL (WELLBUTRIN XL) 300 MG 24 hr tablet, Take 1 tablet by mouth Every Morning., Disp: 90 tablet, Rfl: 3    busPIRone (BUSPAR) 15 MG tablet, TAKE 1 TABLET THREE TIMES A DAY (Patient taking differently: Take 1-2 tablets by mouth Take As Directed. 30 mg in the morning and 15 mg at bedtime), Disp: 270 tablet, Rfl: 3    cetirizine (zyrTEC) 10 MG tablet, Take 1 tablet by mouth Daily., Disp: , Rfl:     CHOLECALCIFEROL PO, Take 1 tablet by mouth Daily., Disp: , Rfl:     Coenzyme Q10 (CO Q-10) 100 MG capsule, Take 100 mg by mouth Daily., Disp: , Rfl:     FIBER PO, Take 2 capsules by mouth Daily., Disp: , Rfl:     Green Tea, Luba sinensis, (GREEN TEA EXTRACT PO), Take 1 tablet by mouth Daily., Disp: , Rfl:     levothyroxine (SYNTHROID, LEVOTHROID) 50 MCG tablet, Take 1 tablet by mouth Daily., Disp: 90 tablet, " Rfl: 3    meloxicam (MOBIC) 15 MG tablet, Take 1 tablet by mouth Daily., Disp: , Rfl:     Omega-3 Fatty Acids (fish oil) 1000 MG capsule capsule, Take 1 capsule by mouth Daily With Breakfast., Disp: , Rfl:     Potassium Gluconate 550 MG tablet, Take 550 mg by mouth every night at bedtime., Disp: , Rfl:     Probiotic Product (PROBIOTIC DAILY PO), Take 1 capsule by mouth Daily., Disp: , Rfl:     sennosides-docusate (PERICOLACE) 8.6-50 MG per tablet, Take 1 tablet by mouth Daily. 1-2 tablets by mouth daily, Disp: , Rfl:     terazosin (HYTRIN) 1 MG capsule, TAKE 1 CAPSULE DAILY, Disp: 90 capsule, Rfl: 3    Tirzepatide-Weight Management (ZEPBOUND) 7.5 MG/0.5ML solution auto-injector, Inject 0.5 mL under the skin into the appropriate area as directed 1 (One) Time Per Week. (Patient taking differently: Inject 0.5 mL under the skin into the appropriate area as directed 1 (One) Time Per Week. SATURDAY), Disp: 6 mL, Rfl: 1    amoxicillin (AMOXIL) 500 MG capsule, Take 1 capsule by mouth Take As Directed. PRIOR TO DENTAL PROCEDURES (Patient not taking: Reported on 2/7/2025), Disp: , Rfl:     The following portions of the patient's history were reviewed and updated as appropriate: allergies, current medications, past family history, past medical history, past social history, past surgical history, and problem list.    Review of Systems   Constitutional: Negative.    Eyes: Negative.    Musculoskeletal:  Positive for arthralgias (right arm).   All other systems reviewed and are negative.      Assessment     Physical Exam  Vitals reviewed.   Constitutional:       Appearance: Normal appearance.   Cardiovascular:      Rate and Rhythm: Normal rate and regular rhythm.      Pulses: Normal pulses.      Heart sounds: Normal heart sounds. No murmur heard.  Pulmonary:      Effort: Pulmonary effort is normal. No respiratory distress.      Breath sounds: Normal breath sounds. No stridor.   Musculoskeletal:      Right upper arm: Normal.  Tenderness (proximal biceps tendon. Pain with palm up and internal rotation) present. No swelling, edema or deformity.      Right elbow: Normal range of motion.   Neurological:      Mental Status: She is alert and oriented to person, place, and time.   Psychiatric:         Mood and Affect: Mood normal.         Behavior: Behavior normal.         Thought Content: Thought content normal.         Plan         Diagnoses and all orders for this visit:    1. Strain of right biceps tendon (Primary)  -     meloxicam (MOBIC) 15 MG tablet; Take 1 tablet by mouth Daily With Dinner.  Dispense: 30 tablet; Refill: 0  -     Compression Sleeve ()        PRICE protocol and NSAIDs.     Follow up in 2 months if pain still resent.

## 2025-04-11 DIAGNOSIS — F41.8 ANXIETY WITH DEPRESSION: ICD-10-CM

## 2025-04-11 DIAGNOSIS — S46.211A STRAIN OF RIGHT BICEPS TENDON: ICD-10-CM

## 2025-04-14 RX ORDER — MELOXICAM 15 MG/1
15 TABLET ORAL DAILY
Qty: 90 TABLET | Refills: 3 | OUTPATIENT
Start: 2025-04-14

## 2025-04-14 RX ORDER — BUPROPION HYDROCHLORIDE 300 MG/1
300 TABLET ORAL EVERY MORNING
Qty: 90 TABLET | Refills: 3 | Status: SHIPPED | OUTPATIENT
Start: 2025-04-14

## 2025-04-14 NOTE — TELEPHONE ENCOUNTER
Rx Refill Note  Requested Prescriptions     Pending Prescriptions Disp Refills    buPROPion XL (WELLBUTRIN XL) 300 MG 24 hr tablet [Pharmacy Med Name: BUPROPION HCL XL TABS 300MG] 90 tablet 3     Sig: TAKE 1 TABLET EVERY MORNING     Refused Prescriptions Disp Refills    meloxicam (MOBIC) 15 MG tablet [Pharmacy Med Name: MELOXICAM TABS 15MG] 90 tablet 3     Sig: TAKE 1 TABLET DAILY      Last office visit with prescribing clinician: 2/21/2024   Last telemedicine visit with prescribing clinician: Visit date not found   Next office visit with prescribing clinician: Visit date not found                         Would you like a call back once the refill request has been completed: [] Yes [] No    If the office needs to give you a call back, can they leave a voicemail: [] Yes [] No    My Fitzpatrick MA  04/14/25, 08:06 EDT

## 2025-04-28 ENCOUNTER — OFFICE VISIT (OUTPATIENT)
Dept: INTERNAL MEDICINE | Facility: CLINIC | Age: 60
End: 2025-04-28
Payer: COMMERCIAL

## 2025-04-28 VITALS
OXYGEN SATURATION: 98 % | SYSTOLIC BLOOD PRESSURE: 132 MMHG | DIASTOLIC BLOOD PRESSURE: 80 MMHG | WEIGHT: 210.8 LBS | HEART RATE: 80 BPM | BODY MASS INDEX: 29.82 KG/M2 | TEMPERATURE: 98.2 F

## 2025-04-28 DIAGNOSIS — E66.01 MORBID (SEVERE) OBESITY DUE TO EXCESS CALORIES: ICD-10-CM

## 2025-04-28 DIAGNOSIS — R05.9 COUGH, UNSPECIFIED TYPE: Primary | ICD-10-CM

## 2025-04-28 DIAGNOSIS — I10 ESSENTIAL HYPERTENSION: ICD-10-CM

## 2025-04-28 DIAGNOSIS — R73.9 HYPERGLYCEMIA: ICD-10-CM

## 2025-04-28 DIAGNOSIS — E78.00 HYPERCHOLESTEROLEMIA: ICD-10-CM

## 2025-04-28 DIAGNOSIS — F41.8 ANXIETY WITH DEPRESSION: ICD-10-CM

## 2025-04-28 RX ORDER — ALBUTEROL SULFATE 90 UG/1
2 INHALANT RESPIRATORY (INHALATION) EVERY 4 HOURS PRN
Qty: 8.5 G | Refills: 1 | Status: SHIPPED | OUTPATIENT
Start: 2025-04-28

## 2025-04-28 RX ORDER — TIRZEPATIDE 10 MG/.5ML
10 INJECTION, SOLUTION SUBCUTANEOUS WEEKLY
Qty: 2 ML | Refills: 2 | Status: SHIPPED | OUTPATIENT
Start: 2025-04-28

## 2025-04-28 NOTE — PROGRESS NOTES
Christine Villagomez is a 59 y.o. female, who presents with a chief complaint of   Chief Complaint   Patient presents with     Strain of right biceps tendon     Medication refill           HPI     Pt has had multiple bladder stones and is following with urology.     Pt has had more issues with her depression.  She has had more issues with urinary incontinence with her bladder stones.  Her  has stage 5 kidney disease and is not a candidate for a kidney transplant.  She also cares for her adult son with autism.      She has been on zepbound to help with weight.  She is on 7.5mg but would like to go up to 10mg.     She has had a cough with worsening allergy symptoms this spring. No fever or productive cough.     The following portions of the patient's history were reviewed and updated as appropriate: allergies, current medications, past family history, past medical history, past social history, past surgical history and problem list.    Allergies: Sulfa antibiotics    Review of Systems   Constitutional: Negative.    HENT: Negative.     Eyes: Negative.    Respiratory: Negative.     Cardiovascular: Negative.    Gastrointestinal: Negative.    Endocrine: Negative.    Genitourinary: Negative.    Musculoskeletal: Negative.    Skin: Negative.    Allergic/Immunologic: Negative.    Neurological: Negative.    Hematological: Negative.    Psychiatric/Behavioral: Negative.     All other systems reviewed and are negative.            Wt Readings from Last 3 Encounters:   04/28/25 95.6 kg (210 lb 12.8 oz)   02/07/25 98.4 kg (217 lb)   01/08/25 96.4 kg (212 lb 9.6 oz)     Temp Readings from Last 3 Encounters:   04/28/25 98.2 °F (36.8 °C) (Infrared)   02/07/25 98 °F (36.7 °C) (Infrared)   01/08/25 97.7 °F (36.5 °C) (Temporal)     BP Readings from Last 3 Encounters:   04/28/25 132/80   02/07/25 122/66   01/08/25 136/69     Pulse Readings from Last 3 Encounters:   04/28/25 80   02/07/25 88   01/08/25 74     Body mass index is  29.82 kg/m².  SpO2 Readings from Last 3 Encounters:   04/28/25 98%   02/07/25 98%   01/08/25 98%          Physical Exam  Vitals and nursing note reviewed.   Constitutional:       General: She is not in acute distress.     Appearance: She is well-developed.   HENT:      Head: Normocephalic and atraumatic.      Right Ear: External ear normal.      Left Ear: External ear normal.      Nose: Nose normal.   Eyes:      Conjunctiva/sclera: Conjunctivae normal.      Pupils: Pupils are equal, round, and reactive to light.   Cardiovascular:      Rate and Rhythm: Normal rate and regular rhythm.      Heart sounds: Normal heart sounds.   Pulmonary:      Effort: Pulmonary effort is normal. No respiratory distress.      Breath sounds: Normal breath sounds. No wheezing.   Musculoskeletal:         General: Normal range of motion.      Cervical back: Normal range of motion and neck supple.      Comments: Normal gait   Skin:     General: Skin is warm and dry.   Neurological:      Mental Status: She is alert and oriented to person, place, and time.   Psychiatric:         Behavior: Behavior normal.         Thought Content: Thought content normal.         Judgment: Judgment normal.         Results for orders placed or performed during the hospital encounter of 01/08/25   Tissue Pathology Exam    Collection Time: 01/08/25  2:01 PM    Specimen: Urinary Bladder; Tissue   Result Value Ref Range    Case Report       Surgical Pathology Report                         Case: [id number]                                  Authorizing Provider:  Felix Godinez MD   Collected:           01/08/2025 02:01 PM          Ordering Location:     [facility]     Received:            01/08/2025 03:17 PM                                 [place]                                                                            OR                                                                           Pathologist:           Joss Toledo  "MD LO                                                          Specimen:    Urinary Bladder, LEFT BLADDER WALL                                                         Final Diagnosis       1.  Urinary bladder, left wall, biopsy: Urothelial mucosa and submucosa with   A.  Chronic cystitis with area of erosion, mixed inflammation and necroinflammatory debris.   B.  Reactive epithelial changes; negative for dysplasia and malignancy.      Gross Description       1. Urinary Bladder.   Received in formalin, labeled with the patient's name, and designated \" left bladder wall biopsy\", is an aggregate of edematous pink-tan to gray-white tissue pieces measuring 1.5 x 1.0 x 0.2 cm in aggregate.  The tissue is submitted in 1A.        mb/uso/[facility]          Microscopic Description       Unless \"gross only\" is specified, the final diagnosis for each specimen is based on microscopic examination of tissue.     STONE ANALYSIS - Calculus, Urinary Bladder    Collection Time: 01/08/25  2:03 PM    Specimen: Urinary Bladder; Calculus   Result Value Ref Range    Stone Source Comment     Color Tan     Size 10x8 mm    Stone Weight 660 mg    Composition Comment     Carbonate Apatite 20 %    Magnesium [place] Phos 70 %    Ammonium Acid Urate 10 %    Comment Comment     Comment Comment     Photo Comment     Comments: Comment     Please note Comment     Disclaimer: Comment      Result Review :   The following data was reviewed by: Graciela Cadena MD on 04/28/2025:  Common labs          12/21/2024    21:25   Common Labs   Glucose 105    BUN 19    Creatinine 0.95    Sodium 137    Potassium 4.0    Chloride 104    Calcium 9.1    Albumin 4.2    Total Bilirubin 0.4    Alkaline Phosphatase 85    AST (SGOT) 19    ALT (SGPT) 14    WBC 8.33    Hemoglobin 12.6    Hematocrit 37.4    Platelets 213                 Assessment and Plan    Diagnoses and all orders for this visit:    1. Cough, unspecified type (Primary)  -     albuterol sulfate  (90 Base) " MCG/ACT inhaler; Inhale 2 puffs Every 4 (Four) Hours As Needed for Wheezing (cough).  Dispense: 8.5 g; Refill: 1    2. Morbid (severe) obesity due to excess calories  -     Tirzepatide-Weight Management (Zepbound) 10 MG/0.5ML solution auto-injector; Inject 0.5 mL under the skin into the appropriate area as directed 1 (One) Time Per Week.  Dispense: 2 mL; Refill: 2  -     CBC & Differential  -     Comprehensive Metabolic Panel  -     Lipid Panel With LDL / HDL Ratio  -     Hemoglobin A1c  -     T4, Free  -     TSH    3. Essential hypertension  -     CBC & Differential  -     Comprehensive Metabolic Panel  -     Lipid Panel With LDL / HDL Ratio  -     Hemoglobin A1c  -     T4, Free  -     TSH    4. Hypercholesterolemia  -     CBC & Differential  -     Comprehensive Metabolic Panel  -     Lipid Panel With LDL / HDL Ratio  -     Hemoglobin A1c  -     T4, Free  -     TSH    5. Anxiety with depression  -     Ambulatory Referral to Psychology    6. Hyperglycemia  -     CBC & Differential  -     Comprehensive Metabolic Panel  -     Lipid Panel With LDL / HDL Ratio  -     Hemoglobin A1c  -     T4, Free  -     TSH                       Outpatient Medications Prior to Visit   Medication Sig Dispense Refill    atorvastatin (LIPITOR) 20 MG tablet Take 1 tablet by mouth Daily. (Patient taking differently: Take 1 tablet by mouth Every Night.) 90 tablet 3    buPROPion XL (WELLBUTRIN XL) 300 MG 24 hr tablet TAKE 1 TABLET EVERY MORNING 90 tablet 3    busPIRone (BUSPAR) 15 MG tablet TAKE 1 TABLET THREE TIMES A DAY (Patient taking differently: Take 1-2 tablets by mouth Take As Directed. 30 mg in the morning and 15 mg at bedtime) 270 tablet 3    cephalexin (KEFLEX) 250 MG capsule       cetirizine (zyrTEC) 10 MG tablet Take 1 tablet by mouth Daily.      CHOLECALCIFEROL PO Take 1 tablet by mouth Daily.      Coenzyme Q10 (CO Q-10) 100 MG capsule Take 100 mg by mouth Daily.      FIBER PO Take 2 capsules by mouth Daily.      Green Tea,  Luba sinensis, (GREEN TEA EXTRACT PO) Take 1 tablet by mouth Daily.      levothyroxine (SYNTHROID, LEVOTHROID) 50 MCG tablet Take 1 tablet by mouth Daily. 90 tablet 3    meloxicam (MOBIC) 15 MG tablet Take 1 tablet by mouth Daily With Dinner. 30 tablet 0    Omega-3 Fatty Acids (fish oil) 1000 MG capsule capsule Take 1 capsule by mouth Daily With Breakfast.      Potassium Citrate ER (Urocit-K 15) 15 MEQ (1620 MG) tablet controlled-release Take 15 mEq by mouth 2 (Two) Times a Day. 60 tablet 12    Potassium Gluconate 550 MG tablet Take 550 mg by mouth every night at bedtime.      Probiotic Product (PROBIOTIC DAILY PO) Take 1 capsule by mouth Daily.      sennosides-docusate (PERICOLACE) 8.6-50 MG per tablet Take 1 tablet by mouth Daily. 1-2 tablets by mouth daily      solifenacin (VESICARE) 5 MG tablet Take 1 tablet by mouth Daily. 30 tablet 5    terazosin (HYTRIN) 1 MG capsule TAKE 1 CAPSULE DAILY 90 capsule 3    Tirzepatide-Weight Management (ZEPBOUND) 7.5 MG/0.5ML solution auto-injector Inject 0.5 mL under the skin into the appropriate area as directed 1 (One) Time Per Week. (Patient taking differently: Inject 0.5 mL under the skin into the appropriate area as directed 1 (One) Time Per Week. SATURDAY) 6 mL 1    amphetamine-dextroamphetamine (Adderall) 15 MG tablet Take 1 tablet by mouth 2 (Two) Times a Day. (Patient taking differently: Take 1 tablet by mouth 2 (Two) Times a Day As Needed.) 60 tablet 0    nitrofurantoin, macrocrystal-monohydrate, (Macrobid) 100 MG capsule Take 1 capsule by mouth 2 (Two) Times a Day. (Patient not taking: Reported on 4/28/2025) 14 capsule 0    oxyCODONE-acetaminophen (PERCOCET) 5-325 MG per tablet Take 1 tablet by mouth Every 4 (Four) to 6 (Six) Hours As Needed for pain (Patient not taking: Reported on 4/28/2025) 30 tablet 0    amoxicillin (AMOXIL) 500 MG capsule Take 1 capsule by mouth Take As Directed. PRIOR TO DENTAL PROCEDURES (Patient not taking: Reported on 2/7/2025)       No  facility-administered medications prior to visit.     New Medications Ordered This Visit   Medications    albuterol sulfate  (90 Base) MCG/ACT inhaler     Sig: Inhale 2 puffs Every 4 (Four) Hours As Needed for Wheezing (cough).     Dispense:  8.5 g     Refill:  1    Tirzepatide-Weight Management (Zepbound) 10 MG/0.5ML solution auto-injector     Sig: Inject 0.5 mL under the skin into the appropriate area as directed 1 (One) Time Per Week.     Dispense:  2 mL     Refill:  2     [unfilled]  Medications Discontinued During This Encounter   Medication Reason    amoxicillin (AMOXIL) 500 MG capsule *Therapy completed    Tirzepatide-Weight Management (ZEPBOUND) 7.5 MG/0.5ML solution auto-injector          No follow-ups on file.    Patient was given instructions and counseling regarding her condition or for health maintenance advice. Please see specific information pulled into the AVS if appropriate.

## 2025-05-05 ENCOUNTER — TRANSCRIBE ORDERS (OUTPATIENT)
Dept: ADMINISTRATIVE | Facility: HOSPITAL | Age: 60
End: 2025-05-05
Payer: COMMERCIAL

## 2025-05-05 DIAGNOSIS — Z12.31 SCREENING MAMMOGRAM FOR BREAST CANCER: Primary | ICD-10-CM

## 2025-05-08 ENCOUNTER — HOSPITAL ENCOUNTER (EMERGENCY)
Facility: HOSPITAL | Age: 60
Discharge: HOME | End: 2025-05-08
Attending: STUDENT IN AN ORGANIZED HEALTH CARE EDUCATION/TRAINING PROGRAM
Payer: COMMERCIAL

## 2025-05-08 VITALS
TEMPERATURE: 98 F | SYSTOLIC BLOOD PRESSURE: 166 MMHG | DIASTOLIC BLOOD PRESSURE: 89 MMHG | WEIGHT: 205 LBS | BODY MASS INDEX: 29.35 KG/M2 | OXYGEN SATURATION: 96 % | RESPIRATION RATE: 18 BRPM | HEIGHT: 70 IN | HEART RATE: 82 BPM

## 2025-05-08 DIAGNOSIS — N30.01 ACUTE CYSTITIS WITH HEMATURIA: ICD-10-CM

## 2025-05-08 DIAGNOSIS — R33.9 URINARY RETENTION: Primary | ICD-10-CM

## 2025-05-08 LAB
AMORPH URATE CRY URNS QL MICRO: ABNORMAL /HPF
BACTERIA UR QL AUTO: ABNORMAL /HPF
BILIRUB UR QL STRIP: NEGATIVE
CLARITY UR: ABNORMAL
COLOR UR: ABNORMAL
GLUCOSE UR STRIP-MCNC: NEGATIVE MG/DL
HGB UR QL STRIP.AUTO: ABNORMAL
HYALINE CASTS UR QL AUTO: ABNORMAL /LPF
KETONES UR QL STRIP: NEGATIVE
LEUKOCYTE ESTERASE UR QL STRIP.AUTO: ABNORMAL
NITRITE UR QL STRIP: NEGATIVE
PH UR STRIP.AUTO: 8.5 [PH] (ref 4.5–8)
PROT UR QL STRIP: ABNORMAL
RBC # UR STRIP: ABNORMAL /HPF
REF LAB TEST METHOD: ABNORMAL
SP GR UR STRIP: 1.02 (ref 1–1.03)
SQUAMOUS #/AREA URNS HPF: ABNORMAL /HPF
UROBILINOGEN UR QL STRIP: ABNORMAL
WBC # UR STRIP: ABNORMAL /HPF

## 2025-05-08 PROCEDURE — 87086 URINE CULTURE/COLONY COUNT: CPT | Performed by: PHYSICIAN ASSISTANT

## 2025-05-08 PROCEDURE — 99283 EMERGENCY DEPT VISIT LOW MDM: CPT | Performed by: STUDENT IN AN ORGANIZED HEALTH CARE EDUCATION/TRAINING PROGRAM

## 2025-05-08 PROCEDURE — 81001 URINALYSIS AUTO W/SCOPE: CPT | Performed by: PHYSICIAN ASSISTANT

## 2025-05-08 PROCEDURE — 51702 INSERT TEMP BLADDER CATH: CPT

## 2025-05-08 RX ORDER — NITROFURANTOIN 25; 75 MG/1; MG/1
100 CAPSULE ORAL 2 TIMES DAILY
Qty: 14 CAPSULE | Refills: 0 | Status: SHIPPED | OUTPATIENT
Start: 2025-05-08 | End: 2025-05-16

## 2025-05-08 NOTE — ED PROVIDER NOTES
Subjective   History of Present Illness  Patient is a 59-year-old female with a history of bladder stones that have been removed by Dr. Godinez.  She has a catheter bag that she can use if she needs it.  She has only had to use it once before.  She says she has not been able to urinate since noon today.  She and her  both tried to insert the Correa catheter and were unable to do so.  She has had some nausea but not vomiting or having fevers.  She says she feels pressure on the lower abdomen.      Review of Systems   Genitourinary:  Positive for difficulty urinating. Negative for dysuria, flank pain and hematuria.   All other systems reviewed and are negative.      Past Medical History:   Diagnosis Date    ADHD (attention deficit hyperactivity disorder) June 2018    Anxiety 1997    ADHD    Bipolar disorder     Bladder calculus 01/08/2025    Bladder stone 12/2024    Depression     Fibroid May 2021    D and C performed by Dr. Clark on May 4, 2021    Hematuria of unknown cause     History of high cholesterol     Under control last 2 years    Hyperlipidemia     Hypertension     Hypothyroid     This has been a condition for years    Hypothyroidism     Kidney stone     OA (osteoarthritis) of knee     left    Obesity moving now to overweight    PONV (postoperative nausea and vomiting)     Sleep apnea     uses cpap    Urinary tract infection     Visits with urologist, Valentin Godinez, during fall 2023, determined after a cystoscopy, blood in urine was caused by inflammation in the bladder    Put on antibiotic.  As of 6/3/2024, everything ok.  Significant bladder stone issues since then.    Varicella     A Titeter test determined i must have had as a child       Allergies   Allergen Reactions    Sulfa Antibiotics Rash     fever         Past Surgical History:   Procedure Laterality Date    BLADDER SUSPENSION      TVT    CATARACT EXTRACTION      COLONOSCOPY      CYSTOSCOPY BLADDER STONE LITHOTRIPSY N/A 01/08/2025     Procedure: CYSTOSCOPY BLADDER CALCULUS REMOVAL WITH LASER AND BLADDER BIOPSY;  Surgeon: Felix Godinez MD;  Location: [facility] MAIN OR;  Service: Urology;  Laterality: N/A;    D & C HYSTEROSCOPY MYOSURE N/A 2021    Procedure: DILATATION AND CURETTAGE HYSTEROSCOPY with MYOSURE;  Surgeon: Trinity Clark MD;  Location: [facility] OR;  Service: Obstetrics/Gynecology;  Laterality: N/A;    JOINT REPLACEMENT      both knees    KIDNEY STONE SURGERY      KNEE MENISCAL REPAIR Bilateral     MYOMECTOMY ABDOMINAL APPROACH  May 4,  2021    REPLACEMENT TOTAL KNEE Right     REPLACEMENT TOTAL KNEE Left     WISDOM TOOTH EXTRACTION         Family History   Problem Relation Age of Onset    Cancer Mother         Lung cancer, smoker    Alcohol abuse Mother     Hyperlipidemia Mother     Heart attack Father     Alcohol abuse Father     COPD Father         Smoker    Hyperlipidemia Father     Stroke Father         Smoker, drinker, no exercise    Vision loss Father         Macular degeneration    Hypertension Father     Colon cancer Maternal Grandmother     Developmental Disability Son         Autism    Breast cancer Neg Hx     Ovarian cancer Neg Hx     Pulmonary embolism Neg Hx     Deep vein thrombosis Neg Hx     Malig Hyperthermia Neg Hx        Social History     Socioeconomic History    Marital status:    Tobacco Use    Smoking status: Never     Passive exposure: Never    Smokeless tobacco: Never    Tobacco comments:     My parents smoked.  My mom  due to smoking- lung cancer.   Vaping Use    Vaping status: Never Used   Substance and Sexual Activity    Alcohol use: No    Drug use: Never    Sexual activity: Not Currently     Partners: Male     Birth control/protection: Post-menopausal     Comment:            Objective   Physical Exam  Vitals and nursing note reviewed.   Constitutional:       Appearance: Normal appearance.   HENT:      Head: Normocephalic.   Eyes:      Conjunctiva/sclera:  Conjunctivae normal.   Cardiovascular:      Rate and Rhythm: Normal rate and regular rhythm.      Heart sounds: Normal heart sounds.   Pulmonary:      Effort: Pulmonary effort is normal.   Abdominal:      General: There is distension (Palpable bladder).      Tenderness: There is no abdominal tenderness. There is no guarding.   Musculoskeletal:         General: Normal range of motion.      Cervical back: Normal range of motion and neck supple. Rigidity present.   Skin:     General: Skin is warm and dry.   Neurological:      General: No focal deficit present.      Mental Status: She is alert.   Psychiatric:         Mood and Affect: Mood normal.         Procedures           ED Course                                                       Medical Decision Making  I appreciate a palpable bladder on exam.  She is not tender.  Her main issue for coming is she cannot urinate.  Not noticed blood.  She has been able to get some urine out but not a significant amount since noon today.  Nurse was able to get a Correa catheter inserted but says there was some resistance.  Urine was sent to the lab and has blood in it which is always there for her.  There is some leukocyte esterase which could be from inflammation but I will start her on antibiotic.  She takes Keflex 250 every day and is steve stop that while she is on Macrobid for 7 days.  She will call her urologist Dr. Godinez for follow-up.  Says she is feeling much better with the Correa catheter and the urine output.  Told if she is running fever has concerns she is return to emergency room but otherwise will follow-up with Dr. Godinez.    --------------------            05/08/25               1859     --------------------   BP:                  Pulse:      81       Resp:                Temp:                SpO2:      97%      --------------------    Labs Reviewed  URINALYSIS W/ CULTURE IF INDICATED - Abnormal; Notable for the following components:      Color, UA                     Dark Yellow (*)               Appearance, UA                Cloudy (*)               pH, UA                        8.5 (*)                Blood, UA                     Large (3+) (*)               Protein, UA                     (*)                  Leuk Esterase, UA             Small (1+) (*)            All other components within normal limits         Narrative: In absence of clinical symptoms, the presence of pyuria, bacteria, and/or nitrites on the urinalysis result does not correlate with infection.  URINALYSIS, MICROSCOPIC ONLY - Abnormal; Notable for the following components:     RBC, UA                         (*)                  WBC, UA                       6-10 (*)               Bacteria, UA                  Trace (*)            All other components within normal limits  URINE CULTURE      Problems Addressed:  Acute cystitis with hematuria: complicated acute illness or injury  Urinary retention: complicated acute illness or injury    Risk  Prescription drug management.        Final diagnoses:   Urinary retention   Acute cystitis with hematuria       ED Disposition  ED Disposition       ED Disposition   Discharge    Condition   Stable    Comment   --               Felix Godinez MD  [address] [address] [phone]               Medication List        Changed      amphetamine-dextroamphetamine 15 MG tablet  Commonly known as: Adderall  Take 1 tablet by mouth 2 (Two) Times a Day.  What changed:   when to take this  reasons to take this     atorvastatin 20 MG tablet  Commonly known as: LIPITOR  Take 1 tablet by mouth Daily.  What changed: when to take this     busPIRone 15 MG tablet  Commonly known as: BUSPAR  TAKE 1 TABLET THREE TIMES A DAY  What changed:   how much to take  when to take this  additional instructions               Where to Get Your Medications        These [facility] [address]  [facility] [address]      Hours: Monday to Friday 7 AM to 5 PM Phone: [phone]   nitrofurantoin (macrocrystal-monohydrate) 100 MG capsule            Emma Frey PA-C  05/08/25 1945

## 2025-05-09 ENCOUNTER — HOSPITAL ENCOUNTER (EMERGENCY)
Facility: HOSPITAL | Age: 60
Discharge: HOME | End: 2025-05-09
Attending: STUDENT IN AN ORGANIZED HEALTH CARE EDUCATION/TRAINING PROGRAM
Payer: COMMERCIAL

## 2025-05-09 VITALS
BODY MASS INDEX: 29.35 KG/M2 | SYSTOLIC BLOOD PRESSURE: 162 MMHG | OXYGEN SATURATION: 97 % | HEIGHT: 70 IN | HEART RATE: 86 BPM | WEIGHT: 205 LBS | DIASTOLIC BLOOD PRESSURE: 90 MMHG | RESPIRATION RATE: 16 BRPM | TEMPERATURE: 98.1 F

## 2025-05-09 DIAGNOSIS — T83.9XXA PROBLEM WITH FOLEY CATHETER, INITIAL ENCOUNTER: Primary | ICD-10-CM

## 2025-05-09 LAB — BACTERIA SPEC AEROBE CULT: NORMAL

## 2025-05-09 PROCEDURE — 99282 EMERGENCY DEPT VISIT SF MDM: CPT | Performed by: STUDENT IN AN ORGANIZED HEALTH CARE EDUCATION/TRAINING PROGRAM

## 2025-05-09 NOTE — ED PROVIDER NOTES
EMERGENCY ROOM NOTE  [facility]  Medical Screening Exam      Christine Villagomez is a 59 y.o. female with a past medical history of bladder stones who presented to the ER yesterday with urinary retention.  Patient was seen by the JAJA at that time.  A Correa was placed and the patient was discharged home.  This morning her Correa fell out.  On inspection of the Correa it appears the balloon was faulty.  Patient's Correa was exchanged with urine draining from the catheter into the bag.      No new complaints.  Close follow-up with Dr. Godinez.  Since his seek reevaluation discussed      Final diagnoses:   Problem with Correa catheter, initial encounter     ED Disposition       ED Disposition   Discharge    Condition   Stable    Comment   --             Felix Godinez MD  [address] [address] [phone]    Schedule an appointment as soon as possible for a visit             In cases where narcotics are prescribed, JAYLEEN report was obtained, reviewed, and made part of record. After examining available information, and risks of prescribing or dispensing controlled substances was explained to the patient (including non-treatment or other treatment), it is considered medically appropriate to administer  narcotics as prescribed.    Part of this note may be an electronic transcription/translation of spoken language to printed text using the Dragon Dictation System.     MD Andrew Ojeda, Rajinder Lubin MD  05/09/25 3849

## 2025-05-23 ENCOUNTER — HOSPITAL ENCOUNTER (OUTPATIENT)
Facility: HOSPITAL | Age: 60
Setting detail: OBSERVATION
Discharge: HOME | End: 2025-05-24
Attending: UROLOGY | Admitting: UROLOGY
Payer: COMMERCIAL

## 2025-05-23 DIAGNOSIS — N21.0 BLADDER CALCULUS: Primary | ICD-10-CM

## 2025-05-23 DIAGNOSIS — N21.0 CALCULUS IN BLADDER: ICD-10-CM

## 2025-05-23 PROCEDURE — 96361 HYDRATE IV INFUSION ADD-ON: CPT

## 2025-05-23 PROCEDURE — 96360 HYDRATION IV INFUSION INIT: CPT

## 2025-05-23 PROCEDURE — 25010000002 CEFAZOLIN PER 500 MG: Performed by: UROLOGY

## 2025-05-23 PROCEDURE — G0378 HOSPITAL OBSERVATION PER HR: HCPCS

## 2025-05-23 PROCEDURE — 25810000003 SODIUM CHLORIDE 0.9 % SOLUTION: Performed by: UROLOGY

## 2025-05-23 RX ORDER — TEMAZEPAM 15 MG/1
15 CAPSULE ORAL NIGHTLY PRN
Status: DISCONTINUED | OUTPATIENT
Start: 2025-05-23 | End: 2025-05-24 | Stop reason: HOSPADM

## 2025-05-23 RX ORDER — TERAZOSIN 1 MG/1
1 CAPSULE ORAL DAILY
Status: DISCONTINUED | OUTPATIENT
Start: 2025-05-24 | End: 2025-05-24 | Stop reason: HOSPADM

## 2025-05-23 RX ORDER — PHENAZOPYRIDINE HYDROCHLORIDE 100 MG/1
200 TABLET, FILM COATED ORAL
Status: DISCONTINUED | OUTPATIENT
Start: 2025-05-23 | End: 2025-05-24 | Stop reason: HOSPADM

## 2025-05-23 RX ORDER — BUSPIRONE HYDROCHLORIDE 15 MG/1
15 TABLET ORAL 3 TIMES DAILY
Status: DISCONTINUED | OUTPATIENT
Start: 2025-05-23 | End: 2025-05-24 | Stop reason: HOSPADM

## 2025-05-23 RX ORDER — SODIUM CHLORIDE 0.9 % (FLUSH) 0.9 %
10 SYRINGE (ML) INJECTION AS NEEDED
Status: DISCONTINUED | OUTPATIENT
Start: 2025-05-23 | End: 2025-05-24 | Stop reason: HOSPADM

## 2025-05-23 RX ORDER — ALBUTEROL SULFATE 0.83 MG/ML
2.5 SOLUTION RESPIRATORY (INHALATION)
Status: DISCONTINUED | OUTPATIENT
Start: 2025-05-23 | End: 2025-05-23

## 2025-05-23 RX ORDER — LEVOTHYROXINE SODIUM 50 UG/1
50 TABLET ORAL
Status: DISCONTINUED | OUTPATIENT
Start: 2025-05-24 | End: 2025-05-24 | Stop reason: HOSPADM

## 2025-05-23 RX ORDER — BUPROPION HYDROCHLORIDE 150 MG/1
300 TABLET ORAL EVERY MORNING
Status: DISCONTINUED | OUTPATIENT
Start: 2025-05-24 | End: 2025-05-24 | Stop reason: HOSPADM

## 2025-05-23 RX ORDER — ONDANSETRON 2 MG/ML
4 INJECTION INTRAMUSCULAR; INTRAVENOUS EVERY 6 HOURS PRN
Status: DISCONTINUED | OUTPATIENT
Start: 2025-05-23 | End: 2025-05-24 | Stop reason: HOSPADM

## 2025-05-23 RX ORDER — OXYCODONE AND ACETAMINOPHEN 5; 325 MG/1; MG/1
1 TABLET ORAL EVERY 4 HOURS PRN
Refills: 0 | Status: DISCONTINUED | OUTPATIENT
Start: 2025-05-23 | End: 2025-05-24 | Stop reason: HOSPADM

## 2025-05-23 RX ORDER — ALPRAZOLAM 0.5 MG
0.5 TABLET ORAL EVERY 8 HOURS PRN
Status: DISCONTINUED | OUTPATIENT
Start: 2025-05-23 | End: 2025-05-24 | Stop reason: HOSPADM

## 2025-05-23 RX ORDER — SODIUM CHLORIDE 9 MG/ML
40 INJECTION, SOLUTION INTRAVENOUS AS NEEDED
Status: DISCONTINUED | OUTPATIENT
Start: 2025-05-23 | End: 2025-05-24 | Stop reason: HOSPADM

## 2025-05-23 RX ORDER — SODIUM CHLORIDE 9 MG/ML
100 INJECTION, SOLUTION INTRAVENOUS CONTINUOUS
Status: ACTIVE | OUTPATIENT
Start: 2025-05-23 | End: 2025-05-23

## 2025-05-23 RX ORDER — ONDANSETRON 4 MG/1
4 TABLET, ORALLY DISINTEGRATING ORAL EVERY 6 HOURS PRN
Status: DISCONTINUED | OUTPATIENT
Start: 2025-05-23 | End: 2025-05-24 | Stop reason: HOSPADM

## 2025-05-23 RX ORDER — ALBUTEROL SULFATE 0.83 MG/ML
2.5 SOLUTION RESPIRATORY (INHALATION) EVERY 4 HOURS PRN
Status: DISCONTINUED | OUTPATIENT
Start: 2025-05-23 | End: 2025-05-24 | Stop reason: HOSPADM

## 2025-05-23 RX ORDER — SODIUM CHLORIDE 0.9 % (FLUSH) 0.9 %
10 SYRINGE (ML) INJECTION EVERY 12 HOURS SCHEDULED
Status: DISCONTINUED | OUTPATIENT
Start: 2025-05-23 | End: 2025-05-24 | Stop reason: HOSPADM

## 2025-05-23 RX ORDER — NALOXONE HCL 0.4 MG/ML
0.4 VIAL (ML) INJECTION
Status: DISCONTINUED | OUTPATIENT
Start: 2025-05-23 | End: 2025-05-24 | Stop reason: HOSPADM

## 2025-05-23 RX ORDER — FAMOTIDINE 20 MG/1
40 TABLET, FILM COATED ORAL DAILY
Status: DISCONTINUED | OUTPATIENT
Start: 2025-05-23 | End: 2025-05-24 | Stop reason: HOSPADM

## 2025-05-23 RX ORDER — AMOXICILLIN 250 MG
1 CAPSULE ORAL DAILY
Status: DISCONTINUED | OUTPATIENT
Start: 2025-05-23 | End: 2025-05-24 | Stop reason: HOSPADM

## 2025-05-23 RX ORDER — CETIRIZINE HYDROCHLORIDE 10 MG/1
10 TABLET ORAL DAILY
Status: DISCONTINUED | OUTPATIENT
Start: 2025-05-24 | End: 2025-05-24 | Stop reason: HOSPADM

## 2025-05-23 RX ADMIN — ALPRAZOLAM 0.5 MG: 0.5 TABLET ORAL at 20:06

## 2025-05-23 RX ADMIN — SENNOSIDES AND DOCUSATE SODIUM 1 TABLET: 50; 8.6 TABLET ORAL at 20:07

## 2025-05-23 RX ADMIN — Medication 10 ML: at 20:07

## 2025-05-23 RX ADMIN — SODIUM CHLORIDE 2000 MG: 9 INJECTION, SOLUTION INTRAVENOUS at 20:06

## 2025-05-23 RX ADMIN — SODIUM CHLORIDE 100 ML/HR: 9 INJECTION, SOLUTION INTRAVENOUS at 18:26

## 2025-05-23 RX ADMIN — PHENAZOPYRIDINE 200 MG: 100 TABLET ORAL at 20:06

## 2025-05-23 RX ADMIN — FAMOTIDINE 40 MG: 20 TABLET, FILM COATED ORAL at 20:06

## 2025-05-23 RX ADMIN — BUSPIRONE HYDROCHLORIDE 15 MG: 15 TABLET ORAL at 20:06

## 2025-05-23 NOTE — PLAN OF CARE
Goal Outcome Evaluation:  Plan of Care Reviewed With: patient, spouse        Progress: improving  Outcome Evaluation: Direct admit from /Urology - awaiting orders. Probable procedure tomorrow. VSS.

## 2025-05-23 NOTE — H&P
[facility] Surgical History and Physical    Patient Care Team:  Graciela Cadena MD as PCP - General  Graciela Cadena MD as PCP - Family Medicine  Trinity Clark MD as Consulting Physician (Obstetrics and Gynecology)    Chief complaint bladder pain    Subjective     Patient is a 59 y.o. female presents with persistent discomfort in the urethra and the bladder.  She recently went into retention.  The catheter was placed and then had to be replaced after it fell out.  Her CT scan shows somewhat dilated bladder with another bladder calcification at the lateral wall the bladder and a large bladder stone.  We just taken out a couple stones several months ago.  Now she is unable to void probably from this bladder calculus.  She having a lot of discomfort and was admitted for pain management..     Review of Systems   Pertinent items are noted in HPI    Past Medical History:   Diagnosis Date    ADHD (attention deficit hyperactivity disorder) June 2018    Anxiety 1997    ADHD    Bipolar disorder     Bladder calculus 01/08/2025    Bladder stone 12/2024    Depression     Fibroid May 2021    D and C performed by Dr. Clark on May 4, 2021    Hematuria of unknown cause     History of high cholesterol     Under control last 2 years    Hyperlipidemia     Hypertension     Hypothyroid     This has been a condition for years    Hypothyroidism     Kidney stone     OA (osteoarthritis) of knee     left    Obesity moving now to overweight    PONV (postoperative nausea and vomiting)     Sleep apnea     uses cpap    Urinary tract infection     Visits with urologist, Valentin Godinez, during fall 2023, determined after a cystoscopy, blood in urine was caused by inflammation in the bladder    Put on antibiotic.  As of 6/3/2024, everything ok.  Significant bladder stone issues since then.    Varicella     A Titeter test determined i must have had as a child     Past Surgical History:   Procedure Laterality  Date    BLADDER SUSPENSION      TVT    CATARACT EXTRACTION      COLONOSCOPY      CYSTOSCOPY BLADDER STONE LITHOTRIPSY N/A 2025    Procedure: CYSTOSCOPY BLADDER CALCULUS REMOVAL WITH LASER AND BLADDER BIOPSY;  Surgeon: Felix Godinez MD;  Location: [facility] MAIN OR;  Service: Urology;  Laterality: N/A;    D & C HYSTEROSCOPY MYOSURE N/A 2021    Procedure: DILATATION AND CURETTAGE HYSTEROSCOPY with MYOSURE;  Surgeon: Trinity Clark MD;  Location: [facility] OR;  Service: Obstetrics/Gynecology;  Laterality: N/A;    JOINT REPLACEMENT      both knees    KIDNEY STONE SURGERY      KNEE MENISCAL REPAIR Bilateral     MYOMECTOMY ABDOMINAL APPROACH  May 4,  2021    REPLACEMENT TOTAL KNEE Right     REPLACEMENT TOTAL KNEE Left     WISDOM TOOTH EXTRACTION       Family History   Problem Relation Age of Onset    Cancer Mother         Lung cancer, smoker    Alcohol abuse Mother     Hyperlipidemia Mother     Heart attack Father     Alcohol abuse Father     COPD Father         Smoker    Hyperlipidemia Father     Stroke Father         Smoker, drinker, no exercise    Vision loss Father         Macular degeneration    Hypertension Father     Colon cancer Maternal Grandmother     Developmental Disability Son         Autism    Breast cancer Neg Hx     Ovarian cancer Neg Hx     Pulmonary embolism Neg Hx     Deep vein thrombosis Neg Hx     Malig Hyperthermia Neg Hx      Social History     Tobacco Use    Smoking status: Never     Passive exposure: Never    Smokeless tobacco: Never    Tobacco comments:     My parents smoked.  My mom  due to smoking- lung cancer.   Vaping Use    Vaping status: Never Used   Substance Use Topics    Alcohol use: No    Drug use: Never       Meds:  Medications Prior to Admission   Medication Sig Dispense Refill Last Dose/Taking    albuterol sulfate  (90 Base) MCG/ACT inhaler Inhale 2 puffs Every 4 (Four) Hours As Needed for Wheezing (cough). 8.5 g 1 2025    atorvastatin  (LIPITOR) 20 MG tablet Take 1 tablet by mouth Daily. (Patient taking differently: Take 1 tablet by mouth Every Night.) 90 tablet 3 5/22/2025 at  9:00 PM    buPROPion XL (WELLBUTRIN XL) 300 MG 24 hr tablet TAKE 1 TABLET EVERY MORNING 90 tablet 3 5/23/2025 at  8:00 AM    busPIRone (BUSPAR) 15 MG tablet TAKE 1 TABLET THREE TIMES A DAY (Patient taking differently: Take 1-2 tablets by mouth Take As Directed. 30 mg in the morning and 15 mg at bedtime) 270 tablet 3 5/23/2025    cephalexin (KEFLEX) 250 MG capsule    5/22/2025 at  9:00 PM    cetirizine (zyrTEC) 10 MG tablet Take 1 tablet by mouth Daily.   5/23/2025 at  8:00 AM    CHOLECALCIFEROL PO Take 1 tablet by mouth Daily.   Past Week    Coenzyme Q10 (CO Q-10) 100 MG capsule Take 100 mg by mouth Daily.   Past Week    FIBER PO Take 2 capsules by mouth Daily.   Past Week    levothyroxine (SYNTHROID, LEVOTHROID) 50 MCG tablet Take 1 tablet by mouth Daily. 90 tablet 3 5/23/2025 at  8:00 AM    meloxicam (MOBIC) 15 MG tablet Take 1 tablet by mouth Daily With Dinner. 30 tablet 0 5/23/2025 at  8:00 AM    Omega-3 Fatty Acids (fish oil) 1000 MG capsule capsule Take 1 capsule by mouth Daily With Breakfast.   5/22/2025    oxyCODONE-acetaminophen (PERCOCET) 5-325 MG per tablet Take 1 tablet by mouth Every 4 (Four) to 6 (Six) Hours As Needed for pain 30 tablet 0 5/23/2025 at  1:00 PM    Potassium Citrate ER (Urocit-K 15) 15 MEQ (1620 MG) tablet controlled-release Take 15 mEq by mouth 2 (Two) Times a Day. 60 tablet 12 5/23/2025 at  8:00 AM    Probiotic Product (PROBIOTIC DAILY PO) Take 1 capsule by mouth Daily.   5/23/2025 at  8:00 AM    solifenacin (VESICARE) 5 MG tablet Take 1 tablet by mouth Daily. 30 tablet 5 5/23/2025 at  8:00 AM    terazosin (HYTRIN) 1 MG capsule TAKE 1 CAPSULE DAILY 90 capsule 3 5/23/2025 at  8:00 AM    Tirzepatide-Weight Management (Zepbound) 10 MG/0.5ML solution auto-injector Inject 0.5 mL under the skin into the appropriate area as directed 1 (One) Time Per  "Week. 2 mL 2 Past Week    amphetamine-dextroamphetamine (Adderall) 15 MG tablet Take 1 tablet by mouth 2 (Two) Times a Day. (Patient taking differently: Take 1 tablet by mouth 2 (Two) Times a Day As Needed.) 60 tablet 0 More than a month    Green Tea, Luba sinensis, (GREEN TEA EXTRACT PO) Take 1 tablet by mouth Daily.   More than a month    sennosides-docusate (PERICOLACE) 8.6-50 MG per tablet Take 1 tablet by mouth Daily. 1-2 tablets by mouth daily   Unknown       Allergies:  Sulfa antibiotics    Debilities:  None    Objective     Vital Signs  Temp:  [98 °F (36.7 °C)] 98 °F (36.7 °C)  Heart Rate:  [97] 97  Resp:  [18] 18  BP: (139)/(66) 139/66    Intake/Output Summary (Last 24 hours) at 5/23/2025 1811  Last data filed at 5/23/2025 1700  Gross per 24 hour   Intake --   Output 50 ml   Net -50 ml     Flowsheet Rows      Flowsheet Row First Filed Value   Admission Height 177.8 cm (70\") Documented at 05/23/2025 1657   Admission Weight 94.9 kg (209 lb 3.2 oz) Documented at 05/23/2025 1657             Physical Exam:     General Appearance:     Alert, cooperative, NAD   HEENT:     No trauma, pupils reactive, hearing intact   Back:      No CVA tenderness   Lungs:      Respirations unlabored, no wheezing    Heart:     RRR, intact peripheral pulses   Abdomen:      Soft, NDNT, no masses, no guarding   :     Correa catheter draining   Extremities:    No edema, no deformity   Lymphatic:    No neck or groin LAD   Skin:    No bleeding, bruising or rashes   Neuro/Psych:    Orientation intact, mood/affect pleasant, no focal findings     Results Review:     Results for orders placed or performed during the hospital encounter of 05/08/25   Urine Culture - Urine, Urine, Clean Catch    Collection Time: 05/08/25  6:57 PM    Specimen: Urine, Clean Catch   Result Value Ref Range    Urine Culture <25,000 CFU/mL Normal Urogenital Gin    Urinalysis With Culture If Indicated - Urine, Clean Catch    Collection Time: 05/08/25  6:57 PM    " Specimen: Urine, Clean Catch   Result Value Ref Range    Color, UA Dark Yellow (A) Yellow, Straw    Appearance, UA Cloudy (A) Clear    pH, UA 8.5 (H) 4.5 - 8.0    Specific Gravity, UA 1.020 1.003 - 1.030    Glucose, UA Negative Negative    Ketones, UA Negative Negative    Bilirubin, UA Negative Negative    Blood, UA Large (3+) (A) Negative    Protein, UA >=300 mg/dL (3+) (A) Negative    Leuk Esterase, UA Small (1+) (A) Negative    Nitrite, UA Negative Negative    Urobilinogen, UA 0.2 E.U./dL 0.2 - 1.0 E.U./dL   Urinalysis, Microscopic Only - Urine, Clean Catch    Collection Time: 05/08/25  6:57 PM    Specimen: Urine, Clean Catch   Result Value Ref Range    RBC, UA Too Numerous to Count (A) None Seen, 0-2 /HPF    WBC, UA 6-10 (A) None Seen, 0-2 /HPF    Bacteria, UA Trace (A) None Seen /HPF    Squamous Epithelial Cells, UA 0-2 None Seen, 0-2 /HPF    Hyaline Casts, UA None Seen None Seen /LPF    Amorphous Crystals, UA Moderate/2+ None Seen /HPF    Methodology Manual Light Microscopy        I reviewed the patient's prior history and old records to the best of my ability.   I have personally reviewed all pertinent imaging myself and their accompanying reports.   I have reviewed all labs.     Assessment:  Bladder pain    Plan:    Continue Correa catheter to drainage.  Start antibiotics.  N.p.o. after midnight for cystoscopy and laser lithotripsy of this recurrent bladder calculus.  Possible bladder biopsies of this calcified mass.    I discussed the patient's findings and my recommendations with patient.   Risks, complications, outcomes and alternatives discussed with the patient at the bedside and office.    Felix Godinez MD  05/23/25  18:11 EDT

## 2025-05-24 ENCOUNTER — ANESTHESIA EVENT (OUTPATIENT)
Dept: PERIOP | Facility: HOSPITAL | Age: 60
End: 2025-05-24
Payer: COMMERCIAL

## 2025-05-24 ENCOUNTER — READMISSION MANAGEMENT (OUTPATIENT)
Dept: CALL CENTER | Facility: HOSPITAL | Age: 60
End: 2025-05-24
Payer: COMMERCIAL

## 2025-05-24 ENCOUNTER — ANESTHESIA (OUTPATIENT)
Dept: PERIOP | Facility: HOSPITAL | Age: 60
End: 2025-05-24
Payer: COMMERCIAL

## 2025-05-24 ENCOUNTER — APPOINTMENT (OUTPATIENT)
Dept: GENERAL RADIOLOGY | Facility: HOSPITAL | Age: 60
End: 2025-05-24
Payer: COMMERCIAL

## 2025-05-24 VITALS
HEIGHT: 70 IN | OXYGEN SATURATION: 97 % | DIASTOLIC BLOOD PRESSURE: 69 MMHG | HEART RATE: 70 BPM | SYSTOLIC BLOOD PRESSURE: 130 MMHG | BODY MASS INDEX: 29.95 KG/M2 | RESPIRATION RATE: 16 BRPM | WEIGHT: 209.2 LBS | TEMPERATURE: 97.5 F

## 2025-05-24 LAB
ANION GAP SERPL CALCULATED.3IONS-SCNC: 7.8 MMOL/L (ref 5–15)
BUN SERPL-MCNC: 20 MG/DL (ref 8–23)
BUN/CREAT SERPL: 20.4 (ref 7–25)
CALCIUM SPEC-SCNC: 8.8 MG/DL (ref 8.6–10.5)
CHLORIDE SERPL-SCNC: 110 MMOL/L (ref 98–107)
CO2 SERPL-SCNC: 24.2 MMOL/L (ref 22–29)
CREAT SERPL-MCNC: 0.98 MG/DL (ref 0.57–1)
DEPRECATED RDW RBC AUTO: 39.3 FL (ref 37–54)
EGFRCR SERPLBLD CKD-EPI 2021: 66.2 ML/MIN/1.73
ERYTHROCYTE [DISTWIDTH] IN BLOOD BY AUTOMATED COUNT: 12.4 % (ref 12.3–15.4)
GLUCOSE SERPL-MCNC: 86 MG/DL (ref 65–99)
HCT VFR BLD AUTO: 37 % (ref 34–46.6)
HGB BLD-MCNC: 12 G/DL (ref 12–15.9)
MCH RBC QN AUTO: 28.1 PG (ref 26.6–33)
MCHC RBC AUTO-ENTMCNC: 32.4 G/DL (ref 31.5–35.7)
MCV RBC AUTO: 86.7 FL (ref 79–97)
PLATELET # BLD AUTO: 188 10*3/MM3 (ref 140–450)
PMV BLD AUTO: 9.4 FL (ref 6–12)
POTASSIUM SERPL-SCNC: 4.1 MMOL/L (ref 3.5–5.2)
RBC # BLD AUTO: 4.27 10*6/MM3 (ref 3.77–5.28)
SODIUM SERPL-SCNC: 142 MMOL/L (ref 136–145)
WBC NRBC COR # BLD AUTO: 4.07 10*3/MM3 (ref 3.4–10.8)

## 2025-05-24 PROCEDURE — C1758 CATHETER, URETERAL: HCPCS | Performed by: UROLOGY

## 2025-05-24 PROCEDURE — 25010000002 DEXAMETHASONE PER 1 MG: Performed by: NURSE ANESTHETIST, CERTIFIED REGISTERED

## 2025-05-24 PROCEDURE — 88307 TISSUE EXAM BY PATHOLOGIST: CPT | Performed by: UROLOGY

## 2025-05-24 PROCEDURE — 25010000002 HYDROMORPHONE 1 MG/ML SOLUTION: Performed by: NURSE ANESTHETIST, CERTIFIED REGISTERED

## 2025-05-24 PROCEDURE — 25010000002 CEFAZOLIN PER 500 MG: Performed by: UROLOGY

## 2025-05-24 PROCEDURE — 25010000002 PROPOFOL 200 MG/20ML EMULSION: Performed by: NURSE ANESTHETIST, CERTIFIED REGISTERED

## 2025-05-24 PROCEDURE — 85027 COMPLETE CBC AUTOMATED: CPT | Performed by: UROLOGY

## 2025-05-24 PROCEDURE — G0378 HOSPITAL OBSERVATION PER HR: HCPCS

## 2025-05-24 PROCEDURE — 25810000003 LACTATED RINGERS PER 1000 ML: Performed by: NURSE ANESTHETIST, CERTIFIED REGISTERED

## 2025-05-24 PROCEDURE — 25010000002 LIDOCAINE 2% SOLUTION: Performed by: NURSE ANESTHETIST, CERTIFIED REGISTERED

## 2025-05-24 PROCEDURE — 80048 BASIC METABOLIC PNL TOTAL CA: CPT | Performed by: UROLOGY

## 2025-05-24 PROCEDURE — 25010000002 FAMOTIDINE 10 MG/ML SOLUTION: Performed by: NURSE ANESTHETIST, CERTIFIED REGISTERED

## 2025-05-24 PROCEDURE — 25010000002 MIDAZOLAM PER 1MG: Performed by: NURSE ANESTHETIST, CERTIFIED REGISTERED

## 2025-05-24 PROCEDURE — 74420 UROGRAPHY RTRGR +-KUB: CPT

## 2025-05-24 PROCEDURE — 25010000002 ONDANSETRON PER 1 MG: Performed by: NURSE ANESTHETIST, CERTIFIED REGISTERED

## 2025-05-24 PROCEDURE — 82365 CALCULUS SPECTROSCOPY: CPT | Performed by: UROLOGY

## 2025-05-24 PROCEDURE — 25010000002 FENTANYL CITRATE (PF) 50 MCG/ML SOLUTION: Performed by: NURSE ANESTHETIST, CERTIFIED REGISTERED

## 2025-05-24 RX ORDER — OXYCODONE AND ACETAMINOPHEN 5; 325 MG/1; MG/1
1 TABLET ORAL
Qty: 30 TABLET | Refills: 0 | Status: SHIPPED | OUTPATIENT
Start: 2025-05-24

## 2025-05-24 RX ORDER — FENTANYL CITRATE 50 UG/ML
INJECTION, SOLUTION INTRAMUSCULAR; INTRAVENOUS AS NEEDED
Status: DISCONTINUED | OUTPATIENT
Start: 2025-05-24 | End: 2025-05-24 | Stop reason: SURG

## 2025-05-24 RX ORDER — SODIUM CHLORIDE, SODIUM LACTATE, POTASSIUM CHLORIDE, CALCIUM CHLORIDE 600; 310; 30; 20 MG/100ML; MG/100ML; MG/100ML; MG/100ML
100 INJECTION, SOLUTION INTRAVENOUS CONTINUOUS
Status: DISCONTINUED | OUTPATIENT
Start: 2025-05-24 | End: 2025-05-24

## 2025-05-24 RX ORDER — NITROFURANTOIN 25; 75 MG/1; MG/1
100 CAPSULE ORAL 2 TIMES DAILY
Qty: 14 CAPSULE | Refills: 0 | Status: SHIPPED | OUTPATIENT
Start: 2025-05-24

## 2025-05-24 RX ORDER — SCOPOLAMINE 1 MG/3D
1 PATCH, EXTENDED RELEASE TRANSDERMAL
Status: DISCONTINUED | OUTPATIENT
Start: 2025-05-24 | End: 2025-05-24 | Stop reason: HOSPADM

## 2025-05-24 RX ORDER — PROPOFOL 10 MG/ML
INJECTION, EMULSION INTRAVENOUS AS NEEDED
Status: DISCONTINUED | OUTPATIENT
Start: 2025-05-24 | End: 2025-05-24 | Stop reason: SURG

## 2025-05-24 RX ORDER — ONDANSETRON 2 MG/ML
4 INJECTION INTRAMUSCULAR; INTRAVENOUS ONCE AS NEEDED
Status: DISCONTINUED | OUTPATIENT
Start: 2025-05-24 | End: 2025-05-24 | Stop reason: HOSPADM

## 2025-05-24 RX ORDER — OXYCODONE AND ACETAMINOPHEN 5; 325 MG/1; MG/1
1 TABLET ORAL ONCE AS NEEDED
Status: COMPLETED | OUTPATIENT
Start: 2025-05-24 | End: 2025-05-24

## 2025-05-24 RX ORDER — DROPERIDOL 2.5 MG/ML
1.25 INJECTION, SOLUTION INTRAMUSCULAR; INTRAVENOUS ONCE AS NEEDED
Status: DISCONTINUED | OUTPATIENT
Start: 2025-05-24 | End: 2025-05-24 | Stop reason: HOSPADM

## 2025-05-24 RX ORDER — FAMOTIDINE 10 MG/ML
20 INJECTION, SOLUTION INTRAVENOUS
Status: COMPLETED | OUTPATIENT
Start: 2025-05-24 | End: 2025-05-24

## 2025-05-24 RX ORDER — SODIUM CHLORIDE, SODIUM LACTATE, POTASSIUM CHLORIDE, CALCIUM CHLORIDE 600; 310; 30; 20 MG/100ML; MG/100ML; MG/100ML; MG/100ML
30 INJECTION, SOLUTION INTRAVENOUS CONTINUOUS
Status: DISCONTINUED | OUTPATIENT
Start: 2025-05-24 | End: 2025-05-24

## 2025-05-24 RX ORDER — DEXAMETHASONE SODIUM PHOSPHATE 4 MG/ML
4 INJECTION, SOLUTION INTRA-ARTICULAR; INTRALESIONAL; INTRAMUSCULAR; INTRAVENOUS; SOFT TISSUE ONCE AS NEEDED
Status: COMPLETED | OUTPATIENT
Start: 2025-05-24 | End: 2025-05-24

## 2025-05-24 RX ORDER — ONDANSETRON 2 MG/ML
4 INJECTION INTRAMUSCULAR; INTRAVENOUS ONCE AS NEEDED
Status: COMPLETED | OUTPATIENT
Start: 2025-05-24 | End: 2025-05-24

## 2025-05-24 RX ORDER — LIDOCAINE HYDROCHLORIDE 20 MG/ML
INJECTION, SOLUTION INFILTRATION; PERINEURAL AS NEEDED
Status: DISCONTINUED | OUTPATIENT
Start: 2025-05-24 | End: 2025-05-24 | Stop reason: SURG

## 2025-05-24 RX ORDER — SODIUM CHLORIDE 9 MG/ML
40 INJECTION, SOLUTION INTRAVENOUS AS NEEDED
Status: DISCONTINUED | OUTPATIENT
Start: 2025-05-24 | End: 2025-05-24 | Stop reason: HOSPADM

## 2025-05-24 RX ORDER — LIDOCAINE HYDROCHLORIDE 10 MG/ML
0.5 INJECTION, SOLUTION EPIDURAL; INFILTRATION; INTRACAUDAL; PERINEURAL ONCE AS NEEDED
Status: DISCONTINUED | OUTPATIENT
Start: 2025-05-24 | End: 2025-05-24 | Stop reason: HOSPADM

## 2025-05-24 RX ORDER — SODIUM CHLORIDE 0.9 % (FLUSH) 0.9 %
10 SYRINGE (ML) INJECTION EVERY 12 HOURS SCHEDULED
Status: DISCONTINUED | OUTPATIENT
Start: 2025-05-24 | End: 2025-05-24 | Stop reason: HOSPADM

## 2025-05-24 RX ORDER — SODIUM CHLORIDE 0.9 % (FLUSH) 0.9 %
10 SYRINGE (ML) INJECTION AS NEEDED
Status: DISCONTINUED | OUTPATIENT
Start: 2025-05-24 | End: 2025-05-24 | Stop reason: HOSPADM

## 2025-05-24 RX ORDER — MIDAZOLAM HYDROCHLORIDE 2 MG/2ML
1 INJECTION, SOLUTION INTRAMUSCULAR; INTRAVENOUS
Status: DISCONTINUED | OUTPATIENT
Start: 2025-05-24 | End: 2025-05-24 | Stop reason: HOSPADM

## 2025-05-24 RX ORDER — FAMOTIDINE 20 MG/1
20 TABLET, FILM COATED ORAL
Status: COMPLETED | OUTPATIENT
Start: 2025-05-24 | End: 2025-05-24

## 2025-05-24 RX ADMIN — FENTANYL CITRATE 50 MCG: 50 INJECTION INTRAMUSCULAR; INTRAVENOUS at 08:19

## 2025-05-24 RX ADMIN — DEXAMETHASONE SODIUM PHOSPHATE 4 MG: 4 INJECTION INTRA-ARTICULAR; INTRALESIONAL; INTRAMUSCULAR; INTRAVENOUS; SOFT TISSUE at 07:39

## 2025-05-24 RX ADMIN — LEVOTHYROXINE SODIUM 50 MCG: 0.05 TABLET ORAL at 10:12

## 2025-05-24 RX ADMIN — TERAZOSIN HYDROCHLORIDE 1 MG: 1 CAPSULE ORAL at 10:11

## 2025-05-24 RX ADMIN — PHENAZOPYRIDINE 200 MG: 100 TABLET ORAL at 09:24

## 2025-05-24 RX ADMIN — MIDAZOLAM HYDROCHLORIDE 1 MG: 1 INJECTION, SOLUTION INTRAMUSCULAR; INTRAVENOUS at 08:00

## 2025-05-24 RX ADMIN — ALPRAZOLAM 0.5 MG: 0.5 TABLET ORAL at 10:56

## 2025-05-24 RX ADMIN — HYDROMORPHONE HYDROCHLORIDE 0.5 MG: 1 INJECTION, SOLUTION INTRAMUSCULAR; INTRAVENOUS; SUBCUTANEOUS at 09:20

## 2025-05-24 RX ADMIN — PHENAZOPYRIDINE 200 MG: 100 TABLET ORAL at 10:56

## 2025-05-24 RX ADMIN — SODIUM CHLORIDE 2000 MG: 9 INJECTION, SOLUTION INTRAVENOUS at 03:26

## 2025-05-24 RX ADMIN — CETIRIZINE HYDROCHLORIDE 10 MG: 10 TABLET, FILM COATED ORAL at 10:11

## 2025-05-24 RX ADMIN — FENTANYL CITRATE 50 MCG: 50 INJECTION INTRAMUSCULAR; INTRAVENOUS at 08:28

## 2025-05-24 RX ADMIN — FAMOTIDINE 20 MG: 10 INJECTION INTRAVENOUS at 07:39

## 2025-05-24 RX ADMIN — OXYCODONE HYDROCHLORIDE AND ACETAMINOPHEN 1 TABLET: 5; 325 TABLET ORAL at 09:24

## 2025-05-24 RX ADMIN — PROPOFOL 200 MG: 10 INJECTION, EMULSION INTRAVENOUS at 08:14

## 2025-05-24 RX ADMIN — SENNOSIDES AND DOCUSATE SODIUM 1 TABLET: 50; 8.6 TABLET ORAL at 10:11

## 2025-05-24 RX ADMIN — HYDROMORPHONE HYDROCHLORIDE 0.5 MG: 1 INJECTION, SOLUTION INTRAMUSCULAR; INTRAVENOUS; SUBCUTANEOUS at 09:10

## 2025-05-24 RX ADMIN — SCOPOLAMINE 1 PATCH: 1.5 PATCH, EXTENDED RELEASE TRANSDERMAL at 07:39

## 2025-05-24 RX ADMIN — FENTANYL CITRATE 50 MCG: 50 INJECTION INTRAMUSCULAR; INTRAVENOUS at 08:45

## 2025-05-24 RX ADMIN — PROPOFOL 100 MG: 10 INJECTION, EMULSION INTRAVENOUS at 08:25

## 2025-05-24 RX ADMIN — BUSPIRONE HYDROCHLORIDE 15 MG: 15 TABLET ORAL at 10:11

## 2025-05-24 RX ADMIN — Medication 10 ML: at 10:12

## 2025-05-24 RX ADMIN — BUPROPION HYDROCHLORIDE 300 MG: 150 TABLET, EXTENDED RELEASE ORAL at 10:10

## 2025-05-24 RX ADMIN — ONDANSETRON 4 MG: 2 INJECTION INTRAMUSCULAR; INTRAVENOUS at 07:39

## 2025-05-24 RX ADMIN — SODIUM CHLORIDE, POTASSIUM CHLORIDE, SODIUM LACTATE AND CALCIUM CHLORIDE 30 ML/HR: 600; 310; 30; 20 INJECTION, SOLUTION INTRAVENOUS at 07:20

## 2025-05-24 RX ADMIN — LIDOCAINE HYDROCHLORIDE 100 MG: 20 INJECTION, SOLUTION INFILTRATION; PERINEURAL at 08:14

## 2025-05-24 RX ADMIN — MIDAZOLAM HYDROCHLORIDE 1 MG: 1 INJECTION, SOLUTION INTRAMUSCULAR; INTRAVENOUS at 08:06

## 2025-05-24 RX ADMIN — FAMOTIDINE 40 MG: 20 TABLET, FILM COATED ORAL at 10:11

## 2025-05-24 NOTE — ANESTHESIA PREPROCEDURE EVALUATION
Anesthesia Evaluation     Patient summary reviewed and Nursing notes reviewed   history of anesthetic complications:  PONV  NPO Solid Status: > 8 hours  NPO Liquid Status: > 8 hours           Airway   Mallampati: II  TM distance: >3 FB  Neck ROM: full  No difficulty expected  Dental - normal exam     Pulmonary - normal exam    breath sounds clear to auscultation  (+) ,sleep apnea on CPAP  Cardiovascular - normal exam  Exercise tolerance: good (4-7 METS)    Rhythm: regular  Rate: normal    (+) hypertension  (-) hyperlipidemia      Neuro/Psych  (+) psychiatric history Anxiety, Depression, ADHD and Bipolar  GI/Hepatic/Renal/Endo    (+) obesity, renal disease- stones, thyroid problem hypothyroidism  (-) diabetes    Musculoskeletal     (+) back pain  Abdominal  - normal exam   Substance History - negative use     OB/GYN negative ob/gyn ROS         Other   arthritis,                       Anesthesia Plan    ASA 2     general     intravenous induction     Anesthetic plan, risks, benefits, and alternatives have been provided, discussed and informed consent has been obtained with: patient.  Pre-procedure education provided  Use of blood products discussed with patient  Consented to blood products.    Plan discussed with CRNA.        CODE STATUS:

## 2025-05-24 NOTE — DISCHARGE SUMMARY
Date of Discharge:  05/24/2025    Discharge Diagnosis: Pelvic pain secondary to bladder calculus and bladder mass    Presenting Problem/History of Present Illness  Calculus in bladder [N21.0]  Bladder calculus [N21.0]     59-year-old female with retention pelvic pain hematuria was admitted for pain management  Hospital Course  Patient is a 59 y.o. female presented with severe pelvic pain some hematuria or urinary retention.  She was taken to the OR on 5/24/2025 where she underwent cystoscopy and lithotripsy of a bladder calculi removal of this and then resection of an irregular area of the lateral wall of her bladder that was calcified.  Retrograde showed no upper tract abnormalities.  Correa out.  She is voiding well I checked on her this afternoon and I think she is ready go home.  We have restrictions for the next couple weeks.  She needs to push lots of fluids and void frequently.    Procedures Performed  Procedure(s):  CYSTOSCOPY; BILATERAL RETROGRADE PYELOGRAM; LASER LITHOTRIPSY OF BLADDER STONES; TRANSURETHERAL RESECTION OF BLADDER TUMOR       Consults:   Consults       No orders found for last 30 day(s).            Pertinent Test Results: labs: Routine labs.  Pathology pending      Condition on Discharge: Good    Vital Signs  Temp:  [96.7 °F (35.9 °C)-98.6 °F (37 °C)] 97.5 °F (36.4 °C)  Heart Rate:  [70-97] 70  Resp:  [16-20] 18  BP: (105-169)/(51-86) 169/82    Physical Exam:     General Appearance:  Alert, cooperative, in no acute distress   Head:  Normocephalic, without obvious abnormality, atraumatic   Eyes:  Lids and lashes normal, conjunctivae and sclerae normal, no icterus, no pallor, corneas clear, PERRLA   Ears:  Ears appear intact with no abnormalities noted   Throat:  No oral lesions, no thrush, oral mucosa moist   Neck:  No adenopathy, supple, trachea midline, no thyromegaly, no carotid bruit, no JVD   Back:  No kyphosis present, no scoliosis present, no skin lesions, erythema or scars, no  tenderness to percussion, or palpation, range of motion normal   Lungs:  Clear to auscultation,respirations regular, even and unlabored    Heart:  Regular rhythm and normal rate, normal S1 and S2, no murmur, no gallop, no rub, no click   Breast Exam:  Deferred   Abdomen:  Normal bowel sounds, no masses, no organomegaly, soft non-tender, non-distended, no guarding, no rebound tenderness   Genitalia:  Deferred   Extremities:  Moves all extremities well, no edema, no cyanosis, no redness   Pulses:  Pulses palpable and equal bilaterally   Skin:  No bleeding, bruising or rash   Lymph nodes:  No palpable adenopathy   Neurologic:  Cranial nerves 2 - 12 grossly intact, sensation intact, DTR present and equal bilaterally       Discharge Disposition  Home or Self Care    Discharge Medications     Discharge Medications        PAUSE taking these medications        Instructions Start Date   cephalexin 250 MG capsule  Wait to take this until: May 31, 2025  Commonly known as: KEFLEX              New Medications        Instructions Start Date   nitrofurantoin (macrocrystal-monohydrate) 100 MG capsule  Commonly known as: Macrobid   100 mg, Oral, 2 Times Daily             Changes to Medications        Instructions Start Date   atorvastatin 20 MG tablet  Commonly known as: LIPITOR  What changed: when to take this   20 mg, Oral, Daily      oxyCODONE-acetaminophen 5-325 MG per tablet  Commonly known as: PERCOCET  What changed: reasons to take this   1 tablet, Oral, Every 4 to 6 Hours PRN             Continue These Medications        Instructions Start Date   albuterol sulfate  (90 Base) MCG/ACT inhaler  Commonly known as: PROVENTIL HFA;VENTOLIN HFA;PROAIR HFA   2 puffs, Inhalation, Every 4 Hours PRN      amphetamine-dextroamphetamine 15 MG tablet  Commonly known as: Adderall   15 mg, Oral, 2 Times Daily      buPROPion  MG 24 hr tablet  Commonly known as: WELLBUTRIN XL   300 mg, Oral, Every Morning      busPIRone 15 MG  tablet  Commonly known as: BUSPAR   15 mg, Oral, 3 Times Daily      cetirizine 10 MG tablet  Commonly known as: zyrTEC   10 mg, Daily      CHOLECALCIFEROL PO   1 tablet, Daily      Co Q-10 100 MG capsule   Take 100 mg by mouth Daily.      FIBER PO   2 capsules, Daily      fish oil 1000 MG capsule capsule   1,000 mg, Daily With Breakfast      GREEN TEA EXTRACT PO   Take 1 tablet by mouth Daily.      levothyroxine 50 MCG tablet  Commonly known as: SYNTHROID, LEVOTHROID   50 mcg, Oral, Daily      meloxicam 15 MG tablet  Commonly known as: MOBIC   15 mg, Oral, Daily With Dinner      Potassium Citrate ER 15 MEQ (1620 MG) tablet controlled-release  Commonly known as: Urocit-K 15   15 mEq, Oral, 2 Times Daily      PROBIOTIC DAILY PO   1 capsule, Daily      sennosides-docusate 8.6-50 MG per tablet  Commonly known as: PERICOLACE   1 tablet, Daily      solifenacin 5 MG tablet  Commonly known as: VESICARE   5 mg, Oral, Daily      terazosin 1 MG capsule  Commonly known as: HYTRIN   1 mg, Oral, Daily      Zepbound 10 MG/0.5ML solution auto-injector  Generic drug: Tirzepatide-Weight Management   10 mg, Subcutaneous, Weekly               Discharge Diet: Regular diet    Activity at Discharge: Slowly increase activity    Follow-up Appointments  Future Appointments   Date Time Provider Department Center   7/2/2025  8:00 AM [facility] 1 [facility]   7/14/2025  9:15 AM Trinity Clark MD [facility] TC [facility]   12[facility]9/2025 10:30 [facility]     Additional Instructions for the Follow-ups that You Need to Schedule       Discharge Follow-up with Specified Provider: Dr Felix Godinez; 2 Weeks   As directed      To: Dr Felix Godinez   Follow Up: 2 Weeks                Test Results Pending at Discharge  Pending Labs       Order Current Status    STONE ANALYSIS - Calculus, Urinary Bladder In process    Tissue Pathology Exam In process             Felix Godinez MD  05/24/25  15:31  EDT    Time: Discharge 20 min

## 2025-05-24 NOTE — OUTREACH NOTE
Prep Survey      Flowsheet Row Responses   [facility] patient discharged from? [place]   Is LACE score < 7 ? Yes   Eligibility [facility]   Date of Admission 05/23/25   Date of Discharge 05/24/25   Discharge diagnosis CYSTOSCOPY,  BILATERAL RETROGRADE PYELOGRAM,  LASER LITHOTRIPSY OF BLADDER STONES,  TRANSURETHERAL RESECTION OF BLADDER TUMOR   Does the patient have one of the following disease processes/diagnoses(primary or secondary)? Other   Prep survey completed? Yes            Seema LOONEY - Registered Nurse

## 2025-05-24 NOTE — ANESTHESIA POSTPROCEDURE EVALUATION
Patient: Christine Villagomez    Procedure Summary       Date: 05/24/25 Room / Location: [facility] OR 4 /  [facility] OR    Anesthesia Start: 0810 Anesthesia Stop: 0902    Procedure: CYSTOSCOPY; BILATERAL RETROGRADE PYELOGRAM; LASER LITHOTRIPSY OF BLADDER STONES; TRANSURETHERAL RESECTION OF BLADDER TUMOR ([place]) Diagnosis:       Calculus in bladder      Bladder mass      (Bladder calculus with erythematous lateral wall bladder mass)    Surgeons: Felix Godinez MD Provider: Mateo Jackson CRNA    Anesthesia Type: general ASA Status: 2            Anesthesia Type: general    Vitals  Vitals Value Taken Time   /78 05/24/25 09:25   Temp 97.8 °F (36.6 °C) 05/24/25 09:00   Pulse 71 05/24/25 09:32   Resp 18 05/24/25 09:20   SpO2 98 % 05/24/25 09:32   Vitals shown include unfiled device data.        Post Anesthesia Care and Evaluation    Patient location during evaluation: PACU  Patient participation: complete - patient participated  Level of consciousness: awake and alert  Pain score: 2  Pain management: adequate    Airway patency: patent  Anesthetic complications: No anesthetic complications  PONV Status: none  Cardiovascular status: acceptable  Respiratory status: acceptable  Hydration status: acceptable

## 2025-05-24 NOTE — ANESTHESIA PROCEDURE NOTES
Airway  Reason: elective    Date/Time: 5/24/2025 8:15 AM  Airway not difficult    General Information and Staff    Patient location during procedure: OR  CRNA/CAA: Mateo Jackson CRNA    Indications and Patient Condition  Indications for airway management: airway protection    Preoxygenated: yes    Mask difficulty assessment: 0 - not attempted    Final Airway Details    Final airway type: supraglottic airway      Successful airway: unique  Size: 4   Number of attempts at approach: 1  Assessment: lips, teeth, and gum same as pre-op

## 2025-05-24 NOTE — PLAN OF CARE
Problem: Adult Inpatient Plan of Care  Goal: Plan of Care Review  Outcome: Adequate for Care Transition  Goal: Patient-Specific Goal (Individualized)  Outcome: Adequate for Care Transition  Goal: Absence of Hospital-Acquired Illness or Injury  Outcome: Adequate for Care Transition  Goal: Optimal Comfort and Wellbeing  Outcome: Adequate for Care Transition  Goal: Readiness for Transition of Care  Outcome: Adequate for Care Transition     Problem: Pain Acute  Goal: Optimal Pain Control and Function  Outcome: Adequate for Care Transition     Problem: Urinary Elimination Management  Goal: Effective Urinary Elimination/Continence  Outcome: Adequate for Care Transition     Problem: Comorbidity Management  Goal: Blood Pressure in Desired Range  Outcome: Adequate for Care Transition   Goal Outcome Evaluation:

## 2025-05-24 NOTE — PLAN OF CARE
Goal Outcome Evaluation:  Plan of Care Reviewed With: patient        Progress: no change  Outcome Evaluation: Alert and oriented, VS stable on RA. Patient is able to ambulate to the bathroom. For Cystoscopy Laser Lithotripsy of bladder stone wih possible bladder biopsy today. Kept NPO post MN. Informed consent attached on file. Patient has indwellign mccurdy catheter, draining pinkish urine with sediments. Denies pain. Antibx given as ordered. No other complaint made up this moment.

## 2025-05-24 NOTE — PROGRESS NOTES
[facility] Progress Note    05/24/25 08:08 EDT        Surgical plans outlined to the patient and her spouse.  All questions answered.

## 2025-05-27 ENCOUNTER — TRANSITIONAL CARE MANAGEMENT TELEPHONE ENCOUNTER (OUTPATIENT)
Dept: CALL CENTER | Facility: HOSPITAL | Age: 60
End: 2025-05-27
Payer: COMMERCIAL

## 2025-05-27 NOTE — OUTREACH NOTE
Call Center TCM Note      Flowsheet Row Responses   [facility] patient discharged from? [place]   Does the patient have one of the following disease processes/diagnoses(primary or secondary)? Other   TCM attempt successful? Yes   Call start time 1237   Call end time 1241   Discharge diagnosis CYSTOSCOPY,  BILATERAL RETROGRADE PYELOGRAM,  LASER LITHOTRIPSY OF BLADDER STONES,  TRANSURETHERAL RESECTION OF BLADDER TUMOR   Meds reviewed with patient/caregiver? Yes   Is the patient having any side effects they believe may be caused by any medication additions or changes? No   Does the patient have all medications ordered at discharge? Yes   Prescription comments New rx's Macrobid, updated Oxycodone-acet in place. Cephalexin on hold until 05/31/2025   Is the patient taking all medications as directed (includes completed medication regime)? Yes   Does the patient have an appointment with their PCP within 7-14 days of discharge? No   Nursing Interventions Patient desires to follow up with specialty only, Routed TCM call to PCP office, Patient declined scheduling/rescheduling appointment at this time   Has home health visited the patient within 72 hours of discharge? N/A   Psychosocial issues? No   Did the patient receive a copy of their discharge instructions? Yes   Nursing interventions Reviewed instructions with patient   What is the patient's perception of their health status since discharge? Improving   Is the patient/caregiver able to teach back signs and symptoms related to disease process for when to call PCP? Yes   Is the patient/caregiver able to teach back signs and symptoms related to disease process for when to call 911? Yes   Is the patient/caregiver able to teach back the hierarchy of who to call/visit for symptoms/problems? PCP, Specialist, Home health nurse, Urgent Care, ED, 911 Yes   If the patient is a current smoker, are they able to teach back resources for cessation? Not a smoker   TCM call  completed? Yes   Wrap up additional comments D/C DX: Urinary retention,  hematuria,  pelvic pain,  Laser lithotripsy bladder stones,  resection bladder tumor - Pt is so grateful to have been worked in for a Saturday sx on a holiday wkend. She is feeling so much better. First POST OP is 06/02/2025. Pt declines TCM APPT with PCP Dr Cadena as she just saw her, but does want her updated on admit/sx.   Call end time 1241            ANDREW CORLEY - Medical Assistant    5/27/2025, 12:44 EDT

## 2025-06-03 LAB
AMM URATE MFR STONE: 40 %
CA CARBONATE CRY STONE QL IR: 10 %
COLOR STONE: NORMAL
LABORATORY COMMENT REPORT: NORMAL
SIZE STONE: NORMAL MM
SPEC SOURCE SUBJ: NORMAL
TRI-PHOS MFR STONE: 50 %
WT STONE: 1098 MG

## 2025-06-07 ENCOUNTER — PATIENT MESSAGE (OUTPATIENT)
Dept: INTERNAL MEDICINE | Facility: CLINIC | Age: 60
End: 2025-06-07
Payer: COMMERCIAL

## 2025-06-07 DIAGNOSIS — S46.211A STRAIN OF RIGHT BICEPS TENDON: ICD-10-CM

## 2025-06-09 RX ORDER — MELOXICAM 15 MG/1
15 TABLET ORAL
Qty: 30 TABLET | Refills: 0 | Status: SHIPPED | OUTPATIENT
Start: 2025-06-09

## 2025-07-02 ENCOUNTER — HOSPITAL ENCOUNTER (OUTPATIENT)
Dept: MAMMOGRAPHY | Facility: HOSPITAL | Age: 60
Discharge: HOME | End: 2025-07-02
Admitting: OBSTETRICS & GYNECOLOGY
Payer: COMMERCIAL

## 2025-07-02 DIAGNOSIS — Z12.31 SCREENING MAMMOGRAM FOR BREAST CANCER: ICD-10-CM

## 2025-07-02 PROCEDURE — 77067 SCR MAMMO BI INCL CAD: CPT

## 2025-07-02 PROCEDURE — 77063 BREAST TOMOSYNTHESIS BI: CPT

## 2025-07-03 PROCEDURE — 77067 SCR MAMMO BI INCL CAD: CPT | Performed by: RADIOLOGY

## 2025-07-03 PROCEDURE — 77063 BREAST TOMOSYNTHESIS BI: CPT | Performed by: RADIOLOGY

## 2025-07-04 DIAGNOSIS — F41.8 ANXIETY WITH DEPRESSION: ICD-10-CM

## 2025-07-04 DIAGNOSIS — E03.8 OTHER SPECIFIED HYPOTHYROIDISM: ICD-10-CM

## 2025-07-04 DIAGNOSIS — S46.211A STRAIN OF RIGHT BICEPS TENDON: ICD-10-CM

## 2025-07-07 ENCOUNTER — TELEPHONE (OUTPATIENT)
Dept: INTERNAL MEDICINE | Facility: CLINIC | Age: 60
End: 2025-07-07
Payer: COMMERCIAL

## 2025-07-07 RX ORDER — BUPROPION HYDROCHLORIDE 300 MG/1
300 TABLET ORAL EVERY MORNING
Qty: 90 TABLET | Refills: 3 | OUTPATIENT
Start: 2025-07-07

## 2025-07-07 RX ORDER — LEVOTHYROXINE SODIUM 50 UG/1
50 TABLET ORAL DAILY
Qty: 90 TABLET | Refills: 3 | OUTPATIENT
Start: 2025-07-07

## 2025-07-07 RX ORDER — MELOXICAM 15 MG/1
15 TABLET ORAL
Qty: 30 TABLET | Refills: 0 | Status: SHIPPED | OUTPATIENT
Start: 2025-07-07

## 2025-07-07 NOTE — TELEPHONE ENCOUNTER
Rx Refill Note  Requested Prescriptions     Signed Prescriptions Disp Refills    meloxicam (MOBIC) 15 MG tablet 30 tablet 0     Sig: Take 1 tablet by mouth Daily With Dinner.     Authorizing Provider: JOSE HATFIELD     Ordering User: TISHA SHELTON     Refused Prescriptions Disp Refills    levothyroxine (SYNTHROID, LEVOTHROID) 50 MCG tablet 90 tablet 3     Sig: Take 1 tablet by mouth Daily.     Refused By: TISHA SHELTON     Reason for Refusal: Patient has requested refill too soon    buPROPion XL (WELLBUTRIN XL) 300 MG 24 hr tablet 90 tablet 3     Sig: Take 1 tablet by mouth Every Morning.     Refused By: TISHA SHELTON     Reason for Refusal: Patient has requested refill too soon      Last office visit with prescribing clinician: 4/28/2025   Last telemedicine visit with prescribing clinician: Visit date not found   Next office visit with prescribing clinician: Visit date not found                         Would you like a call back once the refill request has been completed: [] Yes [] No    If the office needs to give you a call back, can they leave a voicemail: [] Yes [] No    Tisha Shelton MA  07/07/25, 08:18 EDT     No

## 2025-07-07 NOTE — TELEPHONE ENCOUNTER
Hakeem Miranda from [facility] called to let Dr. Cadena know he has left messages for Ms. Villagomez.  She has not returned any of his calls. Sending this to Dr. Cadena as a FYI.

## 2025-07-14 ENCOUNTER — OFFICE VISIT (OUTPATIENT)
Dept: OBSTETRICS AND GYNECOLOGY | Facility: CLINIC | Age: 60
End: 2025-07-14
Payer: COMMERCIAL

## 2025-07-14 VITALS
BODY MASS INDEX: 29.2 KG/M2 | DIASTOLIC BLOOD PRESSURE: 72 MMHG | WEIGHT: 204 LBS | SYSTOLIC BLOOD PRESSURE: 112 MMHG | HEIGHT: 70 IN

## 2025-07-14 DIAGNOSIS — Z12.31 ENCOUNTER FOR SCREENING MAMMOGRAM FOR MALIGNANT NEOPLASM OF BREAST: ICD-10-CM

## 2025-07-14 DIAGNOSIS — N95.2 VAGINAL ATROPHY: ICD-10-CM

## 2025-07-14 DIAGNOSIS — Z01.419 ROUTINE GYNECOLOGICAL EXAMINATION: Primary | ICD-10-CM

## 2025-07-14 DIAGNOSIS — Z13.820 SCREENING FOR OSTEOPOROSIS: ICD-10-CM

## 2025-07-14 LAB
BILIRUB BLD-MCNC: NEGATIVE MG/DL
CLARITY, POC: CLEAR
COLOR UR: YELLOW
GLUCOSE UR STRIP-MCNC: NEGATIVE MG/DL
KETONES UR QL: ABNORMAL
LEUKOCYTE EST, POC: NEGATIVE
NITRITE UR-MCNC: NEGATIVE MG/ML
PH UR: 5 [PH] (ref 5–8)
PROT UR STRIP-MCNC: ABNORMAL MG/DL
RBC # UR STRIP: NEGATIVE /UL
SP GR UR: 1 (ref 1–1.03)
UROBILINOGEN UR QL: NORMAL

## 2025-07-14 RX ORDER — ESTRADIOL 0.1 MG/G
1 CREAM VAGINAL 2 TIMES WEEKLY
Qty: 45 G | Refills: 6 | Status: SHIPPED | OUTPATIENT
Start: 2025-07-14

## 2025-07-14 RX ORDER — ADHESIVE TAPE 3"X 2.3 YD
TAPE, NON-MEDICATED TOPICAL
COMMUNITY
Start: 2025-05-01

## 2025-07-14 NOTE — PROGRESS NOTES
GYN Annual Exam     CC- Here for annual exam.     Christine Villagomez is a 59 y.o. female established patient who presents for annual well woman exam. No  VB . She saw urology for hematuria and had had multiple bladder stone and and a bladder ablation. She is also having vaginal dryness and is interested in starting E2 cream vaginally.     OB History          3    Para   3    Term   3            AB        Living   1         SAB        IAB        Ectopic        Molar        Multiple        Live Births              Obstetric Comments   1                Menarche:12  Menopause:52  HRT:none  Current contraception: vasectomy &   History of abnormal Pap smear: no  History of abnormal mammogram: no  Family history of uterine, colon or ovarian cancer: yes - MGM colon age 60  Family history of breast cancer: no  STD's: none  Last pap smear: 2024- normal pap/HPV  CAROLE; none  2021- HS D&C myomectomy- path B9 fibroid  Hematuria- seeing urology      Health Maintenance   Topic Date Due    Pneumococcal Vaccine 50+ (1 of 1 - PCV) Never done    ANNUAL PHYSICAL  Never done    LIPID PANEL  2025    Annual Gynecologic Pelvic and Breast Exam  2025    INFLUENZA VACCINE  10/01/2025    COLORECTAL CANCER SCREENING  2026    PAP SMEAR  2027    MAMMOGRAM  2027    TDAP/TD VACCINES (3 - Td or Tdap) 10/18/2027    HEPATITIS C SCREENING  Completed    COVID-19 Vaccine  Completed    ZOSTER VACCINE  Completed    HEMOGLOBIN A1C  Discontinued       Past Medical History:   Diagnosis Date    ADHD (attention deficit hyperactivity disorder) 2018    Anxiety 1997    ADHD    Bipolar disorder     Bladder calculus 2025    Bladder stone 2024    Depression     Fibroid May 2021    D and C performed by Dr. Clark on May 4, 2021    Hematuria of unknown cause     History of high cholesterol     Under control last 2 years    Hyperlipidemia     Hypertension     Hypothyroid     This has been a condition  for years    Hypothyroidism     Kidney stone     OA (osteoarthritis) of knee     left    Obesity moving now to overweight    PONV (postoperative nausea and vomiting)     Sleep apnea     uses cpap    Urinary tract infection     Visits with urologist, Valentin Godinez, during fall 2023, determined after a cystoscopy, blood in urine was caused by inflammation in the bladder    Put on antibiotic.  As of 6/3/2024, everything ok.  Significant bladder stone issues since then.    Varicella     A Titeter test determined i must have had as a child       Past Surgical History:   Procedure Laterality Date    BLADDER SUSPENSION      TVT    CATARACT EXTRACTION      COLONOSCOPY      CYSTOSCOPY BLADDER STONE LITHOTRIPSY N/A 01/08/2025    Procedure: CYSTOSCOPY BLADDER CALCULUS REMOVAL WITH LASER AND BLADDER BIOPSY;  Surgeon: Felix Godinez MD;  Location: [facility] OR;  Service: Urology;  Laterality: N/A;    CYSTOSCOPY URETEROSCOPY LASER LITHOTRIPSY N/A 5/24/2025    Procedure: CYSTOSCOPY; BILATERAL RETROGRADE PYELOGRAM; LASER LITHOTRIPSY OF BLADDER STONES; TRANSURETHERAL RESECTION OF BLADDER TUMOR;  Surgeon: Felix Godinez MD;  Location: [facility];  Service: Urology;  Laterality: N/A;    D & C HYSTEROSCOPY MYOSURE N/A 05/04/2021    Procedure: DILATATION AND CURETTAGE HYSTEROSCOPY with MYOSURE;  Surgeon: Trinity Clark MD;  Location: [facility];  Service: Obstetrics/Gynecology;  Laterality: N/A;    JOINT REPLACEMENT  2021    both knees    KIDNEY STONE SURGERY      KNEE MENISCAL REPAIR Bilateral     MYOMECTOMY ABDOMINAL APPROACH  May 4,  2021    REPLACEMENT TOTAL KNEE Right     REPLACEMENT TOTAL KNEE Left     WISDOM TOOTH EXTRACTION  1982         Current Outpatient Medications:     albuterol sulfate  (90 Base) MCG/ACT inhaler, Inhale 2 puffs Every 4 (Four) Hours As Needed for Wheezing (cough)., Disp: 8.5 g, Rfl: 1    atorvastatin (LIPITOR) 20 MG tablet, Take 1 tablet by mouth Daily. (Patient taking  differently: Take 1 tablet by mouth Every Night.), Disp: 90 tablet, Rfl: 3    buPROPion XL (WELLBUTRIN XL) 300 MG 24 hr tablet, TAKE 1 TABLET EVERY MORNING, Disp: 90 tablet, Rfl: 3    busPIRone (BUSPAR) 15 MG tablet, TAKE 1 TABLET THREE TIMES A DAY (Patient taking differently: Take 1-2 tablets by mouth Take As Directed. 30 mg in the morning and 15 mg at bedtime), Disp: 270 tablet, Rfl: 3    cephalexin (KEFLEX) 250 MG capsule, , Disp: , Rfl:     cetirizine (zyrTEC) 10 MG tablet, Take 1 tablet by mouth Daily., Disp: , Rfl:     CHOLECALCIFEROL PO, Take 1 tablet by mouth Daily., Disp: , Rfl:     Coenzyme Q10 (CO Q-10) 100 MG capsule, Take 100 mg by mouth Daily., Disp: , Rfl:     Cranberry 500 MG capsule, , Disp: , Rfl:     FIBER PO, Take 2 capsules by mouth Daily., Disp: , Rfl:     Green Tea, Luba sinensis, (GREEN TEA EXTRACT PO), Take 1 tablet by mouth Daily., Disp: , Rfl:     levothyroxine (SYNTHROID, LEVOTHROID) 50 MCG tablet, Take 1 tablet by mouth Daily., Disp: 90 tablet, Rfl: 3    meloxicam (MOBIC) 15 MG tablet, Take 1 tablet by mouth Daily With Dinner., Disp: 30 tablet, Rfl: 0    Omega-3 Fatty Acids (fish oil) 1000 MG capsule capsule, Take 1 capsule by mouth Daily With Breakfast., Disp: , Rfl:     Probiotic Product (PROBIOTIC DAILY PO), Take 1 capsule by mouth Daily., Disp: , Rfl:     sennosides-docusate (PERICOLACE) 8.6-50 MG per tablet, Take 1 tablet by mouth Daily. 1-2 tablets by mouth daily, Disp: , Rfl:     terazosin (HYTRIN) 1 MG capsule, TAKE 1 CAPSULE DAILY, Disp: 90 capsule, Rfl: 3    Tirzepatide-Weight Management (Zepbound) 10 MG/0.5ML solution auto-injector, Inject 0.5 mL under the skin into the appropriate area as directed 1 (One) Time Per Week., Disp: 2 mL, Rfl: 2    amphetamine-dextroamphetamine (Adderall) 15 MG tablet, Take 1 tablet by mouth 2 (Two) Times a Day. (Patient not taking: Reported on 7/14/2025), Disp: 60 tablet, Rfl: 0    estradiol (ESTRACE) 0.1 MG/GM vaginal cream, Insert 1 g into  the vagina 2 (Two) Times a Week., Disp: 45 g, Rfl: 6    oxyCODONE-acetaminophen (PERCOCET) 5-325 MG per tablet, Take 1 tablet by mouth Every 4 (Four) to 6 (Six) Hours As Needed for Moderate Pain. (Patient not taking: Reported on 2025), Disp: 30 tablet, Rfl: 0    Allergies   Allergen Reactions    Sulfa Antibiotics Rash     fever         Social History     Tobacco Use    Smoking status: Never     Passive exposure: Never    Smokeless tobacco: Never    Tobacco comments:     My parents smoked.  My mom  due to smoking- lung cancer.   Vaping Use    Vaping status: Never Used   Substance Use Topics    Alcohol use: No    Drug use: Never       Family History   Problem Relation Age of Onset    Cancer Mother         Lung cancer, smoker    Alcohol abuse Mother     Hyperlipidemia Mother     Heart attack Father     Alcohol abuse Father     COPD Father         Smoker    Hyperlipidemia Father     Stroke Father         Smoker, drinker, no exercise    Vision loss Father         Macular degeneration    Hypertension Father     Colon cancer Maternal Grandmother     Developmental Disability Son         Autism    Breast cancer Neg Hx     Ovarian cancer Neg Hx     Pulmonary embolism Neg Hx     Deep vein thrombosis Neg Hx     Malig Hyperthermia Neg Hx        Review of Systems   Constitutional:  Positive for activity change and unexpected weight change (loss). Negative for appetite change, fatigue and fever.   Respiratory:  Negative for cough and shortness of breath.    Cardiovascular:  Negative for chest pain and palpitations.   Gastrointestinal:  Negative for abdominal distention, abdominal pain, constipation, diarrhea and nausea.   Endocrine: Negative for cold intolerance and heat intolerance.   Genitourinary:  Positive for dyspareunia. Negative for decreased urine volume, dysuria, menstrual problem, pelvic pain, vaginal bleeding, vaginal discharge and vaginal pain.   Musculoskeletal:  Negative for arthralgias and gait problem.  "  Skin:  Negative for color change and rash.   Neurological:  Negative for headaches.   Psychiatric/Behavioral:  The patient is not nervous/anxious.        /72   Ht 177.8 cm (70\")   Wt 92.5 kg (204 lb)   LMP  (LMP Unknown)   BMI 29.27 kg/m²     Physical Exam   Constitutional: She is oriented to person, place, and time. She appears well-developed.   HENT:   Head: Normocephalic and atraumatic.   Eyes: Conjunctivae are normal. No scleral icterus.   Neck: No thyromegaly present.   Cardiovascular: Normal rate and regular rhythm.   Pulmonary/Chest: Effort normal and breath sounds normal. Right breast exhibits no inverted nipple, no mass, no nipple discharge, no skin change and no tenderness. Left breast exhibits no inverted nipple, no mass, no nipple discharge, no skin change and no tenderness.   Abdominal: Soft. Normal appearance and bowel sounds are normal. She exhibits no distension and no mass. There is no abdominal tenderness. There is no rebound and no guarding. No hernia.   Genitourinary:    Rectum normal.      Pelvic exam was performed with patient supine.   There is no rash, tenderness, lesion or injury on the right labia. There is no rash, tenderness, lesion or injury on the left labia. Uterus is not deviated, not enlarged, not fixed and not tender. Cervix exhibits no motion tenderness, no discharge and no friability. Right adnexum displays no mass, no tenderness and no fullness. Left adnexum displays no mass, no tenderness and no fullness.    No vaginal discharge, erythema, tenderness or bleeding.   No erythema, tenderness or bleeding in the vagina.    No foreign body in the vagina.      No signs of injury in the vagina.      Genitourinary Comments: Mild atrophy  Cystocele noted     Neurological: She is alert and oriented to person, place, and time.   Skin: Skin is warm and dry. No rash noted.   Scattered rash w/excoriations over her extremities   Psychiatric: Her behavior is normal. Mood, judgment " and thought content normal.   Nursing note and vitals reviewed.         Assessment/Plan    1) GYN HM: normal pap/HPV 6/2024. SBE demonstrated and encouraged.  2) STD screening: declines Condoms encouraged.  3) Bone health - Weight bearing exercise, dietary calcium recommendations and vitamin D reviewed.   4) Vulvar rash- refer to derm for evaluation.  5) Smoking Status: No   6) Social: no issue  7)MMG:  UTD 7/2025 B1, scheduled MMG 7/2026  8) DEXA- UTD 6/2020- normal, repeat due now  9)C scope- UTD 3/2016, repeat 10 years.   10)Vaginal atrophy- ERX Estrace cream x2/week Patient counseled that vaginal estrogen rings, creams and tablets are available and highly effective at treating local vaginal symptoms such as atrophy and vaginal dryness.  Vaginal estrogen does not cause uterine overgrowth and does not require a progestogen to protect the uterus.  Very small amounts of estrogen are absorbed systemically.  For patients with a history of an estrogen dependent cancer such as breast cancer, the decision to use local estrogen for local vaginal symptoms should be made after consultation with her oncologist.  Possible side effects include local irritation or burning and/or vaginal bleeding and should always be reported.    11)  Parts of this document have been copied or forwarded from her previous visits and have been reviewed, updated and edited as indicated.   12)Follow up prn and 1 year           Diagnoses and all orders for this visit:    1. Routine gynecological examination (Primary)  -     POC Urinalysis Dipstick    2. Screening for osteoporosis  -     DEXA Bone Density Axial; Future    3. Vaginal atrophy    4. Encounter for screening mammogram for malignant neoplasm of breast  -     Mammo Screening Digital Tomosynthesis Bilateral With CAD; Future    Other orders  -     estradiol (ESTRACE) 0.1 MG/GM vaginal cream; Insert 1 g into the vagina 2 (Two) Times a Week.  Dispense: 45 g; Refill: 6        Trinity Gill  MD Eduardo  7/14/2025  09:26 EDT

## 2025-07-25 ENCOUNTER — APPOINTMENT (OUTPATIENT)
Dept: BONE DENSITY | Facility: HOSPITAL | Age: 60
End: 2025-07-25
Payer: COMMERCIAL

## 2025-07-25 DIAGNOSIS — Z13.820 SCREENING FOR OSTEOPOROSIS: ICD-10-CM

## 2025-07-25 PROCEDURE — 77080 DXA BONE DENSITY AXIAL: CPT

## 2025-08-01 DIAGNOSIS — S46.211A STRAIN OF RIGHT BICEPS TENDON: ICD-10-CM

## 2025-08-04 RX ORDER — MELOXICAM 15 MG/1
15 TABLET ORAL
Qty: 30 TABLET | Refills: 5 | Status: SHIPPED | OUTPATIENT
Start: 2025-08-04

## 2025-08-11 DIAGNOSIS — E66.01 MORBID (SEVERE) OBESITY DUE TO EXCESS CALORIES: ICD-10-CM

## 2025-08-17 DIAGNOSIS — F41.8 ANXIETY WITH DEPRESSION: ICD-10-CM

## 2025-08-17 DIAGNOSIS — F32.A DEPRESSION, UNSPECIFIED DEPRESSION TYPE: ICD-10-CM

## 2025-08-18 RX ORDER — TERAZOSIN 1 MG/1
1 CAPSULE ORAL DAILY
Qty: 90 CAPSULE | Refills: 3 | Status: SHIPPED | OUTPATIENT
Start: 2025-08-18

## (undated) DEVICE — DEV TISS REMOV MYOSURE/LITE HYSTEROSCP

## (undated) DEVICE — TBG CONN INFLOW AQUILEX/MYOSURE

## (undated) DEVICE — 1000ML,PRESSURE INFUSER W/STOPCOCK: Brand: MEDLINE

## (undated) DEVICE — CRD/GUARDIAN TANDEM TUBE 18": Brand: CARDINAL HEALTH

## (undated) DEVICE — CONTAINER,SPECIMEN,OR STERILE,4OZ: Brand: MEDLINE

## (undated) DEVICE — PK CYSTO 90

## (undated) DEVICE — SEAL HYSTERSCOPE/OUTFLOW CHANNEL MYOSURE

## (undated) DEVICE — FIBR LASR HOLMIUM SINGLEFLEX400 FLT/TP DISP

## (undated) DEVICE — SOL IRR H2O BO 1000ML STRL

## (undated) DEVICE — GLV SURG NEOLON 2G PF LF 6.5 STRL

## (undated) DEVICE — ELECTRD LP CUT 24/26F YEL

## (undated) DEVICE — TBG CONN OUTFLOW AQUILEX/MYOSURE

## (undated) DEVICE — SYRINGE,TOOMEY,IRRIGATION,70CC,STERILE: Brand: MEDLINE

## (undated) DEVICE — LAG PERI GYN: Brand: MEDLINE INDUSTRIES, INC.

## (undated) DEVICE — COLLECTION SOCK, GENERAL: Brand: DEROYAL

## (undated) DEVICE — CYSTO/BLADDER IRRIGATION SET, REGULATING CLAMP

## (undated) DEVICE — GLOVE,SURG,SENSICARE,ALOE,LF,PF,7: Brand: MEDLINE

## (undated) DEVICE — Device: Brand: INTELLICART™

## (undated) DEVICE — SUCTION CANISTER, 2500CC, RIGID: Brand: DEROYAL

## (undated) DEVICE — CATH URETRL FLXITP POLLACK STD 5F 70CM